# Patient Record
Sex: FEMALE | Race: WHITE | Employment: OTHER | URBAN - METROPOLITAN AREA
[De-identification: names, ages, dates, MRNs, and addresses within clinical notes are randomized per-mention and may not be internally consistent; named-entity substitution may affect disease eponyms.]

---

## 2017-06-05 ENCOUNTER — OFFICE VISIT (OUTPATIENT)
Dept: GERIATRIC MEDICINE | Age: 82
End: 2017-06-05

## 2017-06-05 VITALS
DIASTOLIC BLOOD PRESSURE: 72 MMHG | TEMPERATURE: 97.2 F | HEART RATE: 69 BPM | OXYGEN SATURATION: 96 % | HEIGHT: 61 IN | WEIGHT: 151.5 LBS | SYSTOLIC BLOOD PRESSURE: 159 MMHG | BODY MASS INDEX: 28.6 KG/M2 | RESPIRATION RATE: 18 BRPM

## 2017-06-05 DIAGNOSIS — R41.89 ALTERATION IN COGNITION: ICD-10-CM

## 2017-06-05 DIAGNOSIS — F41.9 ANXIETY: Primary | ICD-10-CM

## 2017-06-05 DIAGNOSIS — R26.89 BALANCE PROBLEM: ICD-10-CM

## 2017-06-05 DIAGNOSIS — F02.80 DEMENTIA IN ALZHEIMER'S DISEASE WITH EARLY ONSET (HCC): ICD-10-CM

## 2017-06-05 DIAGNOSIS — G30.0 DEMENTIA IN ALZHEIMER'S DISEASE WITH EARLY ONSET (HCC): ICD-10-CM

## 2017-06-05 DIAGNOSIS — R79.9 ABNORMAL FINDING OF BLOOD CHEMISTRY: ICD-10-CM

## 2017-06-05 DIAGNOSIS — I10 ESSENTIAL HYPERTENSION: ICD-10-CM

## 2017-06-05 PROBLEM — K76.0 FATTY LIVER DISEASE, NONALCOHOLIC: Chronic | Status: ACTIVE | Noted: 2017-06-05

## 2017-06-05 PROBLEM — N28.9 KIDNEY INSUFFICIENCY: Chronic | Status: ACTIVE | Noted: 2017-06-05

## 2017-06-05 RX ORDER — DONEPEZIL HYDROCHLORIDE 5 MG/1
5 TABLET, FILM COATED ORAL
Qty: 30 TAB | Refills: 1 | Status: SHIPPED | OUTPATIENT
Start: 2017-06-05 | End: 2018-01-18 | Stop reason: SINTOL

## 2017-06-05 RX ORDER — LORAZEPAM 0.5 MG/1
0.5 TABLET ORAL
Qty: 30 TAB | Refills: 2 | Status: SHIPPED | OUTPATIENT
Start: 2017-06-05 | End: 2018-01-18

## 2017-06-05 RX ORDER — FAMOTIDINE 20 MG/1
20 TABLET, FILM COATED ORAL DAILY
COMMUNITY
End: 2018-02-22 | Stop reason: ALTCHOICE

## 2017-06-05 NOTE — PATIENT INSTRUCTIONS

## 2017-06-05 NOTE — PROGRESS NOTES
Visit Vitals    /72 (BP 1 Location: Right arm, BP Patient Position: Sitting)    Pulse 69    Temp 97.2 °F (36.2 °C) (Oral)    Resp 18    Ht 5' 1\" (1.549 m)    Wt 151 lb 8 oz (68.7 kg)    LMP 01/01/2000    SpO2 96%    BMI 28.63 kg/m2     Chief Complaint   Patient presents with    Follow-up     yearly exam     Pt here in clinic for yearly exam. Pt states that she is having hot flashes lasting about half an hour or so.

## 2017-06-08 ENCOUNTER — CLINICAL SUPPORT (OUTPATIENT)
Dept: GERIATRIC MEDICINE | Age: 82
End: 2017-06-08

## 2017-06-08 DIAGNOSIS — R79.9 ABNORMAL FINDING OF BLOOD CHEMISTRY: ICD-10-CM

## 2017-06-08 DIAGNOSIS — F02.80 DEMENTIA IN ALZHEIMER'S DISEASE WITH EARLY ONSET (HCC): ICD-10-CM

## 2017-06-08 DIAGNOSIS — Z01.89 ROUTINE LAB DRAW: Primary | ICD-10-CM

## 2017-06-08 DIAGNOSIS — I10 ESSENTIAL HYPERTENSION: ICD-10-CM

## 2017-06-08 DIAGNOSIS — G30.0 DEMENTIA IN ALZHEIMER'S DISEASE WITH EARLY ONSET (HCC): ICD-10-CM

## 2017-06-08 DIAGNOSIS — R26.89 BALANCE PROBLEM: ICD-10-CM

## 2017-06-08 DIAGNOSIS — R41.89 ALTERATION IN COGNITION: ICD-10-CM

## 2017-06-08 NOTE — PROGRESS NOTES
Chief Complaint   Patient presents with    Labs Only     Pt here in clinic for lab draw only. Labs drawn CBC, CMP, TSH,Lipid Panel, HgbA1C. Pt was informed that results will be called/mailed to her.

## 2017-06-09 LAB
ALBUMIN SERPL-MCNC: 4.4 G/DL (ref 3.5–4.7)
ALBUMIN/GLOB SERPL: 1.6 {RATIO} (ref 1.2–2.2)
ALP SERPL-CCNC: 73 IU/L (ref 39–117)
ALT SERPL-CCNC: 18 IU/L (ref 0–32)
AST SERPL-CCNC: 23 IU/L (ref 0–40)
BASOPHILS # BLD AUTO: 0 X10E3/UL (ref 0–0.2)
BASOPHILS NFR BLD AUTO: 0 %
BILIRUB SERPL-MCNC: 0.4 MG/DL (ref 0–1.2)
BUN SERPL-MCNC: 19 MG/DL (ref 8–27)
BUN/CREAT SERPL: 21 (ref 12–28)
CALCIUM SERPL-MCNC: 9.5 MG/DL (ref 8.7–10.3)
CHLORIDE SERPL-SCNC: 91 MMOL/L (ref 96–106)
CHOLEST SERPL-MCNC: 177 MG/DL (ref 100–199)
CO2 SERPL-SCNC: 24 MMOL/L (ref 18–29)
CREAT SERPL-MCNC: 0.89 MG/DL (ref 0.57–1)
EOSINOPHIL # BLD AUTO: 0.2 X10E3/UL (ref 0–0.4)
EOSINOPHIL NFR BLD AUTO: 3 %
ERYTHROCYTE [DISTWIDTH] IN BLOOD BY AUTOMATED COUNT: 13.7 % (ref 12.3–15.4)
EST. AVERAGE GLUCOSE BLD GHB EST-MCNC: 128 MG/DL
GLOBULIN SER CALC-MCNC: 2.7 G/DL (ref 1.5–4.5)
GLUCOSE SERPL-MCNC: 99 MG/DL (ref 65–99)
HBA1C MFR BLD: 6.1 % (ref 4.8–5.6)
HCT VFR BLD AUTO: 40.9 % (ref 34–46.6)
HDLC SERPL-MCNC: 51 MG/DL
HGB BLD-MCNC: 13.8 G/DL (ref 11.1–15.9)
IMM GRANULOCYTES # BLD: 0 X10E3/UL (ref 0–0.1)
IMM GRANULOCYTES NFR BLD: 0 %
LDLC SERPL CALC-MCNC: 86 MG/DL (ref 0–99)
LYMPHOCYTES # BLD AUTO: 1.7 X10E3/UL (ref 0.7–3.1)
LYMPHOCYTES NFR BLD AUTO: 27 %
MCH RBC QN AUTO: 30.5 PG (ref 26.6–33)
MCHC RBC AUTO-ENTMCNC: 33.7 G/DL (ref 31.5–35.7)
MCV RBC AUTO: 91 FL (ref 79–97)
MONOCYTES # BLD AUTO: 0.9 X10E3/UL (ref 0.1–0.9)
MONOCYTES NFR BLD AUTO: 15 %
NEUTROPHILS # BLD AUTO: 3.5 X10E3/UL (ref 1.4–7)
NEUTROPHILS NFR BLD AUTO: 55 %
PLATELET # BLD AUTO: 267 X10E3/UL (ref 150–379)
POTASSIUM SERPL-SCNC: 4.1 MMOL/L (ref 3.5–5.2)
PROT SERPL-MCNC: 7.1 G/DL (ref 6–8.5)
RBC # BLD AUTO: 4.52 X10E6/UL (ref 3.77–5.28)
SODIUM SERPL-SCNC: 132 MMOL/L (ref 134–144)
TRIGL SERPL-MCNC: 201 MG/DL (ref 0–149)
TSH SERPL DL<=0.005 MIU/L-ACNC: 4.28 UIU/ML (ref 0.45–4.5)
VLDLC SERPL CALC-MCNC: 40 MG/DL (ref 5–40)
WBC # BLD AUTO: 6.4 X10E3/UL (ref 3.4–10.8)

## 2017-06-12 NOTE — PROGRESS NOTES
Even though these results have a few items that are out of the normal they are really good values. I would not change anything at this time and continue with the current regimen of treatment for the dizziness episodes.

## 2018-01-17 ENCOUNTER — TELEPHONE (OUTPATIENT)
Dept: GERIATRIC MEDICINE | Age: 83
End: 2018-01-17

## 2018-01-17 NOTE — TELEPHONE ENCOUNTER
I called the patient to remind her of her appointment on January 18, 2018. Pt stated that they will be on time and will not miss their appointment for the world.

## 2018-01-18 ENCOUNTER — OFFICE VISIT (OUTPATIENT)
Dept: GERIATRIC MEDICINE | Age: 83
End: 2018-01-18

## 2018-01-18 VITALS
RESPIRATION RATE: 20 BRPM | HEART RATE: 63 BPM | SYSTOLIC BLOOD PRESSURE: 185 MMHG | BODY MASS INDEX: 27.87 KG/M2 | WEIGHT: 147.6 LBS | DIASTOLIC BLOOD PRESSURE: 79 MMHG | OXYGEN SATURATION: 96 % | HEIGHT: 61 IN

## 2018-01-18 DIAGNOSIS — K59.01 SLOW TRANSIT CONSTIPATION: ICD-10-CM

## 2018-01-18 DIAGNOSIS — Z78.9 SELF-CARE DEFICIT IN PATIENT LIVING ALONE: ICD-10-CM

## 2018-01-18 DIAGNOSIS — Z00.00 MEDICARE ANNUAL WELLNESS VISIT, SUBSEQUENT: Primary | ICD-10-CM

## 2018-01-18 DIAGNOSIS — G47.62 NOCTURNAL LEG CRAMPS: ICD-10-CM

## 2018-01-18 DIAGNOSIS — F51.01 PRIMARY INSOMNIA: ICD-10-CM

## 2018-01-18 DIAGNOSIS — N28.9 KIDNEY INSUFFICIENCY: Chronic | ICD-10-CM

## 2018-01-18 DIAGNOSIS — E78.2 MIXED HYPERLIPIDEMIA: ICD-10-CM

## 2018-01-18 DIAGNOSIS — R26.0 ATAXIC GAIT: ICD-10-CM

## 2018-01-18 DIAGNOSIS — Z71.89 ADVANCE CARE PLANNING: ICD-10-CM

## 2018-01-18 DIAGNOSIS — F41.8 ANTICIPATORY ANXIETY: ICD-10-CM

## 2018-01-18 DIAGNOSIS — R26.89 BALANCE PROBLEM: ICD-10-CM

## 2018-01-18 DIAGNOSIS — I10 ESSENTIAL HYPERTENSION: ICD-10-CM

## 2018-01-18 NOTE — PROGRESS NOTES
Visit Vitals    /79 (BP 1 Location: Right arm, BP Patient Position: Sitting)    Pulse 63    Resp 20    Ht 5' 1\" (1.549 m)    Wt 147 lb 9.6 oz (67 kg)    LMP 01/01/2000    SpO2 96%    BMI 27.89 kg/m2      Chief Complaint   Patient presents with    Complete Physical     Patient here for yearly physical.    1. Have you been to the ER, urgent care clinic since your last visit? Hospitalized since your last visit? NO     2. Have you seen or consulted any other health care providers outside of the [address] [place] [address] since your last visit? Include any pap smears or colon screening.  NO

## 2018-01-18 NOTE — PATIENT INSTRUCTIONS
Constipation: Care Instructions  Your Care Instructions    Constipation means that you have a hard time passing stools (bowel movements). People pass stools from 3 times a day to once every 3 days. What is normal for you may be different. Constipation may occur with pain in the rectum and cramping. The pain may get worse when you try to pass stools. Sometimes there are small amounts of bright red blood on toilet paper or the surface of stools. This is because of enlarged veins near the rectum (hemorrhoids). A few changes in your diet and lifestyle may help you avoid ongoing constipation. Your doctor may also prescribe medicine to help loosen your stool. Some medicines can cause constipation. These include pain medicines and antidepressants. Tell your doctor about all the medicines you take. Your doctor may want to make a medicine change to ease your symptoms. Follow-up care is a key part of your treatment and safety. Be sure to make and go to all appointments, and call your doctor if you are having problems. It's also a good idea to know your test results and keep a list of the medicines you take. How can you care for yourself at home? · Drink plenty of fluids, enough so that your urine is light yellow or clear like water. If you have kidney, heart, or liver disease and have to limit fluids, talk with your doctor before you increase the amount of fluids you drink. · Include high-fiber foods in your diet each day. These include fruits, vegetables, beans, and whole grains. · Get at least 30 minutes of exercise on most days of the week. Walking is a good choice. You also may want to do other activities, such as running, swimming, cycling, or playing tennis or team sports. · Take a fiber supplement, such as Citrucel or Metamucil, every day. Read and follow all instructions on the label. · Schedule time each day for a bowel movement. A daily routine may help.  Take your time having your bowel movement. · Support your feet with a small step stool when you sit on the toilet. This helps flex your hips and places your pelvis in a squatting position. · Your doctor may recommend an over-the-counter laxative to relieve your constipation. Examples are Milk of Magnesia and MiraLax. Read and follow all instructions on the label. Do not use laxatives on a long-term basis. When should you call for help? Call your doctor now or seek immediate medical care if:  ? · You have new or worse belly pain. ? · You have new or worse nausea or vomiting. ? · You have blood in your stools. ? Watch closely for changes in your health, and be sure to contact your doctor if:  ? · Your constipation is getting worse. ? · You do not get better as expected. Where can you learn more? Go to http://pia-colby.info/. Enter [id number] in the search box to learn more about \"Constipation: Care Instructions. \"  Current as of: March 20, 2017  Content Version: 11.4  © 8845-9451 [employer]. Care instructions adapted under license by [employer] (which disclaims liability or warranty for this information). If you have questions about a medical condition or this instruction, always ask your healthcare professional. [address] any warranty or liability for your use of this information. Insomnia: Care Instructions  Your Care Instructions    Insomnia is the inability to sleep well. It is a common problem for most people at some time. Insomnia may make it hard for you to get to sleep, stay asleep, or sleep as long as you need to. This can make you tired and grouchy during the day. It can also make you forgetful, less effective at work, and unhappy. Insomnia can be caused by conditions such as depression or anxiety. Pain can also affect your ability to sleep. When these problems are solved, the insomnia usually clears up.  But sometimes bad sleep habits can cause insomnia. If insomnia is affecting your work or your enjoyment of life, you can take steps to improve your sleep. Follow-up care is a key part of your treatment and safety. Be sure to make and go to all appointments, and call your doctor if you are having problems. It's also a good idea to know your test results and keep a list of the medicines you take. How can you care for yourself at home? What to avoid  · Do not have drinks with caffeine, such as coffee or black tea, for 8 hours before bed. · Do not smoke or use other types of tobacco near bedtime. Nicotine is a stimulant and can keep you awake. · Avoid drinking alcohol late in the evening, because it can cause you to wake in the middle of the night. · Do not eat a big meal close to bedtime. If you are hungry, eat a light snack. · Do not drink a lot of water close to bedtime, because the need to urinate may wake you up during the night. · Do not read or watch TV in bed. Use the bed only for sleeping and sexual activity. What to try  · Go to bed at the same time every night, and wake up at the same time every morning. Do not take naps during the day. · Keep your bedroom quiet, dark, and cool. · Sleep on a comfortable pillow and mattress. · If watching the clock makes you anxious, turn it facing away from you so you cannot see the time. · If you worry when you lie down, start a worry book. Well before bedtime, write down your worries, and then set the book and your concerns aside. · Try meditation or other relaxation techniques before you go to bed. · If you cannot fall asleep, get up and go to another room until you feel sleepy. Do something relaxing. Repeat your bedtime routine before you go to bed again. · Make your house quiet and calm about an hour before bedtime. Turn down the lights, turn off the TV, log off the computer, and turn down the volume on music. This can help you relax after a busy day. When should you call for help?   Watch closely for changes in your health, and be sure to contact your doctor if:  ? · Your efforts to improve your sleep do not work. ? · Your insomnia gets worse. ? · You have been feeling down, depressed, or hopeless or have lost interest in things that you usually enjoy. Where can you learn more? Go to http://pia-colby.info/. Enter P513 in the search box to learn more about \"Insomnia: Care Instructions. \"  Current as of: July 26, 2016  Content Version: 11.4  © 2177-1817 [employer]. Care instructions adapted under license by [employer] (which disclaims liability or warranty for this information). If you have questions about a medical condition or this instruction, always ask your healthcare professional. [address] any warranty or liability for your use of this information.

## 2018-01-18 NOTE — PROGRESS NOTES
Martha Padilla is a 80 y.o. female and presents for annual Medicare Wellness Visit. Problem List: Reviewed with patient and discussed risk factors. 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  Holter    HX APPENDECTOMY      HX DILATION AND CURETTAGE      x5    HX GYN      D&C (x5), ALLEY/BSO (-Juliann/Zedler), Provera intolerance (bleeding), endometrial Bx annually    HX ALLEY AND BSO      Juliann/Zedler        SH: Reviewed with patient  Social History   Substance Use Topics    Smoking status: Former Smoker     Packs/day: 4.00     Types: Cigarettes     Quit date: 1955    Smokeless tobacco: Never Used    Alcohol use 0.0 - 0.5 oz/week     0 - 1 Standard drinks or equivalent per week      Comment: rare       FH: Reviewed with patient  Family History   Problem Relation Age of Onset    Diabetes Mother     Hypertension Mother     Heart Failure Mother      CHF ( @ 80)   [facility] Alzheimer Mother     Cancer Father      lung ( @ 77)   [facility] Alzheimer Father     Ovarian Cancer Sister      hysterectomy    Breast Cancer Sister      mastectomy    Cancer Sister      breast and ovarian    Alzheimer Sister     Breast Problems Sister breast cyst    Cataract Sister     Hypertension Sister     Diabetes Brother        Medications/Allergies: Reviewed with patient. Current Outpatient Prescriptions on File Prior to Visit   Medication Sig Dispense Refill    famotidine (PEPCID) 20 mg tablet Take 20 mg by mouth daily.  cholecalciferol, vitamin D3, 2,000 unit tab Take  by mouth.  diltiazem CD (CARTIA XT) 180 mg ER capsule Take  by mouth daily.  simvastatin (ZOCOR) 10 mg tablet Take 10 mg by mouth daily.  aspirin 81 mg tablet Take 81 mg by mouth.  MULTIVITAMIN PO Take  by mouth.  CALCIUM CARBONATE/VITAMIN D3 (CALTRATE 600 + D PO) Take  by mouth. No current facility-administered medications on file prior to visit. Allergies   Allergen Reactions    Celecoxib Other (comments)    Sulfa (Sulfonamide Antibiotics) Unknown (comments) and Hives    Darvocet A500 [Propoxyphene N-Acetaminophen] Unknown (comments)    Iodine Unknown (comments)    Statins-Hmg-Coa Reductase Inhibitors Other (comments)     (intolerance)-myalgias    Sulfa (Sulfonamide Antibiotics) Unknown (comments)    Aricept [Donepezil] Myalgia     Headaches - noted as intolerance      Medications Discontinued During This Encounter   Medication Reason    donepezil (ARICEPT) 5 mg tablet Side Effects    LORazepam (ATIVAN) 0.5 mg tablet Not A Current Medication    fexofenadine (ALLEGRA) 180 mg tablet Not A Current Medication       Objective:  Visit Vitals    /79 (BP 1 Location: Right arm, BP Patient Position: Sitting)    Pulse 63    Resp 20    Ht 5' 1\" (1.549 m)    Wt 147 lb 9.6 oz (67 kg)    LMP 01/01/2000    SpO2 96%    BMI 27.89 kg/m2    Body mass index is 27.89 kg/(m^2).     Depression Screen:   PHQ over the last two weeks 1/18/2018   Little interest or pleasure in doing things Not at all   Feeling down, depressed or hopeless Not at all   Total Score PHQ 2 -   Trouble falling or staying asleep, or sleeping too much Not at all Feeling tired or having little energy Not at all   Poor appetite or overeating Not at all   Feeling bad about yourself - or that you are a failure or have let yourself or your family down Not at all   Moving or speaking so slowly that other people could have noticed; or the opposite being so fidgety that others notice Not at all   Thoughts of being better off dead, or hurting yourself in some way Not at all   How difficult have these problems made it for you to do your work, take care of your home and get along with others Not difficult at all      [address] Exam 1/18/2018   What is the Year 1   What is the Season 1   What is the Date 1   What is the Day 1   What is the Month 1   Where are we State 1   Where are we Country 1   Where are we [place] [facility] 1   [place] are we Floor 1   Name three objects, then ask the patient to say them 3   Serial sevens Subtract 7 from 100 in increments 3   Ask for the three objects repeated above 3   Name a pencil 1   Name a watch 1   Have the patient repeat this phrase \"No ifs, ands, or buts\" 1   Three stage command: Take the paper in your right hand 1   Fold the paper in half 1   Put the paper on the floor 1   Read and obey the following: CLOSE YOUR EYES 1   Have the patient write a sentence 1   Have the patient copy a figure 1   Mini Mental Score 28       Fall Risk:   Fall Risk Assessment, last 12 mths 1/18/2018   Able to walk? Yes   Fall in past 12 months? Yes   Fall with injury? No   Number of falls in past 12 months 1   Fall Risk Score 1        Abuse Screen:   Abuse Screening Questionnaire 1/18/2018   Do you ever feel afraid of your partner? N   Are you in a relationship with someone who physically or mentally threatens you? N   Is it safe for you to go home?  N      Cognition Screen:  appropriate for age attention/concentration, appropriate safety awareness, decreased attention/concentration, following commands  follows multi-step simple commands/direction, impulsive and memory loss    Hearing Loss Screen:   The patient wears hearing aids. Activities of Daily Living:   Self-care. Requires assistance with: does not require help with any ADLs     Adult Nutrition Screen:   No risk factors noted. Immunization status:   up to date and documented. Functional Ability:   What are the patient's living arrangements - the patient lives alone. Does the patient use any ambulatory aids: no   If so, what type: none    Does the patient exhibit a steady gait?  no      Is the patient self reliant?  (ie can do own laundry, meals, household chores)  yes      Does the patient handle his/her own medications? yes     Does the patient handle his/her own money? yes     Is the patients home safe (ie good lighting, handrails on stairs and bath, etc.)? yes     Did you notice or did patient express any hearing difficulties? yes     Did you notice or did patient express any vision difficulties?   no     Were distance and reading eye charts used? yes       Advance Care Planning (ACP) Provider Conversation Snapshot    Date of ACP Conversation: 01/18/18  Persons included in Conversation:  patient  Length of ACP Conversation in minutes:  16 minutes    Authorized Decision Maker (if patient is incapable of making informed decisions): This person is: Healthcare Agent/Medical Power of  under Advance Directive        Is the patient competent to make his/her health decisions: yes    For Patients with Decision Making Capacity: \"In these circumstances, what matters most to you? \"  Care focused more on comfort or quality of life. Conversation Outcomes / Follow-Up Plan: Reviewed existing Advance Directive     No physical exam was performed today per Medicare Wellness Guidelines. Was further evaluation necessary? yes  Was it ordered? YES   I have ordered the following therapies for evaluation and treatment of notable deficits.  These referrals have been made to [address] [place] [address] department [facility] in writing. I will discuss these orders with patient on Monday 1/22/18 when we discuss labs. I have not sent these over yet! Physical Therapy: yes  Evaluate and Treat orders has been sent to [address] [facility] with the following concerns to be addressed balance issues, ambulation, safety and strength/ROM lower body. Occupational Therapy: yes  Evaluate and Treat orders has been sent to [address] [facility] with the following concerns to be addressed safety, cognition and mobility with aids. Speech Therapy: yes  Evaluate and Treat orders has been sent to [address] [facility] with the following concerns to be addressed comprehension, cognition and memory deficits. Greater than 30 minutes was spent with patient discussing advance directives, wellness initiatives, and preventative measures of their health. Orders Placed This Encounter    MICROALBUMIN, UR, RAND W/ MICROALBUMIN/CREA RATIO    HEMOGLOBIN A1C WITH EAG    LIPID PANEL    CBC WITH AUTOMATED DIFF    VITAMIN D, 25 HYDROXY    VITAMIN C10    METABOLIC PANEL, COMPREHENSIVE    TSH AND FREE T4       Diagnoses and all orders for this visit:    1. Medicare annual wellness visit, subsequent    2. Advance care planning    3. Essential hypertension  -     MICROALBUMIN, UR, RAND W/ MICROALBUMIN/CREA RATIO  -     HEMOGLOBIN A1C WITH EAG  -     LIPID PANEL  -     CBC WITH AUTOMATED DIFF  -     VITAMIN D, 25 HYDROXY  -     VITAMIN E14  -     METABOLIC PANEL, COMPREHENSIVE  -     TSH AND FREE T4    4. Mixed hyperlipidemia  -     MICROALBUMIN, UR, RAND W/ MICROALBUMIN/CREA RATIO  -     HEMOGLOBIN A1C WITH EAG  -     LIPID PANEL  -     CBC WITH AUTOMATED DIFF  -     VITAMIN D, 25 HYDROXY  -     VITAMIN D05  -     METABOLIC PANEL, COMPREHENSIVE  -     TSH AND FREE T4    5.  Kidney insufficiency  -     MICROALBUMIN, UR, RAND W/ MICROALBUMIN/CREA RATIO  -     HEMOGLOBIN A1C WITH EAG  -     LIPID PANEL  -     CBC WITH AUTOMATED DIFF  -     VITAMIN D, 25 HYDROXY  -     VITAMIN D73  -     METABOLIC PANEL, COMPREHENSIVE  -     TSH AND FREE T4    6. Balance problem  -     MICROALBUMIN, UR, RAND W/ MICROALBUMIN/CREA RATIO  -     HEMOGLOBIN A1C WITH EAG  -     LIPID PANEL  -     CBC WITH AUTOMATED DIFF  -     VITAMIN D, 25 HYDROXY  -     VITAMIN J76  -     METABOLIC PANEL, COMPREHENSIVE  -     TSH AND FREE T4    7. Anticipatory anxiety  -     MICROALBUMIN, UR, RAND W/ MICROALBUMIN/CREA RATIO  -     HEMOGLOBIN A1C WITH EAG  -     LIPID PANEL  -     CBC WITH AUTOMATED DIFF  -     VITAMIN D, 25 HYDROXY  -     VITAMIN O65  -     METABOLIC PANEL, COMPREHENSIVE  -     TSH AND FREE T4    8. Ataxic gait  -     MICROALBUMIN, UR, RAND W/ MICROALBUMIN/CREA RATIO  -     HEMOGLOBIN A1C WITH EAG  -     LIPID PANEL  -     CBC WITH AUTOMATED DIFF  -     VITAMIN D, 25 HYDROXY  -     VITAMIN O82  -     METABOLIC PANEL, COMPREHENSIVE  -     TSH AND FREE T4    9. Primary insomnia  -     MICROALBUMIN, UR, RAND W/ MICROALBUMIN/CREA RATIO  -     HEMOGLOBIN A1C WITH EAG  -     LIPID PANEL  -     CBC WITH AUTOMATED DIFF  -     VITAMIN D, 25 HYDROXY  -     VITAMIN G61  -     METABOLIC PANEL, COMPREHENSIVE  -     TSH AND FREE T4    10. Self-care deficit in patient living alone  -     MICROALBUMIN, UR, RAND W/ MICROALBUMIN/CREA RATIO  -     HEMOGLOBIN A1C WITH EAG  -     LIPID PANEL  -     CBC WITH AUTOMATED DIFF  -     VITAMIN D, 25 HYDROXY  -     VITAMIN R27  -     METABOLIC PANEL, COMPREHENSIVE  -     TSH AND FREE T4    11. Slow transit constipation    12. Nocturnal leg cramps      This is an [facility] Exam (AWV). Patient verbalized understanding and agreement with the plan. A copy of the After Visit Summary with personalized health plan was given to the patient today. I have reviewed the patient's medical history in detail and updated the computerized patient record.      Follow-up Disposition:  Return in about 4 days (around 1/22/2018) for follow up to care provided today. Bharti Russell

## 2018-01-18 NOTE — PROGRESS NOTES
Reason for Visit:      Chief Complaint   Patient presents with    Complete Physical     Patient here for yearly physical.     History of Present Illness:     Bentley Vo is a 80 y.o. female geriatric patient who presents today with concerns     Diagnosis/Plan of Care:   During today's visit the following medications or treatments were requested to be discontinued by the provider :   Medications Discontinued During This Encounter   Medication Reason    donepezil (ARICEPT) 5 mg tablet Side Effects    LORazepam (ATIVAN) 0.5 mg tablet Not A Current Medication    fexofenadine (ALLEGRA) 180 mg tablet Not A Current Medication     Diagnoses and all orders for this visit:    1. Medicare annual wellness visit, subsequent    2. Advance care planning    3. Essential hypertension  -     MICROALBUMIN, UR, RAND W/ MICROALBUMIN/CREA RATIO  -     HEMOGLOBIN A1C WITH EAG  -     LIPID PANEL  -     CBC WITH AUTOMATED DIFF  -     VITAMIN D, 25 HYDROXY  -     VITAMIN K01  -     METABOLIC PANEL, COMPREHENSIVE  -     TSH AND FREE T4    4. Mixed hyperlipidemia  -     MICROALBUMIN, UR, RAND W/ MICROALBUMIN/CREA RATIO  -     HEMOGLOBIN A1C WITH EAG  -     LIPID PANEL  -     CBC WITH AUTOMATED DIFF  -     VITAMIN D, 25 HYDROXY  -     VITAMIN F61  -     METABOLIC PANEL, COMPREHENSIVE  -     TSH AND FREE T4    5. Kidney insufficiency  -     MICROALBUMIN, UR, RAND W/ MICROALBUMIN/CREA RATIO  -     HEMOGLOBIN A1C WITH EAG  -     LIPID PANEL  -     CBC WITH AUTOMATED DIFF  -     VITAMIN D, 25 HYDROXY  -     VITAMIN G59  -     METABOLIC PANEL, COMPREHENSIVE  -     TSH AND FREE T4    6. Balance problem  -     MICROALBUMIN, UR, RAND W/ MICROALBUMIN/CREA RATIO  -     HEMOGLOBIN A1C WITH EAG  -     LIPID PANEL  -     CBC WITH AUTOMATED DIFF  -     VITAMIN D, 25 HYDROXY  -     VITAMIN H46  -     METABOLIC PANEL, COMPREHENSIVE  -     TSH AND FREE T4    7.  Anticipatory anxiety  -     MICROALBUMIN, UR, RAND W/ MICROALBUMIN/CREA RATIO  - HEMOGLOBIN A1C WITH EAG  -     LIPID PANEL  -     CBC WITH AUTOMATED DIFF  -     VITAMIN D, 25 HYDROXY  -     VITAMIN I46  -     METABOLIC PANEL, COMPREHENSIVE  -     TSH AND FREE T4    8. Ataxic gait  -     MICROALBUMIN, UR, RAND W/ MICROALBUMIN/CREA RATIO  -     HEMOGLOBIN A1C WITH EAG  -     LIPID PANEL  -     CBC WITH AUTOMATED DIFF  -     VITAMIN D, 25 HYDROXY  -     VITAMIN R90  -     METABOLIC PANEL, COMPREHENSIVE  -     TSH AND FREE T4    9. Primary insomnia  -     MICROALBUMIN, UR, RAND W/ MICROALBUMIN/CREA RATIO  -     HEMOGLOBIN A1C WITH EAG  -     LIPID PANEL  -     CBC WITH AUTOMATED DIFF  -     VITAMIN D, 25 HYDROXY  -     VITAMIN B56  -     METABOLIC PANEL, COMPREHENSIVE  -     TSH AND FREE T4    10. Self-care deficit in patient living alone  -     MICROALBUMIN, UR, RAND W/ MICROALBUMIN/CREA RATIO  -     HEMOGLOBIN A1C WITH EAG  -     LIPID PANEL  -     CBC WITH AUTOMATED DIFF  -     VITAMIN D, 25 HYDROXY  -     VITAMIN W73  -     METABOLIC PANEL, COMPREHENSIVE  -     TSH AND FREE T4    11. Slow transit constipation    12. Nocturnal leg cramps      Follow-up Disposition:  Return in about 4 days (around 1/22/2018) for follow up to care provided today. .    Subjective: Allergies   Allergen Reactions    Celecoxib Other (comments)    Sulfa (Sulfonamide Antibiotics) Unknown (comments) and Hives    Darvocet A500 [Propoxyphene N-Acetaminophen] Unknown (comments)    Iodine Unknown (comments)    Statins-Hmg-Coa Reductase Inhibitors Other (comments)     (intolerance)-myalgias    Sulfa (Sulfonamide Antibiotics) Unknown (comments)    Aricept [Donepezil] Myalgia     Headaches - noted as intolerance      Current Outpatient Prescriptions on File Prior to Visit   Medication Sig Dispense Refill    famotidine (PEPCID) 20 mg tablet Take 20 mg by mouth daily.  cholecalciferol, vitamin D3, 2,000 unit tab Take  by mouth.  diltiazem CD (CARTIA XT) 180 mg ER capsule Take  by mouth daily.         simvastatin (ZOCOR) 10 mg tablet Take 10 mg by mouth daily.  aspirin 81 mg tablet Take 81 mg by mouth.  MULTIVITAMIN PO Take  by mouth.  CALCIUM CARBONATE/VITAMIN D3 (CALTRATE 600 + D PO) Take  by mouth. No current facility-administered medications on file prior to visit. Past Medical History:   Diagnosis Date    Allergic rhinitis     Edema     Environmental allergies     dizziness-(chronic)    Fatty liver disease, nonalcoholic     GERD (gastroesophageal reflux disease)     Hypercholesterolemia     Hypertension     Liver disease     fatty liver    Peripheral neuropathy     Vertigo     Vitamin D deficiency       Past Surgical History:   Procedure Laterality Date    BREAST SURGERY PROCEDURE UNLISTED      breast cyst Brian Siemens)    CARDIAC SURG PROCEDURE UNLIST  1999    + stress thalassemia; 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  Holter    HX APPENDECTOMY      HX DILATION AND CURETTAGE      x5    HX GYN      D&C (x5), ALLEY/BSO (-Juliann/Alonzo), Provera intolerance (bleeding), endometrial Bx annually    HX ALLEY AND BSO      Juliann/Alonzor      Social History   Substance Use Topics    Smoking status: Former Smoker     Packs/day: 4.00     Types: Cigarettes     Quit date: 1955    Smokeless tobacco: Never Used    Alcohol use 0.0 - 0.5 oz/week     0 - 1 Standard drinks or equivalent per week      Comment: rare      Family History   Problem Relation Age of Onset    Diabetes Mother     Hypertension Mother     Heart Failure Mother      CHF ( @ 80)   Laura Martinez Alzheimer Mother     Cancer Father      lung ( @ 77)   Laura Martinez Alzheimer Father     Ovarian Cancer Sister      hysterectomy    Breast Cancer Sister      mastectomy    Cancer Sister      breast and ovarian    Alzheimer Sister     Breast Problems Sister      breast cyst    Cataract Sister     Hypertension Sister     Diabetes Brother       Latest Laboratory Results:     Lab Results   Component Value Date/Time    WBC 6.4 2017 12:00 AM    HGB 13.8 06/08/2017 12:00 AM    HCT 40.9 06/08/2017 12:00 AM    PLATELET 018 02/71/0755 12:00 AM     Lab Results   Component Value Date/Time    ALT (SGPT) 18 06/08/2017 12:00 AM    AST (SGOT) 23 06/08/2017 12:00 AM    Creatinine 0.89 06/08/2017 12:00 AM    BUN 19 06/08/2017 12:00 AM    Sodium 132 06/08/2017 12:00 AM    Potassium 4.1 06/08/2017 12:00 AM     Lab Results   Component Value Date/Time    Cholesterol, total 177 06/08/2017 12:00 AM    HDL Cholesterol 51 06/08/2017 12:00 AM    LDL, calculated 86 06/08/2017 12:00 AM    Triglyceride 201 06/08/2017 12:00 AM    TSH 4.280 06/08/2017 12:00 AM    Hemoglobin A1c 6.1 06/08/2017 12:00 AM     Objective:     Vitals:    01/18/18 0834 01/18/18 0840   BP: (!) 210/93 185/79   Pulse: 66 63   Resp: 20    SpO2: 96%    Weight: 147 lb 9.6 oz (67 kg)    Height: 5' 1\" (1.549 m)      Review of Systems   Constitutional: Negative for activity change, appetite change, fatigue and fever. HENT: Negative for congestion, dental problem, hearing loss, postnasal drip, sinus pressure, sneezing, sore throat and trouble swallowing. Eyes: Negative for discharge, redness and visual disturbance. Respiratory: Negative for apnea, cough, chest tightness, shortness of breath and wheezing. Cardiovascular: Negative for chest pain, palpitations and leg swelling. Gastrointestinal: Positive for constipation. Negative for abdominal distention, abdominal pain, blood in stool, diarrhea, nausea and vomiting. Endocrine: Negative for polydipsia, polyphagia and polyuria. Genitourinary: Negative for flank pain, frequency and urgency. Musculoskeletal: Negative for arthralgias, gait problem, joint swelling and neck pain. Skin: Negative for color change, pallor, rash and wound. Allergic/Immunologic: Negative for environmental allergies and food allergies. Neurological: Positive for dizziness, weakness and light-headedness. Negative for tremors, numbness and headaches. Hematological: Negative for adenopathy. Psychiatric/Behavioral: Positive for confusion and dysphoric mood. Negative for agitation and sleep disturbance. The patient is nervous/anxious. Physical Exam   Constitutional: She is oriented to person, place, and time and well-developed, well-nourished, and in no distress. Vital signs are normal. She appears to not be writhing in pain, not malnourished and not dehydrated. She appears healthy. She does not have a sickly appearance. No distress. Obese,  female, who's appearance is younger then her stated age. She appears to be in her 66's. She is not in any acute distress. HENT:   Head: Normocephalic and atraumatic. Right Ear: Hearing, tympanic membrane, external ear and ear canal normal.   Left Ear: Hearing, tympanic membrane, external ear and ear canal normal.   Nose: Nose normal. No mucosal edema or rhinorrhea. Mouth/Throat: Uvula is midline, oropharynx is clear and moist and mucous membranes are normal. Mucous membranes are not pale, not dry and not cyanotic. No oral lesions. No uvula swelling. No posterior oropharyngeal edema or posterior oropharyngeal erythema. Eyes: Conjunctivae, EOM and lids are normal. Pupils are equal, round, and reactive to light. Lids are everted and swept, no foreign bodies found. Neck: Trachea normal, normal range of motion and full passive range of motion without pain. Neck supple. No hepatojugular reflux and no JVD present. Carotid bruit is not present. No thyromegaly present. Cardiovascular: Normal rate, regular rhythm, S1 normal, S2 normal, normal heart sounds, intact distal pulses and normal pulses. Occasional extrasystoles are present. Exam reveals no gallop and no friction rub. No murmur heard. No extremity edema noted on visit. Pulmonary/Chest: Effort normal and breath sounds normal.   Abdominal: Soft.  Normal appearance and bowel sounds are normal. She exhibits no distension, no fluid wave and no mass. There is no hepatosplenomegaly. There is no tenderness. There is no rebound and no CVA tenderness. Musculoskeletal:   Generalized weakness with balance issues and intermittent dizziness    Lymphadenopathy:        Head (right side): No submandibular adenopathy present. Head (left side): No submandibular and no tonsillar adenopathy present. She has no cervical adenopathy. She has no axillary adenopathy. Neurological: She is alert and oriented to person, place, and time. She has normal reflexes and intact cranial nerves. She displays weakness and atrophy. A sensory deficit is present. She exhibits normal muscle tone. Coordination and gait abnormal.   Skin: Skin is warm, dry and intact. No bruising, no ecchymosis and no rash noted. She is not diaphoretic. No cyanosis. No pallor. Nails show no clubbing. Psychiatric: Affect normal. Her mood appears anxious. She exhibits disordered thought content and abnormal new learning ability. Baseline mood and affect unchanged from normal.         Disclaimer:   Advised Dougie Leon to call back or return to office if symptoms worsen/change/persist.  Discussed expected course/resolution/complications of diagnosis in detail with patient. Medication risks/benefits/costs/interactions/alternatives discussed with patient and she was given an after visit summary which includes diagnoses, current medications, & vitals. Dougie Leon expressed understanding with the diagnosis and plan.

## 2018-01-19 LAB
25(OH)D3+25(OH)D2 SERPL-MCNC: 42.9 NG/ML (ref 30–100)
ALBUMIN SERPL-MCNC: 4.5 G/DL (ref 3.2–4.6)
ALBUMIN/CREAT UR: 318.5 MG/G CREAT (ref 0–30)
ALBUMIN/GLOB SERPL: 1.6 {RATIO} (ref 1.2–2.2)
ALP SERPL-CCNC: 79 IU/L (ref 39–117)
ALT SERPL-CCNC: 17 IU/L (ref 0–32)
AST SERPL-CCNC: 21 IU/L (ref 0–40)
BASOPHILS # BLD AUTO: 0 X10E3/UL (ref 0–0.2)
BASOPHILS NFR BLD AUTO: 0 %
BILIRUB SERPL-MCNC: 0.3 MG/DL (ref 0–1.2)
BUN SERPL-MCNC: 16 MG/DL (ref 10–36)
BUN/CREAT SERPL: 16 (ref 12–28)
CALCIUM SERPL-MCNC: 10.3 MG/DL (ref 8.7–10.3)
CHLORIDE SERPL-SCNC: 98 MMOL/L (ref 96–106)
CHOLEST SERPL-MCNC: 194 MG/DL (ref 100–199)
CO2 SERPL-SCNC: 25 MMOL/L (ref 18–29)
CREAT SERPL-MCNC: 0.97 MG/DL (ref 0.57–1)
CREAT UR-MCNC: 21.1 MG/DL
EOSINOPHIL # BLD AUTO: 0.2 X10E3/UL (ref 0–0.4)
EOSINOPHIL NFR BLD AUTO: 3 %
ERYTHROCYTE [DISTWIDTH] IN BLOOD BY AUTOMATED COUNT: 13.7 % (ref 12.3–15.4)
EST. AVERAGE GLUCOSE BLD GHB EST-MCNC: 131 MG/DL
GLOBULIN SER CALC-MCNC: 2.9 G/DL (ref 1.5–4.5)
GLUCOSE SERPL-MCNC: 104 MG/DL (ref 65–99)
HBA1C MFR BLD: 6.2 % (ref 4.8–5.6)
HCT VFR BLD AUTO: 43.7 % (ref 34–46.6)
HDLC SERPL-MCNC: 53 MG/DL
HGB BLD-MCNC: 14.8 G/DL (ref 11.1–15.9)
IMM GRANULOCYTES # BLD: 0 X10E3/UL (ref 0–0.1)
IMM GRANULOCYTES NFR BLD: 0 %
LDLC SERPL CALC-MCNC: 109 MG/DL (ref 0–99)
LYMPHOCYTES # BLD AUTO: 1.6 X10E3/UL (ref 0.7–3.1)
LYMPHOCYTES NFR BLD AUTO: 26 %
MCH RBC QN AUTO: 30.3 PG (ref 26.6–33)
MCHC RBC AUTO-ENTMCNC: 33.9 G/DL (ref 31.5–35.7)
MCV RBC AUTO: 90 FL (ref 79–97)
MICROALBUMIN UR-MCNC: 67.2 UG/ML
MONOCYTES # BLD AUTO: 0.5 X10E3/UL (ref 0.1–0.9)
MONOCYTES NFR BLD AUTO: 8 %
NEUTROPHILS # BLD AUTO: 4 X10E3/UL (ref 1.4–7)
NEUTROPHILS NFR BLD AUTO: 63 %
PLATELET # BLD AUTO: 273 X10E3/UL (ref 150–379)
POTASSIUM SERPL-SCNC: 4.4 MMOL/L (ref 3.5–5.2)
PROT SERPL-MCNC: 7.4 G/DL (ref 6–8.5)
RBC # BLD AUTO: 4.88 X10E6/UL (ref 3.77–5.28)
SODIUM SERPL-SCNC: 141 MMOL/L (ref 134–144)
SPECIMEN STATUS REPORT, ROLRST: NORMAL
T4 FREE SERPL-MCNC: 1.1 NG/DL (ref 0.82–1.77)
TRIGL SERPL-MCNC: 162 MG/DL (ref 0–149)
TSH SERPL DL<=0.005 MIU/L-ACNC: 4 UIU/ML (ref 0.45–4.5)
VIT B12 SERPL-MCNC: 896 PG/ML (ref 232–1245)
VLDLC SERPL CALC-MCNC: 32 MG/DL (ref 5–40)
WBC # BLD AUTO: 6.2 X10E3/UL (ref 3.4–10.8)

## 2018-01-22 ENCOUNTER — OFFICE VISIT (OUTPATIENT)
Dept: GERIATRIC MEDICINE | Age: 83
End: 2018-01-22

## 2018-01-25 ENCOUNTER — OFFICE VISIT (OUTPATIENT)
Dept: GERIATRIC MEDICINE | Age: 83
End: 2018-01-25

## 2018-01-25 VITALS
TEMPERATURE: 98.7 F | BODY MASS INDEX: 27.75 KG/M2 | OXYGEN SATURATION: 98 % | DIASTOLIC BLOOD PRESSURE: 58 MMHG | HEIGHT: 61 IN | SYSTOLIC BLOOD PRESSURE: 144 MMHG | HEART RATE: 62 BPM | RESPIRATION RATE: 20 BRPM | WEIGHT: 147 LBS

## 2018-01-25 DIAGNOSIS — E78.2 HYPERLIPEMIA, MIXED: Primary | ICD-10-CM

## 2018-01-25 DIAGNOSIS — F41.8 ANTICIPATORY ANXIETY: ICD-10-CM

## 2018-01-25 DIAGNOSIS — F51.01 PRIMARY INSOMNIA: ICD-10-CM

## 2018-01-25 DIAGNOSIS — Z71.2 ENCOUNTER TO DISCUSS TEST RESULTS: ICD-10-CM

## 2018-01-25 DIAGNOSIS — K59.01 SLOW TRANSIT CONSTIPATION: ICD-10-CM

## 2018-01-25 DIAGNOSIS — R73.03 PREDIABETES: ICD-10-CM

## 2018-01-25 DIAGNOSIS — R41.3 EPISODIC MEMORY LOSS: ICD-10-CM

## 2018-01-25 DIAGNOSIS — I48.0 PAROXYSMAL ATRIAL FIBRILLATION (HCC): ICD-10-CM

## 2018-01-25 DIAGNOSIS — I10 ESSENTIAL HYPERTENSION: ICD-10-CM

## 2018-01-25 DIAGNOSIS — Z78.9 PATIENT IS FULL CODE: ICD-10-CM

## 2018-01-25 RX ORDER — FENOFIBRATE 48 MG/1
48 TABLET, COATED ORAL DAILY
Qty: 30 TAB | Refills: 2 | Status: SHIPPED | OUTPATIENT
Start: 2018-01-25 | End: 2018-11-28 | Stop reason: ALTCHOICE

## 2018-01-25 NOTE — PATIENT INSTRUCTIONS
Anxiety Disorder: Care Instructions  Your Care Instructions    Anxiety is a normal reaction to stress. Difficult situations can cause you to have symptoms such as sweaty palms and a nervous feeling. In an anxiety disorder, the symptoms are far more severe. Constant worry, muscle tension, trouble sleeping, nausea and diarrhea, and other symptoms can make normal daily activities difficult or impossible. These symptoms may occur for no reason, and they can affect your work, school, or social life. Medicines, counseling, and self-care can all help. Follow-up care is a key part of your treatment and safety. Be sure to make and go to all appointments, and call your doctor if you are having problems. It's also a good idea to know your test results and keep a list of the medicines you take. How can you care for yourself at home? · Take medicines exactly as directed. Call your doctor if you think you are having a problem with your medicine. · Go to your counseling sessions and follow-up appointments. · Recognize and accept your anxiety. Then, when you are in a situation that makes you anxious, say to yourself, \"This is not an emergency. I feel uncomfortable, but I am not in danger. I can keep going even if I feel anxious. \"  · Be kind to your body:  ¨ Relieve tension with exercise or a massage. ¨ Get enough rest.  ¨ Avoid alcohol, caffeine, nicotine, and illegal drugs. They can increase your anxiety level and cause sleep problems. ¨ Learn and do relaxation techniques. See below for more about these techniques. · Engage your mind. Get out and do something you enjoy. Go to a funny movie, or take a walk or hike. Plan your day. Having too much or too little to do can make you anxious. · Keep a record of your symptoms. Discuss your fears with a good friend or family member, or join a support group for people with similar problems. Talking to others sometimes relieves stress.   · Get involved in social groups, or volunteer to help others. Being alone sometimes makes things seem worse than they are. · Get at least 30 minutes of exercise on most days of the week to relieve stress. Walking is a good choice. You also may want to do other activities, such as running, swimming, cycling, or playing tennis or team sports. Relaxation techniques  Do relaxation exercises 10 to 20 minutes a day. You can play soothing, relaxing music while you do them, if you wish. · Tell others in your house that you are going to do your relaxation exercises. Ask them not to disturb you. · Find a comfortable place, away from all distractions and noise. · Lie down on your back, or sit with your back straight. · Focus on your breathing. Make it slow and steady. · Breathe in through your nose. Breathe out through either your nose or mouth. · Breathe deeply, filling up the area between your navel and your rib cage. Breathe so that your belly goes up and down. · Do not hold your breath. · Breathe like this for 5 to 10 minutes. Notice the feeling of calmness throughout your whole body. As you continue to breathe slowly and deeply, relax by doing the following for another 5 to 10 minutes:  · Tighten and relax each muscle group in your body. You can begin at your toes and work your way up to your head. · Imagine your muscle groups relaxing and becoming heavy. · Empty your mind of all thoughts. · Let yourself relax more and more deeply. · Become aware of the state of calmness that surrounds you. · When your relaxation time is over, you can bring yourself back to alertness by moving your fingers and toes and then your hands and feet and then stretching and moving your entire body. Sometimes people fall asleep during relaxation, but they usually wake up shortly afterward. · Always give yourself time to return to full alertness before you drive a car or do anything that might cause an accident if you are not fully alert.  Never play a relaxation tape while you drive a car. When should you call for help? Call 911 anytime you think you may need emergency care. For example, call if:  ? · You feel you cannot stop from hurting yourself or someone else. ? Keep the numbers for these national suicide hotlines: 9-798-545-TALK (3-674.602.4529) and 2-318-NUJOFCE (4-368.803.2818). If you or someone you know talks about suicide or feeling hopeless, get help right away. ? Watch closely for changes in your health, and be sure to contact your doctor if:  ? · You have anxiety or fear that affects your life. ? · You have symptoms of anxiety that are new or different from those you had before. Where can you learn more? Go to http://pia-colby.info/. Enter P754 in the search box to learn more about \"Anxiety Disorder: Care Instructions. \"  Current as of: May 12, 2017  Content Version: 11.4  © 2016-3019 [facility]. Care instructions adapted under license by [employer] (which disclaims liability or warranty for this information). If you have questions about a medical condition or this instruction, always ask your healthcare professional. [address] any warranty or liability for your use of this information. Atrial Fibrillation: Care Instructions  Your Care Instructions    Atrial fibrillation is an irregular and often fast heartbeat. Treating this condition is important for several reasons. It can cause blood clots, which can travel from your heart to your brain and cause a stroke. If you have a fast heartbeat, you may feel lightheaded, dizzy, and weak. An irregular heartbeat can also increase your risk for heart failure. Atrial fibrillation is often the result of another heart condition, such as high blood pressure or coronary artery disease. Making changes to improve your heart condition will help you stay healthy and active. Follow-up care is a key part of your treatment and safety.  Be sure to make and go to all appointments, and call your doctor if you are having problems. It's also a good idea to know your test results and keep a list of the medicines you take. How can you care for yourself at home? Medicines  ? · Take your medicines exactly as prescribed. Call your doctor if you think you are having a problem with your medicine. You will get more details on the specific medicines your doctor prescribes. ? · If your doctor has given you a blood thinner to prevent a stroke, be sure you get instructions about how to take your medicine safely. Blood thinners can cause serious bleeding problems. ? · Do not take any vitamins, over-the-counter drugs, or herbal products without talking to your doctor first.   ? Lifestyle changes  ? · Do not smoke. Smoking can increase your chance of a stroke and heart attack. If you need help quitting, talk to your doctor about stop-smoking programs and medicines. These can increase your chances of quitting for good. ? · Eat a heart-healthy diet. ? · Stay at a healthy weight. Lose weight if you need to.   ? · Limit alcohol to 2 drinks a day for men and 1 drink a day for women. Too much alcohol can cause health problems. ? · Avoid colds and flu. Get a pneumococcal vaccine shot. If you have had one before, ask your doctor whether you need another dose. Get a flu shot every year. If you must be around people with colds or flu, wash your hands often. Activity  ? · If your doctor recommends it, get more exercise. Walking is a good choice. Bit by bit, increase the amount you walk every day. Try for at least 30 minutes on most days of the week. You also may want to swim, bike, or do other activities. Your doctor may suggest that you join a cardiac rehabilitation program so that you can have help increasing your physical activity safely. ? · Start light exercise if your doctor says it is okay.  Even a small amount will help you get stronger, have more energy, and manage stress. Walking is an easy way to get exercise. Start out by walking a little more than you did in the hospital. Gradually increase the amount you walk. ? · When you exercise, watch for signs that your heart is working too hard. You are pushing too hard if you cannot talk while you are exercising. If you become short of breath or dizzy or have chest pain, sit down and rest immediately. ? · Check your pulse regularly. Place two fingers on the artery at the palm side of your wrist, in line with your thumb. If your heartbeat seems uneven or fast, talk to your doctor. When should you call for help? Call 911 anytime you think you may need emergency care. For example, call if:  ? · You have symptoms of a heart attack. These may include:  ¨ Chest pain or pressure, or a strange feeling in the chest.  ¨ Sweating. ¨ Shortness of breath. ¨ Nausea or vomiting. ¨ Pain, pressure, or a strange feeling in the back, neck, jaw, or upper belly or in one or both shoulders or arms. ¨ Lightheadedness or sudden weakness. ¨ A fast or irregular heartbeat. After you call 911, the  may tell you to chew 1 adult-strength or 2 to 4 low-dose aspirin. Wait for an ambulance. Do not try to drive yourself. ? · You have symptoms of a stroke. These may include:  ¨ Sudden numbness, tingling, weakness, or loss of movement in your face, arm, or leg, especially on only one side of your body. ¨ Sudden vision changes. ¨ Sudden trouble speaking. ¨ Sudden confusion or trouble understanding simple statements. ¨ Sudden problems with walking or balance. ¨ A sudden, severe headache that is different from past headaches. ? · You passed out (lost consciousness). ?Call your doctor now or seek immediate medical care if:  ? · You have new or increased shortness of breath. ? · You feel dizzy or lightheaded, or you feel like you may faint. ? · Your heart rate becomes irregular.    ? · You can feel your heart flutter in your chest or skip heartbeats. Tell your doctor if these symptoms are new or worse. ? Watch closely for changes in your health, and be sure to contact your doctor if you have any problems. Where can you learn more? Go to http://pia-colby.info/. Enter U020 in the search box to learn more about \"Atrial Fibrillation: Care Instructions. \"  Current as of: September 21, 2016  Content Version: 11.4  © 6033-0454 [employer]. Care instructions adapted under license by [employer] (which disclaims liability or warranty for this information). If you have questions about a medical condition or this instruction, always ask your healthcare professional. [place] [address] any warranty or liability for your use of this information. Constipation: Care Instructions  Your Care Instructions    Constipation means that you have a hard time passing stools (bowel movements). People pass stools from 3 times a day to once every 3 days. What is normal for you may be different. Constipation may occur with pain in the rectum and cramping. The pain may get worse when you try to pass stools. Sometimes there are small amounts of bright red blood on toilet paper or the surface of stools. This is because of enlarged veins near the rectum (hemorrhoids). A few changes in your diet and lifestyle may help you avoid ongoing constipation. Your doctor may also prescribe medicine to help loosen your stool. Some medicines can cause constipation. These include pain medicines and antidepressants. Tell your doctor about all the medicines you take. Your doctor may want to make a medicine change to ease your symptoms. Follow-up care is a key part of your treatment and safety. Be sure to make and go to all appointments, and call your doctor if you are having problems. It's also a good idea to know your test results and keep a list of the medicines you take.   How can you care for yourself at home?  · Drink plenty of fluids, enough so that your urine is light yellow or clear like water. If you have kidney, heart, or liver disease and have to limit fluids, talk with your doctor before you increase the amount of fluids you drink. · Include high-fiber foods in your diet each day. These include fruits, vegetables, beans, and whole grains. · Get at least 30 minutes of exercise on most days of the week. Walking is a good choice. You also may want to do other activities, such as running, swimming, cycling, or playing tennis or team sports. · Take a fiber supplement, such as Citrucel or Metamucil, every day. Read and follow all instructions on the label. · Schedule time each day for a bowel movement. A daily routine may help. Take your time having your bowel movement. · Support your feet with a small step stool when you sit on the toilet. This helps flex your hips and places your pelvis in a squatting position. · Your doctor may recommend an over-the-counter laxative to relieve your constipation. Examples are Milk of Magnesia and MiraLax. Read and follow all instructions on the label. Do not use laxatives on a long-term basis. When should you call for help? Call your doctor now or seek immediate medical care if:  ? · You have new or worse belly pain. ? · You have new or worse nausea or vomiting. ? · You have blood in your stools. ? Watch closely for changes in your health, and be sure to contact your doctor if:  ? · Your constipation is getting worse. ? · You do not get better as expected. Where can you learn more? Go to http://pia-colby.info/. Enter [phone] in the search box to learn more about \"Constipation: Care Instructions. \"  Current as of: March 20, 2017  Content Version: 11.4  © 7165-8851 [employer]. Care instructions adapted under license by [employer] (which disclaims liability or warranty for this information).  If you have questions about a medical condition or this instruction, always ask your healthcare professional. [place] [address] any warranty or liability for your use of this information.

## 2018-01-25 NOTE — PROGRESS NOTES
Health Maintenance Due   Topic Date Due    GLAUCOMA SCREENING Q2Y  11/13/1992    Influenza Age 5 to Adult  08/01/2017       Chief Complaint   Patient presents with    Results     Patient here to discuss lab results with NP.        1. Have you been to the ER, urgent care clinic since your last visit? NO Hospitalized since your last visit? No                    2. Have you seen or consulted any other health care providers outside of the [address] [place] [address] since your last visit? NO  Include any pap smears or colon screening. NO     3) Do you have an Advance Directive on file? NO     4) Are you interested in receiving information on Advance Directives? Yes       Patient is accompanied by self  I have received verbal consent from Prentis Najjar to discuss any/all medical information while they are present in the room.

## 2018-01-25 NOTE — PROGRESS NOTES
Reason for Visit:      Chief Complaint   Patient presents with    Results     Patient here to discuss lab results with NP. History of Present Illness:     Bakari Rose is a 80 y.o. female geriatric patient who presents today to follow up on her visit from last week, review labs, and obtain answers to some of her questions she has been worried with. Diagnosis/Plan of Care:   During today's visit the following medications or treatments were requested to be discontinued by the provider :   Medications Discontinued During This Encounter   Medication Reason    simvastatin (ZOCOR) 10 mg tablet Alternate Therapy     Diagnoses and all orders for this visit:    1. Hyperlipemia, mixed  -     REFERRAL TO [facility] PROGRAMS  -     fenofibrate nanocrystallized (TRICOR) 48 mg tablet; Take 1 Tab by mouth daily. Indications: mixed hyperlipidemia  -     REFERRAL TO DIETITIAN  -     LIPID PANEL; Future    2. Encounter to discuss test results  -     REFERRAL TO [facility] PROGRAMS    3. Patient is full code  -     REFERRAL TO [facility] PROGRAMS  -     FULL CODE    4. Essential hypertension  -     REFERRAL TO [facility] PROGRAMS    5. Anticipatory anxiety  -     REFERRAL TO [facility] PROGRAMS  -     REFERRAL TO SPEECH THERAPY    6. Primary insomnia  -     REFERRAL TO [facility] PROGRAMS    7. Slow transit constipation  -     REFERRAL TO [facility] PROGRAMS  -     REFERRAL TO DIETITIAN    8. Episodic memory loss  -     REFERRAL TO [facility] PROGRAMS  -     REFERRAL TO SPEECH THERAPY    9. Prediabetes  -     REFERRAL TO [facility] PROGRAMS  -     REFERRAL TO DIETITIAN  -     HEMOGLOBIN A1C W/O EAG; Future  -     MICROALBUMIN, UR, RAND W/ MICROALBUMIN/CREA RATIO; Future    10. Paroxysmal atrial fibrillation (HCC)  -     REFERRAL TO CARDIOLOGY    Records have been requested from the following providers: Labs/Test results requested from other physicians, Cardiology Dr. Mere Camara.  The records are necessary for a full holistic approach of Bakari Rose medical treatment plan, if changes need to be made the provider will contact the patient individually. Follow-up Disposition: Return in about 2 weeks (around 2018) for follow up to care provided today. At that time we will discuss the followin. Constipation  2. Dietician Consult   3. Cardiology Consult  4. Trying an antidepressant, such as Lexapro. 5. Trying an anxiety medication, such as Xanax. 6. Trying Myrbetric for the chronic nocturia. Subjective: Allergies   Allergen Reactions    Celecoxib Other (comments)    Sulfa (Sulfonamide Antibiotics) Unknown (comments) and Hives    Darvocet A500 [Propoxyphene N-Acetaminophen] Unknown (comments)    Iodine Unknown (comments)    Statins-Hmg-Coa Reductase Inhibitors Other (comments)     (intolerance)-myalgias    Sulfa (Sulfonamide Antibiotics) Unknown (comments)    Aricept [Donepezil] Myalgia     Headaches - noted as intolerance      Current Outpatient Prescriptions on File Prior to Visit   Medication Sig Dispense Refill    famotidine (PEPCID) 20 mg tablet Take 20 mg by mouth daily.  cholecalciferol, vitamin D3, 2,000 unit tab Take  by mouth.  diltiazem CD (CARTIA XT) 180 mg ER capsule Take  by mouth daily.  aspirin 81 mg tablet Take 81 mg by mouth.  MULTIVITAMIN PO Take  by mouth.  CALCIUM CARBONATE/VITAMIN D3 (CALTRATE 600 + D PO) Take  by mouth. No current facility-administered medications on file prior to visit.        Past Medical History:   Diagnosis Date    Allergic rhinitis     Edema     Environmental allergies     dizziness-(chronic)    Fatty liver disease, nonalcoholic     GERD (gastroesophageal reflux disease)     Hypercholesterolemia     Hypertension     Liver disease     fatty liver    Peripheral neuropathy     Vertigo     Vitamin D deficiency       Past Surgical History:   Procedure Laterality Date    BREAST SURGERY PROCEDURE UNLISTED      breast cyst Alice Lamb)    CARDIAC SURG PROCEDURE UNLIST 1999    + stress thalassemia; 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  Holter    HX APPENDECTOMY      HX DILATION AND CURETTAGE      x5    HX GYN      D&C (x5), ALLEY/BSO (-), Provera intolerance (bleeding), endometrial Bx annually    HX ALLEY AND BSO      Juliann/Zedler      Social History   Substance Use Topics    Smoking status: Former Smoker     Packs/day: 4.00     Types: Cigarettes     Quit date: 1955    Smokeless tobacco: Never Used    Alcohol use 0.0 - 0.5 oz/week     0 - 1 Standard drinks or equivalent per week      Comment: rare      Family History   Problem Relation Age of Onset    Diabetes Mother     Hypertension Mother     Heart Failure Mother      CHF ( @ 80)   [facility] Alzheimer Mother     Cancer Father      lung ( @ 77)   [facility] Alzheimer Father     No Known Problems Son     No Known Problems Son     Ovarian Cancer Sister      hysterectomy    Breast Cancer Sister      mastectomy    Cancer Sister      breast and ovarian    Alzheimer Sister     Breast Problems Sister      breast cyst    Cataract Sister     Hypertension Sister     Diabetes Brother       Latest Laboratory Results:     Lab Results   Component Value Date/Time    WBC 6.2 2018 10:42 AM    HGB 14.8 2018 10:42 AM    HCT 43.7 2018 10:42 AM    PLATELET 597  10:42 AM     Lab Results   Component Value Date/Time    ALT (SGPT) 17 2018 10:42 AM    AST (SGOT) 21 2018 10:42 AM    Creatinine 0.97 2018 10:42 AM    BUN 16 2018 10:42 AM    Sodium 141 2018 10:42 AM    Potassium 4.4 2018 10:42 AM     Lab Results   Component Value Date/Time    Cholesterol, total 194 2018 10:42 AM    HDL Cholesterol 53 2018 10:42 AM    LDL, calculated 109 2018 10:42 AM    Triglyceride 162 2018 10:42 AM    TSH 4.000 2018 10:42 AM    Hemoglobin A1c 6.2 2018 10:42 AM     Objective:     Vitals:    18 1022   BP: 144/58   Pulse: 62   Resp: 20   Temp: 98.7 °F (37.1 °C)   TempSrc: Oral   SpO2: 98%   Weight: 147 lb (66.7 kg)   Height: 5' 1\" (1.549 m)     Review of Systems   Constitutional: Positive for fatigue. Negative for activity change, appetite change and fever. HENT: Negative for congestion, dental problem, hearing loss, postnasal drip, sinus pressure, sneezing, sore throat and trouble swallowing. Eyes: Negative for discharge, redness and visual disturbance. Respiratory: Negative for apnea, cough, chest tightness, shortness of breath and wheezing. Cardiovascular: Negative for chest pain, palpitations and leg swelling. Gastrointestinal: Positive for constipation. Negative for abdominal distention, abdominal pain, blood in stool, diarrhea, nausea and vomiting. Endocrine: Negative for polydipsia, polyphagia and polyuria. Genitourinary: Negative for flank pain, frequency and urgency. Musculoskeletal: Negative for arthralgias, gait problem, joint swelling and neck pain. Skin: Negative for color change, pallor, rash and wound. Allergic/Immunologic: Negative for environmental allergies and food allergies. Neurological: Positive for dizziness, weakness and light-headedness. Negative for tremors, numbness and headaches. Hematological: Negative for adenopathy. Psychiatric/Behavioral: Positive for confusion and dysphoric mood. Negative for agitation and sleep disturbance. The patient is nervous/anxious. Physical Exam   Constitutional: She is oriented to person, place, and time and well-developed, well-nourished, and in no distress. Vital signs are normal. She appears to not be writhing in pain, not malnourished and not dehydrated. She appears healthy. She does not have a sickly appearance. No distress. Obese,  female, who's appearance is younger then her stated age. She appears to be in her 66's. She is not in any acute distress. HENT:   Head: Normocephalic and atraumatic.    Right Ear: Hearing, tympanic membrane, external ear and ear canal normal.   Left Ear: Hearing, tympanic membrane, external ear and ear canal normal.   Nose: Nose normal. No mucosal edema or rhinorrhea. Mouth/Throat: Uvula is midline, oropharynx is clear and moist and mucous membranes are normal. Mucous membranes are not pale, not dry and not cyanotic. No oral lesions. No uvula swelling. No posterior oropharyngeal edema or posterior oropharyngeal erythema. Eyes: Conjunctivae, EOM and lids are normal. Pupils are equal, round, and reactive to light. Lids are everted and swept, no foreign bodies found. Neck: Trachea normal, normal range of motion and full passive range of motion without pain. Neck supple. No hepatojugular reflux and no JVD present. Carotid bruit is not present. No thyromegaly present. Cardiovascular: Normal rate, S1 normal, S2 normal, intact distal pulses and normal pulses. A regularly irregular rhythm present. Occasional extrasystoles are present. Exam reveals distant heart sounds. Exam reveals no gallop and no friction rub. No murmur heard. No extremity edema noted on visit. Pulmonary/Chest: Effort normal and breath sounds normal.   Abdominal: Soft. Normal appearance and bowel sounds are normal. She exhibits no distension, no fluid wave and no mass. There is no hepatosplenomegaly. There is no tenderness. There is no rebound and no CVA tenderness. Musculoskeletal:   Generalized weakness with balance issues and intermittent dizziness    Lymphadenopathy:        Head (right side): No submandibular adenopathy present. Head (left side): No submandibular and no tonsillar adenopathy present. She has no cervical adenopathy. She has no axillary adenopathy. Neurological: She is alert and oriented to person, place, and time. She has normal reflexes and intact cranial nerves. She displays weakness and atrophy. A sensory deficit is present. She exhibits normal muscle tone. Coordination and gait abnormal. GCS score is 15.    Skin: Skin is warm, dry and intact. No bruising, no ecchymosis and no rash noted. She is not diaphoretic. No cyanosis. No pallor. Nails show no clubbing. Psychiatric: Affect normal. Her mood appears anxious. She exhibits disordered thought content and abnormal new learning ability. Baseline mood and affect unchanged from normal.         Disclaimer:   Advised Maggi Katz to call back or return to office if symptoms worsen/change/persist.  Discussed expected course/resolution/complications of diagnosis in detail with patient. Medication risks/benefits/costs/interactions/alternatives discussed with patient and she was given an after visit summary which includes diagnoses, current medications, & vitals. Maggi Katz expressed understanding with the diagnosis and plan.

## 2018-02-08 ENCOUNTER — OFFICE VISIT (OUTPATIENT)
Dept: GERIATRIC MEDICINE | Age: 83
End: 2018-02-08

## 2018-02-08 VITALS
WEIGHT: 147 LBS | HEIGHT: 61 IN | DIASTOLIC BLOOD PRESSURE: 77 MMHG | OXYGEN SATURATION: 98 % | HEART RATE: 70 BPM | BODY MASS INDEX: 27.75 KG/M2 | RESPIRATION RATE: 20 BRPM | TEMPERATURE: 97.8 F | SYSTOLIC BLOOD PRESSURE: 170 MMHG

## 2018-02-08 DIAGNOSIS — H65.90 FLUID COLLECTION OF MIDDLE EAR: ICD-10-CM

## 2018-02-08 DIAGNOSIS — F41.8 ANTICIPATORY ANXIETY: ICD-10-CM

## 2018-02-08 DIAGNOSIS — K59.01 SLOW TRANSIT CONSTIPATION: ICD-10-CM

## 2018-02-08 DIAGNOSIS — R42 VERTIGO: Primary | ICD-10-CM

## 2018-02-08 DIAGNOSIS — H66.90 ACUTE OTITIS MEDIA, UNSPECIFIED OTITIS MEDIA TYPE: ICD-10-CM

## 2018-02-08 DIAGNOSIS — F51.01 PRIMARY INSOMNIA: ICD-10-CM

## 2018-02-08 DIAGNOSIS — Z66 DNR NO CODE (DO NOT RESUSCITATE): ICD-10-CM

## 2018-02-08 DIAGNOSIS — I48.0 PAROXYSMAL ATRIAL FIBRILLATION (HCC): ICD-10-CM

## 2018-02-08 RX ORDER — ALPRAZOLAM 0.25 MG/1
0.25 TABLET ORAL
Qty: 30 TAB | Refills: 0 | Status: SHIPPED | OUTPATIENT
Start: 2018-02-08 | End: 2018-11-28 | Stop reason: ALTCHOICE

## 2018-02-08 RX ORDER — CIPROFLOXACIN AND DEXAMETHASONE 3; 1 MG/ML; MG/ML
2 SUSPENSION/ DROPS AURICULAR (OTIC) 2 TIMES DAILY
Qty: 7.5 ML | Refills: 0 | Status: SHIPPED | OUTPATIENT
Start: 2018-02-08 | End: 2018-02-13

## 2018-02-08 RX ORDER — AMOXICILLIN AND CLAVULANATE POTASSIUM 875; 125 MG/1; MG/1
1 TABLET, FILM COATED ORAL EVERY 12 HOURS
Qty: 14 TAB | Refills: 0 | Status: SHIPPED | OUTPATIENT
Start: 2018-02-08 | End: 2018-02-15

## 2018-02-08 RX ORDER — MONTELUKAST SODIUM 10 MG/1
10 TABLET ORAL DAILY
Qty: 30 TAB | Refills: 0 | Status: SHIPPED | OUTPATIENT
Start: 2018-02-08 | End: 2018-03-27

## 2018-02-08 RX ORDER — MECLIZINE HCL 12.5 MG 12.5 MG/1
12.5 TABLET ORAL
COMMUNITY
End: 2018-11-28 | Stop reason: ALTCHOICE

## 2018-02-08 RX ORDER — FLUCONAZOLE 100 MG/1
100 TABLET ORAL DAILY
Qty: 1 TAB | Refills: 0 | Status: SHIPPED | OUTPATIENT
Start: 2018-02-08 | End: 2018-02-09

## 2018-02-08 NOTE — PROGRESS NOTES
There are no preventive care reminders to display for this patient. Chief Complaint   Patient presents with    Constipation     Patient here for follow up.  Follow-up       1. Have you been to the ER, urgent care clinic since your last visit? Hospitalized since your last visit? No    2. Have you seen or consulted any other health care providers outside of the [address] [place] [address] since your last visit? Include any pap smears or colon screening. No    3) Do you have an Advance Directive on file? Yes    4) Are you interested in receiving information on Advance Directives? NO      Patient is accompanied by self I have received verbal consent from Estela Chow to discuss any/all medical information while they are present in the room. Corrinne Dresser

## 2018-02-08 NOTE — PATIENT INSTRUCTIONS
Anxiety Disorder: Care Instructions  Your Care Instructions    Anxiety is a normal reaction to stress. Difficult situations can cause you to have symptoms such as sweaty palms and a nervous feeling. In an anxiety disorder, the symptoms are far more severe. Constant worry, muscle tension, trouble sleeping, nausea and diarrhea, and other symptoms can make normal daily activities difficult or impossible. These symptoms may occur for no reason, and they can affect your work, school, or social life. Medicines, counseling, and self-care can all help. Follow-up care is a key part of your treatment and safety. Be sure to make and go to all appointments, and call your doctor if you are having problems. It's also a good idea to know your test results and keep a list of the medicines you take. How can you care for yourself at home? · Take medicines exactly as directed. Call your doctor if you think you are having a problem with your medicine. · Go to your counseling sessions and follow-up appointments. · Recognize and accept your anxiety. Then, when you are in a situation that makes you anxious, say to yourself, \"This is not an emergency. I feel uncomfortable, but I am not in danger. I can keep going even if I feel anxious. \"  · Be kind to your body:  ¨ Relieve tension with exercise or a massage. ¨ Get enough rest.  ¨ Avoid alcohol, caffeine, nicotine, and illegal drugs. They can increase your anxiety level and cause sleep problems. ¨ Learn and do relaxation techniques. See below for more about these techniques. · Engage your mind. Get out and do something you enjoy. Go to a funny movie, or take a walk or hike. Plan your day. Having too much or too little to do can make you anxious. · Keep a record of your symptoms. Discuss your fears with a good friend or family member, or join a support group for people with similar problems. Talking to others sometimes relieves stress.   · Get involved in social groups, or volunteer to help others. Being alone sometimes makes things seem worse than they are. · Get at least 30 minutes of exercise on most days of the week to relieve stress. Walking is a good choice. You also may want to do other activities, such as running, swimming, cycling, or playing tennis or team sports. Relaxation techniques  Do relaxation exercises 10 to 20 minutes a day. You can play soothing, relaxing music while you do them, if you wish. · Tell others in your house that you are going to do your relaxation exercises. Ask them not to disturb you. · Find a comfortable place, away from all distractions and noise. · Lie down on your back, or sit with your back straight. · Focus on your breathing. Make it slow and steady. · Breathe in through your nose. Breathe out through either your nose or mouth. · Breathe deeply, filling up the area between your navel and your rib cage. Breathe so that your belly goes up and down. · Do not hold your breath. · Breathe like this for 5 to 10 minutes. Notice the feeling of calmness throughout your whole body. As you continue to breathe slowly and deeply, relax by doing the following for another 5 to 10 minutes:  · Tighten and relax each muscle group in your body. You can begin at your toes and work your way up to your head. · Imagine your muscle groups relaxing and becoming heavy. · Empty your mind of all thoughts. · Let yourself relax more and more deeply. · Become aware of the state of calmness that surrounds you. · When your relaxation time is over, you can bring yourself back to alertness by moving your fingers and toes and then your hands and feet and then stretching and moving your entire body. Sometimes people fall asleep during relaxation, but they usually wake up shortly afterward. · Always give yourself time to return to full alertness before you drive a car or do anything that might cause an accident if you are not fully alert.  Never play a relaxation tape while you drive a car. When should you call for help? Call 911 anytime you think you may need emergency care. For example, call if:  ? · You feel you cannot stop from hurting yourself or someone else. ? Keep the numbers for these national suicide hotlines: 7-067-090-TALK (6-974.263.5662) and 3-144-MUTCUCC (7-293.248.4105). If you or someone you know talks about suicide or feeling hopeless, get help right away. ? Watch closely for changes in your health, and be sure to contact your doctor if:  ? · You have anxiety or fear that affects your life. ? · You have symptoms of anxiety that are new or different from those you had before. Where can you learn more? Go to http://pia-colby.info/. Enter P754 in the search box to learn more about \"Anxiety Disorder: Care Instructions. \"  Current as of: May 12, 2017  Content Version: 11.4  © 6563-0883 [employer]. Care instructions adapted under license by [employer] (which disclaims liability or warranty for this information). If you have questions about a medical condition or this instruction, always ask your healthcare professional. [place] [address] any warranty or liability for your use of this information. Ear Infection (Otitis Media): Care Instructions  Your Care Instructions    An ear infection may start with a cold and affect the middle ear (otitis media). It can hurt a lot. Most ear infections clear up on their own in a couple of days. Most often you will not need antibiotics. This is because many ear infections are caused by a virus. Antibiotics don't work against a virus. Regular doses of pain medicines are the best way to reduce your fever and help you feel better. Follow-up care is a key part of your treatment and safety. Be sure to make and go to all appointments, and call your doctor if you are having problems.  It's also a good idea to know your test results and keep a list of the medicines you take. How can you care for yourself at home? · Take pain medicines exactly as directed. ¨ If the doctor gave you a prescription medicine for pain, take it as prescribed. ¨ If you are not taking a prescription pain medicine, take an over-the-counter medicine, such as acetaminophen (Tylenol), ibuprofen (Advil, Motrin), or naproxen (Aleve). Read and follow all instructions on the label. ¨ Do not take two or more pain medicines at the same time unless the doctor told you to. Many pain medicines have acetaminophen, which is Tylenol. Too much acetaminophen (Tylenol) can be harmful. · Plan to take a full dose of pain reliever before bedtime. Getting enough sleep will help you get better. · Try a warm, moist washcloth on the ear. It may help relieve pain. · If your doctor prescribed antibiotics, take them as directed. Do not stop taking them just because you feel better. You need to take the full course of antibiotics. When should you call for help? Call your doctor now or seek immediate medical care if:  ? · You have new or increasing ear pain. ? · You have new or increasing pus or blood draining from your ear. ? · You have a fever with a stiff neck or a severe headache. ? Watch closely for changes in your health, and be sure to contact your doctor if:  ? · You have new or worse symptoms. ? · You are not getting better after taking an antibiotic for 2 days. Where can you learn more? Go to http://pia-colby.info/. Enter U215 in the search box to learn more about \"Ear Infection (Otitis Media): Care Instructions. \"  Current as of: May 12, 2017  Content Version: 11.4  © 8540-6371 [employer]. Care instructions adapted under license by [employer] (which disclaims liability or warranty for this information).  If you have questions about a medical condition or this instruction, always ask your healthcare professional. [address] any warranty or liability for your use of this information. Learning About Sleeping Well  What does sleeping well mean? Sleeping well means getting enough sleep. How much sleep is enough varies among people. The number of hours you sleep is not as important as how you feel when you wake up. If you do not feel refreshed, you probably need more sleep. Another sign of not getting enough sleep is feeling tired during the day. The average total nightly sleep time is 7½ to 8 hours. Healthy adults may need a little more or a little less than this. Why is getting enough sleep important? Getting enough quality sleep is a basic part of good health. When your sleep suffers, your mood and your thoughts can suffer too. You may find yourself feeling more grumpy or stressed. Not getting enough sleep also can lead to serious problems, including injury, accidents, anxiety, and depression. What might cause poor sleeping? Many things can cause sleep problems, including:  · Stress. Stress can be caused by fear about a single event, such as giving a speech. Or you may have ongoing stress, such as worry about work or school. · Depression, anxiety, and other mental or emotional conditions. · Changes in your sleep habits or surroundings. This includes changes that happen where you sleep, such as noise, light, or sleeping in a different bed. It also includes changes in your sleep pattern, such as having jet lag or working a late shift. · Health problems, such as pain, breathing problems, and restless legs syndrome. · Lack of regular exercise. How can you help yourself? Here are some tips that may help you sleep more soundly and wake up feeling more refreshed. Your sleeping area  · Use your bedroom only for sleeping and sex. A bit of light reading may help you fall asleep. But if it doesn't, do your reading elsewhere in the house. Don't watch TV in bed.   · Be sure your bed is big enough to stretch out comfortably, especially if you have a sleep partner. · Keep your bedroom quiet, dark, and cool. Use curtains, blinds, or a sleep mask to block out light. To block out noise, use earplugs, soothing music, or a \"white noise\" machine. Your evening and bedtime routine  · Create a relaxing bedtime routine. You might want to take a warm shower or bath, listen to soothing music, or drink a cup of noncaffeinated tea. · Go to bed at the same time every night. And get up at the same time every morning, even if you feel tired. What to avoid  · Limit caffeine (coffee, tea, caffeinated sodas) during the day, and don't have any for at least 4 to 6 hours before bedtime. · Don't drink alcohol before bedtime. Alcohol can cause you to wake up more often during the night. · Don't smoke or use tobacco, especially in the evening. Nicotine can keep you awake. · Don't take naps during the day, especially close to bedtime. · Don't lie in bed awake for too long. If you can't fall asleep, or if you wake up in the middle of the night and can't get back to sleep within 15 minutes or so, get out of bed and go to another room until you feel sleepy. · Don't take medicine right before bed that may keep you awake or make you feel hyper or energized. Your doctor can tell you if your medicine may do this and if you can take it earlier in the day. If you can't sleep  · Imagine yourself in a peaceful, pleasant scene. Focus on the details and feelings of being in a place that is relaxing. · Get up and do a quiet or boring activity until you feel sleepy. · Don't drink any liquids after 6 p.m. if you wake up often because you have to go to the bathroom. Where can you learn more? Go to http://pia-colby.info/. Enter P722 in the search box to learn more about \"Learning About Sleeping Well. \"  Current as of: May 12, 2017  Content Version: 11.4  © 4396-4362 [employer].  Care instructions adapted under license by [employer] (which disclaims liability or warranty for this information). If you have questions about a medical condition or this instruction, always ask your healthcare professional. [address] any warranty or liability for your use of this information.

## 2018-02-08 NOTE — LETTER
2/8/2018 9:59 AM 
 
Ms. Dottie Hatchet [address] [facility] [address] [id number] Your instruction for care after your visit today are as follows: Any questions do not hesitate to call Domi Kumar [facility] Nurse at [phone]. Please STOP taking the following medications: There are no discontinued medications. Please START taking the following medication: 
 
Orders Placed This Encounter  DO NOT RESUSCITATE  meclizine (ANTIVERT) 12.5 mg tablet Sig: Take 12.5 mg by mouth every eight (8) hours as needed.  amoxicillin-clavulanate (AUGMENTIN) 875-125 mg per tablet Sig: Take 1 Tab by mouth every twelve (12) hours for 7 days. Indications: Acute Otitis Media Dispense:  14 Tab Refill:  0  
 montelukast (SINGULAIR) 10 mg tablet Sig: Take 1 Tab by mouth daily. Indications: Allergic Rhinitis Dispense:  30 Tab Refill:  0  
 linaclotide (LINZESS) 72 mcg cap Sig: Take 1 Caplet by mouth daily (with breakfast). Indications: chronic idiopathic constipation Dispense:  30 Cap Refill:  1  ciprofloxacin-dexamethasone (CIPRODEX) 0.3-0.1 % otic suspension Sig: Administer 2 Drops into each ear two (2) times a day for 5 days. Dispense:  7.5 mL Refill:  0  
 ALPRAZolam (XANAX) 0.25 mg tablet Sig: Take 1 Tab by mouth three (3) times daily as needed for Anxiety. Max Daily Amount: 0.75 mg. Indications: Generalized Anxiety Disorder Dispense:  30 Tab Refill:  0  
 fluconazole (DIFLUCAN) 100 mg tablet Sig: Take 1 Tab by mouth daily for 1 day. Indications: PREVENTION OF VULVOVAGINAL CANDIDIASIS Dispense:  1 Tab Refill:  0 Diagnoses and all orders for this visit: 1. Vertigo - Continue to use the meclazine as needed but only if the treatments below are not working.  
-     amoxicillin-clavulanate (AUGMENTIN) 875-125 mg per tablet; Take 1 Tab by mouth every twelve (12) hours for 7 days.    Indications: Acute Otitis Media 
-     montelukast (SINGULAIR) 10 mg tablet; Take 1 Tab by mouth daily. Indications: Fluid Collection in Ears 
-     ciprofloxacin-dexamethasone (CIPRODEX) 0.3-0.1 % otic suspension; Administer 2 Drops into each ear two (2) times a   day for 5 days. Indications: Inner Ear Infection, Acute -     ALPRAZolam (XANAX) 0.25 mg tablet; Take 1 Tab by mouth three (3) times daily as needed for Anxiety. Max Daily Amount: 0.75 mg. Indications: Anxiety, Dizziness, Promote Sleep 2. Fluid collection of middle ear 3. Acute otitis media, unspecified otitis media type 
-     amoxicillin-clavulanate (AUGMENTIN) 875-125 mg per tablet; Take 1 Tab by mouth every twelve (12) hours for 7 days. Indications: Acute Otitis Media 
-     fluconazole (DIFLUCAN) 100 mg tablet; Take 1 Tab by mouth daily for 1 day. Indications: PREVENTION OF Yeast Infection from Antibiotics 4. Slow transit constipation 
-     linaclotide (LINZESS) 72 mcg cap; Take 1 Caplet by mouth daily (with breakfast). Indications: chronic idiopathic constipation 5. Anticipatory anxiety -     ALPRAZolam (XANAX) 0.25 mg tablet; Take 1 Tab by mouth three (3) times daily as needed for Anxiety. Max Daily Amount: 0.75 mg. Indications: Anxiety, Dizziness, Promote Sleep 6. Primary insomnia -     ALPRAZolam (XANAX) 0.25 mg tablet; Take 1 Tab by mouth three (3) times daily as needed for Anxiety. Max Daily Amount: 0.75 mg. Indications: Anxiety, Dizziness, Promote Sleep 7. Paroxysmal atrial fibrillation (HCC) 8. DNR no code (do not resuscitate) -     DO NOT RESUSCITATE Please return on Monday 2/12/18 for a follow up to your ear infection. Thank you for allowing us to participate in your health care.   
 
Philip Cantu NP

## 2018-02-08 NOTE — PROGRESS NOTES
Reason for Visit:      Chief Complaint   Patient presents with    Constipation     Patient here for follow up.  Follow-up     History of Present Illness:     Balwinder Kimball is a 80 y.o. female geriatric patient who presents today for a 2 week follow up to review the healthcare suggestions and concerns I have for her. She has complaints of dizziness, confusion, constipation, uncontrolled a fib, insomnia, balance concerns, and severe anxiety. Today we are discussin. Constipation   2. Dietician Consult   3. Cardiology Consult  4. Trying an antidepressant, such as Lexapro. 5. Trying an anxiety medication, such as Xanax. 6. Trying Myrbetric for the chronic nocturia. Diagnosis/Plan of Care:     Constipation- discussed with patient, gave her a Linzess handout and the medications have been ordered. Dietician Consult - completed 18 with Tenisha Adamson. Cardiology Consult- Ms. Blayne Cavazos is ok with requesting records from dr. Tesha Alejandro and then seeing another Cardiologist for the Afib treatment. As she continues to have runs of flutter and increased heart rate. Antidepressant/Anxiety Treatment- Ms. Blayne Cavazos does not want to treat these as she believe she can resolve them on her own and \"does not want to be addicted to a pill\". I have attempted to educate her and give her all the benefits and concerns with treatment. She has significant obsessive compulsive behaviors, insomnia, and anxiety. Discussed the use of Lexapro to help even out her emotions, she currently declines this. She is ok with using the Xanax as she was using Ativan in the past. She has failed treatment with Remeron in the past. Unknown as she does not remember using it. She has an old script of Ativan but it is out dated. Nocturia- She used Detrol XL in the past and it made her stop urinating. She became paranoid that it was keeping her from peeing and so she discontinued it and returned to urinating sometimes 9 x a night. At this time she does not want to treat these symptoms. During today's visit the following medications or treatments were requested to be discontinued by the provider :   There are no discontinued medications. Diagnoses and all orders for this visit:    1. Vertigo  -     amoxicillin-clavulanate (AUGMENTIN) 875-125 mg per tablet; Take 1 Tab by mouth every twelve (12) hours for 7 days. Indications: Acute Otitis Media  -     montelukast (SINGULAIR) 10 mg tablet; Take 1 Tab by mouth daily. Indications: Allergic Rhinitis  -     ciprofloxacin-dexamethasone (CIPRODEX) 0.3-0.1 % otic suspension; Administer 2 Drops into each ear two (2) times a day for 5 days.  -     ALPRAZolam (XANAX) 0.25 mg tablet; Take 1 Tab by mouth three (3) times daily as needed for Anxiety. Max Daily Amount: 0.75 mg. Indications: Generalized Anxiety Disorder    2. Fluid collection of middle ear  -     amoxicillin-clavulanate (AUGMENTIN) 875-125 mg per tablet; Take 1 Tab by mouth every twelve (12) hours for 7 days. Indications: Acute Otitis Media  -     montelukast (SINGULAIR) 10 mg tablet; Take 1 Tab by mouth daily. Indications: Allergic Rhinitis  -     ciprofloxacin-dexamethasone (CIPRODEX) 0.3-0.1 % otic suspension; Administer 2 Drops into each ear two (2) times a day for 5 days. 3. Acute otitis media, unspecified otitis media type  -     amoxicillin-clavulanate (AUGMENTIN) 875-125 mg per tablet; Take 1 Tab by mouth every twelve (12) hours for 7 days. Indications: Acute Otitis Media  -     ciprofloxacin-dexamethasone (CIPRODEX) 0.3-0.1 % otic suspension; Administer 2 Drops into each ear two (2) times a day for 5 days. -     fluconazole (DIFLUCAN) 100 mg tablet; Take 1 Tab by mouth daily for 1 day. Indications: PREVENTION OF VULVOVAGINAL CANDIDIASIS    4. Slow transit constipation  -     linaclotide (LINZESS) 72 mcg cap; Take 1 Caplet by mouth daily (with breakfast).  Indications: chronic idiopathic constipation  -     ALPRAZolam Ernpiter Lloyd) 0.25 mg tablet; Take 1 Tab by mouth three (3) times daily as needed for Anxiety. Max Daily Amount: 0.75 mg. Indications: Generalized Anxiety Disorder    5. Anticipatory anxiety  -     ALPRAZolam (XANAX) 0.25 mg tablet; Take 1 Tab by mouth three (3) times daily as needed for Anxiety. Max Daily Amount: 0.75 mg. Indications: Generalized Anxiety Disorder    6. Primary insomnia  -     ALPRAZolam (XANAX) 0.25 mg tablet; Take 1 Tab by mouth three (3) times daily as needed for Anxiety. Max Daily Amount: 0.75 mg. Indications: Generalized Anxiety Disorder    7. Paroxysmal atrial fibrillation (HCC)  -     ALPRAZolam (XANAX) 0.25 mg tablet; Take 1 Tab by mouth three (3) times daily as needed for Anxiety. Max Daily Amount: 0.75 mg. Indications: Generalized Anxiety Disorder    8. DNR no code (do not resuscitate)  -     DO NOT RESUSCITATE    Records have been requested from the following providers: Labs/Test results requested from other physicians from Dr. Justin Jackson and Dr. Kya Lange. The records are necessary for a [facility], if changes need to be made the provider will contact the patient individually. Follow-up Disposition:  Return in about 4 days (around 2/12/2018) for follow up to care provided today. .    Subjective: Allergies   Allergen Reactions    Celecoxib Other (comments)    Sulfa (Sulfonamide Antibiotics) Unknown (comments) and Hives    Darvocet A500 [Propoxyphene N-Acetaminophen] Unknown (comments)    Iodine Unknown (comments)    Statins-Hmg-Coa Reductase Inhibitors Other (comments)     (intolerance)-myalgias    Sulfa (Sulfonamide Antibiotics) Unknown (comments)    Aricept [Donepezil] Myalgia     Headaches - noted as intolerance      Current Outpatient Prescriptions on File Prior to Visit   Medication Sig Dispense Refill    fenofibrate nanocrystallized (TRICOR) 48 mg tablet Take 1 Tab by mouth daily.  Indications: mixed hyperlipidemia 30 Tab 2    famotidine (PEPCID) 20 mg tablet Take 20 mg by mouth daily.  cholecalciferol, vitamin D3, 2,000 unit tab Take  by mouth.  diltiazem CD (CARTIA XT) 180 mg ER capsule Take  by mouth daily.  aspirin 81 mg tablet Take 81 mg by mouth.  MULTIVITAMIN PO Take  by mouth.  CALCIUM CARBONATE/VITAMIN D3 (CALTRATE 600 + D PO) Take  by mouth. No current facility-administered medications on file prior to visit. Past Medical History:   Diagnosis Date    Allergic rhinitis     Edema     Environmental allergies     dizziness-(chronic)    Fatty liver disease, nonalcoholic     GERD (gastroesophageal reflux disease)     Hypercholesterolemia     Hypertension     Liver disease     fatty liver    Peripheral neuropathy     Vertigo     Vitamin D deficiency       Past Surgical History:   Procedure Laterality Date    BREAST SURGERY PROCEDURE UNLISTED      breast cyst Philip Robledo)    CARDIAC SURG PROCEDURE UNLIST  1999    + stress thalassemia; 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  Holter    HX APPENDECTOMY      HX DILATION AND CURETTAGE      x5    HX GYN      D&C (x5), ALLEY/BSO (-Juliann/Jerome), Provera intolerance (bleeding), endometrial Bx annually    HX ALLEY AND BSO      Juliann/Zedler      Social History   Substance Use Topics    Smoking status: Former Smoker     Packs/day: 4.00     Types: Cigarettes     Quit date: 1955    Smokeless tobacco: Never Used    Alcohol use 0.0 - 0.5 oz/week     0 - 1 Standard drinks or equivalent per week      Comment: rare      Family History   Problem Relation Age of Onset    Diabetes Mother     Hypertension Mother     Heart Failure Mother      CHF ( @ 80)   [facility] Alzheimer Mother     Cancer Father      lung ( @ 77)   [facility] Alzheimer Father     No Known Problems Son     No Known Problems Son     Ovarian Cancer Sister      hysterectomy    Breast Cancer Sister      mastectomy    Cancer Sister      breast and ovarian    Alzheimer Sister     Breast Problems Sister      breast cyst    Cataract Sister     Hypertension Sister     Diabetes Brother       Latest Laboratory Results:     Lab Results   Component Value Date/Time    WBC 6.2 01/18/2018 10:42 AM    HGB 14.8 01/18/2018 10:42 AM    HCT 43.7 01/18/2018 10:42 AM    PLATELET 435 67/18/9918 10:42 AM     Lab Results   Component Value Date/Time    ALT (SGPT) 17 01/18/2018 10:42 AM    AST (SGOT) 21 01/18/2018 10:42 AM    Creatinine 0.97 01/18/2018 10:42 AM    BUN 16 01/18/2018 10:42 AM    Sodium 141 01/18/2018 10:42 AM    Potassium 4.4 01/18/2018 10:42 AM     Lab Results   Component Value Date/Time    Cholesterol, total 194 01/18/2018 10:42 AM    HDL Cholesterol 53 01/18/2018 10:42 AM    LDL, calculated 109 (H) 01/18/2018 10:42 AM    Triglyceride 162 (H) 01/18/2018 10:42 AM    TSH 4.000 01/18/2018 10:42 AM    Hemoglobin A1c 6.2 (H) 01/18/2018 10:42 AM     Objective:     Vitals:    02/08/18 0855 02/08/18 0856   BP: 178/76 170/77   Pulse: 73 70   Resp: 20    Temp: 97.8 °F (36.6 °C)    TempSrc: Oral    SpO2: 98%    Weight: 147 lb (66.7 kg)    Height: 5' 1\" (1.549 m)      Extended / Orthostatic Vitals:    Vital Signs  Temp: 97.8 °F (36.6 °C) (02/08/18 0855)  Pulse (Heart Rate): 70 (02/08/18 0856)  Resp Rate: 20 (02/08/18 0855)  BP: 170/77 (02/08/18 0856)  BP 1 Location: Right arm (02/08/18 0856)  BP Patient Position: Sitting (02/08/18 0856)    Additional Blood Pressure/Pulse Data  Pulse 2: 66 (02/08/18 0000)  BP 2: 178/62 (02/08/18 0000)  BP 2 Location: Right arm (02/08/18 0000)  BP Method 2: Manual (02/08/18 0000)  Patient Position 2: Supine (02/08/18 0000)  Pulse 3: 66 (02/08/18 0000)  BP 3: 184/78 (02/08/18 0000)  BP 3 Location: Right arm (02/08/18 0000)  BP Method 3: Manual (02/08/18 0000)  Patient Position 3: Sitting (02/08/18 0000)  Pulse 4: 68 (02/08/18 0000)  BP 4: 182/78 (02/08/18 0000)  BP 4 Location: Right arm (02/08/18 0000)  BP Method 4: Manual (02/08/18 0000)  Patient Position 4: Standing (02/08/18 0000)    Oxygen Therapy  O2 Sat (%): 98 % (02/08/18 0828)    Review of Systems   Constitutional: Negative for activity change, appetite change, fatigue and fever. HENT: Negative for congestion, dental problem, hearing loss, postnasal drip, sinus pressure, sneezing, sore throat and trouble swallowing. Eyes: Negative for discharge, redness and visual disturbance. Respiratory: Negative for apnea, cough, chest tightness, shortness of breath and wheezing. Cardiovascular: Negative for chest pain, palpitations and leg swelling. Gastrointestinal: Positive for abdominal distention and constipation. Negative for abdominal pain, blood in stool, diarrhea, nausea and vomiting. Endocrine: Negative for polydipsia, polyphagia and polyuria. Genitourinary: Negative for flank pain, frequency and urgency. Musculoskeletal: Negative for arthralgias, gait problem, joint swelling and neck pain. Skin: Negative for color change, pallor, rash and wound. Allergic/Immunologic: Negative for environmental allergies and food allergies. Neurological: Positive for dizziness, weakness and light-headedness. Negative for tremors, numbness and headaches. Hematological: Negative for adenopathy. Psychiatric/Behavioral: Positive for confusion, dysphoric mood and sleep disturbance. Negative for agitation. The patient is nervous/anxious. Physical Exam   Constitutional: She is well-developed, well-nourished, and in no distress. Vital signs are normal. She appears to not be writhing in pain, not malnourished and not dehydrated. She appears healthy. She does not have a sickly appearance. No distress. Obese,  female, who's appearance is younger then her stated age. She appears to be in her 66's. She is not in any acute distress. HENT:   Head: Normocephalic and atraumatic. Right Ear: Ear canal normal. There is tenderness. Tympanic membrane is bulging. A middle ear effusion is present. Decreased hearing is noted. Left Ear: Ear canal normal. There is tenderness. Tympanic membrane is bulging. A middle ear effusion is present. Decreased hearing is noted. Nose: Nose normal. No mucosal edema or rhinorrhea. Mouth/Throat: Uvula is midline, oropharynx is clear and moist and mucous membranes are normal. Mucous membranes are not pale, not dry and not cyanotic. No oral lesions. No uvula swelling. No posterior oropharyngeal edema or posterior oropharyngeal erythema. Has been having dizzy spells since Sunday last week. She has been taking Meclizine for the spells but it is not getting better. As of today she has a large amount of fluid behind the left TM and it is bulging. The right is mild to moderately fluid filled. She also states the left ear gets really hot feeling and hurts her. Eyes: Conjunctivae, EOM and lids are normal. Pupils are equal, round, and reactive to light. Lids are everted and swept, no foreign bodies found. Neck: Trachea normal, normal range of motion and full passive range of motion without pain. Neck supple. No hepatojugular reflux and no JVD present. Carotid bruit is not present. No thyromegaly present. Cardiovascular: Normal rate, S1 normal, S2 normal, intact distal pulses and normal pulses. A regularly irregular rhythm present. Occasional extrasystoles are present. Exam reveals distant heart sounds. Exam reveals no gallop and no friction rub. No murmur heard. No extremity edema noted on visit. Pulmonary/Chest: Effort normal and breath sounds normal.   Abdominal: Soft. Normal appearance and bowel sounds are normal. She exhibits no distension, no fluid wave and no mass. There is no hepatosplenomegaly. There is no tenderness. There is no rebound and no CVA tenderness. Musculoskeletal:   Generalized weakness with balance issues and intermittent dizziness    Lymphadenopathy:        Head (right side): No submandibular adenopathy present.         Head (left side): No submandibular and no tonsillar adenopathy present. She has no cervical adenopathy. She has no axillary adenopathy. Neurological: She is alert. She has normal reflexes and intact cranial nerves. She is disoriented. She displays weakness and atrophy. A sensory deficit is present. She exhibits normal muscle tone. Coordination and gait abnormal. GCS score is 15. Has been having dizzy spells since Sunday last week. She has been taking Meclizine for the spells but it is not getting better. As of today she has a large amount of fluid behind the left TM and it is bulging. The right is mild to moderately fluid filled. Skin: Skin is warm, dry and intact. No bruising, no ecchymosis and no rash noted. She is not diaphoretic. No cyanosis. No pallor. Nails show no clubbing. Psychiatric: Affect normal. Her mood appears anxious. She expresses impulsivity. She exhibits disordered thought content and abnormal new learning ability. Baseline mood and affect unchanged from normal.         Disclaimer:   Advised Hossein Mekhilion to call back or return to office if symptoms worsen/change/persist.  Discussed expected course/resolution/complications of diagnosis in detail with patient. Medication risks/benefits/costs/interactions/alternatives discussed with patient and she was given an after visit summary which includes diagnoses, current medications, & vitals. Hossein Pontiff expressed understanding with the diagnosis and plan.

## 2018-02-09 ENCOUNTER — CLINICAL SUPPORT (OUTPATIENT)
Dept: GERIATRIC MEDICINE | Age: 83
End: 2018-02-09

## 2018-02-09 VITALS — HEART RATE: 60 BPM | HEIGHT: 61 IN | SYSTOLIC BLOOD PRESSURE: 150 MMHG | DIASTOLIC BLOOD PRESSURE: 69 MMHG

## 2018-02-09 DIAGNOSIS — Z01.30 BLOOD PRESSURE CHECK: Primary | ICD-10-CM

## 2018-02-09 RX ORDER — MINERAL OIL
ENEMA (ML) RECTAL
COMMUNITY
End: 2018-11-28 | Stop reason: ALTCHOICE

## 2018-02-09 NOTE — PROGRESS NOTES
There are no preventive care reminders to display for this patient. Chief Complaint   Patient presents with    Blood Pressure Check     Patient here for blood pressure check.  Medication Management     Patient here for medication refill for weekly pill box. 1. Have you been to the ER, urgent care clinic since your last visit? Hospitalized since your last visit? No    2. Have you seen or consulted any other health care providers outside of the [address] [place] [address] since your last visit? Include any pap smears or colon screening. No    3) Do you have an Advance Directive on file? yes    4) Are you interested in receiving information on Advance Directives? NO      Patient is accompanied by self I have received verbal consent from Balwinder Kimball to discuss any/all medical information while they are present in the room.

## 2018-02-21 ENCOUNTER — TELEPHONE (OUTPATIENT)
Dept: GERIATRIC MEDICINE | Age: 83
End: 2018-02-21

## 2018-02-21 NOTE — TELEPHONE ENCOUNTER
Pt wants to begin taking Prilosec again for her indigestion. She states she has been taking Pepcid but it does not seem to work well, she sometimes has to take two Pepcid to help her. She was once told to stop taking Prilosec as it was not a good medication to be on long-term. Please contact pt to approve/deny the use of Prilosec or to recommend an alternative medication.

## 2018-02-22 DIAGNOSIS — K21.9 GASTROESOPHAGEAL REFLUX DISEASE WITHOUT ESOPHAGITIS: Primary | ICD-10-CM

## 2018-02-22 RX ORDER — PHENOL/SODIUM PHENOLATE
40 AEROSOL, SPRAY (ML) MUCOUS MEMBRANE DAILY
Qty: 90 TAB | Refills: 1 | Status: SHIPPED | OUTPATIENT
Start: 2018-02-22 | End: 2018-03-27

## 2018-03-11 DIAGNOSIS — E78.2 HYPERLIPEMIA, MIXED: ICD-10-CM

## 2018-03-11 DIAGNOSIS — R73.03 PREDIABETES: ICD-10-CM

## 2018-03-27 ENCOUNTER — OFFICE VISIT (OUTPATIENT)
Dept: CARDIOLOGY CLINIC | Age: 83
End: 2018-03-27

## 2018-03-27 VITALS
HEIGHT: 61 IN | HEART RATE: 78 BPM | WEIGHT: 147.4 LBS | SYSTOLIC BLOOD PRESSURE: 144 MMHG | RESPIRATION RATE: 16 BRPM | BODY MASS INDEX: 27.83 KG/M2 | DIASTOLIC BLOOD PRESSURE: 80 MMHG

## 2018-03-27 DIAGNOSIS — R01.1 SYSTOLIC EJECTION MURMUR: Primary | ICD-10-CM

## 2018-03-27 DIAGNOSIS — E78.2 MIXED HYPERLIPIDEMIA: ICD-10-CM

## 2018-03-27 DIAGNOSIS — I48.0 PAROXYSMAL ATRIAL FIBRILLATION (HCC): ICD-10-CM

## 2018-03-27 DIAGNOSIS — I25.10 CORONARY ARTERY DISEASE INVOLVING NATIVE CORONARY ARTERY OF NATIVE HEART WITHOUT ANGINA PECTORIS: ICD-10-CM

## 2018-03-27 RX ORDER — OMEPRAZOLE 40 MG/1
40 CAPSULE, DELAYED RELEASE ORAL DAILY
COMMUNITY
End: 2018-12-03 | Stop reason: ALTCHOICE

## 2018-03-27 NOTE — MR AVS SNAPSHOT
[address] [facility] [address] Rosalio Lamb 13 
[phone] Patient: Balwinder Kimball MRN: [id number] EJB:[id number] Visit Information Date & Time Provider Department Dept. Phone Encounter #  
 3/27/2018 10:40 AM Amandeep Howe MD [facility] Madelyn Brand [phone] [id number] Your Appointments 4/3/2018  9:00 AM  
ECHO CARDIOGRAMS 2D with [facility] (TING SCHEDULING) Appt Note: echo for murmur per Dr. Claudia Ochoa [address] [facility] [address]  
[address] [address]  
  
    
 10/2/2018  9:20 AM  
ESTABLISHED PATIENT with Amandeep Howe MD  
[facility] (Kaushik Decker) Appt Note: 6 mo f/u per Dr. Claudia Ochoa [address] [facility] Rupesh Lamb 13  
[address] [address] Upcoming Health Maintenance Date Due  
 MEDICARE YEARLY EXAM 1/19/2019 GLAUCOMA SCREENING Q2Y 12/4/2019 DTaP/Tdap/Td series (2 - Td) 10/13/2024 Allergies as of 3/27/2018  Review Complete On: 3/27/2018 By: Melissa Guzman Severity Noted Reaction Type Reactions Celecoxib Medium   Other (comments) Sulfa (Sulfonamide Antibiotics) Medium 11/07/2011    Unknown (comments), Hives Atorvastatin  02/13/2018    Unknown (comments) Codeine  02/13/2018    Unknown (comments) Darvocet A500 [Propoxyphene N-acetaminophen]  11/08/2011    Unknown (comments) Iodine  11/08/2011    Unknown (comments) Statins-hmg-coa Reductase Inhibitors  11/07/2011    Other (comments)  
 (intolerance)-myalgias Sulfa (Sulfonamide Antibiotics)  11/08/2011    Unknown (comments) Aricept [Donepezil] Low 01/18/2018   Intolerance Myalgia Headaches - noted as intolerance Current Immunizations  Reviewed on 10/14/2014 Name Date Influenza High Dose Vaccine PF 10/7/2013  1:57 PM  
 Influenza Vaccine 10/14/2014 Influenza Vaccine Whole 10/13/2011, 11/1/2008 Pneumococcal Conjugate (PCV-13) 4/14/2015 Tdap 10/13/2014 ZZZ-RETIRED (DO NOT USE) Pneumococcal Vaccine (Unspecified Type) 8/1/1998 Not reviewed this visit You Were Diagnosed With   
  
 Codes Comments Systolic ejection murmur    -  Primary ICD-10-CM: R01.1 ICD-9-CM: 785.2 Mixed hyperlipidemia     ICD-10-CM: E78.2 ICD-9-CM: 272.2 Paroxysmal atrial fibrillation (HCC)     ICD-10-CM: I48.0 ICD-9-CM: 427.31 Coronary artery disease involving native coronary artery of native heart without angina pectoris     ICD-10-CM: I25.10 ICD-9-CM: 414.01 Vitals BP Pulse Resp Height(growth percentile) Weight(growth percentile) LMP  
 144/80 (BP 1 Location: Right arm, BP Patient Position: Standing) 78 16 5' 1\" (1.549 m) 147 lb 6.4 oz (66.9 kg) 01/01/2000 BMI OB Status Smoking Status 27.85 kg/m2 Hysterectomy Former Smoker Vitals History BMI and BSA Data Body Mass Index Body Surface Area  
 27.85 kg/m 2 1.7 m 2 Preferred Pharmacy Pharmacy Name Phone [facility] [address] [place] [address], [address] [place] [address] [phone] Your Updated Medication List  
  
   
This list is accurate as of 3/27/18 11:41 AM.  Always use your most recent med list.  
  
  
  
  
 ALPRAZolam 0.25 mg tablet Commonly known as:  Kelly Jn Take 1 Tab by mouth three (3) times daily as needed for Anxiety. Max Daily Amount: 0.75 mg. Indications: Generalized Anxiety Disorder  
  
 aspirin 81 mg tablet Take 81 mg by mouth nightly. CALTRATE 600 + D PO Take  by mouth. CARTIA  mg ER capsule Generic drug:  dilTIAZem CD Take 180 mg by mouth nightly. cholecalciferol (vitamin D3) 2,000 unit Tab Take 1,000 Units by mouth. fenofibrate nanocrystallized 48 mg tablet Commonly known as:  [employer] Take 1 Tab by mouth daily. Indications: mixed hyperlipidemia  
  
 linaclotide 72 mcg Cap Commonly known as:  Apple Gambles Take 1 Caplet by mouth daily (with breakfast). Indications: chronic idiopathic constipation  
  
 meclizine 12.5 mg tablet Commonly known as:  ANTIVERT Take 12.5 mg by mouth every eight (8) hours as needed. MULTIVITAMIN PO Take  by mouth. omeprazole 40 mg capsule Commonly known as:  PRILOSEC Take 40 mg by mouth daily. WAL-FEX ALLERGY 180 mg tablet Generic drug:  fexofenadine Take  by mouth. We Performed the Following AMB POC EKG ROUTINE W/ 12 LEADS, INTER & REP [53436 CPT(R)] To-Do List   
 03/27/2018 ECHO:  2D ECHO COMPLETE ADULT (TTE) W OR WO CONTR Patient Instructions Please increase your aerobic/cardiovascular exercise to 30 minutes daily 5 days/week Introducing [facility]! Chris Campuzano introduces Sampa patient portal. Now you can access parts of your medical record, email your doctor's office, and request medication refills online. 1. In your internet browser, go to [email or web address] 2. Click on the First Time User? Click Here link in the Sign In box. You will see the New Member Sign Up page. 3. Enter your Sampa Access Code exactly as it appears below. You will not need to use this code after youve completed the sign-up process. If you do not sign up before the expiration date, you must request a new code. · [employer]ampa Access Code: [email or web address] Expires: 4/17/2018  5:47 PM 
 
4. Enter the last four digits of your Social Security Number (xxxx) and Date of Birth (mm/dd/yyyy) as indicated and click Submit. You will be taken to the next sign-up page. 5. Create a Sampa ID. This will be your Sampa login ID and cannot be changed, so think of one that is secure and easy to remember. 6. Create a [employer] password. You can change your password at any time. 7. Enter your Password Reset Question and Answer. This can be used at a later time if you forget your password. 8. Enter your e-mail address. You will receive e-mail notification when new information is available in [address] 9. Click Sign Up. You can now view and download portions of your medical record. 10. Click the Download Summary menu link to download a portable copy of your medical information. If you have questions, please visit the Frequently Asked Questions section of the [employer] website. Remember, [employer] is NOT to be used for urgent needs. For medical emergencies, dial 911. Now available from your iPhone and Android! Please provide this summary of care documentation to your next provider. Your primary care clinician is listed as Rhona Voss. If you have any questions after today's visit, please call [phone].

## 2018-03-27 NOTE — PROGRESS NOTES
[facility]  [phone]    Reason for consult:  Atrial Fib  Requesting physician: Jacqueline Zhou NP  Previously followed by Dr. Liliam Ramirez. Fercho Londono at [facility]    HPI: Balwinder Kimball, a 80y.o. year-old who presents for evaluation of atrial fibrillation. She denies any palpitations recently, says she has never been to hospital for her Atrial Fib  She has never had a cardioversion or ablation  Had taken alprazolam in the past during episodes and it would stop her palpitations  She denies any hx of TIA or CVA, no new neurologic symptoms   She denies chest pain or dyspnea with exertion  No PND or orthopnea  She denies any LE edema but says her legs feel tight at night  Hx of vertigo, worse when turning over in bed, followed by Dr. 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 Atrial Fib - diagnosed over 10 years ago, [id number] (age, sex, vascular disease), discussed her risk of stroke versus the risk of bleeding on [address] [place] [address] and at this time she prefers to take ASA 81mg daily, not symptomatic currently on diltiazem CD 180mg daily   -will order TTE to check chamber sizes, LVEF and valves   -follow up in 6 months    2. Dyslipidemia - , had statin intolerance in the past due to myalgias, recently started on tricor 48mg daily and is tolerating it well   3. CAD - had abnormal stress test which lead to a cardiac cath in 1999, had non-obstructive CAD, on ASA, cannot take statins as above, no exertional symptoms currently   4. Fatty liver - hx of   5. GERD - on omeprazole   6. Vertigo - hx of, takes meclizine PRN  7. Vitamin D deficiency - takes 1000 units daily   8. Impaired glucose tolerance - A1C 6.2%, encouraged her to exercise daily  9. Systolic ejection murmur - will assess with TTE    10.   Breast pain - bilaterally, she is asking to have mammogram ordered, will forward this request to Janie Parham, DYLAN     [address] [facility] - presumed non-obstructive CAD, no report available for review   Nuc Stress 1999 - no ischemia, EF 69%    Fam Hx: brother  from heart valve regurgitation at age 80, father passed from lung cancer, mother passed from heart trouble at age 80   Soc Hx: quit smoking in 1955, smoked 1 pack over 3 weeks, very rare etoh use, lives at [facility]     She  has a past medical history of Allergic rhinitis; Atherosclerotic cardiovascular disease (); Edema; Environmental allergies; Fatty liver disease, nonalcoholic; GERD (gastroesophageal reflux disease); Heart palpitations (); Hypercholesterolemia (); Hypertension (); Liver disease; Peripheral neuropathy; Vertigo; and Vitamin D deficiency. Cardiovascular ROS: no chest pain or dyspnea on exertion  Respiratory ROS: no cough, shortness of breath, or wheezing  Neurological ROS: no TIA or stroke symptoms  All other systems negative except as above. PE  Vitals:    18 1033 18 1041 18 1042   BP: 140/80 150/70 144/80   Pulse: 70 72 78   Resp: 16     Weight: 147 lb 6.4 oz (66.9 kg)     Height: 5' 1\" (1.549 m)      Body mass index is 27.85 kg/(m^2).    General appearance - alert, well appearing, and in no distress  Mental status - affect appropriate to mood  Eyes - sclera anicteric, moist mucous membranes  Neck - supple  Lymphatics - not assessed  Chest - clear to auscultation, no wheezes, rales or rhonchi  Heart - normal rate, regular rhythm, normal S1, S2, 2/6 VANGIE   Abdomen - soft, nontender, nondistended, no masses or organomegaly  Back exam - full range of motion, no tenderness  Neurological - cranial nerves II through XII grossly intact, no focal deficit  Musculoskeletal - no muscular tenderness noted, normal strength  Extremities - peripheral pulses normal, no pedal edema  Skin - normal coloration  no rashes    12 lead ECG: NSR    Recent Labs:  Lab Results Component Value Date/Time    Cholesterol, total 194 01/18/2018 10:42 AM    HDL Cholesterol 53 01/18/2018 10:42 AM    LDL, calculated 109 (H) 01/18/2018 10:42 AM    Triglyceride 162 (H) 01/18/2018 10:42 AM    CHOL/HDL Ratio 3.8 12/17/2009 08:35 AM     Lab Results   Component Value Date/Time    Creatinine 0.97 01/18/2018 10:42 AM     Lab Results   Component Value Date/Time    BUN 16 01/18/2018 10:42 AM     Lab Results   Component Value Date/Time    Potassium 4.4 01/18/2018 10:42 AM     Lab Results   Component Value Date/Time    Hemoglobin A1c 6.2 (H) 01/18/2018 10:42 AM     Lab Results   Component Value Date/Time    HGB 14.8 01/18/2018 10:42 AM     Lab Results   Component Value Date/Time    PLATELET 945 81/60/3430 10:42 AM       Reviewed:  Past Medical History:   Diagnosis Date    Allergic rhinitis     Atherosclerotic cardiovascular disease 1999    Edema     Environmental allergies     dizziness-(chronic)    Fatty liver disease, nonalcoholic     GERD (gastroesophageal reflux disease)     Heart palpitations 2018    infrequent     Hypercholesterolemia 1999    Hypertension 2010    Liver disease     fatty liver    Peripheral neuropathy     Vertigo     Vitamin D deficiency      History   Smoking Status    Former Smoker    Packs/day: 4.00    Types: Cigarettes    Quit date: 1/1/1955   Smokeless Tobacco    Never Used     History   Alcohol Use    0.0 - 0.5 oz/week    0 - 1 Standard drinks or equivalent per week     Comment: rare     Allergies   Allergen Reactions    Celecoxib Other (comments)    Sulfa (Sulfonamide Antibiotics) Unknown (comments) and Hives    Atorvastatin Unknown (comments)    Codeine Unknown (comments)    Darvocet A500 [Propoxyphene N-Acetaminophen] Unknown (comments)    Iodine Unknown (comments)    Statins-Hmg-Coa Reductase Inhibitors Other (comments)     (intolerance)-myalgias    Sulfa (Sulfonamide Antibiotics) Unknown (comments)    Aricept [Donepezil] Myalgia     Headaches - noted as intolerance        Current Outpatient Prescriptions   Medication Sig    omeprazole (PRILOSEC) 40 mg capsule Take 40 mg by mouth daily.  fexofenadine (WAL-FEX ALLERGY) 180 mg tablet Take  by mouth.  meclizine (ANTIVERT) 12.5 mg tablet Take 12.5 mg by mouth every eight (8) hours as needed.  linaclotide (LINZESS) 72 mcg cap Take 1 Caplet by mouth daily (with breakfast). Indications: chronic idiopathic constipation    ALPRAZolam (XANAX) 0.25 mg tablet Take 1 Tab by mouth three (3) times daily as needed for Anxiety. Max Daily Amount: 0.75 mg. Indications: Generalized Anxiety Disorder    fenofibrate nanocrystallized (TRICOR) 48 mg tablet Take 1 Tab by mouth daily. Indications: mixed hyperlipidemia    cholecalciferol, vitamin D3, 2,000 unit tab Take 1,000 Units by mouth.  diltiazem CD (CARTIA XT) 180 mg ER capsule Take 180 mg by mouth nightly.  aspirin 81 mg tablet Take 81 mg by mouth nightly.  MULTIVITAMIN PO Take  by mouth.  CALCIUM CARBONATE/VITAMIN D3 (CALTRATE 600 + D PO) Take  by mouth. No current facility-administered medications for this visit.         Adam Morales MD  [facility] [address] [facility] [address] [facility] [address]  [facility]  [phone]

## 2018-03-29 DIAGNOSIS — Z12.31 SCREENING MAMMOGRAM, ENCOUNTER FOR: Primary | ICD-10-CM

## 2018-03-29 NOTE — PROGRESS NOTES
Patient called for order to have referral for mammogram. Referral completed and mailed out to patient.

## 2018-04-03 ENCOUNTER — HOSPITAL ENCOUNTER (OUTPATIENT)
Dept: MAMMOGRAPHY | Age: 83
Discharge: HOME | End: 2018-04-03
Attending: NURSE PRACTITIONER
Payer: MEDICARE

## 2018-04-03 ENCOUNTER — CLINICAL SUPPORT (OUTPATIENT)
Dept: CARDIOLOGY CLINIC | Age: 83
End: 2018-04-03

## 2018-04-03 DIAGNOSIS — I34.81 MITRAL ANNULAR CALCIFICATION: ICD-10-CM

## 2018-04-03 DIAGNOSIS — Z12.31 SCREENING MAMMOGRAM, ENCOUNTER FOR: ICD-10-CM

## 2018-04-03 DIAGNOSIS — R01.1 SYSTOLIC EJECTION MURMUR: ICD-10-CM

## 2018-04-03 DIAGNOSIS — I51.7 LAE (LEFT ATRIAL ENLARGEMENT): Primary | ICD-10-CM

## 2018-04-03 DIAGNOSIS — I35.8 AORTIC VALVE SCLEROSIS: ICD-10-CM

## 2018-04-03 DIAGNOSIS — I35.1 NONRHEUMATIC AORTIC (VALVE) INSUFFICIENCY: ICD-10-CM

## 2018-04-03 DIAGNOSIS — I37.1 NONRHEUMATIC PULMONARY VALVE INSUFFICIENCY: ICD-10-CM

## 2018-04-03 PROCEDURE — 77067 SCR MAMMO BI INCL CAD: CPT

## 2018-04-03 NOTE — PROGRESS NOTES
IMPRESSION:  BI-RADS 1: Negative. No mammographic evidence of malignancy. RECOMMENDATIONS:  Next screening mammogram is recommended in one year.

## 2018-04-03 NOTE — LETTER
4/3/2018 1:42 PM 
 
Ms. Lili Samaniego [address] [facility] [address] Dear Lili Miranda: 
 
Please find your most recent results below. Resulted Orders BRIANA MAMMO BI SCREENING INCL CAD Narrative STUDY: Bilateral digital screening mammogram 
 
INDICATION:  Screening. COMPARISON:  [id number] BREAST COMPOSITION:  There are scattered areas of fibroglandular density. FINDINGS: Bilateral digital screening mammography was performed and is 
interpreted in conjunction with a computer assisted detection (CAD) system. No 
suspicious masses or calcifications are identified. There has been no 
significant change. Impression IMPRESSION: 
BI-RADS 1: Negative. No mammographic evidence of malignancy. RECOMMENDATIONS: 
Next screening mammogram is recommended in one year. The patient will be notified of these results. RECOMMENDATIONS: 
 
IMPRESSION:  
BI-RADS 1: Negative. No mammographic evidence of malignancy. RECOMMENDATIONS:  
Next screening mammogram is recommended in one year. Please call me if you have any questions: [phone] Sincerely, 
 
 
Carmen Carroll NP

## 2018-04-09 DIAGNOSIS — K59.01 SLOW TRANSIT CONSTIPATION: ICD-10-CM

## 2018-04-10 ENCOUNTER — TELEPHONE (OUTPATIENT)
Dept: CARDIOLOGY CLINIC | Age: 83
End: 2018-04-10

## 2018-04-10 NOTE — TELEPHONE ENCOUNTER
Echo 4/3/18 - EF 50% mod AI, A/V sclerosis - Repeat in 1 year or PRN symptoms      Called patient. Verified identity. Informed her of the above echo results per Dr. Bea Sparrow. Confirmed next office visit with Dr. Bea Sparrow for 10/2/18, patient states she will make her 1 yr echo follow up then.

## 2018-06-15 ENCOUNTER — TELEPHONE (OUTPATIENT)
Dept: GERIATRIC MEDICINE | Age: 83
End: 2018-06-15

## 2018-06-15 NOTE — TELEPHONE ENCOUNTER
Mrs. 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 I have given her a copy of our current records of what I know her to be on and there is not a blood pressure pill on her list. I have given her a copy to take to her ENT drGuanakito As well. I also reminded her that she needs to get follow up labs that she was overdue for in March. She has made an appt to get them completed. She needs a CMP, Urine Micro albumin, HGA1C, Lipid Panel when she returns.

## 2018-06-19 ENCOUNTER — CLINICAL SUPPORT (OUTPATIENT)
Dept: GERIATRIC MEDICINE | Age: 83
End: 2018-06-19

## 2018-06-19 DIAGNOSIS — I10 ESSENTIAL HYPERTENSION: ICD-10-CM

## 2018-06-19 DIAGNOSIS — E78.2 MIXED HYPERLIPIDEMIA: ICD-10-CM

## 2018-06-19 DIAGNOSIS — R73.03 PREDIABETES: ICD-10-CM

## 2018-06-19 DIAGNOSIS — K76.0 FATTY LIVER DISEASE, NONALCOHOLIC: Primary | Chronic | ICD-10-CM

## 2018-06-19 NOTE — LETTER
6/22/2018 2:31 PM 
 
Ms. Gordon Samaniego [address] [place] [address] Dear Gordon Sanders: 
 
Please find your most recent results below. Resulted Orders METABOLIC PANEL, COMPREHENSIVE Result Value Ref Range Glucose 90 65 - 99 mg/dL BUN 20 10 - 36 mg/dL Creatinine 1.03 (H) 0.57 - 1.00 mg/dL GFR est non-AA 48 (L) >59 mL/min/1.73 GFR est AA 55 (L) >59 mL/min/1.73  
 BUN/Creatinine ratio 19 12 - 28 Sodium 136 134 - 144 mmol/L Potassium 4.3 3.5 - 5.2 mmol/L Chloride 97 96 - 106 mmol/L  
 CO2 24 20 - 29 mmol/L Comment: **Please note reference interval change** Calcium 9.4 8.7 - 10.3 mg/dL Protein, total 6.8 6.0 - 8.5 g/dL Albumin 4.2 3.2 - 4.6 g/dL GLOBULIN, TOTAL 2.6 1.5 - 4.5 g/dL A-G Ratio 1.6 1.2 - 2.2 Bilirubin, total 0.3 0.0 - 1.2 mg/dL Alk. phosphatase 70 39 - 117 IU/L  
 AST (SGOT) 22 0 - 40 IU/L  
 ALT (SGPT) 18 0 - 32 IU/L Narrative Performed at:  [address]  [id number] : Felicity Maurer MD, Phone:  [phone] HEMOGLOBIN A1C WITH EAG Result Value Ref Range Hemoglobin A1c 6.1 (H) 4.8 - 5.6 % Comment:  
            Pre-diabetes: 5.7 - 6.4 Diabetes: >6.4 Glycemic control for adults with diabetes: <7.0 Estimated average glucose 128 mg/dL Narrative Performed at:  [address]  [id number] : Felicity Maurer MD, Phone:  [phone] LIPID PANEL Result Value Ref Range Cholesterol, total 230 (H) 100 - 199 mg/dL Triglyceride 136 0 - 149 mg/dL HDL Cholesterol 55 >39 mg/dL VLDL, calculated 27 5 - 40 mg/dL LDL, calculated 148 (H) 0 - 99 mg/dL Narrative Performed at:  [address] [place], [place]  [id number] : Felicity Maurer MD, Phone:  [phone] MICROALBUMIN, UR, RAND Result Value Ref Range Creatinine, urine 35.8 Not Estab. mg/dL Microalbumin, urine 57.6 Not Estab. ug/mL Microalb/Creat ratio (ug/mg creat.) 160.9 (H) 0.0 - 30.0 mg/g creat Narrative Performed at:  [address]  [id number] : Hetal Hamilton MD, Phone:  [phone] RECOMMENDATIONS: 
Your kidney function is stable. 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 Still elevated but cut in half with insufficiency. HGA1C is stable. We do not need recheck this for 6 months. Lipid panel is now showing elevated cholesterol but better Triglyceride treatment. I would like to discuss options for treatment with your current medications. Please call our clinic to make an appointment. Please call me if you have any questions: [phone] Sincerely, 
 
 
Tasha Mcclelland NP

## 2018-06-20 LAB
ALBUMIN SERPL-MCNC: 4.2 G/DL (ref 3.2–4.6)
ALBUMIN/CREAT UR: 160.9 MG/G CREAT (ref 0–30)
ALBUMIN/GLOB SERPL: 1.6 {RATIO} (ref 1.2–2.2)
ALP SERPL-CCNC: 70 IU/L (ref 39–117)
ALT SERPL-CCNC: 18 IU/L (ref 0–32)
AST SERPL-CCNC: 22 IU/L (ref 0–40)
BILIRUB SERPL-MCNC: 0.3 MG/DL (ref 0–1.2)
BUN SERPL-MCNC: 20 MG/DL (ref 10–36)
BUN/CREAT SERPL: 19 (ref 12–28)
CALCIUM SERPL-MCNC: 9.4 MG/DL (ref 8.7–10.3)
CHLORIDE SERPL-SCNC: 97 MMOL/L (ref 96–106)
CHOLEST SERPL-MCNC: 230 MG/DL (ref 100–199)
CO2 SERPL-SCNC: 24 MMOL/L (ref 20–29)
CREAT SERPL-MCNC: 1.03 MG/DL (ref 0.57–1)
CREAT UR-MCNC: 35.8 MG/DL
EST. AVERAGE GLUCOSE BLD GHB EST-MCNC: 128 MG/DL
GFR SERPLBLD CREATININE-BSD FMLA CKD-EPI: 48 ML/MIN/1.73
GFR SERPLBLD CREATININE-BSD FMLA CKD-EPI: 55 ML/MIN/1.73
GLOBULIN SER CALC-MCNC: 2.6 G/DL (ref 1.5–4.5)
GLUCOSE SERPL-MCNC: 90 MG/DL (ref 65–99)
HBA1C MFR BLD: 6.1 % (ref 4.8–5.6)
HDLC SERPL-MCNC: 55 MG/DL
LDLC SERPL CALC-MCNC: 148 MG/DL (ref 0–99)
MICROALBUMIN UR-MCNC: 57.6 UG/ML
POTASSIUM SERPL-SCNC: 4.3 MMOL/L (ref 3.5–5.2)
PROT SERPL-MCNC: 6.8 G/DL (ref 6–8.5)
SODIUM SERPL-SCNC: 136 MMOL/L (ref 134–144)
TRIGL SERPL-MCNC: 136 MG/DL (ref 0–149)
VLDLC SERPL CALC-MCNC: 27 MG/DL (ref 5–40)

## 2018-06-22 NOTE — PROGRESS NOTES
Stable kidney function. 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 Still elevated but cut in half with insufficiency. HGA1C is stable. We do not need recheck this for 6 months. Lipid panel is now showing elevated cholesterol but better Triglyceride treatment. I would like to discuss options for treatment with her current medications.

## 2018-08-01 ENCOUNTER — OFFICE VISIT (OUTPATIENT)
Dept: GERIATRIC MEDICINE | Age: 83
End: 2018-08-01

## 2018-08-01 VITALS
WEIGHT: 145 LBS | TEMPERATURE: 97.8 F | HEIGHT: 61 IN | RESPIRATION RATE: 18 BRPM | DIASTOLIC BLOOD PRESSURE: 78 MMHG | SYSTOLIC BLOOD PRESSURE: 158 MMHG | OXYGEN SATURATION: 98 % | BODY MASS INDEX: 27.38 KG/M2 | HEART RATE: 88 BPM

## 2018-08-01 DIAGNOSIS — I10 ESSENTIAL HYPERTENSION: Primary | ICD-10-CM

## 2018-08-01 DIAGNOSIS — Z79.899 MEDICATION MANAGEMENT: ICD-10-CM

## 2018-08-01 DIAGNOSIS — Z71.2 ENCOUNTER TO DISCUSS TEST RESULTS: ICD-10-CM

## 2018-08-01 DIAGNOSIS — R35.1 NOCTURIA MORE THAN TWICE PER NIGHT: ICD-10-CM

## 2018-08-01 DIAGNOSIS — F41.8 ANTICIPATORY ANXIETY: ICD-10-CM

## 2018-08-01 DIAGNOSIS — F51.01 PRIMARY INSOMNIA: ICD-10-CM

## 2018-08-01 DIAGNOSIS — Z78.9 POOR HISTORIAN: ICD-10-CM

## 2018-08-01 DIAGNOSIS — E78.2 MIXED HYPERLIPIDEMIA: ICD-10-CM

## 2018-08-01 DIAGNOSIS — I48.0 PAROXYSMAL ATRIAL FIBRILLATION (HCC): ICD-10-CM

## 2018-08-01 DIAGNOSIS — K21.9 GASTROESOPHAGEAL REFLUX DISEASE WITHOUT ESOPHAGITIS: ICD-10-CM

## 2018-08-01 PROBLEM — H04.123 DRY EYES: Status: ACTIVE | Noted: 2017-06-19

## 2018-08-01 PROBLEM — H25.13 AGE-RELATED NUCLEAR CATARACT, BILATERAL: Status: ACTIVE | Noted: 2017-10-25

## 2018-08-01 NOTE — PROGRESS NOTES
ADVISED PATIENT OF THE FOLLOWING HEALTH MAINTAINCE DUE Health Maintenance Due Topic Date Due  Influenza Age 5 to Adult  08/01/2018 Chief Complaint Patient presents with  Results Patient here to discuss lab results and medication changes. 1. Have you been to the ER, urgent care clinic since your last visit? Hospitalized since your last visit? No 
 
2. Have you seen or consulted any other health care providers outside of the [address] [place] [address] since your last visit? Include any DEXA scan, mammography  or colon screening. No 
 
3. Do you have an Advance Directive on file? yes 4. Do you have a DNR on file? DNR Patient is accompanied by self I have received verbal consent from Liang Morfin to discuss any/all medical information while they are present in the room. [facility] [address] [facility], [address] [place] [address] Phone: [phone] Fax: [phone] Advance Care Planning 8/1/2018 Primary Decision Maker Name Frances Kwok Primary Decision Maker Phone Number [phone] Primary Decision Maker Relationship to Patient Adult child Confirm Advance Directive Yes, on file Does the patient have other document types Do Not Resuscitate; Power of [employer]; Other (comment)

## 2018-08-01 NOTE — Clinical Note
I am sending her to [facility] Means. She has failed oral treatment methods and possibly she can help her with other options.

## 2018-08-01 NOTE — ACP (ADVANCE CARE PLANNING)
Advance Care Planning 8/1/2018   Primary Decision Maker Name Tamica Saez   Primary Decision Maker Phone Number [phone]   Primary Decision Maker Relationship to Patient Adult child   Confirm Advance Directive Yes, on file   Does the patient have other document types Do Not Resuscitate; Power of [employer]; Other (comment)

## 2018-08-01 NOTE — PROGRESS NOTES
Reason for Visit/HPI:   
Shahrzad Roberson is a 80 y.o. female patient who presents today for Chief Complaint Patient presents with  Results Patient here to discuss lab results and medication changes. Diagnosis/Treatment Plan:   
Diagnoses and all orders for this visit: 1. Essential hypertension ; Per Mrs. Leyla Lan she has been having some episodes of having a \"fuzzy\" head. She then took her blood pressure with her own cuff and it noted 194/102. I have educated her on use of her machine but to please come in and let us know when it is that elevated and we can check to see if it really is that out of the normal. She was provided with education and monitoring sheet to keep record and let us monitor it. I am not interested in putting her back on BP meds as she has changed the direction of her kidney function. She agrees. 2. Nocturia more than twice per night ; Mrs. Leyla Lan states she is urinating every 15 minutes and this makes it hard to be out enjoying life. She has failed treatment options in the past with pharmacologic interventions. I am sending her to Dr. Rae Bah for consultation as possibly she can help her with other options as her nocturia is severe, she is up 8-10 times per night and is not sleeping.  
-     REFERRAL TO FEMALE PELV[facility] MEDICINE AND RECONSTRUCTIVE SURGERY 3. Primary insomnia ; advised her to see Dr. Anni Schofield in hopes of having some options for treatment of her nocturia. I have also recommended she take 1-2 of her Xanax 0.25 mg prior to bedtime to help with more quality filled sleep.  
-     REFERRAL TO FEMALE PELVIC MEDICINE AND RECONSTRUCTIVE SURGERY 4. Anticipatory anxiety 5. Mixed hyperlipidemia ; Mrs. Hardik Zavala has had a consult from the Dietician here at [facility] previously. She states she can not remember her recommendations but she does not want to follow any diet changes anyway as she loves food and wants to enjoy it.   
 
6. Gastroesophageal reflux disease without esophagitis ; stable on Omeprazole without any breakthrough symptoms. 7. Paroxysmal atrial fibrillation (HCC) ; stable on Cardizem 8. Poor historian ; continues to have intermittent memory loss and has been treated in the past with Dementia medications such as Aricept and Namenda. She discontinued them as she did not want to take more medications. I have discussed with her the safety implications for her medicating herself and driving. She states she understands and is very cautious when out. 9. Encounter to discuss test results 10. Medication management ; Constipation is ongoing. She was taking the Linzess 72 mcg daily but was having to go multiple times per day and it was urgent. She adjusted the medications herself and started taking it 2-3 times per week. She is not having a bowel movement but every 3-4 days with this tx. I have advised her to take the medication every other day before bedtime to allow for a good stool hopefully the next AM before she is up and out of her apartment. Discussed the straining and pain she has with the BM's. She states she was doing well on the medication daily but the urgency is not good for her social life. Discontinued Care: The following treatment modalities have been discontinued by the provider today:  
There are no discontinued medications. Follow Up: Follow-up Disposition: 
Return in about 1 month (around 9/1/2018) for with the NP for follow up to your treatment plan. Subjective: Allergies Allergen Reactions  Celecoxib Other (comments)  Sulfa (Sulfonamide Antibiotics) Unknown (comments) and Hives  Atorvastatin Unknown (comments)  Codeine Unknown (comments)  Darvocet A500 [Propoxyphene N-Acetaminophen] Unknown (comments)  Iodine Unknown (comments)  Statins-Hmg-Coa Reductase Inhibitors Other (comments)  
  (intolerance)-myalgias  Sulfa (Sulfonamide Antibiotics) Unknown (comments)  Aricept [Donepezil] Myalgia Headaches - noted as intolerance Prior to Admission medications Medication Sig Start Date End Date Taking? Authorizing Provider  
linaclotide (LINZESS) 72 mcg cap Take 1 Caplet by mouth daily (with breakfast). Indications: chronic idiopathic constipation 4/9/18  Yes Shahnaz Loya NP  
omeprazole (PRILOSEC) 40 mg capsule Take 40 mg by mouth daily. Yes Historical Provider  
fexofenadine (WAL-FEX ALLERGY) 180 mg tablet Take  by mouth. Yes Historical Provider  
meclizine (ANTIVERT) 12.5 mg tablet Take 12.5 mg by mouth every eight (8) hours as needed. Yes Historical Provider  
fenofibrate nanocrystallized (TRICOR) 48 mg tablet Take 1 Tab by mouth daily. Indications: mixed hyperlipidemia 1/25/18  Yes Shahnaz Loya NP  
cholecalciferol, vitamin D3, 2,000 unit tab Take 1,000 Units by mouth. Yes Historical Provider  
diltiazem CD (CARTIA XT) 180 mg ER capsule Take 180 mg by mouth nightly. Yes Historical Provider  
aspirin 81 mg tablet Take 81 mg by mouth nightly. Yes Historical Provider MULTIVITAMIN PO Take  by mouth. Yes Historical Provider CALCIUM CARBONATE/VITAMIN D3 (CALTRATE 600 + D PO) Take  by mouth. Yes Historical Provider ALPRAZolam (XANAX) 0.25 mg tablet Take 1 Tab by mouth three (3) times daily as needed for Anxiety. Max Daily Amount: 0.75 mg. Indications: Generalized Anxiety Disorder 2/8/18   Shahnaz Loya NP Past Medical History:  
Diagnosis Date  Allergic rhinitis  Atherosclerotic cardiovascular disease 1999  Edema  Environmental allergies   
 dizziness-(chronic)  Fatty liver disease, nonalcoholic  GERD (gastroesophageal reflux disease)  Heart palpitations 2018 infrequent  Hypercholesterolemia 1999  Hypertension 2010  Liver disease   
 fatty liver  Peripheral neuropathy  Vertigo  Vitamin D deficiency Past Surgical History:  
Procedure Laterality Date  BREAST SURGERY PROCEDURE UNLISTED    
 breast cyst Jesse Kohli)  CARDIAC SURG PROCEDURE UNLIST  1999  
 + stress thalassemia; 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 Holter  HX APPENDECTOMY  HX BREAST BIOPSY Left   
 neg  HX CYST INCISION AND DRAINAGE Right years ago  
 neg  HX DILATION AND CURETTAGE    
 x5  
 HX GYN    
 D&C (x5), ALLEY/BSO (-Juliann/Zedler), Provera intolerance (bleeding), endometrial Bx annually  HX ALLEY AND BSO   Juliann/Zedler Social History Substance Use Topics  Smoking status: Former Smoker Packs/day: 4.00 Types: Cigarettes Quit date: 1955  Smokeless tobacco: Never Used  Alcohol use 0.0 - 0.5 oz/week 0 - 1 Standard drinks or equivalent per week Comment: rare Family History Problem Relation Age of Onset  Diabetes Mother  Hypertension Mother  Heart Failure Mother CHF ( @ 80)  Alzheimer Mother  Cancer Father   
  lung ( @ 77)  Alzheimer Father  No Known Problems Son  No Known Problems Son   
 Ovarian Cancer Sister   
  hysterectomy  Breast Cancer Sister 28  
  mastectomy  Cancer Sister   
  breast and ovarian  Alzheimer Sister  Breast Problems Sister   
  breast cyst  
 Cataract Sister  Hypertension Sister  Diabetes Brother Advance Care Planning 2018 Primary Decision Maker Name Hiro Washington Primary Decision Maker Phone Number [phone] Primary Decision Maker Relationship to Patient Adult child Confirm Advance Directive Yes, on file Does the patient have other document types Do Not Resuscitate; Power of [employer]; Other (comment) Latest Laboratory Results:  
 
Lab Results Component Value Date/Time WBC 6.2 2018 10:42 AM  
 HGB 14.8 2018 10:42 AM  
 HCT 43.7 2018 10:42 AM  
 PLATELET 008  10:42 AM  
 MCV 90 2018 10:42 AM  
 
Lab Results Component Value Date/Time  Sodium 136 2018 08:52 AM  
 Potassium 4.3 2018 08:52 AM  
 Chloride 97 06/19/2018 08:52 AM  
 CO2 24 06/19/2018 08:52 AM  
 Anion gap 11 11/02/2010 05:01 PM  
 Glucose 90 06/19/2018 08:52 AM  
 BUN 20 06/19/2018 08:52 AM  
 Creatinine 1.03 (H) 06/19/2018 08:52 AM  
 BUN/Creatinine ratio 19 06/19/2018 08:52 AM  
 GFR est AA 55 (L) 06/19/2018 08:52 AM  
 GFR est non-AA 48 (L) 06/19/2018 08:52 AM  
 Calcium 9.4 06/19/2018 08:52 AM  
 Bilirubin, total 0.3 06/19/2018 08:52 AM  
 AST (SGOT) 22 06/19/2018 08:52 AM  
 Alk. phosphatase 70 06/19/2018 08:52 AM  
 Protein, total 6.8 06/19/2018 08:52 AM  
 Albumin 4.2 06/19/2018 08:52 AM  
 Globulin 3.3 11/02/2010 05:01 PM  
 A-G Ratio 1.6 06/19/2018 08:52 AM  
 ALT (SGPT) 18 06/19/2018 08:52 AM  
 
Objective:  
 
Vitals:  
 08/01/18 1027 BP: 158/78 Pulse: 88 Resp: 18 Temp: 97.8 °F (36.6 °C) TempSrc: Oral  
SpO2: 98% Weight: 145 lb (65.8 kg) Height: 5' 1\" (1.549 m) Wt Readings from Last 3 Encounters:  
08/01/18 145 lb (65.8 kg) 03/27/18 147 lb 6.4 oz (66.9 kg) 02/08/18 147 lb (66.7 kg) BP Readings from Last 3 Encounters:  
08/01/18 158/78  
03/27/18 144/80  
02/09/18 150/69 Review of Systems Constitutional: Negative for activity change, appetite change, fatigue and fever. HENT: Negative for congestion, dental problem, hearing loss, postnasal drip, sinus pressure, sneezing, sore throat and trouble swallowing. Eyes: Negative for discharge, redness and visual disturbance. Respiratory: Negative for apnea, cough, chest tightness, shortness of breath and wheezing. Cardiovascular: Negative for chest pain, palpitations and leg swelling. Gastrointestinal: Positive for constipation. Negative for abdominal distention, abdominal pain, blood in stool, diarrhea, nausea and vomiting. Endocrine: Positive for polyuria. Negative for polydipsia and polyphagia. Genitourinary: Positive for urgency. Negative for flank pain and frequency.   
Musculoskeletal: Negative for arthralgias, gait problem, joint swelling and neck pain.  
Skin: Negative for color change, pallor, rash and wound. Allergic/Immunologic: Negative for environmental allergies and food allergies. Neurological: Negative for dizziness, tremors, weakness, light-headedness, numbness and headaches. Hematological: Negative for adenopathy. Psychiatric/Behavioral: Positive for confusion. Negative for agitation and sleep disturbance. The patient is not nervous/anxious. Physical Exam  
Constitutional: She is well-developed, well-nourished, and in no distress. Vital signs are normal. She appears to not be writhing in pain, not malnourished and not dehydrated. She appears healthy. She does not have a sickly appearance. No distress. Obese,  female, who's appearance is younger then her stated age. She appears to be in her 66's. She is not in any acute distress. HENT:  
Head: Normocephalic and atraumatic. Right Ear: Tympanic membrane, external ear and ear canal normal. Decreased hearing is noted. Left Ear: Tympanic membrane, external ear and ear canal normal. Decreased hearing is noted. Nose: Nose normal. No mucosal edema or rhinorrhea. Mouth/Throat: Uvula is midline, oropharynx is clear and moist and mucous membranes are normal. Mucous membranes are not pale, not dry and not cyanotic. No oral lesions. No uvula swelling. No posterior oropharyngeal edema or posterior oropharyngeal erythema. Eyes: Conjunctivae, EOM and lids are normal. Pupils are equal, round, and reactive to light. Lids are everted and swept, no foreign bodies found. Neck: Trachea normal, normal range of motion and full passive range of motion without pain. Neck supple. No hepatojugular reflux and no JVD present. Carotid bruit is not present. No thyromegaly present. Cardiovascular: Normal rate, S1 normal, S2 normal, intact distal pulses and normal pulses. A regularly irregular rhythm present. Occasional extrasystoles are present. Exam reveals distant heart sounds.  Exam reveals no gallop and no friction rub. No murmur heard. No extremity edema noted on visit. Pulmonary/Chest: Effort normal and breath sounds normal.  
Abdominal: Soft. Normal appearance and bowel sounds are normal. She exhibits no distension, no fluid wave and no mass. There is no hepatosplenomegaly. There is no tenderness. There is no rebound and no CVA tenderness. Continues to struggle with constipation. The [place] Sushma is working but she can not take it every day as the stools get better but the urgency is too much. Musculoskeletal:  
Generalized weakness with balance issues and intermittent dizziness Lymphadenopathy:  
     Head (right side): No submandibular adenopathy present. Head (left side): No submandibular and no tonsillar adenopathy present. She has no cervical adenopathy. She has no axillary adenopathy. Neurological: She is alert. She has normal reflexes and intact cranial nerves. She is disoriented. She is not agitated. She displays weakness and atrophy. No sensory deficit. She exhibits normal muscle tone. Coordination and gait abnormal. GCS score is 15. Skin: Skin is warm, dry and intact. No bruising, no ecchymosis and no rash noted. She is not diaphoretic. No cyanosis. No pallor. Nails show no clubbing. Psychiatric: Affect normal. Her mood appears anxious. She expresses impulsivity. She exhibits disordered thought content and abnormal new learning ability. Baseline mood and affect unchanged from normal.   
  
Disclaimer: Ms. Gumaro Navarro has been advised to call or return to our office if symptoms worsen/change/persist. We as a care team including the patient; discussed expected course/resolution/complications of diagnosis in detail today. Medication risks/benefits/costs/interactions/alternatives discussed Donna Arias was given an after visit summary which includes diagnoses, current medications, & vitals.  Gumaro Navarro expressed understanding with the diagnosis and plan.

## 2018-08-01 NOTE — LETTER
8/1/2018 12:24 PM 
 
Patient:  Alba Spurling YOB: 1927 Date of Visit: 8/1/2018 Dear No Recipients: Thank you for referring Ms. Raquel Juarez to me for evaluation/treatment. Below are the relevant portions of my assessment and plan of care. If you have questions, please do not hesitate to call me. I look forward to following Ms. Verlin Koyanagi along with you.  
 
 
 
Sincerely, 
 
 
Pema Lara NP

## 2018-11-28 ENCOUNTER — OFFICE VISIT (OUTPATIENT)
Dept: GERIATRIC MEDICINE | Age: 83
End: 2018-11-28

## 2018-11-28 VITALS
WEIGHT: 126 LBS | DIASTOLIC BLOOD PRESSURE: 66 MMHG | TEMPERATURE: 97.8 F | BODY MASS INDEX: 23.79 KG/M2 | HEIGHT: 61 IN | RESPIRATION RATE: 18 BRPM | OXYGEN SATURATION: 99 % | HEART RATE: 72 BPM | SYSTOLIC BLOOD PRESSURE: 138 MMHG

## 2018-11-28 DIAGNOSIS — K90.9 DIARRHEA DUE TO MALABSORPTION: Chronic | ICD-10-CM

## 2018-11-28 DIAGNOSIS — R41.0 CONFUSION AND DISORIENTATION: ICD-10-CM

## 2018-11-28 DIAGNOSIS — R19.7 DIARRHEA DUE TO MALABSORPTION: Chronic | ICD-10-CM

## 2018-11-28 DIAGNOSIS — I69.814 FRONTAL LOBE AND EXECUTIVE FUNCTION DEFICIT FOLLOWING OTHER CEREBROVASCULAR DISEASE: ICD-10-CM

## 2018-11-28 DIAGNOSIS — I48.0 PAROXYSMAL ATRIAL FIBRILLATION (HCC): Chronic | ICD-10-CM

## 2018-11-28 DIAGNOSIS — R00.0 IRREGULAR TACHYCARDIA: ICD-10-CM

## 2018-11-28 DIAGNOSIS — I50.42 CHRONIC COMBINED SYSTOLIC AND DIASTOLIC CONGESTIVE HEART FAILURE (HCC): Chronic | ICD-10-CM

## 2018-11-28 DIAGNOSIS — E86.0 DEHYDRATION, MODERATE: ICD-10-CM

## 2018-11-28 DIAGNOSIS — R47.89 WORD FINDING DIFFICULTY: ICD-10-CM

## 2018-11-28 DIAGNOSIS — E78.2 MIXED HYPERLIPIDEMIA: ICD-10-CM

## 2018-11-28 DIAGNOSIS — E87.6 DIURETIC-INDUCED HYPOKALEMIA: ICD-10-CM

## 2018-11-28 DIAGNOSIS — R06.02 SHORTNESS OF BREATH: Chronic | ICD-10-CM

## 2018-11-28 DIAGNOSIS — M62.81 DECREASED MUSCLE STRENGTH: ICD-10-CM

## 2018-11-28 DIAGNOSIS — N28.9 KIDNEY INSUFFICIENCY: Chronic | ICD-10-CM

## 2018-11-28 DIAGNOSIS — I21.02 ST ELEVATION MYOCARDIAL INFARCTION INVOLVING LEFT ANTERIOR DESCENDING (LAD) CORONARY ARTERY (HCC): Primary | ICD-10-CM

## 2018-11-28 DIAGNOSIS — R53.1 WEAKNESS GENERALIZED: ICD-10-CM

## 2018-11-28 DIAGNOSIS — E44.0 MODERATE PROTEIN-CALORIE MALNUTRITION (HCC): ICD-10-CM

## 2018-11-28 DIAGNOSIS — R26.0 ATAXIC GAIT: ICD-10-CM

## 2018-11-28 DIAGNOSIS — T17.320A CHOKING DUE TO FOOD IN LARYNX, INITIAL ENCOUNTER: ICD-10-CM

## 2018-11-28 DIAGNOSIS — T50.2X5A DIURETIC-INDUCED HYPOKALEMIA: ICD-10-CM

## 2018-11-28 DIAGNOSIS — Z74.3 REQUIRES CONTINUOUS SUPERVISION FOR ACTIVITIES OF DAILY LIVING (ADL): ICD-10-CM

## 2018-11-28 DIAGNOSIS — I27.9 PULMONARY HEART DISEASE (HCC): ICD-10-CM

## 2018-11-28 DIAGNOSIS — J45.30 REACTIVE AIRWAY DISEASE, MILD PERSISTENT, UNCOMPLICATED: ICD-10-CM

## 2018-11-28 DIAGNOSIS — Z09 HOSPITAL DISCHARGE FOLLOW-UP: ICD-10-CM

## 2018-11-28 DIAGNOSIS — K14.5: ICD-10-CM

## 2018-11-28 DIAGNOSIS — Z79.899 MEDICATION MANAGEMENT: ICD-10-CM

## 2018-11-28 DIAGNOSIS — R13.19 ESOPHAGEAL DYSPHAGIA: ICD-10-CM

## 2018-11-28 DIAGNOSIS — Z91.14 INNEFFECTIVE MEDICATION ADMINISTRATION ROUTINE AT HOME: ICD-10-CM

## 2018-11-28 DIAGNOSIS — F09 IMPAIRED COMMUNICATION WITH IMPAIRED COGNITION: ICD-10-CM

## 2018-11-28 DIAGNOSIS — F80.9 IMPAIRED COMMUNICATION WITH IMPAIRED COGNITION: ICD-10-CM

## 2018-11-28 DIAGNOSIS — R11.2 NON-INTRACTABLE VOMITING WITH NAUSEA, UNSPECIFIED VOMITING TYPE: ICD-10-CM

## 2018-11-28 PROBLEM — H04.123 DRY EYES: Status: RESOLVED | Noted: 2017-06-19 | Resolved: 2018-11-28

## 2018-11-28 PROBLEM — H25.13 AGE-RELATED NUCLEAR CATARACT, BILATERAL: Status: RESOLVED | Noted: 2017-10-25 | Resolved: 2018-11-28

## 2018-11-28 RX ORDER — MIRTAZAPINE 15 MG/1
7.5 TABLET, FILM COATED ORAL
COMMUNITY
Start: 2018-11-26 | End: 2018-11-28

## 2018-11-28 RX ORDER — TICAGRELOR 90 MG/1
90 TABLET ORAL 2 TIMES DAILY
COMMUNITY
Start: 2018-11-26 | End: 2018-11-30 | Stop reason: SINTOL

## 2018-11-28 RX ORDER — SIMVASTATIN 40 MG/1
40 TABLET, FILM COATED ORAL
COMMUNITY
Start: 2018-11-26

## 2018-11-28 RX ORDER — IPRATROPIUM BROMIDE AND ALBUTEROL SULFATE 2.5; .5 MG/3ML; MG/3ML
3 SOLUTION RESPIRATORY (INHALATION) 3 TIMES DAILY
Qty: 90 NEBULE | Refills: 1 | Status: SHIPPED | OUTPATIENT
Start: 2018-11-28 | End: 2018-12-06

## 2018-11-28 RX ORDER — METOPROLOL TARTRATE 25 MG/1
12.5 TABLET, FILM COATED ORAL
Qty: 30 TAB | Refills: 0 | Status: SHIPPED | OUTPATIENT
Start: 2018-11-28 | End: 2018-12-06

## 2018-11-28 RX ORDER — BUMETANIDE 2 MG/1
2 TABLET ORAL DAILY
COMMUNITY
Start: 2018-11-26 | End: 2018-12-05 | Stop reason: ALTCHOICE

## 2018-11-28 RX ORDER — ONDANSETRON 4 MG/1
TABLET, FILM COATED ORAL
Qty: 90 TAB | Refills: 1 | Status: SHIPPED | OUTPATIENT
Start: 2018-11-28 | End: 2019-02-11 | Stop reason: ALTCHOICE

## 2018-11-28 RX ORDER — POTASSIUM CHLORIDE 750 MG/1
10 TABLET, EXTENDED RELEASE ORAL DAILY
Qty: 30 TAB | Refills: 1 | Status: SHIPPED | OUTPATIENT
Start: 2018-11-28 | End: 2019-03-27 | Stop reason: DRUGHIGH

## 2018-11-28 RX ORDER — CARVEDILOL 6.25 MG/1
6.25 TABLET ORAL 2 TIMES DAILY
COMMUNITY
Start: 2018-11-26 | End: 2018-12-20 | Stop reason: SDUPTHER

## 2018-11-28 RX ORDER — NEBULIZER AND COMPRESSOR
1 EACH MISCELLANEOUS 4 TIMES DAILY
Qty: 1 EACH | Refills: 0 | Status: SHIPPED | OUTPATIENT
Start: 2018-11-28 | End: 2019-02-11 | Stop reason: ALTCHOICE

## 2018-11-28 RX ORDER — ALBUTEROL SULFATE 90 UG/1
2 AEROSOL, METERED RESPIRATORY (INHALATION)
COMMUNITY
Start: 2018-11-26 | End: 2019-08-20 | Stop reason: ALTCHOICE

## 2018-11-28 RX ORDER — PAROXETINE HYDROCHLORIDE 20 MG/1
20 TABLET, FILM COATED ORAL DAILY
COMMUNITY
Start: 2018-11-26 | End: 2019-02-28

## 2018-11-28 RX ORDER — BUDESONIDE 0.25 MG/2ML
250 INHALANT ORAL 2 TIMES DAILY
Qty: 60 EACH | Refills: 2 | Status: SHIPPED | OUTPATIENT
Start: 2018-11-28 | End: 2019-02-11 | Stop reason: ALTCHOICE

## 2018-11-28 RX ORDER — AMOXICILLIN 250 MG
1 CAPSULE ORAL 2 TIMES DAILY
COMMUNITY
End: 2018-11-28

## 2018-11-28 RX ORDER — NITROGLYCERIN 0.4 MG/1
0.4 TABLET SUBLINGUAL AS NEEDED
COMMUNITY
Start: 2018-11-26

## 2018-11-28 RX ORDER — LUBIPROSTONE 8 UG/1
8 CAPSULE, GELATIN COATED ORAL 2 TIMES DAILY
COMMUNITY
Start: 2018-11-26 | End: 2018-11-28

## 2018-11-28 NOTE — PROGRESS NOTES
ADVISED PATIENT OF THE FOLLOWING HEALTH MAINTAINCE DUE Health Maintenance Due Topic Date Due  Shingrix Vaccine Age 50> (1 of 2) 11/13/1977  Influenza Age 5 to Adult  08/01/2018 Chief Complaint Patient presents with  Transitions Of Care Patient being seen for transition of care, she was discharged from  [facility] on 11/26/18.  Diarrhea She states she has had loose stools with bouts of constipation for the past few months. 1. Have you been to the ER, urgent care clinic since your last visit? Hospitalized since your last visit? Patient was seen and [facility] to [facility] in September 2018 for MI 
 
2. Have you seen or consulted any other health care providers outside of the [address] [place] [address] since your last visit? Include any DEXA scan, mammography  or colon screening. No 
 
3. Do you have an Advance Directive on file? yes 4. Do you have a DNR on file? DNR Patient is accompanied by self I have received verbal consent from Rishabh Merritt to discuss any/all medical information while they are present in the room. Advance Care Planning 8/1/2018 Primary Decision Maker Name Devyn Gotti Primary Decision Maker Phone Number [phone] Primary Decision Maker Relationship to Patient Adult child Confirm Advance Directive Yes, on file Does the patient have other document types Do Not Resuscitate; Power of [employer]; Other (comment) [facility] [address], [address] [place] [address] Phone: [phone] Fax: [phone] Rishabh Merritt presents for lab draw ordered by Steffi Jain RN MSN [id number]-NP The following labs were drawn and sent to lab by Kevin Hoff LPN: 
 
CBC, CMP, PT/INR, ESR (sed rate), BNP, HgA1C and C reactive protein, Vitamin D, Vitamin B12, Folate, Prealbumin, Phosphorus, Lipid panel, Magnesium, CK, Lactic Acid, Ammonia, The following tubes were sent: 
 
Sol [facility]cer  ( 4), Bob  ( 1) and [facility] Patient tolerated procedure well, blood obtained via venipuncture to left antecubital  
 
Urine collected for UA,

## 2018-11-28 NOTE — LETTER
Admission Medical Information 11/28/2018 6:13 PM 
 
Ms. Sarah Estes [address] [facility] [address] 
11/13/1927 [address] to 24 hour in home nursing care until patient can be admitted to [facility] To Whom It May Concern: The following information is specific to Ms. Salgado's admission. Admission Type:  Urgent - 24 hour Advance Directive: YES 
CODE STATUS: DDNR Diet- Modified Mechanical with Thickened Liquids due to High Aspiration Risk until swallow study can be completed Extended Emergency Contact Information Primary Emergency Contact: Javi Peterson Cell Phone: [phone] Relation: Son  
 
Primary Care Provider: Jailene Moreau NP with [facility] Primary Contact: [phone] Emergency Contact or After Hours: Cell [phone] 1. Daily weights - Notify provider for weight increase of 3# in a day. 2. Vital signs tid. Keep a log for provider to review. 3. Breathing treatments - Pulmicort bid, Duo nebs tid. Make sure she rinses her mouth after to prevent thrush. 4. Administer medications as prescribed. 5. Daily skin assessments for breakdown, pressure injuries, skin changes due to diarrhea. 6. Bath prn as patient wishes. 7. Manage patients meals & snacks for appropriate nutrition. Use Zofran PRN for nausea. 8. Manage bowel movements, keep log to manage chronic diarrhea. 9. Patient should be allowed to socialize without nursing having to chaperone as long as she is in the care of a friend that is able to manage her safety. 10. Multiple diagnostics are going to start to be scheduled, please keep calendar and get patient to tests or evaluations as needed. 11. Any questions call Mr. Madelyn Garrett or Ceci Gardner NP for help. 12. If patient can attend social events on the campus please get her up and out to the events.  Please have her ambulate around her apartment or in the hallway at least hourly or ever 2 hours if resting to help with strengthening her leg muscles & balance. 13. Manage medications & refills as needed. [facility] delivers medications daily after 4 pm to [facility], [employer] then delivers them to the appropriate patient/residents apartment. 1. ST elevation myocardial infarction involving left anterior descending (LAD) coronary artery (HCC) 
 
- EKG, 12 LEAD, INITIAL; Future - XR CHEST SNGL V; Future 
- REFERRAL TO OCCUPATIONAL THERAPY 
- REFERRAL TO PHYSICAL THERAPY 
- REFERRAL TO SPEECH THERAPY 2. Paroxysmal atrial fibrillation ([address] - AMMONIA 
- CBC WITH AUTOMATED DIFF 
- COLLECTION VENOUS BLOOD,VENIPUNCTURE 
- CREATINE KINASE (CK), MB/TOTAL 
- HEMOGLOBIN A1C WITH EAG 
- LACTIC ACID 
- MAGNESIUM 
- LIPID PANEL 
- METABOLIC PANEL, COMPREHENSIVE 
- NT-PRO BNP 
- PHOSPHORUS 
- PREALBUMIN 
- PROTHROMBIN TIME + INR 
- TSH REFLEX TO T4 
- UA/M W/RFLX CULTURE, ROUTINE 
- VITAMIN B12 & FOLATE 
- VITAMIN D, 25 HYDROXY 
- SED RATE (ESR) - C REACTIVE PROTEIN, QT 
- INSERT PERIPHERAL IV; Future - IV INFUSION, HYDRATION, 1ST HOUR 
- WA PLACE CATH IN VEIN,SVC,IVC 
- OXYGEN SATURATION RESULTS DOCUMENTED/REVIEWED 
- WA NONINVASV OXYGEN SATUR;SINGLE 
- metoprolol tartrate (LOPRESSOR) 25 mg tablet; Take 0.5 Tabs by mouth every morning. Dispense: 30 Tab; Refill: 0 
- EKG, 12 LEAD, INITIAL; Future 3. Irregular tachycardia 
- EKG, 12 LEAD, INITIAL; Future - XR CHEST SNGL V; Future 4. Hospital discharge follow-up - AMMONIA 
- CBC WITH AUTOMATED DIFF 
- COLLECTION VENOUS BLOOD,VENIPUNCTURE 
- CREATINE KINASE (CK), MB/TOTAL 
- HEMOGLOBIN A1C WITH EAG 
- LACTIC ACID 
- MAGNESIUM 
- LIPID PANEL 
- METABOLIC PANEL, COMPREHENSIVE 
- NT-PRO BNP 
- PHOSPHORUS 
- PREALBUMIN 
- PROTHROMBIN TIME + INR 
- TSH REFLEX TO T4 
- UA/M W/RFLX CULTURE, ROUTINE 
- VITAMIN B12 & FOLATE 
- VITAMIN D, 25 HYDROXY 
- SED RATE (ESR) - C REACTIVE PROTEIN, QT 
 - INSERT PERIPHERAL IV; Future - IV INFUSION, HYDRATION, 1ST HOUR 
- SD PLACE CATH IN VEIN,SVC,IVC 
- OXYGEN SATURATION RESULTS DOCUMENTED/REVIEWED 
- SD NONINVASV OXYGEN SATUR;SINGLE 5. Pulmonary heart disease ([address] - SD NONINVASV OXYGEN SATUR;SINGLE 
- Nebulizer & Compressor machine; 1 Each by Nebulization route four (4) times daily. Dispense: 1 Each; Refill: 0 
 
6. Dehydration, moderate 
- sodium chloride 0.9 %; 500 mL by IntraVENous route once for 1 dose. Dispense: 500 mL; Refill: 0 
- potassium chloride (KLOR-CON) 10 mEq tablet; Take 1 Tab by mouth daily. Dispense: 30 Tab; Refill: 1 
- ondansetron hcl (ZOFRAN) 4 mg tablet; Take 1 tablet 30 mins prior to breakfast, then q 8 hours prn for nausea/vomiting. Dispense: 90 Tab; Refill: 1 7. Diarrhea due to malabsorption 8. Furrowed tongue 
- sodium chloride 0.9 %; 500 mL by IntraVENous route once for 1 dose. Dispense: 500 mL; Refill: 0 
- potassium chloride (KLOR-CON) 10 mEq tablet; Take 1 Tab by mouth daily. Dispense: 30 Tab; Refill: 1 
- ondansetron hcl (ZOFRAN) 4 mg tablet; Take 1 tablet 30 mins prior to breakfast, then q 8 hours prn for nausea/vomiting. Dispense: 90 Tab; Refill: 1 9. Diuretic-induced hypokalemia 
- sodium chloride 0.9 %; 500 mL by IntraVENous route once for 1 dose. Dispense: 500 mL; Refill: 0 
- potassium chloride (KLOR-CON) 10 mEq tablet; Take 1 Tab by mouth daily. Dispense: 30 Tab; Refill: 1 10. Moderate protein-calorie malnutrition (HCC)  
- VITAMIN B12 & FOLATE 11. Non-intractable vomiting with nausea, unspecified vomiting type - XR BA SWALLOW ESOPHOGRAM; Future 
- REFERRAL TO SPEECH THERAPY 12. Esophageal dysphagia - XR BA SWALLOW ESOPHOGRAM; Future 
- REFERRAL TO OCCUPATIONAL THERAPY 
- REFERRAL TO SPEECH THERAPY 
- ondansetron hcl (ZOFRAN) 4 mg tablet; Take 1 tablet 30 mins prior to breakfast, then q 8 hours prn for nausea/vomiting. Dispense: 90 Tab; Refill: 1 13. Choking due to food in larynx, initial encounter - XR BA SWALLOW ESOPHOGRAM; Future 
- REFERRAL TO SPEECH THERAPY 
- ondansetron hcl (ZOFRAN) 4 mg tablet; Take 1 tablet 30 mins prior to breakfast, then q 8 hours prn for nausea/vomiting. Dispense: 90 Tab; Refill: 1 14. Kidney insufficiency - AMMONIA 
- CBC WITH AUTOMATED DIFF 
- COLLECTION VENOUS BLOOD,VENIPUNCTURE 
- CREATINE KINASE (CK), MB/TOTAL 
- HEMOGLOBIN A1C WITH EAG 
- LACTIC ACID 
- MAGNESIUM 
- LIPID PANEL 
- METABOLIC PANEL, COMPREHENSIVE 
- NT-PRO BNP 
- PHOSPHORUS 
- PREALBUMIN 
- PROTHROMBIN TIME + INR 
- TSH REFLEX TO T4 
- UA/M W/RFLX CULTURE, ROUTINE 
- VITAMIN B12 & FOLATE 
- VITAMIN D, 25 HYDROXY 
- SED RATE (ESR) - C REACTIVE PROTEIN, QT 
- INSERT PERIPHERAL IV; Future - IV INFUSION, HYDRATION, 1ST HOUR 
- [place] PLACE CATH IN VEIN,SVC,IVC 
- OXYGEN SATURATION RESULTS DOCUMENTED/REVIEWED 
- [place] NONINVASV OXYGEN SATUR;SINGLE 15. Mixed hyperlipidemia 
- CREATINE KINASE (CK), MB/TOTAL 
- LIPID PANEL 
- METABOLIC PANEL, COMPREHENSIVE 
- OXYGEN SATURATION RESULTS DOCUMENTED/REVIEWED 
- [place] NONINVASV OXYGEN SATUR;SINGLE 16. Word finding difficulty - AMMONIA 
- CBC WITH AUTOMATED DIFF 
- COLLECTION VENOUS BLOOD,VENIPUNCTURE 
- CREATINE KINASE (CK), MB/TOTAL 
- HEMOGLOBIN A1C WITH EAG 
- LACTIC ACID 
- MAGNESIUM 
- LIPID PANEL 
- METABOLIC PANEL, COMPREHENSIVE 
- NT-PRO BNP 
- PHOSPHORUS 
- PREALBUMIN 
- PROTHROMBIN TIME + INR 
- TSH REFLEX TO T4 
- UA/M W/RFLX CULTURE, ROUTINE 
- VITAMIN B12 & FOLATE 
- VITAMIN D, 25 HYDROXY 
- SED RATE (ESR) - C REACTIVE PROTEIN, QT 
- INSERT PERIPHERAL IV; Future - IV INFUSION, HYDRATION, 1ST HOUR 
- [place] PLACE CATH IN VEIN,SVC,IVC 
- OXYGEN SATURATION RESULTS DOCUMENTED/REVIEWED 
- [place] NONINVASV OXYGEN SATUR;SINGLE 
- MRI BRAIN WO CONT; Future 
- REFERRAL TO OCCUPATIONAL THERAPY 
- REFERRAL TO PHYSICAL THERAPY 
- REFERRAL TO SPEECH THERAPY 17. Confusion and disorientation - AMMONIA 
 - CBC WITH AUTOMATED DIFF 
- COLLECTION VENOUS BLOOD,VENIPUNCTURE 
- CREATINE KINASE (CK), MB/TOTAL 
- HEMOGLOBIN A1C WITH EAG 
- LACTIC ACID 
- MAGNESIUM 
- LIPID PANEL 
- METABOLIC PANEL, COMPREHENSIVE 
- NT-PRO BNP 
- PHOSPHORUS 
- PREALBUMIN 
- PROTHROMBIN TIME + INR 
- TSH REFLEX TO T4 
- UA/M W/RFLX CULTURE, ROUTINE 
- VITAMIN B12 & FOLATE 
- VITAMIN D, 25 HYDROXY 
- SED RATE (ESR) - C REACTIVE PROTEIN, QT 
- INSERT PERIPHERAL IV; Future - IV INFUSION, HYDRATION, 1ST HOUR 
- MI PLACE CATH IN VEIN,SVC,IVC 
- OXYGEN SATURATION RESULTS DOCUMENTED/REVIEWED 
- [place] NONINVASV OXYGEN SATUR;SINGLE 
- MRI BRAIN WO CONT; Future 
- REFERRAL TO OCCUPATIONAL THERAPY 
- REFERRAL TO PHYSICAL THERAPY 
- REFERRAL TO SPEECH THERAPY 18. Shortness of breath - budesonide (PULMICORT) 0.25 mg/2 mL nbsp; 2 mL by Nebulization route two (2) times a day. Dispense: 60 Each; Refill: 2 
- albuterol-ipratropium (DUO-NEB) 2.5 mg-0.5 mg/3 ml nebu; 3 mL by Nebulization route three (3) times daily. Dispense: 90 Nebule; Refill: 1 
- Nebulizer & Compressor machine; 1 Each by Nebulization route four (4) times daily. Dispense: 1 Each; Refill: 0 
- Nebulizer Accessories kit; Dispense Nebulizer Accessories - face mask. Daily use due to COPD/Pulmonary Reactive Airway Disease/Hypoxemia  Dispense: 1 Kit; Refill: 0 
- EKG, 12 LEAD, INITIAL; Future - XR CHEST SNGL V; Future 19. Chronic combined systolic and diastolic congestive heart failure ([facility] [address] - budesonide (PULMICORT) 0.25 mg/2 mL nbsp; 2 mL by Nebulization route two (2) times a day. Dispense: 60 Each; Refill: 2 
- albuterol-ipratropium (DUO-NEB) 2.5 mg-0.5 mg/3 ml nebu; 3 mL by Nebulization route three (3) times daily. Dispense: 90 Nebule; Refill: 1 
- Nebulizer & Compressor machine; 1 Each by Nebulization route four (4) times daily. Dispense: 1 Each; Refill: 0 
- Nebulizer Accessories kit; Dispense Nebulizer Accessories - face mask. Daily use due to COPD/Pulmonary Reactive Airway Disease/Hypoxemia  Dispense: 1 Kit; Refill: 0 
- EKG, 12 LEAD, INITIAL; Future - XR CHEST SNGL V; Future 20. Reactive airway disease, mild persistent, uncomplicated - budesonide (PULMICORT) 0.25 mg/2 mL nbsp; 2 mL by Nebulization route two (2) times a day. Dispense: 60 Each; Refill: 2 
- albuterol-ipratropium (DUO-NEB) 2.5 mg-0.5 mg/3 ml nebu; 3 mL by Nebulization route three (3) times daily. Dispense: 90 Nebule; Refill: 1 
- Nebulizer & Compressor machine; 1 Each by Nebulization route four (4) times daily. Dispense: 1 Each; Refill: 0 
- Nebulizer Accessories kit; Dispense Nebulizer Accessories - face mask. Daily use due to COPD/Pulmonary Reactive Airway Disease/Hypoxemia  Dispense: 1 Kit; Refill: 0 
 
21. Inneffective medication administration routine at home - MRI BRAIN WO CONT; Future 
- REFERRAL TO OCCUPATIONAL THERAPY 
- REFERRAL TO PHYSICAL THERAPY 
- REFERRAL TO SPEECH THERAPY 22. Ataxic gait - MRI BRAIN WO CONT; Future 
- REFERRAL TO OCCUPATIONAL THERAPY 
- REFERRAL TO PHYSICAL THERAPY 
- REFERRAL TO SPEECH THERAPY 23. Medication management - MRI BRAIN WO CONT; Future 
- REFERRAL TO OCCUPATIONAL THERAPY 
- REFERRAL TO PHYSICAL THERAPY 
- REFERRAL TO SPEECH THERAPY 24. Weakness generalized - MRI BRAIN WO CONT; Future 
- REFERRAL TO OCCUPATIONAL THERAPY 
- REFERRAL TO PHYSICAL THERAPY 
- REFERRAL TO SPEECH THERAPY 25. Decreased muscle strength - MRI BRAIN WO CONT; Future 
- REFERRAL TO OCCUPATIONAL THERAPY 
- REFERRAL TO PHYSICAL THERAPY 
- REFERRAL TO SPEECH THERAPY 26. 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 
- REFERRAL TO OCCUPATIONAL THERAPY 
- REFERRAL TO PHYSICAL THERAPY 
- REFERRAL TO SPEECH THERAPY 27. Requires continuous supervision for activities of daily living (ADL) 
- MRI BRAIN WO CONT; Future 
- REFERRAL TO OCCUPATIONAL THERAPY - REFERRAL TO PHYSICAL THERAPY 
- REFERRAL TO SPEECH THERAPY 28. Impaired communication with impaired cognition - MRI BRAIN WO CONT; Future 
- REFERRAL TO OCCUPATIONAL THERAPY 
- REFERRAL TO PHYSICAL THERAPY 
- REFERRAL TO SPEECH THERAPY Problem List:    
Patient Active Problem List  
 Diagnosis Date Noted  Paroxysmal atrial fibrillation ([address] 11/28/2018  Pulmonary heart disease ([address]  11/28/2018  Diuretic-induced hypokalemia 11/28/2018  Moderate protein-calorie malnutrition ([address]  11/28/2018  Atherosclerotic cardiovascular disease  Fatty liver disease, nonalcoholic 03/73/1524  Kidney insufficiency 06/05/2017  Osteoporosis 11/16/2016  Nuclear cataract 10/06/2016  Nuclear sclerosis 07/25/2016  Ocular hypertension 02/01/2016  
 HTN (hypertension) 11/08/2011  Hyperlipidemia 11/08/2011  GERD (gastroesophageal reflux disease) 11/08/2011  Anxiety 11/08/2011  Vertigo 11/08/2011 Medical History:    
Past Medical History:  
Diagnosis Date  Allergic rhinitis  Atherosclerotic cardiovascular disease 1999  
 CHF (congestive heart failure) ([address] 09/11/2018  Cognitive communication deficit  Edema  Environmental allergies   
 dizziness-(chronic)  Fatty liver disease, nonalcoholic  GERD (gastroesophageal reflux disease)  H/O heart artery stent 09/2018  Heart attack ([address] 09/2018  Heart palpitations 2018 infrequent  History of PTCA  Hypercholesterolemia 1999  Hypertension 2010  Liver disease   
 fatty liver  MI (myocardial infarction) ([address] 09/2018  Overactive bladder 08/09/2018  Peripheral neuropathy  Permanent atrial fibrillation ([address] 11/05/2018  Senile dementia, without behavioral disturbance 11/05/2018  Stress incontinence, female 08/09/2018  Vertigo  Vitamin D deficiency Allergies: Allergies Allergen Reactions  Celecoxib Other (comments)  Sulfa (Sulfonamide Antibiotics) Unknown (comments) and Hives  Atorvastatin Unknown (comments)  Codeine Unknown (comments)  Darvocet A500 [Propoxyphene N-Acetaminophen] Unknown (comments)  Iodine Unknown (comments)  Statins-Hmg-Coa Reductase Inhibitors Other (comments)  
  (intolerance)-myalgias  Sulfa (Sulfonamide Antibiotics) Unknown (comments)  Aricept [Donepezil] Myalgia Headaches - noted as intolerance Medications:  * [facility] will lend their Nebulizer until patient's comes in from supplier* Current Outpatient Medications Medication Sig  BRILINTA 90 mg tablet Take 90 mg by mouth two (2) times a day.  simvastatin (ZOCOR) 40 mg tablet Take 40 mg by mouth nightly.  PARoxetine (PAXIL) 20 mg tablet Take 20 mg by mouth daily.  nitroglycerin (NITROSTAT) 0.4 mg SL tablet Take 0.4 mg by mouth as needed for chest pain.  carvedilol (COREG) 6.25 mg tablet Take 6.25 mg by mouth two (2) times a day.  bumetanide (BUMEX) 2 mg tablet Take 2 mg by mouth daily.  PROAIR HFA 90 mcg/actuation inhaler Take 2 Inhalation by inhalation four (4) times daily as needed for SOB or Wheezing when out of apartment. RESCUE INHALER.  metoprolol tartrate (LOPRESSOR) 25 mg tablet Take 0.5 Tabs by mouth every morning.  budesonide (PULMICORT) 0.25 mg/2 mL nbsp 2 mL by Nebulization route two (2) times a day.  albuterol-ipratropium (DUO-NEB) 2.5 mg-0.5 mg/3 ml nebu 3 mL by Nebulization route three (3) times daily.  Nebulizer & Compressor machine 1 Each by Nebulization route four (4) times daily. ([facility] has ordered her own nebulizer & Accessories for long term use)  Nebulizer Accessories kit Dispense Nebulizer Accessories - face mask. Daily use due to COPD/Pulmonary Reactive Airway Disease/Hypoxemia([facility] has ordered her own nebulizer & Accessories for long term use)  potassium chloride (KLOR-CON) 10 mEq tablet Take 1 Tab by mouth daily.  ondansetron hcl (ZOFRAN) 4 mg tablet Take 1 tablet 30 mins prior to breakfast, then q 8 hours prn for nausea/vomiting.  omeprazole (PRILOSEC) 40 mg capsule Take 40 mg by mouth 30 mins prior to dinner.  diltiazem CD (CARTIA XT) 180 mg ER capsule Take 180 mg by mouth AM. Sincerely, Cj De La Rosa NP

## 2018-11-28 NOTE — LETTER
11/28/2018 3:12 PM 
 
RE:    [address] [facility] [address] [address] CURRENT MEDICATIONS AS OF 11/28/18 Current Outpatient Medications Medication Sig  BRILINTA 90 mg tablet Take 1 tab. by mouth two (2) times a day. (BLOOD THINNER)  simvastatin (ZOCOR) 40 mg tablet Take 1 tab. by mouth nightly. (CHOLESTEROL PILL)  PARoxetine (PAXIL) 20 mg tablet Take 1 tab. by mouth every morning. (MOOD STABILIZER)  carvedilol (COREG) 6.25 mg tablet Take 1 tab. by mouth two (2) times a day. (HEART PILL)  bumetanide (BUMEX) 2 mg tablet Take 1 tab. by mouth every morning. (FLUID PILL)  metoprolol tartrate (LOPRESSOR) 25 mg tablet Take 0.5 Tabs by mouth every morning. (AFIB PILL)  omeprazole (PRILOSEC) 40 mg capsule Take 40 mg by mouth daily. (STOMACH PILL)  diltiazem CD (CARTIA XT) 180 mg ER capsule Take 180 mg by mouth nightly. (HEART PILL) AS NEEDED MEDICATIONS ONLY FOR CHEST PAIN 
 nitroglycerin (NITROSTAT) 0.4 mg SL tablet Take 0.4 mg by mouth as needed. AS NEEDED FOR SHORTNESS OF BREATH 
 PROAIR HFA 90 mcg/actuation inhaler Take 2 puffs by inhalation four (4) times daily as needed. Any questions please call [phone] and ask for Dov Felix. If it is after hours you may [facility] [phone].

## 2018-11-29 LAB
ALBUMIN SERPL-MCNC: 3.7 G/DL (ref 3.2–4.6)
ALBUMIN/GLOB SERPL: 1.2 {RATIO} (ref 1.2–2.2)
ALP SERPL-CCNC: 71 IU/L (ref 39–117)
ALT SERPL-CCNC: 12 IU/L (ref 0–32)
AMMONIA PLAS-MCNC: 36 UG/DL (ref 19–87)
APPEARANCE UR: CLEAR
AST SERPL-CCNC: 24 IU/L (ref 0–40)
BACTERIA #/AREA URNS HPF: NORMAL /[HPF]
BASOPHILS # BLD AUTO: 0 X10E3/UL (ref 0–0.2)
BASOPHILS NFR BLD AUTO: 0 %
BILIRUB SERPL-MCNC: 0.5 MG/DL (ref 0–1.2)
BILIRUB UR QL STRIP: NEGATIVE
BUN SERPL-MCNC: 28 MG/DL (ref 10–36)
BUN/CREAT SERPL: 22 (ref 12–28)
CALCIUM SERPL-MCNC: 9.4 MG/DL (ref 8.7–10.3)
CASTS URNS QL MICRO: NORMAL /LPF
CHLORIDE SERPL-SCNC: 98 MMOL/L (ref 96–106)
CO2 SERPL-SCNC: 25 MMOL/L (ref 20–29)
COLOR UR: YELLOW
CREAT SERPL-MCNC: 1.29 MG/DL (ref 0.57–1)
EOSINOPHIL # BLD AUTO: 0.3 X10E3/UL (ref 0–0.4)
EOSINOPHIL NFR BLD AUTO: 4 %
EPI CELLS #/AREA URNS HPF: NORMAL /HPF
ERYTHROCYTE [DISTWIDTH] IN BLOOD BY AUTOMATED COUNT: 15.1 % (ref 12.3–15.4)
GLOBULIN SER CALC-MCNC: 3.2 G/DL (ref 1.5–4.5)
GLUCOSE SERPL-MCNC: 105 MG/DL (ref 65–99)
GLUCOSE UR QL: NEGATIVE
HCT VFR BLD AUTO: 35.7 % (ref 34–46.6)
HGB BLD-MCNC: 12.2 G/DL (ref 11.1–15.9)
HGB UR QL STRIP: NEGATIVE
KETONES UR QL STRIP: NEGATIVE
LACTATE SERPL-MCNC: 10.3 MG/DL (ref 4.8–25.7)
LEUKOCYTE ESTERASE UR QL STRIP: NEGATIVE
LYMPHOCYTES # BLD AUTO: 1.5 X10E3/UL (ref 0.7–3.1)
LYMPHOCYTES NFR BLD AUTO: 23 %
MCH RBC QN AUTO: 29 PG (ref 26.6–33)
MCHC RBC AUTO-ENTMCNC: 34.2 G/DL (ref 31.5–35.7)
MCV RBC AUTO: 85 FL (ref 79–97)
MICRO URNS: NORMAL
MICRO URNS: NORMAL
MONOCYTES # BLD AUTO: 0.7 X10E3/UL (ref 0.1–0.9)
MONOCYTES NFR BLD AUTO: 11 %
MUCOUS THREADS URNS QL MICRO: PRESENT
NEUTROPHILS # BLD AUTO: 3.9 X10E3/UL (ref 1.4–7)
NEUTROPHILS NFR BLD AUTO: 62 %
NITRITE UR QL STRIP: NEGATIVE
NT-PROBNP SERPL-MCNC: 6632 PG/ML (ref 0–738)
PH UR STRIP: 6.5 [PH] (ref 5–7.5)
PLATELET # BLD AUTO: 316 X10E3/UL (ref 150–379)
POTASSIUM SERPL-SCNC: 3.1 MMOL/L (ref 3.5–5.2)
PROT SERPL-MCNC: 6.9 G/DL (ref 6–8.5)
PROT UR QL STRIP: NEGATIVE
RBC # BLD AUTO: 4.2 X10E6/UL (ref 3.77–5.28)
RBC #/AREA URNS HPF: NORMAL /HPF
SODIUM SERPL-SCNC: 140 MMOL/L (ref 134–144)
SP GR UR: 1.01 (ref 1–1.03)
URINALYSIS REFLEX, 377202: NORMAL
UROBILINOGEN UR STRIP-MCNC: 0.2 MG/DL (ref 0.2–1)
WBC # BLD AUTO: 6.4 X10E3/UL (ref 3.4–10.8)
WBC #/AREA URNS HPF: NORMAL /HPF

## 2018-11-29 NOTE — PATIENT INSTRUCTIONS
Atrial Fibrillation: Care Instructions Your Care Instructions Atrial fibrillation is an irregular and often fast heartbeat. Treating this condition is important for several reasons. It can cause blood clots, which can travel from your heart to your brain and cause a stroke. If you have a fast heartbeat, you may feel lightheaded, dizzy, and weak. An irregular heartbeat can also increase your risk for heart failure. Atrial fibrillation is often the result of another heart condition, such as high blood pressure or coronary artery disease. Making changes to improve your heart condition will help you stay healthy and active. Follow-up care is a key part of your treatment and safety. Be sure to make and go to all appointments, and call your doctor if you are having problems. It's also a good idea to know your test results and keep a list of the medicines you take. How can you care for yourself at home? Medicines 
  · Take your medicines exactly as prescribed. Call your doctor if you think you are having a problem with your medicine. You will get more details on the specific medicines your doctor prescribes.  
  · If your doctor has given you a blood thinner to prevent a stroke, be sure you get instructions about how to take your medicine safely. Blood thinners can cause serious bleeding problems.  
  · Do not take any vitamins, over-the-counter drugs, or herbal products without talking to your doctor first.  
 Lifestyle changes 
  · Do not smoke. Smoking can increase your chance of a stroke and heart attack. If you need help quitting, talk to your doctor about stop-smoking programs and medicines. These can increase your chances of quitting for good.  
  · Eat a heart-healthy diet.  
  · Stay at a healthy weight. Lose weight if you need to.  
  · Limit alcohol to 2 drinks a day for men and 1 drink a day for women. Too much alcohol can cause health problems.   · Avoid colds and flu. Get a pneumococcal vaccine shot. If you have had one before, ask your doctor whether you need another dose. Get a flu shot every year. If you must be around people with colds or flu, wash your hands often. Activity 
  · If your doctor recommends it, get more exercise. Walking is a good choice. Bit by bit, increase the amount you walk every day. Try for at least 30 minutes on most days of the week. You also may want to swim, bike, or do other activities. Your doctor may suggest that you join a cardiac rehabilitation program so that you can have help increasing your physical activity safely.  
  · Start light exercise if your doctor says it is okay. Even a small amount will help you get stronger, have more energy, and manage stress. Walking is an easy way to get exercise. Start out by walking a little more than you did in the hospital. Gradually increase the amount you walk.  
  · When you exercise, watch for signs that your heart is working too hard. You are pushing too hard if you cannot talk while you are exercising. If you become short of breath or dizzy or have chest pain, sit down and rest immediately.  
  · Check your pulse regularly. Place two fingers on the artery at the palm side of your wrist, in line with your thumb. If your heartbeat seems uneven or fast, talk to your doctor. When should you call for help? Call 911 anytime you think you may need emergency care. For example, call if: 
  · You have symptoms of a heart attack. These may include: 
? Chest pain or pressure, or a strange feeling in the chest. 
? Sweating. ? Shortness of breath. ? Nausea or vomiting. ? Pain, pressure, or a strange feeling in the back, neck, jaw, or upper belly or in one or both shoulders or arms. ? Lightheadedness or sudden weakness. ? A fast or irregular heartbeat.  
After you call 911, the  may tell you to chew 1 adult-strength or 2 to 4 low-dose aspirin. Wait for an ambulance. Do not try to drive yourself.  
  · You have symptoms of a stroke. These may include: 
? Sudden numbness, tingling, weakness, or loss of movement in your face, arm, or leg, especially on only one side of your body. ? Sudden vision changes. ? Sudden trouble speaking. ? Sudden confusion or trouble understanding simple statements. ? Sudden problems with walking or balance. ? A sudden, severe headache that is different from past headaches.  
  · You passed out (lost consciousness).  
 Call your doctor now or seek immediate medical care if: 
  · You have new or increased shortness of breath.  
  · You feel dizzy or lightheaded, or you feel like you may faint.  
  · Your heart rate becomes irregular.  
  · You can feel your heart flutter in your chest or skip heartbeats. Tell your doctor if these symptoms are new or worse.  
 Watch closely for changes in your health, and be sure to contact your doctor if you have any problems. Where can you learn more? Go to http://pia-colby.info/. Enter U020 in the search box to learn more about \"Atrial Fibrillation: Care Instructions. \" Current as of: December 6, 2017 Content Version: 11.8 © 8741-7385 [employer]. Care instructions adapted under license by [employer] (which disclaims liability or warranty for this information). If you have questions about a medical condition or this instruction, always ask your healthcare professional. [place] [address] any warranty or liability for your use of this information. Dehydration: Care Instructions Your Care Instructions Dehydration happens when your body loses too much fluid. This might happen when you do not drink enough water or you lose large amounts of fluids from your body because of diarrhea, vomiting, or sweating. Severe dehydration can be life-threatening. Water and minerals called electrolytes help put your body fluids back in balance. Learn the early signs of fluid loss, and drink more fluids to prevent dehydration. Follow-up care is a key part of your treatment and safety. Be sure to make and go to all appointments, and call your doctor if you are having problems. It's also a good idea to know your test results and keep a list of the medicines you take. How can you care for yourself at home? · To prevent dehydration, drink plenty of fluids, enough so that your urine is light yellow or clear like water. Choose water and other caffeine-free clear liquids until you feel better. If you have kidney, heart, or liver disease and have to limit fluids, talk with your doctor before you increase the amount of fluids you drink. · If you do not feel like eating or drinking, try taking small sips of water, sports drinks, or other rehydration drinks. · Get plenty of rest. 
To prevent dehydration · Add more fluids to your diet and daily routine, unless your doctor has told you not to. · During hot weather, drink more fluids. Drink even more fluids if you exercise a lot. Stay away from drinks with alcohol or caffeine. · Watch for the symptoms of dehydration. These include: ? A dry, sticky mouth. ? Dark yellow urine, and not much of it. ? Dry and sunken eyes. ? Feeling very tired. · Learn what problems can lead to dehydration. These include: ? Diarrhea, fever, and vomiting. ? Any illness with a fever, such as pneumonia or the flu. ? Activities that cause heavy sweating, such as endurance races and heavy outdoor work in hot or humid weather. ? Alcohol or drug abuse or withdrawal. 
? Certain medicines, such as cold and allergy pills (antihistamines), diet pills (diuretics), and laxatives. ? Certain diseases, such as diabetes, cancer, and heart or kidney disease. When should you call for help? Call 911 anytime you think you may need emergency care.  For example, call if: 
  · You passed out (lost consciousness).  
 Call your doctor now or seek immediate medical care if: 
  · You are confused and cannot think clearly.  
  · You are dizzy or lightheaded, or you feel like you may faint.  
  · You have signs of needing more fluids. You have sunken eyes and a dry mouth, and you pass only a little dark urine.  
  · You cannot keep fluids down.  
 Watch closely for changes in your health, and be sure to contact your doctor if: 
  · You are not making tears.  
  · Your skin is very dry and sags slowly back into place after you pinch it.  
  · Your mouth and eyes are very dry. Where can you learn more? Go to http://pia-colby.info/. Enter Q581 in the search box to learn more about \"Dehydration: Care Instructions. \" Current as of: November 20, 2017 Content Version: 11.8 © 4134-3314 [employer]. Care instructions adapted under license by [employer] (which disclaims liability or warranty for this information). If you have questions about a medical condition or this instruction, always ask your healthcare professional. [address] any warranty or liability for your use of this information.

## 2018-11-29 NOTE — PROGRESS NOTES
WBC- Normal  
CMP - Kidney insufficiency could be due to dehydration. BNP- PRO - Active CHF / Fluid over load. Urine - WNL no active infection present.

## 2018-11-29 NOTE — PROGRESS NOTES
Reason for Visit/HPI:   
Kimberly Madrigal is a 80 y.o. female patient who presents today for Chief Complaint Patient presents with  Transitions Of Care Patient being seen for transition of care, she was discharged from  [facility] on 11/26/18.  Diarrhea She states she has had loose stools with bouts of constipation for the past few months. Prolonged face to face time documentation:  
 
Start - 9:45 am  
Stop - 1345 pm  
Total Face to Face time = 240 mins Prolonged face to face time necessary for complex assessment of current acute complaints due to geriatric medicine, patient's fragility, frailness, distress, necessary education for new/advanced disease process. Face to Face time with patient reviewing consult notes and plan of care. Review of treatment plan related to systemic illness. Multiple conversations with patient, son, staff of [facility] and [place] [id number] care providers to figure out the best option for Mrs. Salgado care moving forward. I have voiced my concern over her being left alone. Mike Ramey  & DON are also uncomfortable with the patient only receiving 4 hours daily of direct care from the CNA that has been hired. Ms. Elisa Goodwin is unable to identify safety concerns or manage her own care. To date [facility] has turned off Mrs. Salgado stove as she was noted to be cooking and walking away and leaving the stove on. Discussed with son & NN at [facility] that patient requires 24 hour care until more assessments could be made. My overall recommendation from myself is that she be moved to AL at [facility] to allow for a higher level of care to be provided with the hopes that she can rehabilitate towards more independence with a better quality of life. Mr. Elisa Goodwin has agreed and will move forward with getting her to move to AL for a higher level of care.   
 
I have spoke with the  of [place] [id number], they are going to provide 24 hour care until patient moves to AL. I will meet with the DON on Tuesday AM and offer a plan of care related to patient's health at home. She will require some urgent testing for swallowing, medication management & administration, managing her diet as she has been vomiting after eating she states, as well as daily weights & vital sign care. Diagnosis/Treatment Plan:   
Diagnoses and all orders for this visit: 1. ST elevation myocardial infarction involving left anterior descending (LAD) coronary artery ([facility] Comments: 
Pt had a Massive MI in Aug 2018. Now has 2 stents in place, just discharged from skilled nursing facility to [place] at [facility]. Orders: 
-     EKG, 12 LEAD, INITIAL; Future -     XR CHEST SNGL V; Future 
-     REFERRAL TO OCCUPATIONAL THERAPY 
-     REFERRAL TO PHYSICAL THERAPY 
-     REFERRAL TO SPEECH THERAPY 2. Paroxysmal atrial fibrillation ([facility] Comments: 
Chronic AFIB, currently not controlled rate wise. Rate running between 80 to 110 bpm during visit. Orders: -     AMMONIA 
-     CBC WITH AUTOMATED DIFF 
-     COLLECTION VENOUS BLOOD,VENIPUNCTURE 
-     CREATINE KINASE (CK), MB/TOTAL 
-     HEMOGLOBIN A1C WITH EAG 
-     LACTIC ACID 
-     MAGNESIUM 
-     LIPID PANEL 
-     METABOLIC PANEL, COMPREHENSIVE 
-     NT-PRO BNP 
-     PHOSPHORUS 
-     PREALBUMIN 
-     PROTHROMBIN TIME + INR 
-     TSH REFLEX TO T4 
-     UA/M W/RFLX CULTURE, ROUTINE 
-     VITAMIN B12 & FOLATE 
-     VITAMIN D, 25 HYDROXY 
-     SED RATE (ESR) -     C REACTIVE PROTEIN, QT 
-     INSERT PERIPHERAL IV; Future -     IV INFUSION, HYDRATION, 1ST HOUR 
-     RI PLACE CATH IN VEIN,SVC,IVC 
-     OXYGEN SATURATION RESULTS DOCUMENTED/REVIEWED 
-     RI NONINVASV OXYGEN SATUR;SINGLE 
-     metoprolol tartrate (LOPRESSOR) 25 mg tablet; Take 0.5 Tabs by mouth every morning. 
-     EKG, 12 LEAD, INITIAL; Future 3. Irregular tachycardia Comments: Currently could be related to dehydration but will need to assess further. Orders: 
-     EKG, 12 LEAD, INITIAL; Future -     XR CHEST SNGL V; Future 4. Hospital discharge follow-up Comments: 
Discharged from Skilled Nursing stay at [facility] from an acute MI in Aug 2018. Orders: -     AMMONIA 
-     CBC WITH AUTOMATED DIFF 
-     COLLECTION VENOUS BLOOD,VENIPUNCTURE 
-     CREATINE KINASE (CK), MB/TOTAL 
-     HEMOGLOBIN A1C WITH EAG 
-     LACTIC ACID 
-     MAGNESIUM 
-     LIPID PANEL 
-     METABOLIC PANEL, COMPREHENSIVE 
-     NT-PRO BNP 
-     PHOSPHORUS 
-     PREALBUMIN 
-     PROTHROMBIN TIME + INR 
-     TSH REFLEX TO T4 
-     UA/M W/RFLX CULTURE, ROUTINE 
-     VITAMIN B12 & FOLATE 
-     VITAMIN D, 25 HYDROXY 
-     SED RATE (ESR) -     C REACTIVE PROTEIN, QT 
-     INSERT PERIPHERAL IV; Future -     IV INFUSION, HYDRATION, 1ST HOUR 
-     ND PLACE CATH IN VEIN,SVC,IVC 
-     OXYGEN SATURATION RESULTS DOCUMENTED/REVIEWED 
-     ND NONINVASV OXYGEN SATUR;SINGLE 5. Pulmonary heart disease ([address] -     ND NONINVASV OXYGEN SATUR;SINGLE 
-     Nebulizer & Compressor machine; 1 Each by Nebulization route four (4) times daily. 6. Dehydration, moderate Comments: 
Acute, multiple episodes of diarrhea. Not drinking, not capable of hydrating herself due to cognitive issues. Orders: 
-     sodium chloride 0.9 %; 500 mL by IntraVENous route once for 1 dose. -     potassium chloride (KLOR-CON) 10 mEq tablet; Take 1 Tab by mouth daily. -     ondansetron hcl (ZOFRAN) 4 mg tablet; Take 1 tablet 30 mins prior to breakfast, then q 8 hours prn for nausea/vomiting. 7. Diarrhea due to malabsorption 8. Furrowed tongue 
-     sodium chloride 0.9 %; 500 mL by IntraVENous route once for 1 dose. -     potassium chloride (KLOR-CON) 10 mEq tablet; Take 1 Tab by mouth daily. -     ondansetron hcl (ZOFRAN) 4 mg tablet; Take 1 tablet 30 mins prior to breakfast, then q 8 hours prn for nausea/vomiting. 9. Diuretic-induced hypokalemia 
-     sodium chloride 0.9 %; 500 mL by IntraVENous route once for 1 dose. -     potassium chloride (KLOR-CON) 10 mEq tablet; Take 1 Tab by mouth daily. 10. Moderate protein-calorie malnutrition (HCC)  
-     VITAMIN B12 & FOLATE 11. Non-intractable vomiting with nausea, unspecified vomiting type Comments: 
Patient states she is always nauseated and vomits after she eats any meals. Orders: 
-     XR BA SWALLOW ESOPHOGRAM; Future 
-     REFERRAL TO SPEECH THERAPY 12. Esophageal dysphagia Comments: 
Patient is choking on water. She attempted to eat crackers with me today and choked on the crackers. No dysphagia history but could be related MI Orders: 
-     XR BA SWALLOW ESOPHOGRAM; Future 
-     REFERRAL TO OCCUPATIONAL THERAPY 
-     REFERRAL TO SPEECH THERAPY 
-     ondansetron hcl (ZOFRAN) 4 mg tablet; Take 1 tablet 30 mins prior to breakfast, then q 8 hours prn for nausea/vomiting. 13. Choking due to food in larynx, initial encounter -     XR BA SWALLOW ESOPHOGRAM; Future 
-     REFERRAL TO SPEECH THERAPY 
-     ondansetron hcl (ZOFRAN) 4 mg tablet; Take 1 tablet 30 mins prior to breakfast, then q 8 hours prn for nausea/vomiting. 14. Kidney insufficiency -     AMMONIA 
-     CBC WITH AUTOMATED DIFF 
-     COLLECTION VENOUS BLOOD,VENIPUNCTURE 
-     CREATINE KINASE (CK), MB/TOTAL 
-     HEMOGLOBIN A1C WITH EAG 
-     LACTIC ACID 
-     MAGNESIUM 
-     LIPID PANEL 
-     METABOLIC PANEL, COMPREHENSIVE 
-     NT-PRO BNP 
-     PHOSPHORUS 
-     PREALBUMIN 
-     PROTHROMBIN TIME + INR 
-     TSH REFLEX TO T4 
-     UA/M W/RFLX CULTURE, ROUTINE 
-     VITAMIN B12 & FOLATE 
-     VITAMIN D, 25 HYDROXY 
-     SED RATE (ESR) -     C REACTIVE PROTEIN, QT 
-     INSERT PERIPHERAL IV; Future -     IV INFUSION, HYDRATION, 1ST HOUR 
-     CA PLACE CATH IN VEIN,SVC,IVC 
-     OXYGEN SATURATION RESULTS DOCUMENTED/REVIEWED 
-     CA NONINVASV OXYGEN SATUR;SINGLE 
 
 15. Mixed hyperlipidemia 
-     CREATINE KINASE (CK), MB/TOTAL 
-     LIPID PANEL 
-     METABOLIC PANEL, COMPREHENSIVE 
-     OXYGEN SATURATION RESULTS DOCUMENTED/REVIEWED 
-     ND NONINVASV OXYGEN SATUR;SINGLE 16. Word finding difficulty -     AMMONIA 
-     CBC WITH AUTOMATED DIFF 
-     COLLECTION VENOUS BLOOD,VENIPUNCTURE 
-     CREATINE KINASE (CK), MB/TOTAL 
-     HEMOGLOBIN A1C WITH EAG 
-     LACTIC ACID 
-     MAGNESIUM 
-     LIPID PANEL 
-     METABOLIC PANEL, COMPREHENSIVE 
-     NT-PRO BNP 
-     PHOSPHORUS 
-     PREALBUMIN 
-     PROTHROMBIN TIME + INR 
-     TSH REFLEX TO T4 
-     UA/M W/RFLX CULTURE, ROUTINE 
-     VITAMIN B12 & FOLATE 
-     VITAMIN D, 25 HYDROXY 
-     SED RATE (ESR) -     C REACTIVE PROTEIN, QT 
-     INSERT PERIPHERAL IV; Future -     IV INFUSION, HYDRATION, 1ST HOUR 
-     ND PLACE CATH IN VEIN,SVC,IVC 
-     OXYGEN SATURATION RESULTS DOCUMENTED/REVIEWED 
-     ND NONINVASV OXYGEN SATUR;SINGLE 
-     MRI BRAIN WO CONT; Future 
-     REFERRAL TO OCCUPATIONAL THERAPY 
-     REFERRAL TO PHYSICAL THERAPY 
-     REFERRAL TO SPEECH THERAPY 17. Confusion and disorientation -     AMMONIA 
-     CBC WITH AUTOMATED DIFF 
-     COLLECTION VENOUS BLOOD,VENIPUNCTURE 
-     CREATINE KINASE (CK), MB/TOTAL 
-     HEMOGLOBIN A1C WITH EAG 
-     LACTIC ACID 
-     MAGNESIUM 
-     LIPID PANEL 
-     METABOLIC PANEL, COMPREHENSIVE 
-     NT-PRO BNP 
-     PHOSPHORUS 
-     PREALBUMIN 
-     PROTHROMBIN TIME + INR 
-     TSH REFLEX TO T4 
-     UA/M W/RFLX CULTURE, ROUTINE 
-     VITAMIN B12 & FOLATE 
-     VITAMIN D, 25 HYDROXY 
-     SED RATE (ESR) -     C REACTIVE PROTEIN, QT 
-     INSERT PERIPHERAL IV; Future -     IV INFUSION, HYDRATION, 1ST HOUR 
-     ND PLACE CATH IN VEIN,SVC,IVC 
-     OXYGEN SATURATION RESULTS DOCUMENTED/REVIEWED 
-     ND NONINVASV OXYGEN SATUR;SINGLE 
-     MRI BRAIN WO CONT; Future 
-     REFERRAL TO OCCUPATIONAL THERAPY -     REFERRAL TO PHYSICAL THERAPY 
-     REFERRAL TO SPEECH THERAPY 18. Shortness of breath Comments: 
Pt was being treated in SNF for RAD, COPD. Has been off breathing treatments for 3 days due to discharge. Orders: 
-     budesonide (PULMICORT) 0.25 mg/2 mL nbsp; 2 mL by Nebulization route two (2) times a day. -     albuterol-ipratropium (DUO-NEB) 2.5 mg-0.5 mg/3 ml nebu; 3 mL by Nebulization route three (3) times daily. 
-     Nebulizer & Compressor machine; 1 Each by Nebulization route four (4) times daily. 
-     Nebulizer Accessories kit; Dispense Nebulizer Accessories - face mask. Daily use due to COPD/Pulmonary Reactive Airway Disease/Hypoxemia 
-     EKG, 12 LEAD, INITIAL; Future -     XR CHEST SNGL V; Future 19. Chronic combined systolic and diastolic congestive heart failure ([facility] Comments: 
Chronic since the MI due to heart injury. Currently on [employer]um[employer] Orders: 
-     budesonide (PULMICORT) 0.25 mg/2 mL nbsp; 2 mL by Nebulization route two (2) times a day. -     albuterol-ipratropium (DUO-NEB) 2.5 mg-0.5 mg/3 ml nebu; 3 mL by Nebulization route three (3) times daily. 
-     Nebulizer & Compressor machine; 1 Each by Nebulization route four (4) times daily. 
-     Nebulizer Accessories kit; Dispense Nebulizer Accessories - face mask. Daily use due to COPD/Pulmonary Reactive Airway Disease/Hypoxemia 
-     EKG, 12 LEAD, INITIAL; Future -     XR CHEST SNGL V; Future 20. Reactive airway disease, mild persistent, uncomplicated -     budesonide (PULMICORT) 0.25 mg/2 mL nbsp; 2 mL by Nebulization route two (2) times a day. -     albuterol-ipratropium (DUO-NEB) 2.5 mg-0.5 mg/3 ml nebu; 3 mL by Nebulization route three (3) times daily. 
-     Nebulizer & Compressor machine; 1 Each by Nebulization route four (4) times daily. 
-     Nebulizer Accessories kit; Dispense Nebulizer Accessories - face mask. Daily use due to COPD/Pulmonary Reactive Airway Disease/Hypoxemia 21. Inneffective medication administration routine at home -     MRI BRAIN WO CONT; Future 
-     REFERRAL TO OCCUPATIONAL THERAPY 
-     REFERRAL TO PHYSICAL THERAPY 
-     REFERRAL TO SPEECH THERAPY 22. Ataxic gait -     MRI BRAIN WO CONT; Future 
-     REFERRAL TO OCCUPATIONAL THERAPY 
-     REFERRAL TO PHYSICAL THERAPY 
-     REFERRAL TO SPEECH THERAPY 23. Medication management -     MRI BRAIN WO CONT; Future 
-     REFERRAL TO OCCUPATIONAL THERAPY 
-     REFERRAL TO PHYSICAL THERAPY 
-     REFERRAL TO SPEECH THERAPY 24. Weakness generalized -     MRI BRAIN WO CONT; Future 
-     REFERRAL TO OCCUPATIONAL THERAPY 
-     REFERRAL TO PHYSICAL THERAPY 
-     REFERRAL TO SPEECH THERAPY 25. Decreased muscle strength -     MRI BRAIN WO CONT; Future 
-     REFERRAL TO OCCUPATIONAL THERAPY 
-     REFERRAL TO PHYSICAL THERAPY 
-     REFERRAL TO SPEECH THERAPY 26. 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 
-     REFERRAL TO OCCUPATIONAL THERAPY 
-     REFERRAL TO PHYSICAL THERAPY 
-     REFERRAL TO SPEECH THERAPY 27. Requires continuous supervision for activities of daily living (ADL) 
-     MRI BRAIN WO CONT; Future 
-     REFERRAL TO OCCUPATIONAL THERAPY 
-     REFERRAL TO PHYSICAL THERAPY 
-     REFERRAL TO SPEECH THERAPY 28. Impaired communication with impaired cognition -     MRI BRAIN WO CONT; Future 
-     REFERRAL TO OCCUPATIONAL THERAPY 
-     REFERRAL TO PHYSICAL THERAPY 
-     REFERRAL TO SPEECH THERAPY Discontinued Care: The following treatment modalities have been discontinued by the provider today:  
Medications Discontinued During This Encounter Medication Reason  linaclotide (LINZESS) 72 mcg cap Alternate Therapy  fexofenadine (WAL-FEX ALLERGY) 180 mg tablet Alternate Therapy  meclizine (ANTIVERT) 12.5 mg tablet Alternate Therapy  ALPRAZolam (XANAX) 0.25 mg tablet Alternate Therapy  fenofibrate nanocrystallized (TRICOR) 48 mg tablet Alternate Therapy  cholecalciferol, vitamin D3, 2,000 unit tab Alternate Therapy  aspirin 81 mg tablet Alternate Therapy  MULTIVITAMIN PO Alternate Therapy  CALCIUM CARBONATE/VITAMIN D3 (CALTRATE 600 + D PO) Alternate Therapy  AMITIZA 8 mcg capsule Paradoxical Response  mirtazapine (REMERON) 15 mg tablet Paradoxical Response  senna-docusate (PERICOLACE) 8.6-50 mg per tablet Paradoxical Response Follow Up: Follow-up Disposition: 
Return in about 1 day (around 11/29/2018) for with the NP for follow up to your treatment plan. Subjective: Allergies Allergen Reactions  Celecoxib Other (comments)  Sulfa (Sulfonamide Antibiotics) Unknown (comments) and Hives  Atorvastatin Unknown (comments)  Codeine Unknown (comments)  Darvocet A500 [Propoxyphene N-Acetaminophen] Unknown (comments)  Iodine Unknown (comments)  Statins-Hmg-Coa Reductase Inhibitors Other (comments)  
  (intolerance)-myalgias  Sulfa (Sulfonamide Antibiotics) Unknown (comments)  Aricept [Donepezil] Myalgia Headaches - noted as intolerance Prior to Admission medications Medication Sig Start Date End Date Taking? Authorizing Provider BRILINTA 90 mg tablet Take 90 mg by mouth two (2) times a day. 11/26/18  Yes Provider, Historical  
simvastatin (ZOCOR) 40 mg tablet Take 40 mg by mouth nightly. 11/26/18  Yes Provider, Historical  
PARoxetine (PAXIL) 20 mg tablet Take 20 mg by mouth daily. 11/26/18  Yes Provider, Historical  
nitroglycerin (NITROSTAT) 0.4 mg SL tablet Take 0.4 mg by mouth as needed. 11/26/18  Yes Provider, Historical  
carvedilol (COREG) 6.25 mg tablet Take 6.25 mg by mouth two (2) times a day. 11/26/18  Yes Provider, Historical  
bumetanide (BUMEX) 2 mg tablet Take 2 mg by mouth daily.  11/26/18  Yes Provider, Historical  
PROAIR HFA 90 mcg/actuation inhaler Take 2 Inhalation by inhalation four (4) times daily as needed. 11/26/18  Yes Provider, Historical  
metoprolol tartrate (LOPRESSOR) 25 mg tablet Take 0.5 Tabs by mouth every morning. 11/28/18  Yes Josh De La Cruz NP  
budesonide (PULMICORT) 0.25 mg/2 mL nbsp 2 mL by Nebulization route two (2) times a day. 11/28/18  Yes Josh De La Cruz NP  
albuterol-ipratropium (DUO-NEB) 2.5 mg-0.5 mg/3 ml nebu 3 mL by Nebulization route three (3) times daily. 11/28/18  Yes Josh De La Cruz NP Nebulizer & Compressor machine 1 Each by Nebulization route four (4) times daily. 11/28/18  Yes Josh De La Cruz NP Nebulizer Accessories kit Dispense Nebulizer Accessories - face mask. Daily use due to COPD/Pulmonary Reactive Airway Disease/Hypoxemia 11/28/18  Yes Madan DAVE NP  
sodium chloride 0.9 % 500 mL by IntraVENous route once for 1 dose. 11/28/18 11/28/18 Yes Josh De La Cruz NP  
potassium chloride (KLOR-CON) 10 mEq tablet Take 1 Tab by mouth daily. 11/28/18  Yes Josh De La Cruz NP  
ondansetron hcl (ZOFRAN) 4 mg tablet Take 1 tablet 30 mins prior to breakfast, then q 8 hours prn for nausea/vomiting. 11/28/18  Yes Josh De La Cruz NP  
omeprazole (PRILOSEC) 40 mg capsule Take 40 mg by mouth daily. Yes Provider, Historical  
diltiazem CD (CARTIA XT) 180 mg ER capsule Take 180 mg by mouth nightly. Yes Provider, Historical  
 
Past Medical History:  
Diagnosis Date  Allergic rhinitis  Atherosclerotic cardiovascular disease 1999  
 CHF (congestive heart failure) ([facility] 09/11/2018  Cognitive communication deficit  Edema  Environmental allergies   
 dizziness-(chronic)  Fatty liver disease, nonalcoholic  GERD (gastroesophageal reflux disease)  H/O heart artery stent 09/2018  Heart attack ([facility] 09/2018  Heart palpitations 2018 infrequent  History of PTCA  Hypercholesterolemia 1999  Hypertension 2010  Liver disease   
 fatty liver  MI (myocardial infarction) ([facility] 09/2018  Overactive bladder 2018  Peripheral neuropathy  Permanent atrial fibrillation ([facility] [facility] 2018  Senile dementia, without behavioral disturbance 2018  Stress incontinence, female 2018  Vertigo  Vitamin D deficiency Past Surgical History:  
Procedure Laterality Date  BREAST SURGERY PROCEDURE UNLISTED    
 breast cyst Landy Oliver)  CARDIAC SURG PROCEDURE UNLIST  1999  
 + stress thalassemia; 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 Holter  HX APPENDECTOMY  HX BREAST BIOPSY Left   
 neg  HX CYST INCISION AND DRAINAGE Right years ago  
 neg  HX DILATION AND CURETTAGE    
 x5  
 HX GYN    
 D&C (x5), ALLEY/BSO (-Juliann/Zedler), Provera intolerance (bleeding), endometrial Bx annually  HX ALLEY AND BSO   Juliann/Zedler Social History Tobacco Use  Smoking status: Former Smoker Packs/day: 4.00 Types: Cigarettes Last attempt to quit: 1955 Years since quittin.9  Smokeless tobacco: Never Used Substance Use Topics  Alcohol use: Yes Alcohol/week: 0.0 - 0.5 oz  
  Comment: rare  Drug use: No  
  
Family History Problem Relation Age of Onset  Diabetes Mother  Hypertension Mother  Heart Failure Mother CHF ( @ 80)  Alzheimer Mother  Cancer Father   
     lung ( @ 77)  Alzheimer Father  No Known Problems Son  No Known Problems Son   
 Ovarian Cancer Sister   
     hysterectomy  Breast Cancer Sister 28  
     mastectomy  Cancer Sister   
     breast and ovarian  Alzheimer Sister  Breast Problems Sister   
     breast cyst  
 Cataract Sister  Hypertension Sister  Diabetes Brother Advance Care Planning 2018 Primary Decision Maker Name Melissa Chong Primary Decision Maker Phone Number [phone] Primary Decision Maker Relationship to Patient Adult child Confirm Advance Directive Yes, on file Does the patient have other document types Do Not Resuscitate; Power of [employer]; Other (comment) Test Results:  
 
Lab Results Component Value Date/Time WBC 6.4 11/28/2018 01:38 PM  
 HGB 12.2 11/28/2018 01:38 PM  
 HCT 35.7 11/28/2018 01:38 PM  
 PLATELET 390 70/34/1498 01:38 PM  
 MCV 85 11/28/2018 01:38 PM  
 
Lab Results Component Value Date/Time Sodium 140 11/28/2018 01:38 PM  
 Potassium 3.1 (L) 11/28/2018 01:38 PM  
 Chloride 98 11/28/2018 01:38 PM  
 CO2 25 11/28/2018 01:38 PM  
 Anion gap 11 11/02/2010 05:01 PM  
 Glucose 105 (H) 11/28/2018 01:38 PM  
 BUN 28 11/28/2018 01:38 PM  
 Creatinine 1.29 (H) 11/28/2018 01:38 PM  
 BUN/Creatinine ratio 22 11/28/2018 01:38 PM  
 GFR est AA 42 (L) 11/28/2018 01:38 PM  
 GFR est non-AA 36 (L) 11/28/2018 01:38 PM  
 Calcium 9.4 11/28/2018 01:38 PM  
 Bilirubin, total 0.5 11/28/2018 01:38 PM  
 AST (SGOT) 24 11/28/2018 01:38 PM  
 Alk. phosphatase 71 11/28/2018 01:38 PM  
 Protein, total 6.9 11/28/2018 01:38 PM  
 Albumin 3.7 11/28/2018 01:38 PM  
 Globulin 3.3 11/02/2010 05:01 PM  
 A-G Ratio 1.2 11/28/2018 01:38 PM  
 ALT (SGPT) 12 11/28/2018 01:38 PM  
 
 
Objective:  
 
Vitals:  
 11/28/18 0910 11/28/18 1248 BP: 119/77 138/66 Pulse: 90 72 Resp: 18 Temp: 97.8 °F (36.6 °C) TempSrc: Oral   
SpO2: 99% Weight: 126 lb (57.2 kg) Height: 5' 1\" (1.549 m) Wt Readings from Last 3 Encounters:  
11/28/18 126 lb (57.2 kg) 08/01/18 145 lb (65.8 kg) 03/27/18 147 lb 6.4 oz (66.9 kg) BP Readings from Last 3 Encounters:  
11/28/18 138/66  
08/01/18 158/78  
03/27/18 144/80 Review of Systems Unable to perform ROS: Mental status change Physical Exam  
Constitutional: Vital signs are normal. She appears lethargic, malnourished and dehydrated. She appears to not be writhing in pain. She appears unhealthy. She appears cachectic. She appears toxic. She does not have a sickly appearance. She appears distressed. Frail, fragile, alert but not oriented, elderly  female. 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 HENT:  
Head: Normocephalic and atraumatic. Right Ear: External ear and ear canal normal. A middle ear effusion is present. Decreased hearing is noted. Left Ear: External ear and ear canal normal. A middle ear effusion is present. Decreased hearing is noted. Nose: Nose normal. No mucosal edema or rhinorrhea. Mouth/Throat: Uvula is midline and oropharynx is clear and moist. Mucous membranes are pale, dry and not cyanotic. No oral lesions. No uvula swelling. No posterior oropharyngeal edema or posterior oropharyngeal erythema. Eyes: Conjunctivae, EOM and lids are normal. Lids are everted and swept, no foreign bodies found. Right pupil is round and reactive. Left pupil is not reactive. Left pupil required 20 to 30 secs extra to respond. But it finally did respond. Neck: Trachea normal, normal range of motion and full passive range of motion without pain. Neck supple. JVD present. No hepatojugular reflux present. Carotid bruit is not present. No thyromegaly present. Cardiovascular: S1 normal, S2 normal, intact distal pulses and normal pulses. An irregularly irregular rhythm present. Frequent extrasystoles are present. Tachycardia present. Exam reveals gallop and distant heart sounds. Exam reveals no friction rub. No murmur heard. No extremity edema noted on visit. Pulmonary/Chest: Effort normal. Tachypnea noted. She has decreased breath sounds. She has wheezes. Abdominal: Soft. Normal appearance. She exhibits no distension, no fluid wave and no mass. Bowel sounds are hyperactive. There is no hepatosplenomegaly. There is no tenderness. There is no rebound and no CVA tenderness. Continues to struggle with constipation. The [employer] is working but she can not take it every day as the stools get better but the urgency is too much. Musculoskeletal: Generalized weakness with balance issues and intermittent dizziness Lymphadenopathy:  
     Head (right side): No submandibular adenopathy present. Head (left side): No submandibular and no tonsillar adenopathy present. She has no cervical adenopathy. She has no axillary adenopathy. Neurological: She has normal reflexes. She appears lethargic. She is disoriented. She is not agitated. She displays weakness, atrophy, facial asymmetry, abnormal stance and abnormal speech. A cranial nerve deficit and sensory deficit is present. She exhibits abnormal muscle tone. Coordination and gait abnormal. GCS score is 15. Skin: Skin is warm, dry and intact. No bruising, no ecchymosis and no rash noted. She is not diaphoretic. No cyanosis. There is pallor. Nails show no clubbing. Psychiatric: Her mood appears anxious. Her affect is labile. She expresses impulsivity. She exhibits a depressed mood. She is apathetic. She exhibits disordered thought content, abnormal new learning ability and abnormal remote memory. She has a flat affect. Baseline mood and affect unchanged from normal.   
Nursing note and vitals reviewed. Disclaimer: Ms. Juve Pool has been advised to call or return to our office if symptoms worsen/change/persist. We as a care team including the patient; discussed expected course/resolution/complications of diagnosis in detail today. Medication risks/benefits/costs/interactions/alternatives discussed Donna Zarate was given an after visit summary which includes diagnoses, current medications, & vitals. Juve Pool expressed understanding with the diagnosis and plan.

## 2018-11-30 ENCOUNTER — TELEPHONE (OUTPATIENT)
Dept: GERIATRIC MEDICINE | Age: 83
End: 2018-11-30

## 2018-11-30 DIAGNOSIS — I21.02 ST ELEVATION MYOCARDIAL INFARCTION INVOLVING LEFT ANTERIOR DESCENDING (LAD) CORONARY ARTERY (HCC): ICD-10-CM

## 2018-11-30 DIAGNOSIS — I48.0 PAROXYSMAL ATRIAL FIBRILLATION (HCC): Primary | ICD-10-CM

## 2018-11-30 DIAGNOSIS — I27.9 PULMONARY HEART DISEASE (HCC): ICD-10-CM

## 2018-11-30 RX ORDER — CLOPIDOGREL BISULFATE 75 MG/1
75 TABLET ORAL DAILY
Qty: 30 TAB | Refills: 0 | Status: SHIPPED | OUTPATIENT
Start: 2018-11-30 | End: 2019-03-27

## 2018-12-01 NOTE — TELEPHONE ENCOUNTER
Speaking with Jody Hunt NP with Dr. [facility] Nurse Practitioner. Brilinta is the antiplatelet Mrs. Liyah Bowman has been on since her heart attack in Sept. Rajani Rice states the #1 side effect with Brilinta is SOB, Dyspnea. She recommends that I change this to Plavix as she should be on a blood thinner like Eliquis. I have to make these changes and get the information to her aids that are staying with her 24 hours per day until she moves to Assisted Living.

## 2018-12-03 ENCOUNTER — OFFICE VISIT (OUTPATIENT)
Dept: GERIATRIC MEDICINE | Age: 83
End: 2018-12-03

## 2018-12-03 ENCOUNTER — HOSPITAL ENCOUNTER (OUTPATIENT)
Dept: NON INVASIVE DIAGNOSTICS | Age: 83
Discharge: HOME | DRG: 291 | End: 2018-12-03
Attending: NURSE PRACTITIONER
Payer: MEDICARE

## 2018-12-03 ENCOUNTER — HOSPITAL ENCOUNTER (OUTPATIENT)
Dept: GENERAL RADIOLOGY | Age: 83
Discharge: HOME | DRG: 291 | End: 2018-12-03
Attending: NURSE PRACTITIONER
Payer: MEDICARE

## 2018-12-03 DIAGNOSIS — R00.0 IRREGULAR TACHYCARDIA: ICD-10-CM

## 2018-12-03 DIAGNOSIS — I48.0 PAROXYSMAL ATRIAL FIBRILLATION (HCC): Primary | ICD-10-CM

## 2018-12-03 DIAGNOSIS — R06.02 SHORTNESS OF BREATH: Chronic | ICD-10-CM

## 2018-12-03 DIAGNOSIS — F41.9 ANXIETY: ICD-10-CM

## 2018-12-03 DIAGNOSIS — E87.6 DIURETIC-INDUCED HYPOKALEMIA: ICD-10-CM

## 2018-12-03 DIAGNOSIS — R06.02 SHORTNESS OF BREATH: ICD-10-CM

## 2018-12-03 DIAGNOSIS — I27.9 PULMONARY HEART DISEASE (HCC): ICD-10-CM

## 2018-12-03 DIAGNOSIS — R11.2 NON-INTRACTABLE VOMITING WITH NAUSEA, UNSPECIFIED VOMITING TYPE: ICD-10-CM

## 2018-12-03 DIAGNOSIS — R13.19 ESOPHAGEAL DYSPHAGIA: ICD-10-CM

## 2018-12-03 DIAGNOSIS — F41.9 ANXIETY AND DEPRESSION: ICD-10-CM

## 2018-12-03 DIAGNOSIS — T50.2X5A DIURETIC-INDUCED HYPOKALEMIA: ICD-10-CM

## 2018-12-03 DIAGNOSIS — F33.1 MODERATE EPISODE OF RECURRENT MAJOR DEPRESSIVE DISORDER (HCC): ICD-10-CM

## 2018-12-03 DIAGNOSIS — K20.90 ESOPHAGITIS WITH GASTRITIS: ICD-10-CM

## 2018-12-03 DIAGNOSIS — K29.70 ESOPHAGITIS WITH GASTRITIS: ICD-10-CM

## 2018-12-03 DIAGNOSIS — I21.02 ST ELEVATION MYOCARDIAL INFARCTION INVOLVING LEFT ANTERIOR DESCENDING (LAD) CORONARY ARTERY (HCC): ICD-10-CM

## 2018-12-03 DIAGNOSIS — I50.42 CHRONIC COMBINED SYSTOLIC AND DIASTOLIC CONGESTIVE HEART FAILURE (HCC): ICD-10-CM

## 2018-12-03 DIAGNOSIS — I48.91 UNCONTROLLED ATRIAL FIBRILLATION (HCC): ICD-10-CM

## 2018-12-03 DIAGNOSIS — R09.02 HYPOXEMIA REQUIRING SUPPLEMENTAL OXYGEN: ICD-10-CM

## 2018-12-03 DIAGNOSIS — F32.A ANXIETY AND DEPRESSION: ICD-10-CM

## 2018-12-03 DIAGNOSIS — E63.9 NUTRITIONAL DEFICIENCY: ICD-10-CM

## 2018-12-03 DIAGNOSIS — I50.42 CHRONIC COMBINED SYSTOLIC AND DIASTOLIC CONGESTIVE HEART FAILURE (HCC): Chronic | ICD-10-CM

## 2018-12-03 DIAGNOSIS — Z99.81 HYPOXEMIA REQUIRING SUPPLEMENTAL OXYGEN: ICD-10-CM

## 2018-12-03 DIAGNOSIS — J90 PLEURAL EFFUSION, LEFT: ICD-10-CM

## 2018-12-03 DIAGNOSIS — J98.11 MILD BIBASILAR ATELECTASIS: ICD-10-CM

## 2018-12-03 DIAGNOSIS — F32.9 REACTIVE DEPRESSION: ICD-10-CM

## 2018-12-03 DIAGNOSIS — R47.89 WORD FINDING DIFFICULTY: ICD-10-CM

## 2018-12-03 DIAGNOSIS — E86.0 DEHYDRATION, MODERATE: ICD-10-CM

## 2018-12-03 DIAGNOSIS — F32.9 REACTIVE DEPRESSION (SITUATIONAL): ICD-10-CM

## 2018-12-03 DIAGNOSIS — I48.0 PAROXYSMAL ATRIAL FIBRILLATION (HCC): Chronic | ICD-10-CM

## 2018-12-03 DIAGNOSIS — T17.320A CHOKING DUE TO FOOD IN LARYNX, INITIAL ENCOUNTER: ICD-10-CM

## 2018-12-03 LAB
ATRIAL RATE: 101 BPM
CALCULATED R AXIS, ECG10: 71 DEGREES
CALCULATED T AXIS, ECG11: -49 DEGREES
DIAGNOSIS, 93000: NORMAL
Q-T INTERVAL, ECG07: 390 MS
QRS DURATION, ECG06: 88 MS
QTC CALCULATION (BEZET), ECG08: 474 MS
VENTRICULAR RATE, ECG03: 89 BPM

## 2018-12-03 PROCEDURE — 93005 ELECTROCARDIOGRAM TRACING: CPT

## 2018-12-03 PROCEDURE — 74220 X-RAY XM ESOPHAGUS 1CNTRST: CPT

## 2018-12-03 RX ORDER — SUCRALFATE 1 G/10ML
1 SUSPENSION ORAL 4 TIMES DAILY
Qty: 414 ML | Refills: 2 | Status: SHIPPED | OUTPATIENT
Start: 2018-12-03 | End: 2019-02-11 | Stop reason: ALTCHOICE

## 2018-12-03 RX ORDER — BUMETANIDE 1 MG/1
TABLET ORAL
Qty: 2 TAB | Refills: 0 | OUTPATIENT
Start: 2018-12-03 | End: 2018-12-05 | Stop reason: ALTCHOICE

## 2018-12-03 RX ORDER — PANTOPRAZOLE SODIUM 40 MG/1
40 TABLET, DELAYED RELEASE ORAL DAILY
Qty: 30 TAB | Refills: 0 | Status: SHIPPED | OUTPATIENT
Start: 2018-12-03 | End: 2019-04-22

## 2018-12-03 RX ORDER — PANTOPRAZOLE SODIUM 40 MG/1
TABLET, DELAYED RELEASE ORAL
Qty: 90 TAB | Refills: 0 | OUTPATIENT
Start: 2018-12-03

## 2018-12-03 NOTE — TELEPHONE ENCOUNTER
Raheel Duncan please call [facility] and let them know I do not want a 90 day supply as she is going into assisted living and will have to use another pharmacy in a few days to weeks

## 2018-12-04 LAB
25(OH)D3+25(OH)D2 SERPL-MCNC: 44.3 NG/ML (ref 30–100)
CHOLEST SERPL-MCNC: 118 MG/DL (ref 100–199)
CK MB SERPL-MCNC: <1 NG/ML (ref 0–5.3)
CK SERPL-CCNC: 59 U/L (ref 24–173)
CRP SERPL-MCNC: 5.4 MG/L (ref 0–4.9)
ERYTHROCYTE [SEDIMENTATION RATE] IN BLOOD BY WESTERGREN METHOD: 45 MM/HR (ref 0–40)
EST. AVERAGE GLUCOSE BLD GHB EST-MCNC: 137 MG/DL
FOLATE SERPL-MCNC: 11.7 NG/ML
HBA1C MFR BLD: 6.4 % (ref 4.8–5.6)
HDLC SERPL-MCNC: 42 MG/DL
INR PPP: 1.5 (ref 0.8–1.2)
LDLC SERPL CALC-MCNC: 56 MG/DL (ref 0–99)
MAGNESIUM SERPL-MCNC: 2 MG/DL (ref 1.6–2.3)
PHOSPHATE SERPL-MCNC: 3.5 MG/DL (ref 2.5–4.5)
PREALB SERPL-MCNC: 16 MG/DL (ref 9–32)
PROTHROMBIN TIME: 14.9 SEC (ref 9.1–12)
T4 SERPL-MCNC: 10.3 UG/DL (ref 4.5–12)
TRIGL SERPL-MCNC: 98 MG/DL (ref 0–149)
TSH SERPL DL<=0.005 MIU/L-ACNC: 9.22 UIU/ML (ref 0.45–4.5)
VIT B12 SERPL-MCNC: 927 PG/ML (ref 232–1245)
VLDLC SERPL CALC-MCNC: 20 MG/DL (ref 5–40)

## 2018-12-04 NOTE — PROGRESS NOTES
IMPRESSION:   1. An ingested barium tablet demonstrates holdup at the distal esophagus raising the possibility of mild esophagitis. There is no esophageal mass, stricture, or mucosal abnormality. 2. The frontal chest radiograph demonstrates a small left pleural effusion. There are bibasilar opacities, left greater than right, that may represent atelectasis, infection, edema, or aspiration.

## 2018-12-05 ENCOUNTER — TELEPHONE (OUTPATIENT)
Dept: GERIATRIC MEDICINE | Age: 83
End: 2018-12-05

## 2018-12-05 ENCOUNTER — CLINICAL SUPPORT (OUTPATIENT)
Dept: GERIATRIC MEDICINE | Age: 83
End: 2018-12-05

## 2018-12-05 VITALS — WEIGHT: 128.7 LBS | BODY MASS INDEX: 24.32 KG/M2

## 2018-12-05 VITALS
OXYGEN SATURATION: 91 % | HEIGHT: 61 IN | HEART RATE: 98 BPM | WEIGHT: 128 LBS | BODY MASS INDEX: 24.17 KG/M2 | RESPIRATION RATE: 22 BRPM | DIASTOLIC BLOOD PRESSURE: 71 MMHG | TEMPERATURE: 98.1 F | SYSTOLIC BLOOD PRESSURE: 108 MMHG

## 2018-12-05 DIAGNOSIS — J98.11 MILD BIBASILAR ATELECTASIS: Primary | ICD-10-CM

## 2018-12-05 DIAGNOSIS — E78.2 MIXED HYPERLIPIDEMIA: ICD-10-CM

## 2018-12-05 DIAGNOSIS — N28.9 KIDNEY INSUFFICIENCY: Chronic | ICD-10-CM

## 2018-12-05 DIAGNOSIS — I10 ESSENTIAL HYPERTENSION: Primary | ICD-10-CM

## 2018-12-05 DIAGNOSIS — I67.81 ACUTE CEREBROVASCULAR INSUFFICIENCY: ICD-10-CM

## 2018-12-05 DIAGNOSIS — J69.0 ASPIRATION PNEUMONIA OF BOTH LOWER LOBES DUE TO GASTRIC SECRETIONS (HCC): ICD-10-CM

## 2018-12-05 DIAGNOSIS — E87.6 DIURETIC-INDUCED HYPOKALEMIA: ICD-10-CM

## 2018-12-05 DIAGNOSIS — R13.14 PHARYNGOESOPHAGEAL DYSPHAGIA: ICD-10-CM

## 2018-12-05 DIAGNOSIS — T50.2X5A DIURETIC-INDUCED HYPOKALEMIA: ICD-10-CM

## 2018-12-05 DIAGNOSIS — J90 BILATERAL PLEURAL EFFUSION: ICD-10-CM

## 2018-12-05 DIAGNOSIS — I50.43 ACUTE ON CHRONIC COMBINED SYSTOLIC AND DIASTOLIC CONGESTIVE HEART FAILURE (HCC): ICD-10-CM

## 2018-12-05 RX ORDER — GUAIFENESIN 600 MG/1
600 TABLET, EXTENDED RELEASE ORAL 2 TIMES DAILY
Qty: 10 TAB | Refills: 0 | Status: SHIPPED | OUTPATIENT
Start: 2018-12-05 | End: 2018-12-06

## 2018-12-05 RX ORDER — BUMETANIDE 2 MG/1
2 TABLET ORAL
Qty: 30 TAB | Refills: 0 | Status: SHIPPED | OUTPATIENT
Start: 2018-12-05 | End: 2018-12-21

## 2018-12-05 RX ORDER — BUMETANIDE 1 MG/1
1 TABLET ORAL
Qty: 30 TAB | Refills: 0 | Status: SHIPPED | OUTPATIENT
Start: 2018-12-05 | End: 2018-12-24

## 2018-12-05 RX ORDER — LEVOTHYROXINE SODIUM 25 UG/1
25 TABLET ORAL
Qty: 1 TAB | Refills: 0 | Status: SHIPPED | OUTPATIENT
Start: 2018-12-05 | End: 2019-03-27 | Stop reason: ALTCHOICE

## 2018-12-05 RX ORDER — MOXIFLOXACIN HYDROCHLORIDE 400 MG/1
400 TABLET ORAL DAILY
Qty: 10 TAB | Refills: 0 | Status: SHIPPED | OUTPATIENT
Start: 2018-12-05 | End: 2018-12-06

## 2018-12-05 NOTE — PATIENT INSTRUCTIONS
Atrial Fibrillation: Care Instructions Your Care Instructions Atrial fibrillation is an irregular and often fast heartbeat. Treating this condition is important for several reasons. It can cause blood clots, which can travel from your heart to your brain and cause a stroke. If you have a fast heartbeat, you may feel lightheaded, dizzy, and weak. An irregular heartbeat can also increase your risk for heart failure. Atrial fibrillation is often the result of another heart condition, such as high blood pressure or coronary artery disease. Making changes to improve your heart condition will help you stay healthy and active. Follow-up care is a key part of your treatment and safety. Be sure to make and go to all appointments, and call your doctor if you are having problems. It's also a good idea to know your test results and keep a list of the medicines you take. How can you care for yourself at home? Medicines 
  · Take your medicines exactly as prescribed. Call your doctor if you think you are having a problem with your medicine. You will get more details on the specific medicines your doctor prescribes.  
  · If your doctor has given you a blood thinner to prevent a stroke, be sure you get instructions about how to take your medicine safely. Blood thinners can cause serious bleeding problems.  
  · Do not take any vitamins, over-the-counter drugs, or herbal products without talking to your doctor first.  
 Lifestyle changes 
  · Do not smoke. Smoking can increase your chance of a stroke and heart attack. If you need help quitting, talk to your doctor about stop-smoking programs and medicines. These can increase your chances of quitting for good.  
  · Eat a heart-healthy diet.  
  · Stay at a healthy weight. Lose weight if you need to.  
  · Limit alcohol to 2 drinks a day for men and 1 drink a day for women. Too much alcohol can cause health problems.   · Avoid colds and flu. Get a pneumococcal vaccine shot. If you have had one before, ask your doctor whether you need another dose. Get a flu shot every year. If you must be around people with colds or flu, wash your hands often. Activity 
  · If your doctor recommends it, get more exercise. Walking is a good choice. Bit by bit, increase the amount you walk every day. Try for at least 30 minutes on most days of the week. You also may want to swim, bike, or do other activities. Your doctor may suggest that you join a cardiac rehabilitation program so that you can have help increasing your physical activity safely.  
  · Start light exercise if your doctor says it is okay. Even a small amount will help you get stronger, have more energy, and manage stress. Walking is an easy way to get exercise. Start out by walking a little more than you did in the hospital. Gradually increase the amount you walk.  
  · When you exercise, watch for signs that your heart is working too hard. You are pushing too hard if you cannot talk while you are exercising. If you become short of breath or dizzy or have chest pain, sit down and rest immediately.  
  · Check your pulse regularly. Place two fingers on the artery at the palm side of your wrist, in line with your thumb. If your heartbeat seems uneven or fast, talk to your doctor. When should you call for help? Call 911 anytime you think you may need emergency care. For example, call if: 
  · You have symptoms of a heart attack. These may include: 
? Chest pain or pressure, or a strange feeling in the chest. 
? Sweating. ? Shortness of breath. ? Nausea or vomiting. ? Pain, pressure, or a strange feeling in the back, neck, jaw, or upper belly or in one or both shoulders or arms. ? Lightheadedness or sudden weakness. ? A fast or irregular heartbeat.  
After you call 911, the  may tell you to chew 1 adult-strength or 2 to 4 low-dose aspirin. Wait for an ambulance. Do not try to drive yourself.  
  · You have symptoms of a stroke. These may include: 
? Sudden numbness, tingling, weakness, or loss of movement in your face, arm, or leg, especially on only one side of your body. ? Sudden vision changes. ? Sudden trouble speaking. ? Sudden confusion or trouble understanding simple statements. ? Sudden problems with walking or balance. ? A sudden, severe headache that is different from past headaches.  
  · You passed out (lost consciousness).  
 Call your doctor now or seek immediate medical care if: 
  · You have new or increased shortness of breath.  
  · You feel dizzy or lightheaded, or you feel like you may faint.  
  · Your heart rate becomes irregular.  
  · You can feel your heart flutter in your chest or skip heartbeats. Tell your doctor if these symptoms are new or worse.  
 Watch closely for changes in your health, and be sure to contact your doctor if you have any problems. Where can you learn more? Go to http://pia-colby.info/. Enter U020 in the search box to learn more about \"Atrial Fibrillation: Care Instructions. \" Current as of: December 6, 2017 Content Version: 11.8 © 5432-6422 [employer]. Care instructions adapted under license by [employer] (which disclaims liability or warranty for this information). If you have questions about a medical condition or this instruction, always ask your healthcare professional. [place] [address] any warranty or liability for your use of this information. Dehydration: Care Instructions Your Care Instructions Dehydration happens when your body loses too much fluid. This might happen when you do not drink enough water or you lose large amounts of fluids from your body because of diarrhea, vomiting, or sweating. Severe dehydration can be life-threatening. Water and minerals called electrolytes help put your body fluids back in balance. Learn the early signs of fluid loss, and drink more fluids to prevent dehydration. Follow-up care is a key part of your treatment and safety. Be sure to make and go to all appointments, and call your doctor if you are having problems. It's also a good idea to know your test results and keep a list of the medicines you take. How can you care for yourself at home? · To prevent dehydration, drink plenty of fluids, enough so that your urine is light yellow or clear like water. Choose water and other caffeine-free clear liquids until you feel better. If you have kidney, heart, or liver disease and have to limit fluids, talk with your doctor before you increase the amount of fluids you drink. · If you do not feel like eating or drinking, try taking small sips of water, sports drinks, or other rehydration drinks. · Get plenty of rest. 
To prevent dehydration · Add more fluids to your diet and daily routine, unless your doctor has told you not to. · During hot weather, drink more fluids. Drink even more fluids if you exercise a lot. Stay away from drinks with alcohol or caffeine. · Watch for the symptoms of dehydration. These include: ? A dry, sticky mouth. ? Dark yellow urine, and not much of it. ? Dry and sunken eyes. ? Feeling very tired. · Learn what problems can lead to dehydration. These include: ? Diarrhea, fever, and vomiting. ? Any illness with a fever, such as pneumonia or the flu. ? Activities that cause heavy sweating, such as endurance races and heavy outdoor work in hot or humid weather. ? Alcohol or drug abuse or withdrawal. 
? Certain medicines, such as cold and allergy pills (antihistamines), diet pills (diuretics), and laxatives. ? Certain diseases, such as diabetes, cancer, and heart or kidney disease. When should you call for help? Call 911 anytime you think you may need emergency care.  For example, call if: 
  · You passed out (lost consciousness).  
 Call your doctor now or seek immediate medical care if: 
  · You are confused and cannot think clearly.  
  · You are dizzy or lightheaded, or you feel like you may faint.  
  · You have signs of needing more fluids. You have sunken eyes and a dry mouth, and you pass only a little dark urine.  
  · You cannot keep fluids down.  
 Watch closely for changes in your health, and be sure to contact your doctor if: 
  · You are not making tears.  
  · Your skin is very dry and sags slowly back into place after you pinch it.  
  · Your mouth and eyes are very dry. Where can you learn more? Go to http://pia-colby.info/. Enter R526 in the search box to learn more about \"Dehydration: Care Instructions. \" Current as of: November 20, 2017 Content Version: 11.8 © 0073-6952 [employer]. Care instructions adapted under license by [employer] (which disclaims liability or warranty for this information). If you have questions about a medical condition or this instruction, always ask your healthcare professional. [address] any warranty or liability for your use of this information. Oxygen Therapy for Heart Failure: Care Instructions Your Care Instructions When you have heart failure, your heart does not pump as well as it should. So it does not send enough oxygen-rich blood to the rest of your body. Oxygen therapy increases the amount of oxygen sent to your body's tissues. This helps reduce your heart's workload. It can help you breathe easier and let you do more. Follow-up care is a key part of your treatment and safety. Be sure to make and go to all appointments, and call your doctor if you are having problems. It's also a good idea to know your test results and keep a list of the medicines you take. How can you care for yourself at home? To help yourself · Using oxygen may dry out your nose or lips. Use water-based lubricants on your lips or nostrils. Do not use an oil-based product like petroleum jelly. · If you use a nasal cannula, the tubing may rub under your nostrils and around your ears. To keep your skin from getting sore, tuck some gauze under the tubing. Use a water-based lotion on rubbed areas. · Do not use alcohol or take drugs that relax you, because they will slow your breathing rate. · Keep track of how much oxygen is in the tank, and reorder before it runs out. If a holiday is coming up or you expect bad weather, order in advance or make your regular order larger. · You may need extra oxygen when you travel to high altitudes or travel by plane. Ask your doctor about this. · If you are getting oxygen directly to your windpipe through an opening in your neck, your doctor will teach you how to care for the equipment. Check your airflow if you think you are not getting oxygen · Check the flow by holding your mask or cannula up to your ear and listening for the \"hiss\" of airflow. · If you have a nasal cannula, dip the prongs in a glass of water. If you see bubbles, oxygen is coming through. · Check your pressure gauge or contents indicator. · If you use an oxygen concentrator, make sure it is turned on and plugged in. If you use a cylinder, make sure the valve is open. · Look for kinks, blockages, or water in the tubing. Be sure the tubing is connected to the oxygen source. · Do not change your oxygen flow rate. Your doctor sets this at the correct level. Higher flow rates usually do not help and can increase the risk of harmful carbon dioxide buildup in the blood. To be safe · Do not leave cords or tubing running across an area where you or someone else may trip on it. · Do not let oxygen containers get hot. Store them in a cool place where there is airflow. Do not leave them in a car trunk or a hot vehicle. · Keep oxygen containers upright. Make sure they do not fall over and get damaged. · Watch for signs of oxygen leaks. If you hear a loud hissing from your container or if it empties too fast, stay away from the container. Open windows. Call the company that brought the oxygen system to your home right away. · Do not use oxygen around anything that could spark or easily cause a fire. ? Do not smoke or let others smoke while you are using oxygen. Put up \"No smoking\" signs in your home. ? Do not use oxygen near open flames, such as candles, fireplaces, gas stoves, or hot water heaters. Do not use it near electric razors, hair dryers, heating pads, or anything that may spark. ? Keep a working fire extinguisher in your home that is easy to get to. 
? If a fire starts, turn off the oxygen right away and leave the house. ? If you have an oxygen concentrator, do not use it if the cord looks damaged. Do not use an extension cord to plug it in. Do not plug it into an outlet that has other appliances plugged into it. To care for the equipment · Follow the directions that come with the equipment for using and caring for it. · Wash your cannula or mask with a liquid soap and warm water 1 or 2 times a week. Replace them every 2 to 4 weeks. · If you have a cold, change the nasal prongs when your cold symptoms are done. · If you have an oxygen concentrator, unplug the unit and wipe down the cabinet with a damp cloth daily. Clean the air filter at least 2 times a week. Where can you learn more? Go to http://pia-colby.info/. Enter N901 in the search box to learn more about \"Oxygen Therapy for Heart Failure: Care Instructions. \" Current as of: December 6, 2017 Content Version: 11.8 © 0542-8984 [employer]. Care instructions adapted under license by [employer] (which disclaims liability or warranty for this information).  If you have questions about a medical condition or this instruction, always ask your healthcare professional. [place] [address] any warranty or liability for your use of this information.

## 2018-12-05 NOTE — PROGRESS NOTES
Reason for Visit/HPI:   
Kimberly Madrigal is a 80 y.o. female patient who presents today for Chief Complaint Patient presents with  Breathing Problem Ms. Elisa Goodwin went to have some testing completed this morning and the aide that is with her states she was short of breath & increasingly fatigued. Per aide she was unable to stand for her bathing this morning without \"huffing & puffing\".  Dehydration Ms. Elisa Goodwin is also here for follow up from last week's visit when she was clinically dehydrated, confused, and weak.  Follow Up Chronic Condition Prolonged face to face time documentation:  
 
Start - 1400 Stop - 1600 Total Face to Face time = 120 minutes Prolonged face to face time necessary for complex assessment of current acute complaints due to geriatric medicine, patient's fragility, frailness, distress, necessary education for new/advanced disease process. Face to Face time with patient reviewing consult notes and plan of care. Review of treatment plan related to systemic illness. Diagnosis/Treatment Plan:   
Diagnoses and all orders for this visit: 1. Paroxysmal atrial fibrillation (HCC)  
- Beto Serrano - Dr. Patel Fraction at [facility] - Shortness of breath - Hypoxemia - Post inferior STEMI 9/11/2018 - 2 stents *Never had an echo during this medical issue* Was on Coumadin & Brilinta post MI. Noted to have severe dyspnea on exam in my office. Noted in the records of   the SNF that this was one of her biggest complaints. Changed her to Eliquis & Plavix on Nov. 28.  
 - Pulmonary Edema present on a barium swallow due to me possibly due to dysphagia or aspiration. Possibly had a noxious brain injury with the MI as she was noted to be unresponsive. She is not oriented   except to self and that waxes & wains - Active AFIB- slightly better since adding metoprolol 12.5 mg daily. -     XR CHEST SNGL V; Ordered. -     HOME OXYGEN - Patient failed 6 minute walk test in office today. Patient requires continuous oxygen at 2 L NC/min.  
  - OXYGEN SATURATION RESULTS DOCUMENTED/REVIEWED- Tool Used: 6-MINUTE WALK TEST RESULTS RESTING VITALS - ROOM AIR 
/71 (BP 1 Location: Right arm, BP Patient Position: Sitting) Pulse 98 Temp 98.1 °F (36.7 °C) (Oral) Resp 22 Ht 5' 1\" (1.549 m) Wt 128 lb (58.1 kg) SpO2 91% BMI 24.19 kg/m²  
 
6 MIN WALK TEST - ROOM AIR : Patient was unable to ambulate more than 2 foot steps without SOB, Hypoxemia, and increased Fatigue. Pulse 112 Resp 28 SpO2 78 % RESTING VITALS - WITH CONTINUOUS OXYGEN SUPPLEMENTED: Oxygen via Nasal Cannula at 2 L/min /61 (BP 1 Location: Right arm, BP Patient Position: Sitting) Pulse 108 Resp 26 SpO2 95% 2L NC  
 
6 MIN WALK RE-TEST WITH CONTINUOUS OXYGEN SUPPLEMENTED: Oxygen via Nasal Cannula at 2 L/min Pulse 118 Resp 26 SpO2 93% 2L NC During the re-evaluation of patient with nasal cannula in place supplementing with 2 L/min Mrs. Genevieve Guerra remained fatigued, took 5 steps, then needed to sit. Pulse ox dropped to 80%, increased oxygen level to 3 L/min, pt was able to sit down with pulse ox increasing to 98% on 3 L. She was subsequently able to remain between 94 and 96% on 2 L at discharge. - [place] NONINVASV OXYGEN SATUR;SINGLE 
   LONG TERM OXYGEN THERAPY PRESCRIBED due to congestive heart failure; hypoxemia, reactive airway disease. Patient has failed therapy with Duo Nebs q 4 hours and Pulmicort BID. She has had   increased doses of Bumex to help with fluid buildup, she has failed this as well. She is unable to self regulate or cognitively able to breathe. RX: Dispense - Portable Oxygen & Supplies due to congestive heart failure, hypoxemia, confusion due to hypoxemia.  Long term treatment is required as patient failed in office walk test. Oxygen   saturations noted to be at start 89% RA then dropped to 78% with activity. RX: Dispense Oxygen Concentrator with humidified air and supplies. Dx: I50.42 CHF; Dx: I48.91 Afib uncontrolled; Dx: R06.02 SOB; Dx: R09.02 Hypoxemia. Patient failed walk test in office. Pulse ox at   78% on RA. 2. Chronic combined systolic and diastolic congestive heart failure (HCC) 
-     REFERRAL TO CARDIOLOGY 
-     REFERRAL TO PULMONARY DISEASE - [facility] 
  -Significant shortness of breath following a massive MI in Sept. She was started on Brilinta post MI. I have since discontinued it per Cardiology's recommendation as a significant side effect is   dyspnea. She has been on Pulmicort & Duo nebs while in [address] [place] [address] at [facility] from Sept to Nov. She is still really short of breath with any activity. She does have fluid overload but   we are trying to manage that. I do not know what her EF is at this time. Six minute walk test was extremely difficult. She de sated to low 70's with 3 steps & was not able to walk any further.  
-     XR CHEST SNGL V; Future 
-     HOME OXYGEN 
-     OTHER; Dispense - Portable Oxygen & Supplies due to congestive heart failure, hypoxemia, confusion due to hypoxemia. Long term treatment is required as patient failed in office walk test. Oxygen saturations noted to be at start 89% RA then dropped to 78% with activity. 
-     LONG TERM OXYGEN THERAPY PRESCRIBED 
-     OTHER; Dispense Oxygen Concentrator with humidified air and supplies. Dx: I50.42 CHF; Dx: I48.91 Afib uncontrolled; Dx: R06.02 SOB; Dx: R09.02 Hypoxemia. Patient failed walk test in office. Pulse ox at 78% on RA. 
-     OXYGEN SATURATION RESULTS DOCUMENTED/REVIEWED 
-     [place] NONINVASV OXYGEN SATUR;SINGLE 3. Shortness of breath 
-     REFERRAL TO CARDIOLOGY 
-     REFERRAL TO PULMONARY DISEASE -     XR CHEST SNGL V; Future 
-     HOME OXYGEN 
 -     OTHER; Dispense - Portable Oxygen & Supplies due to congestive heart failure, hypoxemia, confusion due to hypoxemia. Long term treatment is required as patient failed in office walk test. Oxygen saturations noted to be at start 89% RA then dropped to 78% with activity. 
-     LONG TERM OXYGEN THERAPY PRESCRIBED 
-     OTHER; Dispense Oxygen Concentrator with humidified air and supplies. Dx: I50.42 CHF; Dx: I48.91 Afib uncontrolled; Dx: R06.02 SOB; Dx: R09.02 Hypoxemia. Patient failed walk test in office. Pulse ox at 78% on RA. 
-     OXYGEN SATURATION RESULTS DOCUMENTED/REVIEWED 
-     [place] NONINVASV OXYGEN SATUR;SINGLE 
 
4. Uncontrolled atrial fibrillation (HCC) 
-     REFERRAL TO CARDIOLOGY 
-     REFERRAL TO PULMONARY DISEASE 
-     HOME OXYGEN 
-     OTHER; Dispense - Portable Oxygen & Supplies due to congestive heart failure, hypoxemia, confusion due to hypoxemia. Long term treatment is required as patient failed in office walk test. Oxygen saturations noted to be at start 89% RA then dropped to 78% with activity. 
-     LONG TERM OXYGEN THERAPY PRESCRIBED 
-     OTHER; Dispense Oxygen Concentrator with humidified air and supplies. Dx: I50.42 CHF; Dx: I48.91 Afib uncontrolled; Dx: R06.02 SOB; Dx: R09.02 Hypoxemia. Patient failed walk test in office. Pulse ox at 78% on RA. 
-     OXYGEN SATURATION RESULTS DOCUMENTED/REVIEWED 
-     [place] NONINVASV OXYGEN SATUR;SINGLE 5. Hypoxemia requiring supplemental oxygen 
-     HOME OXYGEN 
-     OTHER; Dispense - Portable Oxygen & Supplies due to congestive heart failure, hypoxemia, confusion due to hypoxemia. Long term treatment is required as patient failed in office walk test. Oxygen saturations noted to be at start 89% RA then dropped to 78% with activity. 
-     LONG TERM OXYGEN THERAPY PRESCRIBED 
-     OTHER; Dispense Oxygen Concentrator with humidified air and supplies. Dx: I50.42 CHF; Dx: I48.91 Afib uncontrolled;  Dx: R06.02 SOB; Dx: R09.02 Hypoxemia. Patient failed walk test in office. Pulse ox at 78% on RA. 
-     OXYGEN SATURATION RESULTS DOCUMENTED/REVIEWED 
-     [place] NONINVASV OXYGEN SATUR;SINGLE 6. Pulmonary heart disease ([address]  
-     REFERRAL TO CARDIOLOGY 
-     REFERRAL TO PULMONARY DISEASE -     XR CHEST SNGL V; Future 
-     HOME OXYGEN 
-     OTHER; Dispense - Portable Oxygen & Supplies due to congestive heart failure, hypoxemia, confusion due to hypoxemia. Long term treatment is required as patient failed in office walk test. Oxygen saturations noted to be at start 89% RA then dropped to 78% with activity. 
-     LONG TERM OXYGEN THERAPY PRESCRIBED 
-     OTHER; Dispense Oxygen Concentrator with humidified air and supplies. Dx: I50.42 CHF; Dx: I48.91 Afib uncontrolled; Dx: R06.02 SOB; Dx: R09.02 Hypoxemia. Patient failed walk test in office. Pulse ox at 78% on RA. 
-     OXYGEN SATURATION RESULTS DOCUMENTED/REVIEWED 
-     [place] NONINVASV OXYGEN SATUR;SINGLE 
 
7. Irregular tachycardia 
-     REFERRAL TO CARDIOLOGY 8. Pleural effusion, left 
-     REFERRAL TO CARDIOLOGY 
-     REFERRAL TO PULMONARY DISEASE -     XR CHEST SNGL V; Future 9. Mild bibasilar atelectasis -     REFERRAL TO PULMONARY DISEASE -     XR CHEST SNGL V; Future 10. Esophageal dysphagia -     pantoprazole (PROTONIX) 40 mg tablet; Take 1 Tab by mouth daily. -     sucralfate (CARAFATE) 100 mg/mL suspension; Take 10 mL by mouth four (4) times daily. 11. Esophagitis with gastritis -     pantoprazole (PROTONIX) 40 mg tablet; Take 1 Tab by mouth daily. -     sucralfate (CARAFATE) 100 mg/mL suspension; Take 10 mL by mouth four (4) times daily. 12. Dehydration, moderate 13. Nutritional deficiency 
-     REFERRAL TO DIETITIAN - BRANDON SPEARS AT [address]  
  -Referral to Ashlee Tirado, [address]: Please assess & educate Emelina Rizzo for the following nutritional concerns: 1. Weight Loss - Aug 2018 - 145# to Presently 128#.  
   2. Nutritional deficiencies as evidenced by continued dehydration, confusion, ect. .. 3. Diet recommendations as she has been aspirating at home during her stay there. She needs a Cardiac Diet but I did mechanical soft to help with swallowing. She had a barium swallow and    passed it with just results of esophagitis. 14. Diuretic-induced hypokalemia 15. Anxiety 
 -     REFERRAL TO PSYCHIATRY - [facility] - Evaluation & Treat for the followin. Active treatment of depression- taking Paxil 20 mg daily. Not working. Currently confused, disoriented at time. 2. Anxiety - chronic in natures but no treatment. 3. [place] to [place] then to [address] [place] [address] in 2 weeks. Please discuss treatment with patient's PCP - Benedict Hawkins [phone] to allow for continuity of care. -     REFERRAL TO PSYCHOLOGY - Evaluate & Treat - chronic, ongoing, depression with anxiety. Has been on Paxil 20 mg since MI in 2018. She is also suffering from some sort of brain processing issue. I have ordered an MRI of the brain. Not sure if she had a noxious brain injury or small TIA following the MI in Sept??? She has lost her ability to emotionally function daily as an independent person. She is tearful, confused with some moments of clarity but previously diagnosed with a ? ? Dementia? ? . I am not buying that (FYI). She has been in healthcare since [facility] MI for skilled nursing rehab. She went home to her apartment on the ; got 24 hour care in apartment on the  and now moved to Assisted Living due to requiring a higher level of care. Please communicate with Benedict Hawkins NP as her primary provider. [phone] 16. Reactive depression 
-     REFERRAL TO PSYCHIATRY 
-     REFERRAL TO PSYCHOLOGY 17. Word finding difficulty 
-     REFERRAL TO PSYCHIATRY -     REFERRAL TO PSYCHOLOGY 18. Anxiety and depression 
-     REFERRAL TO PSYCHIATRY 
-     REFERRAL TO PSYCHOLOGY 19. Moderate episode of recurrent major depressive disorder (HCC) 
-     REFERRAL TO PSYCHIATRY 
-     REFERRAL TO PSYCHOLOGY 20. Reactive depression (situational) 
-     REFERRAL TO PSYCHIATRY 
-     REFERRAL TO PSYCHOLOGY Other orders -     levothyroxine (SYNTHROID) 25 mcg tablet; Take 1 Tab by mouth Daily (before breakfast). Discontinued Care: The following treatment modalities have been discontinued by the provider today:  
Medications Discontinued During This Encounter Medication Reason  omeprazole (PRILOSEC) 40 mg capsule Alternate Therapy Follow Up: Follow-up Disposition: 
Return in about 2 days (around 12/5/2018) for with the NP for follow up to your treatment plan. Subjective: Allergies Allergen Reactions  Celecoxib Other (comments)  Sulfa (Sulfonamide Antibiotics) Unknown (comments) and Hives  Atorvastatin Unknown (comments)  Codeine Unknown (comments)  Darvocet A500 [Propoxyphene N-Acetaminophen] Unknown (comments)  Iodine Unknown (comments)  Statins-Hmg-Coa Reductase Inhibitors Other (comments)  
  (intolerance)-myalgias  Aricept [Donepezil] Myalgia Headaches - noted as intolerance Prior to Admission medications Medication Sig Start Date End Date Taking? Authorizing Provider  
levothyroxine (SYNTHROID) 25 mcg tablet Take 1 Tab by mouth Daily (before breakfast). 12/5/18  Yes Keaton Angel, NP  
OTHER Dispense - Portable Oxygen & Supplies due to congestive heart failure, hypoxemia, confusion due to hypoxemia. Long term treatment is required as patient failed in office walk test. Oxygen saturations noted to be at start 89% RA then dropped to 78% with activity. 12/5/18  Yes Keaton Angel, NP  
OTHER Dispense Oxygen Concentrator with humidified air and supplies.  Dx: I50.42 CHF; Dx: I48.91 Afib uncontrolled; Dx: R06.02 SOB; Dx: R09.02 Hypoxemia. Patient failed walk test in office. Pulse ox at 78% on RA. 12/5/18  Yes Carlos Muro NP  
pantoprazole (PROTONIX) 40 mg tablet Take 1 Tab by mouth daily. 12/3/18  Yes Carlos Muro NP  
sucralfate (CARAFATE) 100 mg/mL suspension Take 10 mL by mouth four (4) times daily. 12/3/18  Yes Carlos Muro NP  
clopidogrel (PLAVIX) 75 mg tab Take 1 Tab by mouth daily. 11/30/18  Yes Carlos Muro NP  
apixaban (ELIQUIS) 2.5 mg tablet Take 1 Tab by mouth two (2) times a day. 11/30/18  Yes Carlos Muro NP  
simvastatin (ZOCOR) 40 mg tablet Take 40 mg by mouth nightly. 11/26/18  Yes Provider, Historical  
PARoxetine (PAXIL) 20 mg tablet Take 20 mg by mouth daily. 11/26/18  Yes Provider, Historical  
nitroglycerin (NITROSTAT) 0.4 mg SL tablet Take 0.4 mg by mouth as needed. 11/26/18  Yes Provider, Historical  
carvedilol (COREG) 6.25 mg tablet Take 6.25 mg by mouth two (2) times a day. 11/26/18  Yes Provider, Historical  
PROAIR HFA 90 mcg/actuation inhaler Take 2 Inhalation by inhalation four (4) times daily as needed. 11/26/18  Yes Provider, Historical  
metoprolol tartrate (LOPRESSOR) 25 mg tablet Take 0.5 Tabs by mouth every morning. 11/28/18  Yes Carlos Muro NP  
budesonide (PULMICORT) 0.25 mg/2 mL nbsp 2 mL by Nebulization route two (2) times a day. 11/28/18  Yes Carlos Muro NP  
albuterol-ipratropium (DUO-NEB) 2.5 mg-0.5 mg/3 ml nebu 3 mL by Nebulization route three (3) times daily. 11/28/18  Yes Carlos Muro NP Nebulizer & Compressor machine 1 Each by Nebulization route four (4) times daily. 11/28/18  Yes Carlos Muro NP Nebulizer Accessories kit Dispense Nebulizer Accessories - face mask. Daily use due to COPD/Pulmonary Reactive Airway Disease/Hypoxemia 11/28/18  Yes Carlos Muro NP  
potassium chloride (KLOR-CON) 10 mEq tablet Take 1 Tab by mouth daily. 18  Yes Samuel Redman NP  
ondansetron hcl (ZOFRAN) 4 mg tablet Take 1 tablet 30 mins prior to breakfast, then q 8 hours prn for nausea/vomiting. 18  Yes Samuel Redman NP  
diltiazem CD (CARTIA XT) 180 mg ER capsule Take 180 mg by mouth nightly. Yes Provider, Historical  
 
Past Medical History:  
Diagnosis Date  Allergic rhinitis  Atherosclerotic cardiovascular disease   
 CHF (congestive heart failure) ([facility] 2018  Cognitive communication deficit  Edema  Environmental allergies   
 dizziness-(chronic)  Fatty liver disease, nonalcoholic  GERD (gastroesophageal reflux disease)  H/O heart artery stent 2018  Heart attack ([facility] 2018  Heart palpitations  infrequent  History of PTCA  Hypercholesterolemia   Hypertension   Liver disease   
 fatty liver  MI (myocardial infarction) ([facility] 2018  Overactive bladder 2018  Peripheral neuropathy  Permanent [facility] fibrillation ([facility] 2018  Senile dementia, without behavioral disturbance 2018  Stress incontinence, female 2018  Vertigo  Vitamin D deficiency Past Surgical History:  
Procedure Laterality Date  BREAST SURGERY PROCEDURE UNLISTED    
 breast cyst Seth Brine)  CARDIAC SURG PROCEDURE UNLIST  1999  
 + stress thalassemia; 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 Holter  HX APPENDECTOMY  HX BREAST BIOPSY Left   
 neg  HX CYST INCISION AND DRAINAGE Right years ago  
 neg  HX DILATION AND CURETTAGE    
 x5  
 HX GYN    
 D&C (x5), ALLEY/BSO (-Juliann/Zeumerr), Provera intolerance (bleeding), endometrial Bx annually  HX ALLEY AND BSO   Juliann/Zeumerr Social History Tobacco Use  Smoking status: Former Smoker Packs/day: 4.00 Types: Cigarettes Last attempt to quit: 1955 Years since quittin.9  Smokeless tobacco: Never Used Substance Use Topics  Alcohol use:  Yes  
 Alcohol/week: 0.0 - 0.5 oz  
  Comment: rare  Drug use: No  
  
Family History Problem Relation Age of Onset  Diabetes Mother  Hypertension Mother  Heart Failure Mother CHF ( @ 80)  Alzheimer Mother  Cancer Father   
     lung ( @ 77)  Alzheimer Father  No Known Problems Son  No Known Problems Son   
 Ovarian Cancer Sister   
     hysterectomy  Breast Cancer Sister 28  
     mastectomy  Cancer Sister   
     breast and ovarian  Alzheimer Sister  Breast Problems Sister   
     breast cyst  
 Cataract Sister  Hypertension Sister  Diabetes Brother Advance Care Planning 2018 Primary Decision Maker Name Ashely Caal Primary Decision Maker Phone Number [phone] Primary Decision Maker Relationship to Patient Adult child Confirm Advance Directive Yes, on file Does the patient have other document types Do Not Resuscitate; Power of [employer]; Other (comment) Test Results:  
 
Lab Results Component Value Date/Time WBC 6.4 2018 01:38 PM  
 HGB 12.2 2018 01:38 PM  
 HCT 35.7 2018 01:38 PM  
 PLATELET 071  01:38 PM  
 MCV 85 2018 01:38 PM  
 
Lab Results Component Value Date/Time Sodium 140 2018 01:38 PM  
 Potassium 3.1 (L) 2018 01:38 PM  
 Chloride 98 2018 01:38 PM  
 CO2 25 2018 01:38 PM  
 Anion gap 11 2010 05:01 PM  
 Glucose 105 (H) 2018 01:38 PM  
 BUN 28 2018 01:38 PM  
 Creatinine 1.29 (H) 2018 01:38 PM  
 BUN/Creatinine ratio 22 2018 01:38 PM  
 GFR est AA 42 (L) 2018 01:38 PM  
 GFR est non-AA 36 (L) 2018 01:38 PM  
 Calcium 9.4 2018 01:38 PM  
 Bilirubin, total 0.5 2018 01:38 PM  
 AST (SGOT) 24 2018 01:38 PM  
 Alk.  phosphatase 71 2018 01:38 PM  
 Protein, total 6.9 2018 01:38 PM  
 Albumin 3.7 2018 01:38 PM  
 Globulin 3.3 2010 05:01 PM  
 A-G Ratio 1.2 11/28/2018 01:38 PM  
 ALT (SGPT) 12 11/28/2018 01:38 PM  
 
 
Objective:  
 
Vitals:  
 12/03/18 1500 BP: 108/71 Pulse: 98 Resp: 22 Temp: 98.1 °F (36.7 °C) TempSrc: Oral  
SpO2: 91% Weight: 128 lb (58.1 kg) Height: 5' 1\" (1.549 m) Wt Readings from Last 3 Encounters:  
12/05/18 128 lb 11.2 oz (58.4 kg) 12/03/18 128 lb (58.1 kg) 11/28/18 126 lb (57.2 kg) BP Readings from Last 3 Encounters:  
12/03/18 108/71  
11/28/18 138/66  
08/01/18 158/78 Review of Systems Unable to perform ROS: Mental status change Physical Exam  
Constitutional: Vital signs are normal. She appears lethargic, malnourished and dehydrated. She appears to not be writhing in pain. She appears unhealthy. She appears cachectic. She appears toxic. She does not have a sickly appearance. She appears distressed. Frail, fragile, alert but not oriented, elderly  female. 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 HENT:  
Head: Normocephalic and atraumatic. Right Ear: External ear and ear canal normal. A middle ear effusion is present. Decreased hearing is noted. Left Ear: External ear and ear canal normal. A middle ear effusion is present. Decreased hearing is noted. Nose: Nose normal. No mucosal edema or rhinorrhea. Mouth/Throat: Uvula is midline and oropharynx is clear and moist. Mucous membranes are pale, dry and not cyanotic. No oral lesions. No uvula swelling. No posterior oropharyngeal edema or posterior oropharyngeal erythema. Eyes: Conjunctivae, EOM and lids are normal. Lids are everted and swept, no foreign bodies found. Right pupil is round and reactive. Left pupil is not reactive. Left pupil required 20 to 30 secs extra to respond. But it finally did respond. Neck: Trachea normal, normal range of motion and full passive range of motion without pain. Neck supple. JVD present.  No hepatojugular reflux present. Carotid bruit is not present. No thyromegaly present. JVD present bilaterally. SOB, hypoxic. Cardiovascular: S1 normal, S2 normal, intact distal pulses and normal pulses. An irregularly irregular rhythm present. Frequent extrasystoles are present. Tachycardia present. Exam reveals gallop and distant heart sounds. Exam reveals no friction rub. No murmur heard. No extremity edema noted on visit. Pulmonary/Chest: Effort normal. Tachypnea noted. She has decreased breath sounds in the right upper field, the right lower field, the left upper field, the left middle field and the left lower field. She has wheezes. Bilateral wheezing, dyspnea even with patient trying to take deep breaths. Shallow breathing when not trying to deep breathe. Hypoxic during 6 min walk. Abdominal: Soft. Normal appearance. She exhibits no distension, no fluid wave and no mass. Bowel sounds are hyperactive. There is no hepatosplenomegaly. There is no tenderness. There is no rebound and no CVA tenderness. Continues to struggle with constipation. The [place] Course is working but she can not take it every day as the stools get better but the urgency is too much. Musculoskeletal:  
Generalized weakness with balance issues and intermittent dizziness. Atrophy in extremities. Loss of tone in extremities. Lymphadenopathy:  
     Head (right side): No submandibular adenopathy present. Head (left side): No submandibular and no tonsillar adenopathy present. She has no cervical adenopathy. She has no axillary adenopathy. Neurological: She has normal reflexes. She appears lethargic. She is disoriented. She is not agitated. She displays weakness, atrophy, facial asymmetry, abnormal stance and abnormal speech. A cranial nerve deficit and sensory deficit is present. She exhibits abnormal muscle tone. Coordination and gait abnormal. GCS score is 15. Skin: Skin is warm, dry and intact. No bruising, no ecchymosis and no rash noted. She is not diaphoretic. There is cyanosis. There is pallor. Nails show no clubbing. Psychiatric: Her mood appears anxious. Her affect is labile. She expresses impulsivity. She exhibits a depressed mood. She is apathetic. She exhibits disordered thought content, abnormal new learning ability and abnormal remote memory. She has a flat affect. Patient is debilitated. Nursing note and vitals reviewed. Disclaimer: Ms. Albert Ham has been advised to call or return to our office if symptoms worsen/change/persist. We as a care team including the patient; discussed expected course/resolution/complications of diagnosis in detail today. Medication risks/benefits/costs/interactions/alternatives discussed Donna Nelson was given an after visit summary which includes diagnoses, current medications, & vitals. Albert Ham expressed understanding with the diagnosis and plan.

## 2018-12-05 NOTE — PROGRESS NOTES
Chief Complaint   Patient presents with   [facility]     Patient here for lab draw.  Weight Management     Patient here for weight check.        Geroldine Merlin presents for lab draw ordered by Angelo Jason RN MSN [id number]-NP    The following labs were drawn and sent to lab by Tawanna Kayser, LPN:    CBC, CMP, PT/INR, BNP and Thyroid panel    The following tubes were sent:    Lavender  ( 2) and Tiger (2) Blue (1)     Patient tolerated procedure well, blood obtained via venipuncture to left antecubital

## 2018-12-06 ENCOUNTER — OFFICE VISIT (OUTPATIENT)
Dept: CARDIOLOGY CLINIC | Age: 83
End: 2018-12-06

## 2018-12-06 ENCOUNTER — CLINICAL SUPPORT (OUTPATIENT)
Dept: CARDIOLOGY CLINIC | Age: 83
End: 2018-12-06

## 2018-12-06 ENCOUNTER — APPOINTMENT (OUTPATIENT)
Dept: GENERAL RADIOLOGY | Age: 83
DRG: 291 | End: 2018-12-06
Attending: EMERGENCY MEDICINE
Payer: MEDICARE

## 2018-12-06 ENCOUNTER — HOSPITAL ENCOUNTER (INPATIENT)
Age: 83
LOS: 4 days | Discharge: NURSING, REHAB OR LONG-TERM CARE | DRG: 291 | End: 2018-12-10
Attending: EMERGENCY MEDICINE | Admitting: HOSPITALIST
Payer: MEDICARE

## 2018-12-06 VITALS
SYSTOLIC BLOOD PRESSURE: 100 MMHG | HEART RATE: 90 BPM | WEIGHT: 129 LBS | DIASTOLIC BLOOD PRESSURE: 60 MMHG | BODY MASS INDEX: 24.37 KG/M2

## 2018-12-06 DIAGNOSIS — I50.32 CHRONIC DIASTOLIC CONGESTIVE HEART FAILURE (HCC): ICD-10-CM

## 2018-12-06 DIAGNOSIS — I48.0 PAROXYSMAL ATRIAL FIBRILLATION (HCC): ICD-10-CM

## 2018-12-06 DIAGNOSIS — I25.10 CORONARY ARTERY DISEASE INVOLVING NATIVE CORONARY ARTERY OF NATIVE HEART WITHOUT ANGINA PECTORIS: ICD-10-CM

## 2018-12-06 DIAGNOSIS — R06.02 SHORTNESS OF BREATH: ICD-10-CM

## 2018-12-06 DIAGNOSIS — R94.31 ABNORMAL ECG: ICD-10-CM

## 2018-12-06 DIAGNOSIS — J90 PLEURAL EFFUSION, LEFT: Primary | ICD-10-CM

## 2018-12-06 DIAGNOSIS — J90 PLEURAL EFFUSION: ICD-10-CM

## 2018-12-06 DIAGNOSIS — I35.1 AORTIC VALVE INSUFFICIENCY, ETIOLOGY OF CARDIAC VALVE DISEASE UNSPECIFIED: ICD-10-CM

## 2018-12-06 DIAGNOSIS — I35.1 NONRHEUMATIC AORTIC VALVE INSUFFICIENCY: ICD-10-CM

## 2018-12-06 DIAGNOSIS — I34.0 NON-RHEUMATIC MITRAL REGURGITATION: ICD-10-CM

## 2018-12-06 DIAGNOSIS — I25.10 CORONARY ARTERY DISEASE INVOLVING NATIVE CORONARY ARTERY OF NATIVE HEART WITHOUT ANGINA PECTORIS: Primary | ICD-10-CM

## 2018-12-06 DIAGNOSIS — R06.02 SOB (SHORTNESS OF BREATH): Primary | ICD-10-CM

## 2018-12-06 DIAGNOSIS — J96.01 ACUTE RESPIRATORY FAILURE WITH HYPOXEMIA (HCC): ICD-10-CM

## 2018-12-06 DIAGNOSIS — J90 PLEURAL EFFUSION, RIGHT: ICD-10-CM

## 2018-12-06 DIAGNOSIS — I34.0 MITRAL VALVE INSUFFICIENCY, UNSPECIFIED ETIOLOGY: ICD-10-CM

## 2018-12-06 LAB
ABO + RH BLD: NORMAL
ALBUMIN SERPL-MCNC: 3.8 G/DL (ref 3.2–4.6)
ALBUMIN/GLOB SERPL: 1.3 {RATIO} (ref 1.2–2.2)
ALP SERPL-CCNC: 64 IU/L (ref 39–117)
ALT SERPL-CCNC: 14 IU/L (ref 0–32)
ANION GAP SERPL CALC-SCNC: 8 MMOL/L (ref 5–15)
APPEARANCE UR: CLEAR
ARTERIAL PATENCY WRIST A: YES
AST SERPL-CCNC: 26 IU/L (ref 0–40)
BACTERIA URNS QL MICRO: NEGATIVE /HPF
BASE EXCESS BLD CALC-SCNC: 0 MMOL/L
BASOPHILS # BLD AUTO: 0 X10E3/UL (ref 0–0.2)
BASOPHILS # BLD: 0 K/UL (ref 0–0.1)
BASOPHILS NFR BLD AUTO: 0 %
BASOPHILS NFR BLD: 1 % (ref 0–1)
BDY SITE: ABNORMAL
BILIRUB SERPL-MCNC: 0.6 MG/DL (ref 0–1.2)
BILIRUB UR QL: NEGATIVE
BLOOD GROUP ANTIBODIES SERPL: NORMAL
BNP SERPL-MCNC: 7752 PG/ML (ref 0–450)
BUN SERPL-MCNC: 22 MG/DL (ref 10–36)
BUN SERPL-MCNC: 28 MG/DL (ref 6–20)
BUN/CREAT SERPL: 20 (ref 12–28)
BUN/CREAT SERPL: 25 (ref 12–20)
CALCIUM SERPL-MCNC: 9.1 MG/DL (ref 8.7–10.3)
CALCIUM SERPL-MCNC: 9.2 MG/DL (ref 8.5–10.1)
CHLORIDE SERPL-SCNC: 104 MMOL/L (ref 96–106)
CHLORIDE SERPL-SCNC: 106 MMOL/L (ref 97–108)
CO2 SERPL-SCNC: 24 MMOL/L (ref 20–29)
CO2 SERPL-SCNC: 26 MMOL/L (ref 21–32)
COLOR UR: NORMAL
COMMENT, HOLDF: NORMAL
CREAT SERPL-MCNC: 1.1 MG/DL (ref 0.57–1)
CREAT SERPL-MCNC: 1.12 MG/DL (ref 0.55–1.02)
DIFFERENTIAL METHOD BLD: ABNORMAL
EOSINOPHIL # BLD AUTO: 0.2 X10E3/UL (ref 0–0.4)
EOSINOPHIL # BLD: 0.2 K/UL (ref 0–0.4)
EOSINOPHIL NFR BLD AUTO: 2 %
EOSINOPHIL NFR BLD: 2 % (ref 0–7)
EPITH CASTS URNS QL MICRO: NORMAL /LPF
ERYTHROCYTE [DISTWIDTH] IN BLOOD BY AUTOMATED COUNT: 15.8 % (ref 12.3–15.4)
ERYTHROCYTE [DISTWIDTH] IN BLOOD BY AUTOMATED COUNT: 16.3 % (ref 11.5–14.5)
EST. AVERAGE GLUCOSE BLD GHB EST-MCNC: 137 MG/DL
FT4I SERPL CALC-MCNC: 2.6 (ref 1.2–4.9)
GAS FLOW.O2 O2 DELIVERY SYS: ABNORMAL L/MIN
GLOBULIN SER CALC-MCNC: 3 G/DL (ref 1.5–4.5)
GLUCOSE SERPL-MCNC: 119 MG/DL (ref 65–100)
GLUCOSE SERPL-MCNC: 154 MG/DL (ref 65–99)
GLUCOSE UR STRIP.AUTO-MCNC: NEGATIVE MG/DL
HBA1C MFR BLD: 6.4 % (ref 4.2–6.3)
HCO3 BLD-SCNC: 24.8 MMOL/L (ref 22–26)
HCT VFR BLD AUTO: 39.1 % (ref 34–46.6)
HCT VFR BLD AUTO: 43.3 % (ref 35–47)
HGB BLD-MCNC: 12.4 G/DL (ref 11.1–15.9)
HGB BLD-MCNC: 13.1 G/DL (ref 11.5–16)
HGB UR QL STRIP: NEGATIVE
HYALINE CASTS URNS QL MICRO: NORMAL /LPF (ref 0–5)
IMM GRANULOCYTES # BLD: 0 K/UL (ref 0–0.04)
IMM GRANULOCYTES # BLD: 0 X10E3/UL (ref 0–0.1)
IMM GRANULOCYTES NFR BLD AUTO: 0 % (ref 0–0.5)
IMM GRANULOCYTES NFR BLD: 0 %
INR PPP: 1.3 (ref 0.8–1.2)
INR PPP: 1.3 (ref 0.9–1.1)
KETONES UR QL STRIP.AUTO: NEGATIVE MG/DL
LEUKOCYTE ESTERASE UR QL STRIP.AUTO: NEGATIVE
LYMPHOCYTES # BLD AUTO: 1.2 X10E3/UL (ref 0.7–3.1)
LYMPHOCYTES # BLD: 1.6 K/UL (ref 0.8–3.5)
LYMPHOCYTES NFR BLD AUTO: 15 %
LYMPHOCYTES NFR BLD: 19 % (ref 12–49)
MCH RBC QN AUTO: 28.1 PG (ref 26.6–33)
MCH RBC QN AUTO: 28.3 PG (ref 26–34)
MCHC RBC AUTO-ENTMCNC: 30.3 G/DL (ref 30–36.5)
MCHC RBC AUTO-ENTMCNC: 31.7 G/DL (ref 31.5–35.7)
MCV RBC AUTO: 89 FL (ref 79–97)
MCV RBC AUTO: 93.5 FL (ref 80–99)
MONOCYTES # BLD AUTO: 0.7 X10E3/UL (ref 0.1–0.9)
MONOCYTES # BLD: 1 K/UL (ref 0–1)
MONOCYTES NFR BLD AUTO: 9 %
MONOCYTES NFR BLD: 12 % (ref 5–13)
NEUTROPHILS # BLD AUTO: 5.7 X10E3/UL (ref 1.4–7)
NEUTROPHILS NFR BLD AUTO: 74 %
NEUTS SEG # BLD: 5.6 K/UL (ref 1.8–8)
NEUTS SEG NFR BLD: 67 % (ref 32–75)
NITRITE UR QL STRIP.AUTO: NEGATIVE
NRBC # BLD: 0 K/UL (ref 0–0.01)
NRBC BLD-RTO: 0 PER 100 WBC
NT-PROBNP SERPL-MCNC: 8153 PG/ML (ref 0–738)
PCO2 BLD: 38.1 MMHG (ref 35–45)
PH BLD: 7.42 [PH] (ref 7.35–7.45)
PH UR STRIP: 5.5 [PH] (ref 5–8)
PLATELET # BLD AUTO: 303 K/UL (ref 150–400)
PLATELET # BLD AUTO: 322 X10E3/UL (ref 150–379)
PMV BLD AUTO: 10.1 FL (ref 8.9–12.9)
PO2 BLD: 50 MMHG (ref 80–100)
POTASSIUM SERPL-SCNC: 3.6 MMOL/L (ref 3.5–5.1)
POTASSIUM SERPL-SCNC: 3.7 MMOL/L (ref 3.5–5.2)
PROT SERPL-MCNC: 6.8 G/DL (ref 6–8.5)
PROT UR STRIP-MCNC: NEGATIVE MG/DL
PROTHROMBIN TIME: 12.7 SEC (ref 9–11.1)
PROTHROMBIN TIME: 13.4 SEC (ref 9.1–12)
RBC # BLD AUTO: 4.41 X10E6/UL (ref 3.77–5.28)
RBC # BLD AUTO: 4.63 M/UL (ref 3.8–5.2)
RBC #/AREA URNS HPF: NORMAL /HPF (ref 0–5)
SAMPLES BEING HELD,HOLD: NORMAL
SAO2 % BLD: 86 % (ref 92–97)
SODIUM SERPL-SCNC: 140 MMOL/L (ref 136–145)
SODIUM SERPL-SCNC: 144 MMOL/L (ref 134–144)
SP GR UR REFRACTOMETRY: 1.01 (ref 1–1.03)
SPECIMEN EXP DATE BLD: NORMAL
SPECIMEN TYPE: ABNORMAL
T3RU NFR SERPL: 33 % (ref 24–39)
T4 SERPL-MCNC: 8 UG/DL (ref 4.5–12)
TOTAL RESP. RATE, ITRR: 18
TSH SERPL DL<=0.005 MIU/L-ACNC: 8.39 UIU/ML (ref 0.45–4.5)
TSH SERPL DL<=0.05 MIU/L-ACNC: 10.1 UIU/ML (ref 0.36–3.74)
UR CULT HOLD, URHOLD: NORMAL
UROBILINOGEN UR QL STRIP.AUTO: 0.2 EU/DL (ref 0.2–1)
WBC # BLD AUTO: 7.7 X10E3/UL (ref 3.4–10.8)
WBC # BLD AUTO: 8.3 K/UL (ref 3.6–11)
WBC URNS QL MICRO: NORMAL /HPF (ref 0–4)

## 2018-12-06 PROCEDURE — 65660000000 HC RM CCU STEPDOWN

## 2018-12-06 PROCEDURE — 85025 COMPLETE CBC W/AUTO DIFF WBC: CPT

## 2018-12-06 PROCEDURE — 71046 X-RAY EXAM CHEST 2 VIEWS: CPT

## 2018-12-06 PROCEDURE — 36415 COLL VENOUS BLD VENIPUNCTURE: CPT

## 2018-12-06 PROCEDURE — 81001 URINALYSIS AUTO W/SCOPE: CPT

## 2018-12-06 PROCEDURE — 83036 HEMOGLOBIN GLYCOSYLATED A1C: CPT

## 2018-12-06 PROCEDURE — 85610 PROTHROMBIN TIME: CPT

## 2018-12-06 PROCEDURE — 86901 BLOOD TYPING SEROLOGIC RH(D): CPT

## 2018-12-06 PROCEDURE — 36600 WITHDRAWAL OF ARTERIAL BLOOD: CPT

## 2018-12-06 PROCEDURE — 83880 ASSAY OF NATRIURETIC PEPTIDE: CPT

## 2018-12-06 PROCEDURE — 84443 ASSAY THYROID STIM HORMONE: CPT

## 2018-12-06 PROCEDURE — 99285 EMERGENCY DEPT VISIT HI MDM: CPT

## 2018-12-06 PROCEDURE — 74011250637 HC RX REV CODE- 250/637: Performed by: HOSPITALIST

## 2018-12-06 PROCEDURE — 82803 BLOOD GASES ANY COMBINATION: CPT

## 2018-12-06 PROCEDURE — 80048 BASIC METABOLIC PNL TOTAL CA: CPT

## 2018-12-06 PROCEDURE — 93005 ELECTROCARDIOGRAM TRACING: CPT

## 2018-12-06 RX ORDER — SIMVASTATIN 40 MG/1
40 TABLET, FILM COATED ORAL
Status: DISCONTINUED | OUTPATIENT
Start: 2018-12-06 | End: 2018-12-10 | Stop reason: HOSPADM

## 2018-12-06 RX ORDER — PAROXETINE HYDROCHLORIDE 20 MG/1
20 TABLET, FILM COATED ORAL DAILY
Status: DISCONTINUED | OUTPATIENT
Start: 2018-12-07 | End: 2018-12-10 | Stop reason: HOSPADM

## 2018-12-06 RX ORDER — DILTIAZEM HYDROCHLORIDE 180 MG/1
180 CAPSULE, COATED, EXTENDED RELEASE ORAL
Status: DISCONTINUED | OUTPATIENT
Start: 2018-12-06 | End: 2018-12-10 | Stop reason: HOSPADM

## 2018-12-06 RX ORDER — CLOPIDOGREL BISULFATE 75 MG/1
75 TABLET ORAL DAILY
Status: DISCONTINUED | OUTPATIENT
Start: 2018-12-07 | End: 2018-12-10 | Stop reason: HOSPADM

## 2018-12-06 RX ORDER — NITROGLYCERIN 0.4 MG/1
0.4 TABLET SUBLINGUAL AS NEEDED
Status: DISCONTINUED | OUTPATIENT
Start: 2018-12-06 | End: 2018-12-10 | Stop reason: HOSPADM

## 2018-12-06 RX ORDER — FERROUS SULFATE, DRIED 160(50) MG
1 TABLET, EXTENDED RELEASE ORAL 2 TIMES DAILY WITH MEALS
COMMUNITY
End: 2019-03-27 | Stop reason: ALTCHOICE

## 2018-12-06 RX ORDER — BUMETANIDE 1 MG/1
2 TABLET ORAL
Status: DISCONTINUED | OUTPATIENT
Start: 2018-12-07 | End: 2018-12-07

## 2018-12-06 RX ORDER — SODIUM CHLORIDE 0.9 % (FLUSH) 0.9 %
5-10 SYRINGE (ML) INJECTION AS NEEDED
Status: DISCONTINUED | OUTPATIENT
Start: 2018-12-06 | End: 2018-12-10 | Stop reason: HOSPADM

## 2018-12-06 RX ORDER — CARVEDILOL 6.25 MG/1
6.25 TABLET ORAL 2 TIMES DAILY
Status: DISCONTINUED | OUTPATIENT
Start: 2018-12-07 | End: 2018-12-10 | Stop reason: HOSPADM

## 2018-12-06 RX ORDER — CEFAZOLIN SODIUM/WATER 2 G/20 ML
2 SYRINGE (ML) INTRAVENOUS ONCE
Status: DISCONTINUED | OUTPATIENT
Start: 2018-12-06 | End: 2018-12-06

## 2018-12-06 RX ORDER — IPRATROPIUM BROMIDE AND ALBUTEROL SULFATE 2.5; .5 MG/3ML; MG/3ML
3 SOLUTION RESPIRATORY (INHALATION)
COMMUNITY
End: 2019-02-11 | Stop reason: ALTCHOICE

## 2018-12-06 RX ORDER — MOXIFLOXACIN HYDROCHLORIDE 400 MG/1
400 TABLET ORAL DAILY
COMMUNITY
End: 2018-12-10

## 2018-12-06 RX ORDER — GUAIFENESIN 600 MG/1
600 TABLET, EXTENDED RELEASE ORAL 2 TIMES DAILY
COMMUNITY
End: 2018-12-24

## 2018-12-06 RX ORDER — POLYETHYLENE GLYCOL 3350 17 G/17G
17 POWDER, FOR SOLUTION ORAL DAILY
COMMUNITY

## 2018-12-06 RX ORDER — SENNOSIDES 8.6 MG/1
1 TABLET ORAL
COMMUNITY
End: 2019-02-28 | Stop reason: ALTCHOICE

## 2018-12-06 RX ORDER — METOPROLOL TARTRATE 25 MG/1
12.5 TABLET, FILM COATED ORAL 2 TIMES DAILY
COMMUNITY
End: 2018-12-10

## 2018-12-06 RX ORDER — POTASSIUM CHLORIDE 750 MG/1
10 TABLET, FILM COATED, EXTENDED RELEASE ORAL DAILY
Status: DISCONTINUED | OUTPATIENT
Start: 2018-12-07 | End: 2018-12-10 | Stop reason: HOSPADM

## 2018-12-06 RX ORDER — SODIUM CHLORIDE 0.9 % (FLUSH) 0.9 %
5-10 SYRINGE (ML) INJECTION EVERY 8 HOURS
Status: DISCONTINUED | OUTPATIENT
Start: 2018-12-06 | End: 2018-12-10 | Stop reason: HOSPADM

## 2018-12-06 RX ORDER — BUMETANIDE 1 MG/1
1 TABLET ORAL
Status: DISCONTINUED | OUTPATIENT
Start: 2018-12-07 | End: 2018-12-07

## 2018-12-06 RX ORDER — LEVOTHYROXINE SODIUM 50 UG/1
25 TABLET ORAL
Status: DISCONTINUED | OUTPATIENT
Start: 2018-12-07 | End: 2018-12-10 | Stop reason: HOSPADM

## 2018-12-06 RX ORDER — BUDESONIDE 0.25 MG/2ML
250 INHALANT ORAL 2 TIMES DAILY
Status: DISCONTINUED | OUTPATIENT
Start: 2018-12-07 | End: 2018-12-07

## 2018-12-06 RX ADMIN — Medication 10 ML: at 21:49

## 2018-12-06 NOTE — ED TRIAGE NOTES
TRIAGE: Pt arrives from Cardiologist after an echocardiogram showed she had a pleural effusion. Pt complained of SOB prior to arrival that began yesterday. Hx of MI last year. Hx of dementia. Pt arrives alert to person but disoriented to time, place, and situation. Pt on NC 2L. VSS.

## 2018-12-06 NOTE — PROGRESS NOTES
Results discussed with patients son Luna Garibay today prior to patient being sent to the ER. Mrs. Aldo Schumacher is currently being assessed for illness at the ER.

## 2018-12-06 NOTE — PROGRESS NOTES
[facility]  [phone]    Reason for consult: CAD  Requesting physician: Cristina Ibarra NP    HPI: Governor Reilly, a 80y.o. year-old who presents for evaluation of dyspnea. She has been having more dyspnea with minimal exertion recently   In the last couple of days she has been having episodes of acute dyspnea which occur suddenly according to the nurse that is with her today  She has been on oxygen since yesterday at 2 lpm  She is somewhat of a poor historian  Can't give many details about her dyspnea  Doesn't remember what her symptoms were before her MI  Denies ever having any chest pain, before her MI or now   Reports having some occasional palpitations, episodes lasts for a few seconds  Has lightheadedness intermittently, BP low today at 100/60  She is not sure what medications she takes, has metoprolol and coreg on her medication list today  She denies any syncope  No LE edema  Doesn't know if she has PND, has been sleeping on 2 pillows recently   Says she coughs all the time but it is not productive  No fevers or chills, CXR 12/3 without evidence of consolidation  O2 Sats 96% on 2lpm here     Assessment/Plan:  1. CAD s/p Inferior STEMI 9/11/18 with PTCA/stents to distal RCA (KENNETH x 2) - will try to obtain cardiac cath report from [facility]  -continue plavix, coreg, statin  -ECG with inferior, anteroseptal TWI  -follow up with myself again in 4 months   2. Dyspnea  - will check TTE to assess cardiac structure and function  -follow up with Lisha Officer, DYLAN in 3-4 weeks to reassess  3. Hypotension - hx of HTN, advised her to stop taking metoprolol tartrate, continue other medications for now   4. Hx of HFpEF - will reassess with TTE, BP controlled, continue bumex   5. AF - rate controlled on coreg 6.25mg BID and diltiazem 180mg daily, continue Eliquis 2.5mg BID   6. Dyslipidemia - LDL 56 on simvastatin 40mg daily   7.   DNR status in place    Inferior STEMI 9/11/18 with PTCA/stents to distal RCA (KENNETH x 2)  Echo 4/18 - LVEF 55 %, no WMA,mod dilated LA, MAC with mild MR, AV sclerosis with mod AI, mild TR, PASP 30mmHg, mild PI    Soc Hx: no tobacco use x 10 years, no etoh use   Fam Hx: mother passed at age 80 from heart problems    She  has a past medical history of (HFpEF) heart failure with preserved ejection fraction ([address] Allergic rhinitis, Atherosclerotic cardiovascular disease, CHF (congestive heart failure) ([address] Cognitive communication deficit, Edema, Environmental allergies, Fatty liver disease, nonalcoholic, GERD (gastroesophageal reflux disease), H/O heart artery stent, HCVD (hypertensive cardiovascular disease), Heart attack ([address] Heart palpitations, History of PTCA, Hypercholesterolemia, Hypertension, Liver disease, MI (myocardial infarction) ([address] Overactive bladder, Peripheral neuropathy, Permanent atrial fibrillation ([address] Senile dementia, without behavioral disturbance, Stress incontinence, female, Vertigo, and Vitamin D deficiency. Cardiovascular ROS: no chest pain, positive for dyspnea   Respiratory ROS: positive for cough, shortness of breath  Neurological ROS: no TIA or stroke symptoms  All other systems negative except as above. PE  Vitals:    12/06/18 1052   BP: 100/60   Pulse: 90   Weight: 129 lb (58.5 kg)    Body mass index is 24.37 kg/m².    General appearance - alert, well appearing, and in no distress  Mental status - affect appropriate to mood  Eyes - sclera anicteric, moist mucous membranes  Neck - supple  Lymphatics - not assessed  Chest - diminished bases bilaterally   Heart - normal rate, irregular rhythm, normal S1, S2, no murmurs, rubs, clicks or gallops  Abdomen - soft, nontender, nondistended  Back exam - full range of motion, no tenderness  Neurological - cranial nerves II through XII grossly intact, no focal deficit  Musculoskeletal - no muscular tenderness noted, normal strength  Extremities - peripheral pulses normal, no pedal edema  Skin - normal coloration  no rashes    12 lead ECG 12/3: Afib with inferior and anteroseptal TWI    Recent Labs:  Lab Results   Component Value Date/Time    Cholesterol, total 118 11/28/2018 01:39 PM    HDL Cholesterol 42 11/28/2018 01:39 PM    LDL, calculated 56 11/28/2018 01:39 PM    Triglyceride 98 11/28/2018 01:39 PM    CHOL/HDL Ratio 3.8 12/17/2009 08:35 AM     Lab Results   Component Value Date/Time    Creatinine 1.04 (H) 12/10/2018 02:57 AM     Lab Results   Component Value Date/Time    BUN 37 (H) 12/10/2018 02:57 AM     Lab Results   Component Value Date/Time    Potassium 3.9 12/10/2018 02:57 AM     Lab Results   Component Value Date/Time    Hemoglobin A1c 6.4 (H) 12/06/2018 02:03 PM     Lab Results   Component Value Date/Time    HGB 11.4 (L) 12/07/2018 02:46 AM     Lab Results   Component Value Date/Time    PLATELET 807 14/99/1191 02:46 AM       Reviewed:  Past Medical History:   Diagnosis Date    (HFpEF) heart failure with preserved ejection fraction ([address] 10/17/2018    acute    Allergic rhinitis     Atherosclerotic cardiovascular disease 1999    CHF (congestive heart failure) ([address] 09/11/2018    Cognitive communication deficit     Edema     Environmental allergies     dizziness-(chronic)    Fatty liver disease, nonalcoholic     GERD (gastroesophageal reflux disease)     H/O heart artery stent 09/2018    HCVD (hypertensive cardiovascular disease) 10/17/2018    Heart attack ([address] 09/2018    Heart palpitations 2018    infrequent     History of PTCA     Hypercholesterolemia 1999    Hypertension 2010    Liver disease     fatty liver    MI (myocardial infarction) ([address] 09/2018    Overactive bladder 08/09/2018    Peripheral neuropathy     Permanent atrial fibrillation ([address] 11/05/2018    Senile dementia, without behavioral disturbance 11/05/2018    Stress incontinence, female 08/09/2018    Vertigo     Vitamin D deficiency      Social History     Tobacco Use   Smoking Status Former Smoker    Packs/day: 4.00    Types: Cigarettes    Last attempt to quit: 1955    Years since quittin.9   Smokeless Tobacco Never Used     Social History     Substance and Sexual Activity   Alcohol Use No    Alcohol/week: 0.0 - 0.5 oz     Allergies   Allergen Reactions    Celecoxib Other (comments)    Sulfa (Sulfonamide Antibiotics) Unknown (comments) and Hives    Atorvastatin Unknown (comments)    Codeine Unknown (comments)    Darvocet A500 [Propoxyphene N-Acetaminophen] Unknown (comments)    Iodine Unknown (comments)    Statins-Hmg-Coa Reductase Inhibitors Other (comments)     (intolerance)-myalgias    Aricept [Donepezil] Myalgia     Headaches - noted as intolerance        Current Outpatient Medications   Medication Sig    levothyroxine (SYNTHROID) 25 mcg tablet Take 1 Tab by mouth Daily (before breakfast).  OTHER Dispense - Portable Oxygen & Supplies due to congestive heart failure, hypoxemia, confusion due to hypoxemia. Long term treatment is required as patient failed in office walk test. Oxygen saturations noted to be at start 89% RA then dropped to 78% with activity.  OTHER Dispense Oxygen Concentrator with humidified air and supplies. Dx: I50.42 CHF; Dx: I48.91 Afib uncontrolled; Dx: R06.02 SOB; Dx: R09.02 Hypoxemia. Patient failed walk test in office. Pulse ox at 78% on RA.  bumetanide (BUMEX) 2 mg tablet Take 1 Tab by mouth every morning.  bumetanide (BUMEX) 1 mg tablet Take 1 Tab by mouth daily (after lunch).  OTHER May crush or change to liquid for any prescription prn due to dysphagia.  pantoprazole (PROTONIX) 40 mg tablet Take 1 Tab by mouth daily.  sucralfate (CARAFATE) 100 mg/mL suspension Take 10 mL by mouth four (4) times daily.  clopidogrel (PLAVIX) 75 mg tab Take 1 Tab by mouth daily.  apixaban (ELIQUIS) 2.5 mg tablet Take 1 Tab by mouth two (2) times a day.  simvastatin (ZOCOR) 40 mg tablet Take 40 mg by mouth nightly.     PARoxetine (PAXIL) 20 mg tablet Take 20 mg by mouth daily.  nitroglycerin (NITROSTAT) 0.4 mg SL tablet Take 0.4 mg by mouth as needed.  carvedilol (COREG) 6.25 mg tablet Take 6.25 mg by mouth two (2) times a day.  PROAIR HFA 90 mcg/actuation inhaler Take 2 Inhalation by inhalation four (4) times daily as needed.  budesonide (PULMICORT) 0.25 mg/2 mL nbsp 2 mL by Nebulization route two (2) times a day.  Nebulizer & Compressor machine 1 Each by Nebulization route four (4) times daily.  Nebulizer Accessories kit Dispense Nebulizer Accessories - face mask. Daily use due to COPD/Pulmonary Reactive Airway Disease/Hypoxemia    potassium chloride (KLOR-CON) 10 mEq tablet Take 1 Tab by mouth daily.  ondansetron hcl (ZOFRAN) 4 mg tablet Take 1 tablet 30 mins prior to breakfast, then q 8 hours prn for nausea/vomiting.  diltiazem CD (CARTIA XT) 180 mg ER capsule Take 180 mg by mouth nightly.  albuterol-ipratropium (DUO-NEB) 2.5 mg-0.5 mg/3 ml nebu 3 mL by Nebulization route every 4 hours daily while awake resp.  polyethylene glycol (MIRALAX) 17 gram packet Take 17 g by mouth daily.  guaiFENesin ER (MUCINEX) 600 mg ER tablet Take 600 mg by mouth two (2) times a day.  senna (SENNA) 8.6 mg tablet Take 1 Tab by mouth nightly.  calcium-vitamin D (OYSTER SHELL) 500 mg(1,250mg) -200 unit per tablet Take 1 Tab by mouth two (2) times daily (with meals). No current facility-administered medications for this visit.         Juan Carlos Beck MD  [facility] [address], [address] [facility] [address]  [place], [address]  [phone]

## 2018-12-06 NOTE — ED PROVIDER NOTES
80 y.o. female with past medical history significant for dementia, CAD, fatty liver dz, hypercholesterolemia, GERD, atherosclerosis, MI, heart failure, who presents for chief complaint of referral. Pt presents to ED after being evaluated at his cardiologist's office to follow a 2017 MI. Pt displayed pleural effusion and mitral regurgitation on her echocardiogram; referred to ED for further treatment. Accompanying sx include AMS, SOB, congestion, and light headedness. Denies any pain. Currently taking Plavix, Eliquis, and nitro as needed. Recently discontinued metoprolol. Cardiac stents in place. There are no other acute medical concerns at this time. PCP: Emerald Alexander NP Cardiologist: Xochitl Rivera MD 
 
Full history, physical exam, and ROS unable to be obtained due to:  dementia. Note written by Marcia Joyce, as dictated by Regla Pineda MD 1:46 PM 
 
 
The history is provided by a caregiver and the patient. History limited by: dementia. No  was used. Past Medical History:  
Diagnosis Date  (HFpEF) heart failure with preserved ejection fraction ([address] 10/17/2018  
 acute  Allergic rhinitis  Atherosclerotic cardiovascular disease 1999  
 CHF (congestive heart failure) ([address] 09/11/2018  Cognitive communication deficit  Edema  Environmental allergies   
 dizziness-(chronic)  Fatty liver disease, nonalcoholic  GERD (gastroesophageal reflux disease)  H/O heart artery stent 09/2018  HCVD (hypertensive cardiovascular disease) 10/17/2018  Heart attack ([address] 09/2018  Heart palpitations 2018 infrequent  History of PTCA  Hypercholesterolemia 1999  Hypertension 2010  Liver disease   
 fatty liver  MI (myocardial infarction) ([address] 09/2018  Overactive bladder 08/09/2018  Peripheral neuropathy  Permanent atrial fibrillation ([address] 11/05/2018  Senile dementia, without behavioral disturbance 2018  Stress incontinence, female 2018  Vertigo  Vitamin D deficiency Past Surgical History:  
Procedure Laterality Date  BREAST SURGERY PROCEDURE UNLISTED    
 breast cyst Shellye Abed)  CARDIAC SURG PROCEDURE UNLIST  1999  
 + stress thalassemia; 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 Holter  HX APPENDECTOMY  HX BREAST BIOPSY Left   
 neg  HX CYST INCISION AND DRAINAGE Right years ago  
 neg  HX DILATION AND CURETTAGE    
 x5  
 HX GYN    
 D&C (x5), ALLEY/BSO (-Juliann/Zedler), Provera intolerance (bleeding), endometrial Bx annually  HX PTCA  2018  
 stent distal RCA into PLwith KENNETH x 2  
 HX ALLEY AND BSO   Juliann/Zedler Family History:  
Problem Relation Age of Onset  Diabetes Mother  Hypertension Mother  Heart Failure Mother CHF ( @ 80)  Alzheimer Mother  Cancer Father   
     lung ( @ 77)  Alzheimer Father  No Known Problems Son  No Known Problems Son   
 Ovarian Cancer Sister   
     hysterectomy  Breast Cancer Sister 28  
     mastectomy  Cancer Sister   
     breast and ovarian  Alzheimer Sister  Breast Problems Sister   
     breast cyst  
 Cataract Sister  Hypertension Sister  Diabetes Brother Social History Socioeconomic History  Marital status:  Spouse name: Not on file  Number of children: Not on file  Years of education: Not on file  Highest education level: Not on file Social Needs  Financial resource strain: Not on file  Food insecurity - worry: Not on file  Food insecurity - inability: Not on file  Transportation needs - medical: Not on file  Transportation needs - non-medical: Not on file Occupational History  Not on file Tobacco Use  Smoking status: Former Smoker Packs/day: 4.00 Types: Cigarettes Last attempt to quit: 1955 Years since quittin.9  Smokeless tobacco: Never Used Substance and Sexual Activity  Alcohol use: No  
  Alcohol/week: 0.0 - 0.5 oz  Drug use: No  
 Sexual activity: No  
Other Topics Concern  Not on file Social History Narrative ** Merged History Encounter ** ALLERGIES: Celecoxib; Sulfa (sulfonamide antibiotics); Atorvastatin; Codeine; Darvocet a500 [propoxyphene n-acetaminophen]; Iodine; Statins-hmg-coa reductase inhibitors; and Aricept [donepezil] Review of Systems Unable to perform ROS: Dementia Constitutional: Negative for chills and fever. HENT: Negative for congestion. Eyes: Negative for visual disturbance. Respiratory: Positive for shortness of breath. Negative for chest tightness. Cardiovascular: Negative for chest pain. Gastrointestinal: Negative for abdominal pain, nausea and vomiting. Genitourinary: Negative for dysuria. Musculoskeletal: Negative for back pain. Skin: Negative for rash. Allergic/Immunologic: Negative for immunocompromised state. Neurological: Positive for weakness. Negative for syncope and headaches. Psychiatric/Behavioral: Positive for confusion. There were no vitals filed for this visit. Physical Exam  
Constitutional: She is oriented to person, place, and time. She appears well-developed. No distress. HENT:  
Head: Normocephalic and atraumatic. Mouth/Throat: Oropharynx is clear and moist.  
Eyes: EOM are normal. No scleral icterus. Neck: No tracheal deviation present. Cardiovascular: Normal rate, regular rhythm, normal heart sounds and intact distal pulses. Pulmonary/Chest: No respiratory distress. Mild Tachypnea, pausing between sentences to breathe; decreased BS in bilateral bases Abdominal: Soft. She exhibits no distension. There is no tenderness. Musculoskeletal: Normal range of motion. She exhibits no edema. Neurological: She is alert and oriented to person, place, and time.  No cranial nerve deficit. Skin: Skin is warm and dry. Psychiatric: She has a normal mood and affect. Nursing note and vitals reviewed. MDM Number of Diagnoses or Management Options Diagnosis management comments: 89yoF with above hx, referred to ED by her Cardiologist for evaluation of pleural effusions and worsening MR/AI on echo today. No resp distress at this time. Will obtain CXR and plan for admission for non-emergent thoracentesis by IR. EKG, basic labs, troponin, coags. CTS recommends CTA Chest to evaluate for aortic dissection given acuity of changes. Admit to Medicine with Cardiology and CTS following. Amount and/or Complexity of Data Reviewed Clinical lab tests: ordered and reviewed Tests in the radiology section of CPT®: ordered and reviewed Obtain history from someone other than the patient: yes Review and summarize past medical records: yes Discuss the patient with other providers: yes Independent visualization of images, tracings, or specimens: yes Risk of Complications, Morbidity, and/or Mortality Presenting problems: moderate Diagnostic procedures: moderate Management options: moderate Patient Progress Patient progress: stable Procedures ED EKG interpretation: 
Rhythm: atrial fib; Rate (approx.): 84; non-ischemic; normal interval; QRS 80; QT/QTc 404/439 Note written by Marcia Tang, as dictated by Taylor Brunner MD 2:01 PM 
 
CONSULT NOTE: 
3:23 PM Taylor Brunner MD spoke with Dr. Nicole Peñaloza, Consult for CT surgery. Discussed available diagnostic tests and clinical findings. Dr. William De La Rosa surgery recommends CTA of chest to evaluate for aortic dissection.  Pt will be admitted for potential thoracentesis and CTA chest with premedication protocol as she is allergic to iodinated contrast. 
  
CONSULT NOTE: 
3:47 PM Taylor Brunner MD spoke with Dr. Herman Mejia, Consult for Cardiology. Discussed available diagnostic tests and clinical findings. Dr. Alfonso Us recommends admission. I agree that the above documentation as written by ED Scribe is accurate and complete. Janeen Lutz MD  
Signed By: Janeen Lutz MD   
 December 7, 2018

## 2018-12-06 NOTE — PROGRESS NOTES
Seen in office today for dyspnea and hypoxia(new pt) Large left pleural effusion on echo,. Severe MR on office echo. DMS reporting down. Sent to ER for thoracentesis and after speaking to pt and son will ask valve team to consult regarding the benefit of possible MV clipping/repair. Valve is significantly thickened, but mobile, options may be limited. Recommend thoracentesis and diuresis and then discussion of options. Son agrees.

## 2018-12-06 NOTE — ED NOTES
Pt assisted to bedside commode in no acute distress. Returned to bed and placed on monitor x3. Will continue to monitor.

## 2018-12-06 NOTE — CONSULTS
CSS Consult    Subjective:      Kimberly Madrigal is a 80 y.o. female who was referred for cardiac evaluation by Dr. Alvaro Garcia for . The patient had increasing SOB yesterday and was placed on oxygen by the facility that she lives. She reports improvement in her SOB since then. She was seen in the cardiology clinic today and had an echo and was referred to the ER for pleural effusion. The patient currently denies SOB and CP, but is a difficult historian, most information gained from chart review. She has a past medical history of HTN, CAD/STEMI s/p stents 9/2018, GERD, renal insufficiency, HFpEF, HLD, peripheral neuropathy, afib, hypothyroid, and dementia. She is a former smoker. She denies alcohol use. She lives in an assisted living facility. She has a family history of HTN, heart failure, and DM. Cardiac Testing    Cardiac catheterization: none in [facility]    ECHO 4/5/18: SUMMARY:  Left ventricle: Systolic function was normal. Ejection fraction was  estimated to be 55 % by visual assessment. EF measured 50% by 4D volume  and Hunter's biplane techniques. There were no regional wall motion  abnormalities. Wall thickness was minimally increased. Left atrium: The atrium was moderately dilated. Atrial septum: The septum was thickened. Mitral valve: There was mild posterior annular calcification. There was  very mild regurgitation. Aortic valve: The valve was trileaflet. Leaflets exhibited sclerosis. There was moderate regurgitation. PHT measured 459 ms. Tricuspid valve: There was mild regurgitation. Pulmonary artery systolic  pressure: 30 mmHg. Pulmonic valve: There was mild regurgitation.     Past Medical History:   Diagnosis Date    (HFpEF) heart failure with [facility] fraction [facility] 10/17/2018    acute    Allergic rhinitis     Atherosclerotic cardiovascular disease 1999    CHF (congestive heart failure) ([facility] 09/11/2018    Cognitive communication deficit     Edema     Environmental allergies     dizziness-(chronic)    Fatty liver disease, nonalcoholic     GERD (gastroesophageal reflux disease)     H/O heart artery stent 2018    HCVD (hypertensive cardiovascular disease) 10/17/2018    Heart attack ([facility] 2018    Heart palpitations 2018    infrequent     History of PTCA     Hypercholesterolemia 1999    Hypertension 2010    Liver disease     fatty liver    MI (myocardial infarction) ([facility] 2018    Overactive bladder 2018    Peripheral neuropathy     Permanent atrial fibrillation ([facility] 2018    Senile dementia, without behavioral disturbance 2018    Stress incontinence, female 2018    Vertigo     Vitamin D deficiency      Past Surgical History:   Procedure Laterality Date    BREAST SURGERY PROCEDURE UNLISTED      breast cyst [place])    CARDIAC SURG PROCEDURE UNLIST  1999    + stress thalassemia; 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  Holter    HX APPENDECTOMY      HX BREAST BIOPSY Left     neg    HX CYST INCISION AND DRAINAGE Right years ago    neg    HX DILATION AND CURETTAGE      x5    HX GYN      D&C (x5), ALLEY/BSO (-Juliann/Zedler), Provera intolerance (bleeding), endometrial Bx annually    HX PTCA  2018    stent distal RCA into PLwith KENNETH x 2    HX ALLEY AND BSO      Juliann/Zedler      Social History     Tobacco Use    Smoking status: Former Smoker     Packs/day: 4.00     Types: Cigarettes     Last attempt to quit: 1955     Years since quittin.9    Smokeless tobacco: Never Used   Substance Use Topics    Alcohol use: No     Alcohol/week: 0.0 - 0.5 oz      Family History   Problem Relation Age of Onset    Diabetes Mother     Hypertension Mother     Heart Failure Mother         CHF ( @ 80)   [facility] Alzheimer Mother     Cancer Father         lung ( @ 77)   [facility] Alzheimer Father     No Known Problems Son     No Known Problems Son     Ovarian Cancer Sister         hysterectomy    Breast Cancer Sister 28 mastectomy    Cancer Sister         breast and ovarian    Alzheimer Sister     Breast Problems Sister         breast cyst    Cataract Sister     Hypertension Sister     Diabetes Brother      Prior to Admission medications    Medication Sig Start Date End Date Taking? Authorizing Provider   levothyroxine (SYNTHROID) 25 mcg tablet Take 1 Tab by mouth Daily (before breakfast). 12/5/18   Samuel Redman NP   OTHER Dispense - Portable Oxygen & Supplies due to congestive heart failure, hypoxemia, confusion due to hypoxemia. Long term treatment is required as patient failed in office walk test. Oxygen saturations noted to be at start 89% RA then dropped to 78% with activity. 12/5/18   Samuel Redman, DYLAN   OTHER Dispense Oxygen Concentrator with humidified air and supplies. Dx: I50.42 CHF; Dx: I48.91 Afib uncontrolled; Dx: R06.02 SOB; Dx: R09.02 Hypoxemia. Patient failed walk test in office. Pulse ox at 78% on RA. 12/5/18   Samuel Redman NP   bumetanide (BUMEX) 2 mg tablet Take 1 Tab by mouth every morning. 12/5/18   Samuel Redman NP   bumetanide (BUMEX) 1 mg tablet Take 1 Tab by mouth daily (after lunch). 12/5/18   Samuel Redman NP   OTHER May crush or change to liquid for any prescription prn due to dysphagia. 12/5/18   Samuel Redman NP   pantoprazole (PROTONIX) 40 mg tablet Take 1 Tab by mouth daily. 12/3/18   Samuel Redman NP   sucralfate (CARAFATE) 100 mg/mL suspension Take 10 mL by mouth four (4) times daily. 12/3/18   Samuel Redman NP   clopidogrel (PLAVIX) 75 mg tab Take 1 Tab by mouth daily. 11/30/18   Samuel Redman NP   apixaban (ELIQUIS) 2.5 mg tablet Take 1 Tab by mouth two (2) times a day. 11/30/18   Samuel Redman NP   simvastatin (ZOCOR) 40 mg tablet Take 40 mg by mouth nightly. 11/26/18   Provider, Historical   PARoxetine (PAXIL) 20 mg tablet Take 20 mg by mouth daily.  11/26/18   Provider, Historical   nitroglycerin (NITROSTAT) 0.4 mg SL tablet Take 0.4 mg by mouth as needed. 11/26/18   Provider, Historical   carvedilol (COREG) 6.25 mg tablet Take 6.25 mg by mouth two (2) times a day. 11/26/18   Provider, Historical   PROAIR HFA 90 mcg/actuation inhaler Take 2 Inhalation by inhalation four (4) times daily as needed. 11/26/18   Provider, Historical   budesonide (PULMICORT) 0.25 mg/2 mL nbsp 2 mL by Nebulization route two (2) times a day. 11/28/18   Choco Marina NP   albuterol-ipratropium (DUO-NEB) 2.5 mg-0.5 mg/3 ml nebu 3 mL by Nebulization route three (3) times daily. 11/28/18   Choco Marina NP   Nebulizer & Compressor machine 1 Each by Nebulization route four (4) times daily. 11/28/18   Choco Marina NP   Nebulizer Accessories kit Dispense Nebulizer Accessories - face mask. Daily use due to COPD/Pulmonary Reactive Airway Disease/Hypoxemia 11/28/18   Choco Marina NP   potassium chloride (KLOR-CON) 10 mEq tablet Take 1 Tab by mouth daily. 11/28/18   Choco Marina NP   ondansetron hcl (ZOFRAN) 4 mg tablet Take 1 tablet 30 mins prior to breakfast, then q 8 hours prn for nausea/vomiting. 11/28/18   Choco Marina NP   diltiazem CD (CARTIA XT) 180 mg ER capsule Take 180 mg by mouth nightly. Provider, Historical       Allergies   Allergen Reactions    Celecoxib Other (comments)    Sulfa (Sulfonamide Antibiotics) Unknown (comments) and Hives    Atorvastatin Unknown (comments)    Codeine Unknown (comments)    Darvocet A500 [Propoxyphene N-Acetaminophen] Unknown (comments)    Iodine Unknown (comments)    Statins-Hmg-Coa Reductase Inhibitors Other (comments)     (intolerance)-myalgias    Aricept [Donepezil] Myalgia     Headaches - noted as intolerance        Review of Systems: pertinent positives in HPI  Consititutional: Denies fever or chills. Eyes:  Denies use of glasses or vision problems(cataracts). ENT:  Denies hearing or swallowing difficulty. CV: Denies CP, claudication, HTN.   Resp: Denies dyspnea, productive cough. : Denies dialysis or kidney problems. GI: Denies ulcers, esophageal strictures, liver problems. M/S: Denies joint or bone problems, or implanted artificial hardware. Skin: Denies varicose veins, edema. Neuro: Denies strokes, or TIAs. Psych: Denies anxiety or depression. Endocrine: Denies thyroid problems or diabetes. Heme/Lymphatic: Denies easy bruising or lymphedema. Objective:     VS:   Visit Vitals  /58   Pulse 83   Temp 97.9 °F (36.6 °C)   Resp 24   SpO2 97%       Physical Exam:    General appearance: alert, cooperative, no distress  Head: normocephalic, without obvious abnormality; atraumatic  Eyes: conjunctivae/corneas clear; EOM's intact. Nose: nares normal; no drainage. Neck: no carotid bruit and no JVD  Lungs: clear to auscultation bilaterally  Heart: regular rate and rhythm; +2/6 murmur  Abdomen: soft, non-tender; bowel sounds normal  Extremities: moves all extremities; no weakness. Skin: Skin color normal; No varicose veins or edema. Neurologic: Grossly normal      Labs:   Recent Labs     12/06/18  1403   WBC 8.3   HGB 13.1   HCT 43.3         K 3.6   BUN 28*   CREA 1.12*   *   INR 1.3*       Diagnostics:   PA and lateral: FINDINGS: Frontal and lateral views of the chest show unchanged moderate size  left pleural effusion. There is small right pleural effusion which appears  new/increased.     Heart size is obscured. There is difficulty excluding a component of  interstitial pulmonary edema. There is no pneumothorax or midline shift.     IMPRESSION  IMPRESSION:  1. Bilateral pleural effusions as described. 2. Difficult to exclude a component of interstitial pulmonary edema.     Carotid doppler: pending    PFTS-FEV1: pending    EKG: Atrial fibrillation   ST & T wave abnormality, consider inferior ischemia   ST & T wave abnormality, consider anterior ischemia    Assessment:     Active Problems:    AI (aortic insufficiency) (12/6/2018)        Plan:      1. AI: Seen on echo at Dr. Katie Vance office today, not available in [facility] at this time. Dr. Josephine Liriano recommends CTA to r/o dissection. Pt is allergic to dye. Plan on CTA after premedication. 2. CAD s/p Inferior STEMI 9/11/18 with PTCA/stents to distal RCA (KENNETH x 2): On plavix, coreg, statin PTA  3. Dyslipidemia: On statin PTA  4. AF: Rate controlled with coreg and dig, anticoagulated with eliquis 2.5 BID PTA  5. Hx HTN: BP lower today, monitor, meds per cardiology  6. Hypothyroid: On synthroid  7. Renal insufficiency: Monitor Cr  8. Respiratory insufficiency/pleural effusions: Start on O2 yesterday, reports improved SOB since then. Unable to do thoracentesis dt anticoagulation.   9. Dispo: Plans and care per primary team.       Signed By: Kajal Hamilton NP     December 6, 2018        Pt seen and examined  Discussed w Dr Junior Patrick  Would delay any consideration of procedural intervention until medical management optimized

## 2018-12-06 NOTE — PROGRESS NOTES
Admission Medication Reconciliation: 
 
Information obtained from:  patient, chart review, medication list from PCP Comments/Recommendations: All medications have been reviewed and updated; last medication administration times reviewed and recorded. Medical records were reviewed to determined current home medications. Per patient and patient's son, she took her morning medications prior to arrival.   
 
Changes made to Prior to Admission (PTA) Medication List: ? Medications Added:  
- metoprolol tartrate, Miralax, moxifloxacin, Muccinex, senna, and calcium + vit D 
? Medications Changed:  
- DuoNeb changed from TID to Q4H while awake ? Medications Removed:  
- None Significant PMH/Disease States:  
Past Medical History:  
Diagnosis Date  (HFpEF) heart failure with preserved ejection fraction ([facility] 10/17/2018  
 acute  Allergic rhinitis  Atherosclerotic cardiovascular disease 1999  
 CHF (congestive heart failure) ([facility] 09/11/2018  Cognitive communication deficit  Edema  Environmental allergies   
 dizziness-(chronic)  Fatty liver disease, nonalcoholic  GERD (gastroesophageal reflux disease)  H/O heart artery stent 09/2018  HCVD (hypertensive cardiovascular disease) 10/17/2018  Heart attack ([address] 09/2018  Heart palpitations 2018 infrequent  History of PTCA  Hypercholesterolemia 1999  Hypertension 2010  Liver disease   
 fatty liver  MI (myocardial infarction) ([facility] 09/2018  Overactive bladder 08/09/2018  Peripheral neuropathy  Permanent atrial fibrillation ([facility] 11/05/2018  Senile dementia, without behavioral disturbance 11/05/2018  Stress incontinence, female 08/09/2018  Vertigo  Vitamin D deficiency Chief Complaint for this Admission: Chief Complaint Patient presents with  Referral / Consult Allergies:  Celecoxib; Sulfa (sulfonamide antibiotics);  Atorvastatin; Codeine; Darvocet a500 [propoxyphene n-acetaminophen]; Iodine; Statins-hmg-coa reductase inhibitors; and Aricept [donepezil] Prior to Admission Medications:  
Prior to Admission Medications Prescriptions Last Dose Informant Patient Reported? Taking? Nebulizer & Compressor machine   No No  
Si Each by Nebulization route four (4) times daily. Nebulizer Accessories kit   No No  
Sig: Dispense Nebulizer Accessories - face mask. Daily use due to COPD/Pulmonary Reactive Airway Disease/Hypoxemia OTHER   No No  
Sig: Dispense - Portable Oxygen & Supplies due to congestive heart failure, hypoxemia, confusion due to hypoxemia. Long term treatment is required as patient failed in office walk test. Oxygen saturations noted to be at start 89% RA then dropped to 78% with activity. OTHER   No No  
Sig: Dispense Oxygen Concentrator with humidified air and supplies. Dx: I50.42 CHF; Dx: I48.91 Afib uncontrolled; Dx: R06.02 SOB; Dx: R09.02 Hypoxemia. Patient failed walk test in office. Pulse ox at 78% on RA.  
OTHER   No No  
Sig: May crush or change to liquid for any prescription prn due to dysphagia. PARoxetine (PAXIL) 20 mg tablet 2018 at Unknown time  Yes Yes Sig: Take 20 mg by mouth daily. PROAIR HFA 90 mcg/actuation inhaler   Yes No  
Sig: Take 2 Inhalation by inhalation four (4) times daily as needed. albuterol-ipratropium (DUO-NEB) 2.5 mg-0.5 mg/3 ml nebu 2018 at Unknown time  Yes Yes Sig: 3 mL by Nebulization route every 4 hours daily while awake resp. apixaban (ELIQUIS) 2.5 mg tablet 2018 at Unknown time  No Yes Sig: Take 1 Tab by mouth two (2) times a day. budesonide (PULMICORT) 0.25 mg/2 mL nbsp 2018 at Unknown time  No Yes Si mL by Nebulization route two (2) times a day. bumetanide (BUMEX) 1 mg tablet 2018 at Unknown time  No Yes Sig: Take 1 Tab by mouth daily (after lunch). bumetanide (BUMEX) 2 mg tablet 2018 at Unknown time  No Yes Sig: Take 1 Tab by mouth every morning. calcium-vitamin D (OYSTER SHELL) 500 mg(1,250mg) -200 unit per tablet   Yes Yes Sig: Take 1 Tab by mouth two (2) times daily (with meals). carvedilol (COREG) 6.25 mg tablet 12/6/2018 at Unknown time  Yes Yes Sig: Take 6.25 mg by mouth two (2) times a day. clopidogrel (PLAVIX) 75 mg tab 12/6/2018 at Unknown time  No Yes Sig: Take 1 Tab by mouth daily. diltiazem CD (CARTIA XT) 180 mg ER capsule 12/6/2018 at Unknown time  Yes Yes Sig: Take 180 mg by mouth nightly. guaiFENesin ER (MUCINEX) 600 mg ER tablet 12/6/2018 at Unknown time  Yes Yes Sig: Take 600 mg by mouth two (2) times a day. levothyroxine (SYNTHROID) 25 mcg tablet 12/6/2018 at Unknown time  No Yes Sig: Take 1 Tab by mouth Daily (before breakfast). metoprolol tartrate (LOPRESSOR) 25 mg tablet 12/6/2018 at Unknown time  Yes Yes Sig: Take 12.5 mg by mouth two (2) times a day. moxifloxacin (AVELOX) 400 mg tablet 12/6/2018 at Unknown time  Yes Yes Sig: Take 400 mg by mouth daily. nitroglycerin (NITROSTAT) 0.4 mg SL tablet   Yes No  
Sig: Take 0.4 mg by mouth as needed. ondansetron hcl (ZOFRAN) 4 mg tablet   No No  
Sig: Take 1 tablet 30 mins prior to breakfast, then q 8 hours prn for nausea/vomiting. pantoprazole (PROTONIX) 40 mg tablet 12/6/2018 at Unknown time  No Yes Sig: Take 1 Tab by mouth daily. polyethylene glycol (MIRALAX) 17 gram packet 12/6/2018 at Unknown time  Yes Yes Sig: Take 17 g by mouth daily. potassium chloride (KLOR-CON) 10 mEq tablet 12/6/2018 at Unknown time  No Yes Sig: Take 1 Tab by mouth daily. senna (SENNA) 8.6 mg tablet   Yes Yes Sig: Take 1 Tab by mouth nightly. simvastatin (ZOCOR) 40 mg tablet 12/6/2018 at Unknown time  Yes Yes Sig: Take 40 mg by mouth nightly. sucralfate (CARAFATE) 100 mg/mL suspension 12/6/2018 at Unknown time  No Yes Sig: Take 10 mL by mouth four (4) times daily. Facility-Administered Medications: None Thank you for allowing pharmacy to participate in the coordination of this patient's care. If you have any other questions, please contact the medication reconciliation pharmacist at [phone]. Noe Atkinson, Pharm. D., BCPS

## 2018-12-06 NOTE — TELEPHONE ENCOUNTER
I attempted to contact . Charles Guardado (patient's POA) again today to discuss some changes in his mother's condition and treatment. I did not receive a call back. So at 1800 [facility] requested I use to advise him of changes in her status. See e-mail. Results of her Chest Xray returned at 2000. Noted to be CHF with mild to moderate effusions bilaterally. Due to this diagnosis and her barium swallow results I am going to cover the bases and treat for possible aspiration pneumonia due to her dysphagia & choking events lately. At the close of this documentation I have still be unable to reach her POA to discuss treatments & status.

## 2018-12-07 ENCOUNTER — APPOINTMENT (OUTPATIENT)
Dept: CT IMAGING | Age: 83
DRG: 291 | End: 2018-12-07
Attending: NURSE PRACTITIONER
Payer: MEDICARE

## 2018-12-07 ENCOUNTER — APPOINTMENT (OUTPATIENT)
Dept: ULTRASOUND IMAGING | Age: 83
DRG: 291 | End: 2018-12-07
Attending: HOSPITALIST
Payer: MEDICARE

## 2018-12-07 ENCOUNTER — APPOINTMENT (OUTPATIENT)
Dept: GENERAL RADIOLOGY | Age: 83
DRG: 291 | End: 2018-12-07
Attending: RADIOLOGY
Payer: MEDICARE

## 2018-12-07 LAB
ALBUMIN SERPL-MCNC: 3 G/DL (ref 3.5–5)
ALBUMIN/GLOB SERPL: 0.8 {RATIO} (ref 1.1–2.2)
ALP SERPL-CCNC: 67 U/L (ref 45–117)
ALT SERPL-CCNC: 19 U/L (ref 12–78)
ANION GAP SERPL CALC-SCNC: 9 MMOL/L (ref 5–15)
APPEARANCE FLD: ABNORMAL
APTT PPP: 29.4 SEC (ref 22.1–32)
AST SERPL-CCNC: 23 U/L (ref 15–37)
ATRIAL RATE: 107 BPM
BASOPHILS # BLD: 0.1 K/UL (ref 0–0.1)
BASOPHILS NFR BLD: 1 % (ref 0–1)
BILIRUB SERPL-MCNC: 0.4 MG/DL (ref 0.2–1)
BNP SERPL-MCNC: 6252 PG/ML (ref 0–450)
BODY FLD TYPE: NORMAL
BUN SERPL-MCNC: 30 MG/DL (ref 6–20)
BUN/CREAT SERPL: 28 (ref 12–20)
CALCIUM SERPL-MCNC: 8.7 MG/DL (ref 8.5–10.1)
CALCULATED R AXIS, ECG10: 63 DEGREES
CALCULATED T AXIS, ECG11: -32 DEGREES
CHLORIDE SERPL-SCNC: 107 MMOL/L (ref 97–108)
CO2 SERPL-SCNC: 25 MMOL/L (ref 21–32)
COLOR FLD: YELLOW
CREAT SERPL-MCNC: 1.06 MG/DL (ref 0.55–1.02)
DIAGNOSIS, 93000: NORMAL
DIFFERENTIAL METHOD BLD: ABNORMAL
EOSINOPHIL # BLD: 0.3 K/UL (ref 0–0.4)
EOSINOPHIL NFR BLD: 3 % (ref 0–7)
ERYTHROCYTE [DISTWIDTH] IN BLOOD BY AUTOMATED COUNT: 16.3 % (ref 11.5–14.5)
GLOBULIN SER CALC-MCNC: 3.9 G/DL (ref 2–4)
GLUCOSE FLD-MCNC: 126 MG/DL
GLUCOSE SERPL-MCNC: 115 MG/DL (ref 65–100)
HCT VFR BLD AUTO: 38.1 % (ref 35–47)
HGB BLD-MCNC: 11.4 G/DL (ref 11.5–16)
IMM GRANULOCYTES # BLD: 0 K/UL (ref 0–0.04)
IMM GRANULOCYTES NFR BLD AUTO: 0 % (ref 0–0.5)
INR PPP: 1.3 (ref 0.9–1.1)
LDH FLD L TO P-CCNC: 110 U/L
LYMPHOCYTES # BLD: 1.9 K/UL (ref 0.8–3.5)
LYMPHOCYTES NFR BLD: 24 % (ref 12–49)
LYMPHOCYTES NFR FLD: 56 %
MAGNESIUM SERPL-MCNC: 2 MG/DL (ref 1.6–2.4)
MCH RBC QN AUTO: 27.9 PG (ref 26–34)
MCHC RBC AUTO-ENTMCNC: 29.9 G/DL (ref 30–36.5)
MCV RBC AUTO: 93.4 FL (ref 80–99)
MESOTHL CELL NFR FLD: 1 %
MONOCYTES # BLD: 0.9 K/UL (ref 0–1)
MONOCYTES NFR BLD: 11 % (ref 5–13)
MONOS+MACROS NFR FLD: 35 %
NEUTROPHILS NFR FLD: 6 %
NEUTS SEG # BLD: 4.8 K/UL (ref 1.8–8)
NEUTS SEG NFR BLD: 61 % (ref 32–75)
NRBC # BLD: 0 K/UL (ref 0–0.01)
NRBC BLD-RTO: 0 PER 100 WBC
NUC CELL # FLD: 1430 /CU MM
OTHER CELL,FOTHC: 2 %
PH FLD: 6.8 [PH]
PLATELET # BLD AUTO: 271 K/UL (ref 150–400)
PMV BLD AUTO: 10.2 FL (ref 8.9–12.9)
POTASSIUM SERPL-SCNC: 3.6 MMOL/L (ref 3.5–5.1)
PROT FLD-MCNC: 2.1 G/DL
PROT SERPL-MCNC: 6.9 G/DL (ref 6.4–8.2)
PROTHROMBIN TIME: 12.7 SEC (ref 9–11.1)
Q-T INTERVAL, ECG07: 392 MS
QRS DURATION, ECG06: 88 MS
QTC CALCULATION (BEZET), ECG08: 463 MS
RBC # BLD AUTO: 4.08 M/UL (ref 3.8–5.2)
RBC # FLD: >100 /CU MM
SODIUM SERPL-SCNC: 141 MMOL/L (ref 136–145)
SPECIMEN SOURCE FLD: ABNORMAL
SPECIMEN SOURCE FLD: NORMAL
THERAPEUTIC RANGE,PTTT: NORMAL SECS (ref 58–77)
VENTRICULAR RATE, ECG03: 84 BPM
WBC # BLD AUTO: 7.9 K/UL (ref 3.6–11)

## 2018-12-07 PROCEDURE — G8979 MOBILITY GOAL STATUS: HCPCS

## 2018-12-07 PROCEDURE — 83735 ASSAY OF MAGNESIUM: CPT

## 2018-12-07 PROCEDURE — 85610 PROTHROMBIN TIME: CPT

## 2018-12-07 PROCEDURE — 74011250637 HC RX REV CODE- 250/637: Performed by: HOSPITALIST

## 2018-12-07 PROCEDURE — 93880 EXTRACRANIAL BILAT STUDY: CPT

## 2018-12-07 PROCEDURE — 97110 THERAPEUTIC EXERCISES: CPT

## 2018-12-07 PROCEDURE — 36415 COLL VENOUS BLD VENIPUNCTURE: CPT

## 2018-12-07 PROCEDURE — C1729 CATH, DRAINAGE: HCPCS

## 2018-12-07 PROCEDURE — 93306 TTE W/DOPPLER COMPLETE: CPT

## 2018-12-07 PROCEDURE — G8978 MOBILITY CURRENT STATUS: HCPCS

## 2018-12-07 PROCEDURE — 74011250637 HC RX REV CODE- 250/637: Performed by: NURSE PRACTITIONER

## 2018-12-07 PROCEDURE — 83986 ASSAY PH BODY FLUID NOS: CPT

## 2018-12-07 PROCEDURE — 65660000000 HC RM CCU STEPDOWN

## 2018-12-07 PROCEDURE — 88112 CYTOPATH CELL ENHANCE TECH: CPT

## 2018-12-07 PROCEDURE — 32555 ASPIRATE PLEURA W/ IMAGING: CPT

## 2018-12-07 PROCEDURE — 83880 ASSAY OF NATRIURETIC PEPTIDE: CPT

## 2018-12-07 PROCEDURE — 97161 PT EVAL LOW COMPLEX 20 MIN: CPT

## 2018-12-07 PROCEDURE — 97165 OT EVAL LOW COMPLEX 30 MIN: CPT

## 2018-12-07 PROCEDURE — 87205 SMEAR GRAM STAIN: CPT

## 2018-12-07 PROCEDURE — 74011636637 HC RX REV CODE- 636/637: Performed by: NURSE PRACTITIONER

## 2018-12-07 PROCEDURE — 82945 GLUCOSE OTHER FLUID: CPT

## 2018-12-07 PROCEDURE — 80053 COMPREHEN METABOLIC PANEL: CPT

## 2018-12-07 PROCEDURE — 97116 GAIT TRAINING THERAPY: CPT

## 2018-12-07 PROCEDURE — 83615 LACTATE (LD) (LDH) ENZYME: CPT

## 2018-12-07 PROCEDURE — 89050 BODY FLUID CELL COUNT: CPT

## 2018-12-07 PROCEDURE — 85025 COMPLETE CBC W/AUTO DIFF WBC: CPT

## 2018-12-07 PROCEDURE — 84157 ASSAY OF PROTEIN OTHER: CPT

## 2018-12-07 PROCEDURE — 74011000250 HC RX REV CODE- 250: Performed by: NURSE PRACTITIONER

## 2018-12-07 PROCEDURE — 85730 THROMBOPLASTIN TIME PARTIAL: CPT

## 2018-12-07 PROCEDURE — 97535 SELF CARE MNGMENT TRAINING: CPT

## 2018-12-07 PROCEDURE — 0W9B3ZZ DRAINAGE OF LEFT PLEURAL CAVITY, PERCUTANEOUS APPROACH: ICD-10-PCS | Performed by: RADIOLOGY

## 2018-12-07 PROCEDURE — 88305 TISSUE EXAM BY PATHOLOGIST: CPT

## 2018-12-07 PROCEDURE — 71045 X-RAY EXAM CHEST 1 VIEW: CPT

## 2018-12-07 RX ORDER — POTASSIUM CHLORIDE 750 MG/1
20 TABLET, FILM COATED, EXTENDED RELEASE ORAL
Status: COMPLETED | OUTPATIENT
Start: 2018-12-07 | End: 2018-12-07

## 2018-12-07 RX ORDER — DIPHENHYDRAMINE HCL 25 MG
50 CAPSULE ORAL ONCE
Status: DISCONTINUED | OUTPATIENT
Start: 2018-12-08 | End: 2018-12-07

## 2018-12-07 RX ORDER — BUDESONIDE 0.25 MG/2ML
250 INHALANT ORAL
Status: DISCONTINUED | OUTPATIENT
Start: 2018-12-07 | End: 2018-12-10 | Stop reason: HOSPADM

## 2018-12-07 RX ORDER — BUMETANIDE 0.25 MG/ML
1 INJECTION INTRAMUSCULAR; INTRAVENOUS 2 TIMES DAILY
Status: DISCONTINUED | OUTPATIENT
Start: 2018-12-07 | End: 2018-12-10 | Stop reason: HOSPADM

## 2018-12-07 RX ADMIN — BUMETANIDE 2 MG: 1 TABLET ORAL at 08:35

## 2018-12-07 RX ADMIN — POTASSIUM CHLORIDE 20 MEQ: 750 TABLET, EXTENDED RELEASE ORAL at 09:20

## 2018-12-07 RX ADMIN — PAROXETINE HYDROCHLORIDE 20 MG: 20 TABLET, FILM COATED ORAL at 09:20

## 2018-12-07 RX ADMIN — BUMETANIDE 1 MG: 0.25 INJECTION INTRAMUSCULAR; INTRAVENOUS at 17:03

## 2018-12-07 RX ADMIN — CARVEDILOL 6.25 MG: 6.25 TABLET, FILM COATED ORAL at 17:03

## 2018-12-07 RX ADMIN — Medication 10 ML: at 22:06

## 2018-12-07 RX ADMIN — PREDNISONE 50 MG: 20 TABLET ORAL at 13:13

## 2018-12-07 RX ADMIN — Medication 10 ML: at 08:36

## 2018-12-07 RX ADMIN — CARVEDILOL 6.25 MG: 6.25 TABLET, FILM COATED ORAL at 09:20

## 2018-12-07 RX ADMIN — Medication 10 ML: at 13:15

## 2018-12-07 RX ADMIN — POTASSIUM CHLORIDE 10 MEQ: 750 TABLET, EXTENDED RELEASE ORAL at 09:24

## 2018-12-07 RX ADMIN — LEVOTHYROXINE SODIUM 25 MCG: 50 TABLET ORAL at 08:35

## 2018-12-07 RX ADMIN — SIMVASTATIN 40 MG: 40 TABLET, FILM COATED ORAL at 22:06

## 2018-12-07 NOTE — PROGRESS NOTES
Bedside and Verbal shift change report given to Jan WILLS (oncoming nurse) by Wolfgang Rosado (offgoing nurse). Report included the following information SBAR, ED Summary, MAR, Recent Results and Cardiac Rhythm NSR w/ PAC's.

## 2018-12-07 NOTE — H&P
[facility] 
[phone] Reason for consult: CAD Requesting physician: Angelo Jason NP 
 
HPI: Yvetta Moritz, a 80y.o. year-old who presents for evaluation of dyspnea. ER in office with large left plerual effusion and new severe MR will move to hospitl, get thoracentesis and discuss vavle mgmt options She has been having more dyspnea with minimal exertion recently In the last couple of days she has been having episodes of acute dyspnea which occur suddenly according to the nurse that is with her today She has been on oxygen since yesterday at 2 lpm 
She is somewhat of a poor historian 
Can't give many details about her dyspnea Doesn't remember what her symptoms were before her MI Denies ever having any chest pain, before her MI or now Reports having some occasional palpitations, episodes lasts for a few seconds Has lightheadedness intermittently, BP low today at 100/60 She is not sure what medications she takes, has metoprolol and coreg on her medication list today She denies any syncope No LE edema Doesn't know if she has PND, has been sleeping on 2 pillows recently Says she coughs all the time but it is not productive No fevers or chills, CXR 12/3 without evidence of consolidation O2 Sats 96% on 2lpm here Assessment/Plan: 1. CAD s/p Inferior STEMI 9/11/18 with PTCA/stents to distal RCA (KENNETH x 2) - will try to obtain cardiac cath report from [facility] 
-continue plavix, coreg, statin 
-ECG with inferior, anteroseptal TWI 
-follow up with myself again in 4 months 2. Dyspnea  - will check TTE to assess cardiac structure and function 
-follow up with Harrie Litten, NP in 3-4 weeks to reassess 3. Hypotension - hx of HTN, advised her to stop taking metoprolol tartrate, continue other medications for now 4. Hx of HFpEF - will reassess with TTE, BP controlled, continue bumex 5.  AF - rate controlled on coreg 6.25mg BID and diltiazem 180mg daily, continue Eliquis 2.5mg BID 6. Dyslipidemia - LDL 56 on simvastatin 40mg daily 7. DNR status in place Inferior STEMI 9/11/18 with PTCA/stents to distal RCA (KENNETH x 2) Echo 4/18 - LVEF 55 %, no WMA,mod dilated LA, MAC with mild MR, AV sclerosis with mod AI, mild TR, PASP 30mmHg, mild PI Soc Hx: no tobacco use x 10 years, no etoh use Fam Hx: mother passed at age 80 from heart problems She  has a past medical history of (HFpEF) heart failure with preserved ejection fraction ([address] Allergic rhinitis, Atherosclerotic cardiovascular disease, CHF (congestive heart failure) ([address] Cognitive communication deficit, Edema, Environmental allergies, Fatty liver disease, nonalcoholic, GERD (gastroesophageal reflux disease), H/O heart artery stent, HCVD (hypertensive cardiovascular disease), Heart attack ([address] Heart palpitations, History of PTCA, Hypercholesterolemia, Hypertension, Liver disease, MI (myocardial infarction) ([address] Overactive bladder, Peripheral neuropathy, Permanent atrial fibrillation ([address] Senile dementia, without behavioral disturbance, Stress incontinence, female, Vertigo, and Vitamin D deficiency. Cardiovascular ROS: no chest pain, positive for dyspnea Respiratory ROS: positive for cough, shortness of breath Neurological ROS: no TIA or stroke symptoms All other systems negative except as above. PE 
Vitals:  
 12/06/18 2040 12/06/18 2148 12/06/18 2312 12/07/18 1551 BP: 91/47  102/56 102/55 Pulse: 90  80 80 Resp: 20  18 20 Temp: 97.5 °F (36.4 °C)  97.5 °F (36.4 °C) 97.8 °F (36.6 °C) SpO2: 92%  93% 94% Weight:  135 lb 2.3 oz (61.3 kg)  138 lb 12.8 oz (63 kg) Body mass index is 26.23 kg/m². General appearance - alert, well appearing, and in no distress Mental status - affect appropriate to mood Eyes - sclera anicteric, moist mucous membranes Neck - supple Lymphatics - not assessed Chest - diminished bases bilaterally Heart - normal rate, irregular rhythm, normal S1, S2, no murmurs, rubs, clicks or gallops Abdomen - soft, nontender, nondistended Back exam - full range of motion, no tenderness Neurological - cranial nerves II through XII grossly intact, no focal deficit Musculoskeletal - no muscular tenderness noted, normal strength Extremities - peripheral pulses normal, no pedal edema Skin - normal coloration  no rashes 12 lead ECG 12/3: Afib with inferior and anteroseptal TWI Recent Labs: 
Lab Results Component Value Date/Time Cholesterol, total 118 11/28/2018 01:39 PM  
 HDL Cholesterol 42 11/28/2018 01:39 PM  
 LDL, calculated 56 11/28/2018 01:39 PM  
 Triglyceride 98 11/28/2018 01:39 PM  
 CHOL/HDL Ratio 3.8 12/17/2009 08:35 AM  
 
Lab Results Component Value Date/Time Creatinine 1.06 (H) 12/07/2018 02:46 AM  
 
Lab Results Component Value Date/Time BUN 30 (H) 12/07/2018 02:46 AM  
 
Lab Results Component Value Date/Time Potassium 3.6 12/07/2018 02:46 AM  
 
Lab Results Component Value Date/Time Hemoglobin A1c 6.4 (H) 12/06/2018 02:03 PM  
 
Lab Results Component Value Date/Time HGB 11.4 (L) 12/07/2018 02:46 AM  
 
Lab Results Component Value Date/Time PLATELET 577 53/74/4906 02:46 AM  
 
 
Reviewed: 
Past Medical History:  
Diagnosis Date  (HFpEF) heart failure with preserved ejection fraction ([address] 10/17/2018  
 acute  Allergic rhinitis  Atherosclerotic cardiovascular disease 1999  
 CHF (congestive heart failure) ([address] 09/11/2018  Cognitive communication deficit  Edema  Environmental allergies   
 dizziness-(chronic)  Fatty liver disease, nonalcoholic  GERD (gastroesophageal reflux disease)  H/O heart artery stent 09/2018  HCVD (hypertensive cardiovascular disease) 10/17/2018  Heart attack ([address] 09/2018  Heart palpitations 2018 infrequent  History of PTCA  Hypercholesterolemia 1999  Hypertension 2010  Liver disease   
 fatty liver  MI [facility]myocardial infarction) [facility] 2018  Overactive bladder 2018  Peripheral neuropathy  Permanent atrial fibrillation ([facility] 2018  Senile dementia, without behavioral disturbance 2018  Stress incontinence, female 2018  Vertigo  Vitamin D deficiency Social History Tobacco Use Smoking Status Former Smoker  Packs/day: 4.00  Types: Cigarettes  Last attempt to quit: 1955  Years since quittin.9 Smokeless Tobacco Never Used Social History Substance and Sexual Activity Alcohol Use No  
 Alcohol/week: 0.0 - 0.5 oz Allergies Allergen Reactions  Celecoxib Other (comments)  Sulfa (Sulfonamide Antibiotics) Unknown (comments) and Hives  Atorvastatin Unknown (comments)  Codeine Unknown (comments)  Darvocet A500 [Propoxyphene N-Acetaminophen] Unknown (comments)  Iodine Unknown (comments)  Statins-Hmg-Coa Reductase Inhibitors Other (comments)  
  (intolerance)-myalgias  Aricept [Donepezil] Myalgia Headaches - noted as intolerance Current Facility-Administered Medications Medication Dose Route Frequency  sodium chloride (NS) flush 5-10 mL  5-10 mL IntraVENous Q8H  
 sodium chloride (NS) flush 5-10 mL  5-10 mL IntraVENous PRN  
 sodium phosphate (FLEET'S) enema 1 Enema  1 Enema Rectal PRN  
 budesonide (PULMICORT) 250 mcg/2ml nebulizer susp  250 mcg Nebulization BID  carvedilol (COREG) tablet 6.25 mg  6.25 mg Oral BID  levothyroxine (SYNTHROID) tablet 25 mcg  25 mcg Oral ACB  nitroglycerin (NITROSTAT) tablet 0.4 mg  0.4 mg SubLINGual PRN  
 PARoxetine (PAXIL) tablet 20 mg  20 mg Oral DAILY  simvastatin (ZOCOR) tablet 40 mg  40 mg Oral QHS  bumetanide (BUMEX) tablet 1 mg  1 mg Oral PCL  bumetanide (BUMEX) tablet 2 mg  2 mg Oral 7am  
 clopidogrel (PLAVIX) tablet 75 mg  75 mg Oral DAILY  dilTIAZem CD (CARDIZEM CD) capsule 180 mg  180 mg Oral QHS  potassium chloride SR (KLOR-CON 10) tablet 10 mEq  10 mEq Oral DAILY Amalia Mcnally MD 
[facility], [address] [facility] 
[phone]

## 2018-12-07 NOTE — PROGRESS NOTES
NUTRITION COMPLETE ASSESSMENT 
 
RECOMMENDATIONS:  
1. Encourage PO intake with meals and Ensure  
 - should be drinking at least 1 Ensure per day in addition to meals 
 - continue supplements at least BID at facility when discharged 2. Daily weights Interventions/Plan:  
Food/Nutrient Delivery:  (snacks) Commercial supplement(Ensure BID) Assessment:  
Reason for Assessment: [x]BPA/MST Referral  
 
Diet: Cardiac Supplements: none Nutritionally Significant Medications: [x] Reviewed & Includes: bumex, coreg, cardizem, synthroid, KCl, prednisone Meal Intake: No data found. Pre-Hospitalization: 
Usual Appetite: Fair Diet at Home: regular Vitamins/Supplements: Yes(Boost in the past (chocolate)) Current Hospitalization:  
Appetite: Fair PO Ability: Independent Average po intake: Average supplements intake:    
 Subjective: \"I ate that microwave meal, and I'll eat the rest of it if you reheat it. .. I was drinking 2 Boost before I went to the skilled nursing side. They say I need an order for it. \" 
 
Objective: 
Pt admitted for BL pleural effusions form cardiology office. PMHx: severe MR, CHF, CAD, GERD, MITCHELL, dementia. Pleural effusion noted on chest xray at cardiology - though to be related to CHF per MD notes. Had thoracentesis today. Cardio following. Pt and son visited today. Eating microwave meal since missed lunch for thoracentesis. Appetite has been down for a few months per son. Son wt loss attributed to wt loss with start of diuretics. Pt now lives in facility (not independent living) but had previously been drinking Boost shakes (chocolate) BID. Will resumed Ensure BID (700kcal, 40g protein) with snacks - recommend continuing shakes at facility. Predict current bedscale wt not accurate. Office scale indicating severe wt loss of 11% x 4 months. #. Wt Readings from Last 10 Encounters:  
12/07/18 63 kg (138 lb 12.8 oz) 12/06/18 58.5 kg (129 lb) 12/05/18 58.4 kg (128 lb 11.2 oz) 12/03/18 58.1 kg (128 lb)  
11/28/18 57.2 kg (126 lb) 08/01/18 65.8 kg (145 lb)  
03/27/18 66.9 kg (147 lb 6.4 oz) 02/08/18 66.7 kg (147 lb)  
01/25/18 66.7 kg (147 lb) 01/18/18 67 kg (147 lb 9.6 oz) Will continue to follow for PO intake, supplement consumption, and wt trends/fluid status. Estimated Nutrition Needs:  
Kcals/day: 2421 Kcals/day(1115-1208kcal) Protein: 64 g(64-75g (1.1-1.3g/kg)) Fluid: 1200 ml(1ml/kcal) Based On: [place] St Jeor(x 1.2-1.3) Weight Used: Other (comment)(office visit wt) Pt expected to meet estimated nutrient needs:  []   Yes     []  No [x] Unable to predict at this time Nutrition Diagnosis:  
1. Unintended weight loss related to poor appetite 2/2 food selection, taste changes as evidenced by severe wt loss of 11% x 4 months; pt/son reports Goals:   
 Consumption of at least 50% meals and 1-2 supplements/day in 5-7 days Monitoring & Evaluation: - Total energy intake, Liquid meal replacement - Weight/weight change Previous Nutrition Goals Met:   N/A Previous Recommendations:    N/A Education & Discharge Needs: 
 [] None Identified 
 [] Identified and addressed  
 [] Participated in care plan, discharge planning, and/or interdisciplinary rounds Cultural, Denominational and ethnic food preferences identified: None Skin Integrity: [x]Intact  []Other Edema: [x]None []Other Last BM: PTA Food Allergies: [x]None []Other Diet Restrictions: Cultural/Confucianism Preference(s): None Anthropometrics:   
Weight Loss Metrics 12/7/2018 12/6/2018 12/6/2018 12/6/2018 12/5/2018 12/3/2018 11/28/2018 Today's Wt 138 lb 12.8 oz - 129 lb - 128 lb 11.2 oz 128 lb 126 lb BMI - 26.23 kg/m2 - 24.37 kg/m2 24.32 kg/m2 24.19 kg/m2 23.81 kg/m2 Weight Source: Bed Height: 5' 1\" (154.9 cm), Body mass index is 26.23 kg/m². IBW : 47.6 kg (105 lb), % IBW (Calculated): 132.19 % Usual Body Weight: 66.7 kg (147 lb),   
 
Labs: Lab Results Component Value Date/Time Sodium 141 12/07/2018 02:46 AM  
 Potassium 3.6 12/07/2018 02:46 AM  
 Chloride 107 12/07/2018 02:46 AM  
 CO2 25 12/07/2018 02:46 AM  
 Glucose 115 (H) 12/07/2018 02:46 AM  
 BUN 30 (H) 12/07/2018 02:46 AM  
 Creatinine 1.06 (H) 12/07/2018 02:46 AM  
 Calcium 8.7 12/07/2018 02:46 AM  
 Magnesium 2.0 12/07/2018 02:46 AM  
 Phosphorus 3.5 11/28/2018 01:39 PM  
 Albumin 3.0 (L) 12/07/2018 02:46 AM  
 
Lab Results Component Value Date/Time Hemoglobin A1c 6.4 (H) 12/06/2018 02:03 PM  
 
Obed Paul RD Pager #[phone]

## 2018-12-07 NOTE — PROGRESS NOTES
Bedside and Verbal shift change report given to ELMA Jeff (oncoming nurse) by Vera Everett RN (offgoing nurse). Report included the following information SBAR, Kardex, ED Summary, Procedure Summary, Intake/Output, MAR, Recent Results and Cardiac Rhythm A fib.

## 2018-12-07 NOTE — CDMP QUERY
Please clarify if this patient is being treated/managed for: 
 
=> Acute Hypoxic Resp. Failure in the setting of Large Left Pleural Effusion, o2 supplement, Telemetry to have thorcenteisis is planned, cards and cards surgery consult 
=> Other Explanation of clinical findings 
=> Clinically Undetermined (no explanation for clinical findings) The medical record reflects the following clinical findings, treatment, and risk factors: 
 
Risk Factors: CHF, CAD, Atrial Fib Clinical Indicators: Pa o2 @ 50--sob, o2 sats @ 86% Treatment:   O2 supplement 2 liters   telemetry, to have Thoracentesis    Cards and Cards/surgery consulted. Please clarify and document your clinical opinion in the progress notes and discharge summary including the definitive and/or presumptive diagnosis, (suspected or probable), related to the above clinical findings. Please include clinical findings supporting your diagnosis. REFERENCE:  
Hypoxemic respiratory failure is characterized by a PaO2 less than 60 mmHg (or 10 mmHg below COPD patients baseline) and a normal or low arterial PaCO2. Hypercapnic respiratory failure is characterized by a PaCO2 higher than 50 mmHg (or 10 mmHG above COPD patients baseline). Acute Respiratory Failure indicators include: - Respirations <12 or >25 - [employer] - Use of accessory muscles of respiration  
- Inability to speak in full sentences - Cyanosis AND 
 
- Pulse ox <90% RA or <95% on O2  
- pH <7.35 or >7.45  
- pO2 < 60 mm Hg (or 10mm below COPD patient's baseline) - pCO2 >50mm Hg (or 10mm above COPD patient's baseline)

## 2018-12-07 NOTE — PROGRESS NOTES
Reason for Admission:  The patient presented with bilateral pleural effusion RRAT Score: 19 Do you (patient/family) have any concerns for transition/discharge? None identified at this time Plan for utilizing home health:  TBD- CM will await PT/OT evaluations Likelihood of readmission? Low Transition of Care Plan: TBD- [facility] vs. Healthcare at [facility] The CM met with patient and son Vikash Jones at bedside- of note, Mr. Liyah Bowman is MPOA. The patient lives typically at [address] with 24/7 caregivers through [facility], however, patient was scheduled to move to intermediate side of [facility] on 12/6 when she was brought into hospital (family moved belongings to [address] [facility]). The patient's son stated that on September 11 of this year, the patient had a heart attack- prior to this event, patient was driving, very independent in active, etc., however, since this event she has had memory issues and weakness, thus leading to 24/7 caregivers and transition to [facility]. The patient's son stated family has been paying privately- patient also has LTC policy for caregivers, family stated they can supplement care at intermediate with [facility] caregivers if needed. The patient's son verified PCP as DYLAN Lyons, and stated that if [facility] cannot provide transport upon discharge, son will transport. The CM called and spoke with Carli Alcantar ([phone]) JORDYN at [facility], and she confirmed patient can return to intermediate at discharge dependent upon functioning, if not, [facility] is an option. CM also spoke with Be Duncan with [facility]- stated patient has [facility] contract and can discharge to HealthCare if needed. Patient has PT/OT consultations pending.  CM will fax clinical updates to (f: [phone]) [facility] will have to determine if they can accept patient back to [facility] or if patient will need Healthcare. Patient's son will transport upon discharge vs. [facility]. CM will continue to follow. KATHY Song Care Management Interventions PCP Verified by CM: Yes(Patient's son verified PCP as NP Jesus Cartagena) Mode of Transport at Discharge: Other (see comment)(Son vs. [facility] if able to transport, vs. BLS dependent upon functioning ) Transition of Care Consult (CM Consult): Discharge Planning MyChart Signup: No 
Discharge Durable Medical Equipment: No 
Health Maintenance Reviewed: Yes Physical Therapy Consult: Yes Occupational Therapy Consult: Yes Speech Therapy Consult: No 
Current Support Network: Assisted Living, Other(Patient's family is in the process of moving patient to [facility]- anticipate patient will be able to discharge to custodial side of [facility]) Confirm Follow Up Transport: Other (see comment)(Son vs. [facility] ) Plan discussed with Pt/Family/Caregiver: Yes [employer] Information Provided?: No 
Discharge Location Discharge Placement: Assisted Living(Family states the plan will be for patient to discharge to [facility] upon discharge- stated they already started the process, CM spoke with Leena Reynolds with [facility])

## 2018-12-07 NOTE — PROGRESS NOTES
Problem: Self Care Deficits Care Plan (Adult) Goal: *Acute Goals and Plan of Care (Insert Text) Occupational Therapy Goals Initiated 12/7/2018 1. Patient will perform grooming standing at sink for 10 minutes with supervision within 7 day(s). 2.  Patient will perform upper body ADLs with supervision/set-up within 7 day(s). 3.  Patient will perform lower body ADLs with supervision/set-up within 7 day(s). 4.  Patient will perform toilet transfers with supervision/set-up within 7 day(s). 5.  Patient will perform all aspects of toileting with supervision/set-up within 7 day(s). 6.  Patient will participate in upper extremity therapeutic exercise/activities with supervision/set-up for 5 minutes within 7 day(s). 7.  Patient will utilize energy conservation techniques during functional activities with verbal cues within 7 day(s). Occupational Therapy EVALUATION Patient: Cierra Reilly (86 y.o. female) Date: 12/7/2018 Primary Diagnosis: SOB (shortness of breath) Precautions:  Fall ASSESSMENT : 
Based on the objective data described below, the patient presents with overall min A for bed mobility, CGA-min A for functional mobility, s/u for upper body ADLs and s/u-min A for lower body ADLs s/p admission for SOB. Patient on 2.5L O2 throughout session, maintained SPO2 93-95% during and after activity. Patient ADLs limited by impaired balance, decreased functional activity tolerance, generalized weakness, and impaired cognition (attention to task, level of orientation, safety awareness, insight into deficits, etc). Patient would benefit from returning to [facility] with 24/7 assist and if unable to have 24/7 assist, to healthcare at [facility].   
 
Recommend with nursing patient to complete as able in order to maintain strength, endurance and independence: ADLs with supervision/setup, OOB to chair 3x/day and mobilizing to the bathroom for toileting with 1 assist (with RW and gait belt). Thank you for your assistance. Patient will benefit from skilled intervention to address the above impairments. Patients rehabilitation potential is considered to be Fair Factors which may influence rehabilitation potential include:  
[]             None noted []             Mental ability/status []             Medical condition []             Home/family situation and support systems []             Safety awareness []             Pain tolerance/management 
[]             Other: PLAN : 
Recommendations and Planned Interventions: 
[x]               Self Care Training                  [x]        Therapeutic Activities [x]               Functional Mobility Training    []        Cognitive Retraining 
[x]               Therapeutic Exercises           [x]        Endurance Activities [x]               Balance Training                   []        Neuromuscular Re-Education []               Visual/Perceptual Training     [x]   Home Safety Training 
[x]               Patient Education                 [x]        Family Training/Education []               Other (comment): Frequency/Duration: Patient will be followed by occupational therapy 3 times a week to address goals. Discharge Recommendations: [facility] with 24/7 assist or to Healthcare at [facility] Further Equipment Recommendations for Discharge: Has all needed DME SUBJECTIVE:  
Patient stated You're hands are so warm, it's nice.  OBJECTIVE DATA SUMMARY:  
HISTORY:  
Past Medical History:  
Diagnosis Date  (HFpEF) heart failure with preserved ejection fraction ([facility] [place] [address] 10/17/2018  
 acute  Allergic rhinitis  Atherosclerotic cardiovascular disease 1999  
 CHF (congestive heart failure) ([facility] [place] [address] 09/11/2018  Cognitive communication deficit  Edema  Environmental allergies   
 dizziness-(chronic)  Fatty liver disease, nonalcoholic  GERD (gastroesophageal reflux disease)  H/O heart artery stent 09/2018  HCVD (hypertensive cardiovascular disease) 10/17/2018  Heart attack ([facility] [address] 09/2018  Heart palpitations 2018 infrequent  History of PTCA  Hypercholesterolemia 1999  Hypertension 2010  Liver disease   
 fatty liver  MI (myocardial infarction) ([facility] [address] 09/2018  Overactive bladder 08/09/2018  Peripheral neuropathy  Permanent atrial fibrillation ([facility] [address] 11/05/2018  Senile dementia, without behavioral disturbance 11/05/2018  Stress incontinence, female 08/09/2018  Vertigo  Vitamin D deficiency Past Surgical History:  
Procedure Laterality Date  BREAST SURGERY PROCEDURE UNLISTED    
 breast cyst Lynn Denise)  CARDIAC SURG PROCEDURE UNLIST  8/1999  
 + stress thalassemia; neg. cath., (4/02) neg. Holter  HX APPENDECTOMY  HX BREAST BIOPSY Left 1998  
 neg  HX CYST INCISION AND DRAINAGE Right years ago  
 neg  HX DILATION AND CURETTAGE    
 x5  
 HX GYN    
 D&C (x5), ALELY/BSO (12/01-Juliann/[facility]), Provera intolerance (bleeding), endometrial Bx annually  HX PTCA  09/11/2018  
 stent distal RCA into PLwith KENNETH x 2  
 HX ALLEY AND BSO  12/01 Juliann/[facility] Prior Level of Function/Environment/Context: Patient recently transferring to [facility] and has all needed DME. Patient mod I/supervision for ADLs PTA. Has a son who is involved in her care. Home Situation Home Environment: Assisted living Care Facility Name: 0 One/Two Story Residence: One story Living Alone: No 
Support Systems: Child(douglas), Family member(s), Assisted living Patient Expects to be Discharged to[de-identified] Assisted living Current DME Used/Available at Home: Grab bars, Shower chair, Walker, rolling Tub or Shower Type: Shower Hand dominance: RightEXAMINATION OF PERFORMANCE DEFICITS: 
Cognitive/Behavioral Status: 
Neurologic State: Alert Orientation Level: Oriented to person Cognition: Appropriate for age attention/concentration; Follows commands;Poor safety awareness Perception: Appears intact Perseveration: No perseveration noted Safety/Judgement: Awareness of environment; Fall prevention Skin: Appears grossly intact Edema: none noted in BUEs Hearing: Auditory Auditory Impairment: Hard of hearing, bilateral 
Vision/Perceptual:   
Tracking: Able to track stimulus in all quadrants w/o difficulty Acuity: Impaired near vision; Impaired far vision Corrective Lenses: Glasses Range of Motion: In [address] [place] [address] [address] AROM: Within functional limits Strength: In [address] [place] [address] Road Strength: Within functional limits Coordination: 
Coordination: Within functional limits Fine Motor Skills-Upper: Left Intact; Right Intact Gross Motor Skills-Upper: Left Intact; Right Intact Tone & Sensation: In [address] [place] [address] [address] [facility] Tone: Normal 
Sensation: Intact Balance: 
Sitting: Intact Standing: Impaired; With support Standing - Static: Good Standing - Dynamic : Fair Functional Mobility and Transfers for ADLs:Bed Mobility: 
Supine to Sit: Minimum assistance;Assist x1 Scooting: Minimum assistance Transfers: 
Sit to Stand: Contact guard assistance;Minimum assistance;Assist x1 Stand to Sit: Contact guard assistance;Minimum assistance;Assist x1 Toilet Transfer : Minimum assistance;Assist x1 ADL Assessment: 
Feeding: Setup* Oral Facial Hygiene/Grooming: Setup* Bathing: Stand-by assistance* Upper Body Dressing: Setup* Lower Body Dressing: Minimum assistance* Toileting: Minimum assistance *Infer per obs of functional mobility, functional reach, BUE ROM, strength, balance and cognition ADL Intervention and task modifications: Toileting Toileting Assistance: Minimum assistance Clothing Management: Maximum assistance(managing hospital brief) Cues: Physical assistance for pants up;Verbal cues provided Adaptive Equipment: Grab bars Cognitive Retraining Safety/Judgement: Awareness of environment; Fall prevention Functional Measure: 
Barthel Index: Bathin Bladder: 5 Bowels: 10 
Groomin Dressing: 10 Feeding: 10 Mobility: 10 Stairs: 0 Toilet Use: 10 Transfer (Bed to Chair and Back): 10 Total: 75 Barthel and G-code impairment scale: 
Percentage of impairment CH 
0% CI 
1-19% CJ 
20-39% CK 
40-59% CL 
60-79% CM 
80-99% CN 
100% Barthel Score 0-100 100 99-80 79-60 59-40 20-39 1-19 
 0 Barthel Score 0-20 20 17-19 13-16 9-12 5-8 1-4 0 The Barthel ADL Index: Guidelines 1. The index should be used as a record of what a patient does, not as a record of what a patient could do. 2. The main aim is to establish degree of independence from any help, physical or verbal, however minor and for whatever reason. 3. The need for supervision renders the patient not independent. 4. A patient's performance should be established using the best available evidence. Asking the patient, friends/relatives and nurses are the usual sources, but direct observation and common sense are also important. However direct testing is not needed. 5. Usually the patient's performance over the preceding 24-48 hours is important, but occasionally longer periods will be relevant. 6. Middle categories imply that the patient supplies over 50 per cent of the effort. 7. Use of aids to be independent is allowed. Arely Dwyer., Barthel, DGuanakitoW. ([id number]). Functional evaluation: the Barthel Index. [facility] (14)2. HAJA Saba, Nat Bartholomew.Moustapha., [facility], [address] [place] [address] (). Measuring the change indisability after inpatient rehabilitation; comparison of the responsiveness of the Barthel Index and Functional [place] Measure. Journal of Neurology, Neurosurgery, and Psychiatry, 66(4), 428-939. Benji Aggarwal, N.J.A, CORIN Ledesma, & Willie Santiago MFRANCOISE. (2004.) Assessment of post-stroke quality of life in cost-effectiveness studies: The usefulness of the Barthel Index and the EuroQoL-5D. [facility], 13, 374-46 G codes: In compliance with CMSs Claims Based Outcome Reporting, the following G-code set was chosen for this patient based on their primary functional limitation being treated: The outcome measure chosen to determine the severity of the functional limitation was the Barthel Index with a score of 75/100 which was correlated with the impairment scale. ? Self Care:  
  - CURRENT STATUS: CJ - 20%-39% impaired, limited or restricted  - GOAL STATUS: CI - 1%-19% impaired, limited or restricted  - D/C STATUS:  ---------------To be determined--------------- Occupational Therapy Evaluation Charge Determination History Examination Decision-Making LOW Complexity : Brief history review  LOW Complexity : 1-3 performance deficits relating to physical, cognitive , or psychosocial skils that result in activity limitations and / or participation restrictions  MEDIUM Complexity : Patient may present with comorbidities that affect occupational performnce. Miniml to moderate modification of tasks or assistance (eg, physical or verbal ) with assesment(s) is necessary to enable patient to complete evaluation Based on the above components, the patient evaluation is determined to be of the following complexity level: LOW Pain: 
Pain Scale 1: Numeric (0 - 10) Pain Intensity 1: 0 Activity Tolerance:  
Good, VSS on 2.5L O2 Please refer to the flowsheet for vital signs taken during this treatment. After treatment:  
[x] Patient left in no apparent distress sitting up in chair 
[] Patient left in no apparent distress in bed 
[x] Call bell left within reach [x] Nursing notified 
[] Caregiver present [x] Chair alarm activated COMMUNICATION/EDUCATION:  
The patients plan of care was discussed with: Physical Therapist, Registered Nurse and . [x] Home safety education was provided and the patient/caregiver indicated understanding. [x] Patient/family have participated as able in goal setting and plan of care. [] Patient/family agree to work toward stated goals and plan of care. [] Patient understands intent and goals of therapy, but is neutral about his/her participation. [] Patient is unable to participate in goal setting and plan of care. This patients plan of care is appropriate for delegation to JEN. Thank you for this referral. 
Ruben Lopez OT Time Calculation: 22 mins

## 2018-12-07 NOTE — NURSE NAVIGATOR
Chart reviewed by Heart Failure Nurse Navigator. Heart Failure database completed. EF:  pending ACEi/ARB/ARNi: echo pending BB: Coreg Aldosterone Antagonist: echo pending CRT not indicated. NYHA Functional Class IV Heart Failure Teach Back in Patient Education. Heart Failure Avoiding Triggers on Discharge Instructions. Cardiologist: NAYLA Post discharge follow up phone call to be made within 48-72 hours of discharge.

## 2018-12-07 NOTE — PROGRESS NOTES
[facility] Progress Note 12/7/2018 7:53 AM 
Admit Date: 12/6/2018 Admit Diagnosis: SOB (shortness of breath) Assessment/Plan 1. Severe MR - workup per valve team for possible MV clipping/repair 2. Large left pleural effusion on TTE, moderate on CXR - no thoracentesis yet due to anticoagulation, continue bumex 3. AI - workup per valve team 
4. CAD s/p Inferior STEMI 9/11/18 with PTCA/stents to distal RCA (KENNETH x 2) - will try to obtain cardiac cath report from [facility] 
-continue plavix, coreg, statin 
-ECG with inferior, anteroseptal TWI 5. Hypotension - hx of HTN, stopped metoprolol tartrate yesterday, continue other medications for now 6. Hx of HFpEF - NYHA Class IV on admission, NYHA Class III today, BP controlled, continue bumex 7. AF - rate controlled on coreg 6.25mg BID and diltiazem 180mg daily, holding Eliquis for possible thoracentesis 8. Dyslipidemia - LDL 56 on simvastatin 40mg daily 9. Hypokalemia - K 3.6, will give KCL 20meq PO once now, continue KCL 10meq daily 10. DNR status in place  
  
Echo 12/6/18 - (report not available), severe MR, LVEF 50%, AI Inferior STEMI 9/11/18 with PTCA/stents to distal RCA (KENNETH x 2) Echo 4/18 - LVEF 55 %, no WMA,mod dilated LA, MAC with mild MR, AV sclerosis with mod AI, mild TR, PASP 30mmHg, mild PI 
  
Soc Hx: no tobacco use x 10 years, no etoh use Fam Hx: mother passed at age 80 from heart problems Subjective:  
 
Kimberly Madrigal continues with dyspnea at rest.  Denies any chest pain or palpitations. No dizziness. Understands plan for diuresis and evaluation of valve disease. Objective:  
  
Physical Exam: 
Visit Vitals /51 (BP 1 Location: Right arm, BP Patient Position: At rest) Pulse 81 Temp 97.5 °F (36.4 °C) Resp 16 Wt 138 lb 12.8 oz (63 kg) LMP 01/01/2000 SpO2 94% BMI 26.23 kg/m² General Appearance:  Well developed, well nourished, alert and oriented x 3, in no acute distress. Ears/Nose/Mouth/Throat:   Hearing grossly normal. 
  
    Neck: Supple. Chest:   Bibasilar crackles Cardiovascular:  Irregular rate and rhythm, S1, S2 normal, no murmur. Abdomen:   Soft, non-tender, bowel sounds are active. Extremities: No edema bilaterally. Skin: Warm and dry. Telemetry: AFIB Data Review:  
Labs:   
Recent Results (from the past 24 hour(s)) EKG, 12 LEAD, INITIAL Collection Time: 12/06/18  1:57 PM  
Result Value Ref Range Ventricular Rate 84 BPM  
 Atrial Rate 107 BPM  
 QRS Duration 88 ms Q-T Interval 392 ms QTC Calculation (Bezet) 463 ms Calculated R Axis 63 degrees Calculated T Axis -32 degrees Diagnosis Atrial fibrillation ST & T wave abnormality, consider inferior ischemia ST & T wave abnormality, consider anterior ischemia When compared with ECG of 03-DEC-2018 10:16, No significant change was found Confirmed by Alen Rea M.D., [facility] ([id number]) on 12/7/2018 7:92:50 AM 
  
METABOLIC PANEL, BASIC Collection Time: 12/06/18  2:03 PM  
Result Value Ref Range Sodium 140 136 - 145 mmol/L Potassium 3.6 3.5 - 5.1 mmol/L Chloride 106 97 - 108 mmol/L  
 CO2 26 21 - 32 mmol/L Anion gap 8 5 - 15 mmol/L Glucose 119 (H) 65 - 100 mg/dL BUN 28 (H) 6 - 20 MG/DL Creatinine 1.12 (H) 0.55 - 1.02 MG/DL  
 BUN/Creatinine ratio 25 (H) 12 - 20 GFR est AA 55 (L) >60 ml/min/1.73m2 GFR est non-AA 46 (L) >60 ml/min/1.73m2 Calcium 9.2 8.5 - 10.1 MG/DL  
CBC WITH AUTOMATED DIFF Collection Time: 12/06/18  2:03 PM  
Result Value Ref Range WBC 8.3 3.6 - 11.0 K/uL  
 RBC 4.63 3.80 - 5.20 M/uL  
 HGB 13.1 11.5 - 16.0 g/dL HCT 43.3 35.0 - 47.0 % MCV 93.5 80.0 - 99.0 FL  
 MCH 28.3 26.0 - 34.0 PG  
 MCHC 30.3 30.0 - 36.5 g/dL  
 RDW 16.3 (H) 11.5 - 14.5 % PLATELET 716 525 - 690 K/uL MPV 10.1 8.9 - 12.9 FL  
 NRBC 0.0 0  WBC ABSOLUTE NRBC 0.00 0.00 - 0.01 K/uL NEUTROPHILS 67 32 - 75 % LYMPHOCYTES 19 12 - 49 % MONOCYTES 12 5 - 13 % EOSINOPHILS 2 0 - 7 % BASOPHILS 1 0 - 1 % IMMATURE GRANULOCYTES 0 0.0 - 0.5 % ABS. NEUTROPHILS 5.6 1.8 - 8.0 K/UL  
 ABS. LYMPHOCYTES 1.6 0.8 - 3.5 K/UL  
 ABS. MONOCYTES 1.0 0.0 - 1.0 K/UL  
 ABS. EOSINOPHILS 0.2 0.0 - 0.4 K/UL  
 ABS. BASOPHILS 0.0 0.0 - 0.1 K/UL  
 ABS. IMM. GRANS. 0.0 0.00 - 0.04 K/UL  
 DF AUTOMATED PROTHROMBIN TIME + INR Collection Time: 12/06/18  2:03 PM  
Result Value Ref Range INR 1.3 (H) 0.9 - 1.1 Prothrombin time 12.7 (H) 9.0 - 11.1 sec SAMPLES BEING HELD Collection Time: 12/06/18  2:03 PM  
Result Value Ref Range SAMPLES BEING HELD 1RED COMMENT Add-on orders for these samples will be processed based on acceptable specimen integrity and analyte stability, which may vary by analyte. NT-PRO BNP Collection Time: 12/06/18  2:03 PM  
Result Value Ref Range NT pro-BNP 7,752 (H) 0 - 450 PG/ML  
HEMOGLOBIN A1C WITH EAG Collection Time: 12/06/18  2:03 PM  
Result Value Ref Range Hemoglobin A1c 6.4 (H) 4.2 - 6.3 % Est. average glucose 137 mg/dL TSH 3RD GENERATION Collection Time: 12/06/18  2:03 PM  
Result Value Ref Range TSH 10.10 (H) 0.36 - 3.74 uIU/mL URINALYSIS W/MICROSCOPIC Collection Time: 12/06/18  2:53 PM  
Result Value Ref Range Color YELLOW/STRAW Appearance CLEAR CLEAR Specific gravity 1.011 1.003 - 1.030    
 pH (UA) 5.5 5.0 - 8.0 Protein NEGATIVE  NEG mg/dL Glucose NEGATIVE  NEG mg/dL Ketone NEGATIVE  NEG mg/dL Bilirubin NEGATIVE  NEG Blood NEGATIVE  NEG Urobilinogen 0.2 0.2 - 1.0 EU/dL Nitrites NEGATIVE  NEG Leukocyte Esterase NEGATIVE  NEG    
 WBC 0-4 0 - 4 /hpf  
 RBC 0-5 0 - 5 /hpf Epithelial cells FEW FEW /lpf Bacteria NEGATIVE  NEG /hpf Hyaline cast 2-5 0 - 5 /lpf URINE CULTURE HOLD SAMPLE Collection Time: 12/06/18  2:53 PM  
Result Value Ref Range Urine culture hold URINE ON HOLD IN MICROBIOLOGY DEPT FOR 3 DAYS. IF UNPRESERVED URINE IS SUBMITTED, IT CANNOT BE USED FOR ADDITIONAL TESTING AFTER 24 HRS, RECOLLECTION WILL BE REQUIRED. TYPE & SCREEN Collection Time: 12/06/18  4:38 PM  
Result Value Ref Range Crossmatch Expiration 12/09/2018 ABO/Rh(D) O POSITIVE Antibody screen NEG   
POC G3 - PUL Collection Time: 12/06/18  5:48 PM  
Result Value Ref Range pH (POC) 7.422 7.35 - 7.45    
 pCO2 (POC) 38.1 35.0 - 45.0 MMHG  
 pO2 (POC) 50 (L) 80 - 100 MMHG  
 HCO3 (POC) 24.8 22 - 26 MMOL/L  
 sO2 (POC) 86 (L) 92 - 97 % Base excess (POC) 0 mmol/L Site RIGHT RADIAL Device: ROOM AIR Allens test (POC) YES Specimen type (POC) ARTERIAL Total resp. rate 18 METABOLIC PANEL, COMPREHENSIVE Collection Time: 12/07/18  2:46 AM  
Result Value Ref Range Sodium 141 136 - 145 mmol/L Potassium 3.6 3.5 - 5.1 mmol/L Chloride 107 97 - 108 mmol/L  
 CO2 25 21 - 32 mmol/L Anion gap 9 5 - 15 mmol/L Glucose 115 (H) 65 - 100 mg/dL BUN 30 (H) 6 - 20 MG/DL Creatinine 1.06 (H) 0.55 - 1.02 MG/DL  
 BUN/Creatinine ratio 28 (H) 12 - 20 GFR est AA 59 (L) >60 ml/min/1.73m2 GFR est non-AA 49 (L) >60 ml/min/1.73m2 Calcium 8.7 8.5 - 10.1 MG/DL Bilirubin, total 0.4 0.2 - 1.0 MG/DL  
 ALT (SGPT) 19 12 - 78 U/L  
 AST (SGOT) 23 15 - 37 U/L Alk. phosphatase 67 45 - 117 U/L Protein, total 6.9 6.4 - 8.2 g/dL Albumin 3.0 (L) 3.5 - 5.0 g/dL Globulin 3.9 2.0 - 4.0 g/dL A-G Ratio 0.8 (L) 1.1 - 2.2    
CBC WITH AUTOMATED DIFF Collection Time: 12/07/18  2:46 AM  
Result Value Ref Range WBC 7.9 3.6 - 11.0 K/uL  
 RBC 4.08 3.80 - 5.20 M/uL  
 HGB 11.4 (L) 11.5 - 16.0 g/dL HCT 38.1 35.0 - 47.0 % MCV 93.4 80.0 - 99.0 FL  
 MCH 27.9 26.0 - 34.0 PG  
 MCHC 29.9 (L) 30.0 - 36.5 g/dL  
 RDW 16.3 (H) 11.5 - 14.5 % PLATELET 110 454 - 830 K/uL MPV 10.2 8.9 - 12.9 FL  
 NRBC 0.0 0  WBC ABSOLUTE NRBC 0.00 0.00 - 0.01 K/uL NEUTROPHILS 61 32 - 75 % LYMPHOCYTES 24 12 - 49 % MONOCYTES 11 5 - 13 % EOSINOPHILS 3 0 - 7 % BASOPHILS 1 0 - 1 % IMMATURE GRANULOCYTES 0 0.0 - 0.5 % ABS. NEUTROPHILS 4.8 1.8 - 8.0 K/UL  
 ABS. LYMPHOCYTES 1.9 0.8 - 3.5 K/UL  
 ABS. MONOCYTES 0.9 0.0 - 1.0 K/UL  
 ABS. EOSINOPHILS 0.3 0.0 - 0.4 K/UL  
 ABS. BASOPHILS 0.1 0.0 - 0.1 K/UL  
 ABS. IMM. GRANS. 0.0 0.00 - 0.04 K/UL  
 DF AUTOMATED    
PTT Collection Time: 12/07/18  2:46 AM  
Result Value Ref Range aPTT 29.4 22.1 - 32.0 sec  
 aPTT, therapeutic range     58.0 - 77.0 SECS  
PROTHROMBIN TIME + INR Collection Time: 12/07/18  2:46 AM  
Result Value Ref Range INR 1.3 (H) 0.9 - 1.1 Prothrombin time 12.7 (H) 9.0 - 11.1 sec MAGNESIUM Collection Time: 12/07/18  2:46 AM  
Result Value Ref Range Magnesium 2.0 1.6 - 2.4 mg/dL NT-PRO BNP Collection Time: 12/07/18  2:46 AM  
Result Value Ref Range NT pro-BNP 6,252 (H) 0 - 450 PG/ML Current Facility-Administered Medications Medication Dose Route Frequency  potassium chloride SR (KLOR-CON 10) tablet 20 mEq  20 mEq Oral NOW  sodium chloride (NS) flush 5-10 mL  5-10 mL IntraVENous Q8H  
 sodium chloride (NS) flush 5-10 mL  5-10 mL IntraVENous PRN  
 sodium phosphate (FLEET'S) enema 1 Enema  1 Enema Rectal PRN  
 budesonide (PULMICORT) 250 mcg/2ml nebulizer susp  250 mcg Nebulization BID  carvedilol (COREG) tablet 6.25 mg  6.25 mg Oral BID  levothyroxine (SYNTHROID) tablet 25 mcg  25 mcg Oral ACB  nitroglycerin (NITROSTAT) tablet 0.4 mg  0.4 mg SubLINGual PRN  
 PARoxetine (PAXIL) tablet 20 mg  20 mg Oral DAILY  simvastatin (ZOCOR) tablet 40 mg  40 mg Oral QHS  bumetanide (BUMEX) tablet 1 mg  1 mg Oral PCL  bumetanide (BUMEX) tablet 2 mg  2 mg Oral 7am  
 clopidogrel (PLAVIX) tablet 75 mg  75 mg Oral DAILY  dilTIAZem CD (CARDIZEM CD) capsule 180 mg  180 mg Oral QHS  potassium chloride SR (KLOR-CON 10) tablet 10 mEq  10 mEq Oral DAILY Rika Chou NP [facility] [address] [place] [address], [address] 
[phone]

## 2018-12-07 NOTE — PROGRESS NOTES
Problem: Mobility Impaired (Adult and Pediatric) Goal: *Acute Goals and Plan of Care (Insert Text) Physical Therapy Goals Initiated 12/7/2018 1. Patient will move from supine to sit and sit to supine , scoot up and down and roll side to side in bed with modified independence within 7 day(s). 2.  Patient will transfer from bed to chair and chair to bed with modified independence using the least restrictive device within 7 day(s). 3.  Patient will perform sit to stand with modified independence within 7 day(s). 4.  Patient will ambulate with modified independence for 100 feet with the least restrictive device within 7 day(s). physical Therapy EVALUATION Patient: Rishabh Merritt (1 y.o. female) Date: 12/7/2018 Primary Diagnosis: SOB (shortness of breath) Precautions:   Fall ASSESSMENT : 
Based on the objective data described below, the patient presents with decreased independence with mobility limited by impaired memory, impaired safety awareness, decreased activity tolerance, impaired balance, and generalized weakness. Patient with poor compliance using RW and poor control when mobilizing this date. Overall supervision/min A for all mobility tasks with RW. Patient would benefit from returning to [facility] with 24/7 assist and if unable to have 24/7 assist, to healthcare at [facility]. Patient will benefit from skilled intervention to address the above impairments. Patients rehabilitation potential is considered to be Fair Factors which may influence rehabilitation potential include:  
[]         None noted 
[x]         Mental ability/status []         Medical condition 
[]         Home/family situation and support systems 
[x]         Safety awareness 
[]         Pain tolerance/management 
[]         Other: PLAN : 
Recommendations and Planned Interventions: 
[x]           Bed Mobility Training             []    Neuromuscular Re-Education [x]           Transfer Training                   []    Orthotic/Prosthetic Training 
[x]           Gait Training                         []    Modalities [x]           Therapeutic Exercises           []    Edema Management/Control 
[x]           Therapeutic Activities            [x]    Patient and Family Training/Education 
[]           Other (comment): Frequency/Duration: Patient will be followed by physical therapy  3 times a week to address goals. Discharge Recommendations: KYLER Further Equipment Recommendations for Discharge: rolling walker SUBJECTIVE:  
Patient stated I dont use the walker .  OBJECTIVE DATA SUMMARY:  
HISTORY:   
Past Medical History:  
Diagnosis Date  (HFpEF) heart failure with preserved ejection fraction ([address] 10/17/2018  
 acute  Allergic rhinitis  Atherosclerotic cardiovascular disease 1999  
 CHF (congestive heart failure) ([address] 09/11/2018  Cognitive communication deficit  Edema  Environmental allergies   
 dizziness-(chronic)  Fatty liver disease, nonalcoholic  GERD (gastroesophageal reflux disease)  H/O heart artery stent 09/2018  HCVD (hypertensive cardiovascular disease) 10/17/2018  Heart attack ([address] 09/2018  Heart palpitations 2018 infrequent  History of PTCA  Hypercholesterolemia 1999  Hypertension 2010  Liver disease   
 fatty liver  MI (myocardial infarction) ([address] 09/2018  Overactive bladder 08/09/2018  Peripheral neuropathy  Permanent atrial fibrillation ([address] 11/05/2018  Senile dementia, without behavioral disturbance 11/05/2018  Stress incontinence, female 08/09/2018  Vertigo  Vitamin D deficiency Past Surgical History:  
Procedure Laterality Date  BREAST SURGERY PROCEDURE UNLISTED    
 breast cyst Morena Pelaez)  CARDIAC SURG PROCEDURE UNLIST  8/1999  
 + stress thalassemia; neg. cath., (4/02) neg. Holter  HX APPENDECTOMY  HX BREAST BIOPSY Left 1998  
 neg  HX CYST INCISION AND DRAINAGE Right years ago  
 neg  HX DILATION AND CURETTAGE    
 x5  
 HX GYN    
 D&C (x5), ALLEY/BSO (12/01-Juliann/Zedler), Provera intolerance (bleeding), endometrial Bx annually  HX PTCA  09/11/2018  
 stent distal RCA into PLwith KENNETH x 2  
 HX ALLEY AND BSO  12/01 Juliann/Zedler Prior Level of Function/Home Situation: mod I Personal factors and/or comorbidities impacting plan of care: Cognition Home Situation Home Environment: Assisted living Care Facility Name: 0 One/Two Story Residence: One story Living Alone: No 
Support Systems: Child(douglas), Family member(s), Assisted living Patient Expects to be Discharged to[de-identified] Assisted living Current DME Used/Available at Home: Grab bars, Shower chair, Walker, rolling Tub or Shower Type: Shower EXAMINATION/PRESENTATION/DECISION MAKING: Critical Behavior: 
Neurologic State: Alert Orientation Level: Oriented to person Cognition: Appropriate for age attention/concentration, Follows commands, Poor safety awareness Safety/Judgement: Awareness of environment, Fall prevention Hearing: Auditory Auditory Impairment: Hard of hearing, bilateral 
Range Of Motion: 
AROM: Within functional limits Strength:   
Strength: Within functional limits Tone & Sensation:  
Tone: Normal 
  
  
  
  
Sensation: Intact Coordination: 
Coordination: Within functional limits Vision:  
Tracking: Able to track stimulus in all quadrants w/o difficulty Acuity: Impaired near vision; Impaired far vision Corrective Lenses: Glasses Functional Mobility: 
Bed Mobility: 
  
Supine to Sit: Minimum assistance;Assist x1 Scooting: Minimum assistance Transfers: 
Sit to Stand: Contact guard assistance;Minimum assistance;Assist x1 Stand to Sit: Contact guard assistance;Minimum assistance;Assist x1 Balance:  
Sitting: Intact Standing: Impaired; With support Standing - Static: Good Standing - Dynamic : FairAmbulation/Gait Training:Distance (ft): 40 Feet (ft) Assistive Device: Gait belt;Walker, rolling Ambulation - Level of Assistance: Stand-by assistance Gait Abnormalities: Decreased step clearance; Path deviations Base of Support: Narrowed Speed/Татьяна: Slow Step Length: Right shortened;Left shortened Functional Measure: 
Tinetti test: 
 
Sitting Balance: 1 Arises: 1 Attempts to Rise: 1 Immediate Standing Balance: 1 Standing Balance: 1 Nudged: 1 Eyes Closed: 1 Turn 360 Degrees - Continuous/Discontinuous: 1 Turn 360 Degrees - Steady/Unsteady: 1 Sitting Down: 1 Balance Score: 10 Indication of Gait: 1 
R Step Length/Height: 1 L Step Length/Height: 1 
R Foot Clearance: 1 L Foot Clearance: 1 Step Symmetry: 1 Step Continuity: 1 Path: 1 Trunk: 0 Walking Time: 1 Gait Score: 9 Total Score: 19 Tinetti Test and G-code impairment scale: 
Percentage of Impairment CH 
 
0% 
 CI 
 
1-19% CJ 
 
20-39% CK 
 
40-59% CL 
 
60-79% CM 
 
80-99% CN  
 
100% Tinetti Score 0-28 28 23-27 17-22 12-16 6-11 1-5 0 Tinetti Tool Score Risk of Falls 
<19 = High Fall Risk 19-24 = Moderate Fall Risk 25-28 = Low Fall Risk Tinetti ME. Performance-Oriented Assessment of Mobility Problems in Elderly Patients. Knapp 66; [id number]. (Scoring Description: PT Bulletin Feb. 10, 1993) Older adults: Elan Spear et al, 2009; n = 1601 [facility] elderly evaluated with ABC, SHYAM, ADL, and IADL) · Mean SHYAM score for males aged 69-68 years = 26.21(3.40) · Mean SHYAM score for females age 69-68 years = 25.16(4.30) · Mean SHYAM score for males over 80 years = 23.29(6.02) · Mean SHYAM score for females over 80 years = 17.20(8.32) G codes: In compliance with CMSs Claims Based Outcome Reporting, the following G-code set was chosen for this patient based on their primary functional limitation being treated: The outcome measure chosen to determine the severity of the functional limitation was the Tinetti with a score of 19/28 which was correlated with the impairment scale. ? Mobility - Walking and Moving Around:  
  - CURRENT STATUS: CJ - 20%-39% impaired, limited or restricted  - GOAL STATUS: CI - 1%-19% impaired, limited or restricted  - D/C STATUS:  ---------------To be determined--------------- Physical Therapy Evaluation Charge Determination History Examination Presentation Decision-Making HIGH Complexity :3+ comorbidities / personal factors will impact the outcome/ POC  MEDIUM Complexity : 3 Standardized tests and measures addressing body structure, function, activity limitation and / or participation in recreation  LOW Complexity : Stable, uncomplicated  MEDIUM Complexity : FOTO score of 26-74 Based on the above components, the patient evaluation is determined to be of the following complexity level: LOW Pain: 
Pain Scale 1: Numeric (0 - 10) Pain Intensity 1: 0 Activity Tolerance: VSS Please refer to the flowsheet for vital signs taken during this treatment. After treatment:  
[x]         Patient left in no apparent distress sitting up in chair 
[]         Patient left in no apparent distress in bed 
[x]         Call bell left within reach [x]         Nursing notified 
[x]         Caregiver present [x]         Bed alarm activated COMMUNICATION/EDUCATION:  
The patients plan of care was discussed with: Occupational Therapist and Registered Nurse. [x]         Fall prevention education was provided and the patient/caregiver indicated understanding. [x]         Patient/family have participated as able in goal setting and plan of care. [x]         Patient/family agree to work toward stated goals and plan of care. []         Patient understands intent and goals of therapy, but is neutral about his/her participation. []         Patient is unable to participate in goal setting and plan of care. Thank you for this referral. 
Swathi Valdez, PT , DPT Time Calculation: 25 mins

## 2018-12-07 NOTE — ROUTINE PROCESS
TRANSFER - OUT REPORT: 
 
Verbal report given to ELMA Herrera(name) on [facility]  being transferred to 6S(unit) for routine progression of care Report consisted of patients Situation, Background, Assessment and  
Recommendations(SBAR). Information from the following report(s) SBAR, ED Summary, [facility] and Recent Results was reviewed with the receiving nurse. Lines:  
Peripheral IV 12/06/18 Left Antecubital (Active) Site Assessment Clean, dry, & intact 12/6/2018  2:02 PM  
Phlebitis Assessment 0 12/6/2018  2:02 PM  
Infiltration Assessment 0 12/6/2018  2:02 PM  
Dressing Status Clean, dry, & intact 12/6/2018  2:02 PM  
Dressing Type Transparent 12/6/2018  2:02 PM  
Hub Color/Line Status Pink;Flushed;Patent 12/6/2018  2:02 PM  
Action Taken Blood drawn 12/6/2018  2:02 PM  
  
 
Opportunity for questions and clarification was provided. Patient transported with: 
 Monitor O2 @ 2 liters Registered Nurse Tech

## 2018-12-07 NOTE — PROGRESS NOTES
Hospitalist Progress Note Daniela Rivero MD 
Answering service: [phone] OR [phone] from in house phone Date of Service:  2018 NAME:  Hong Harp :  1927 MRN:  [id number] Admission Summary:  
79 yo woman with dementia, hypothyroidism, CAD s/p Wilber x2, PAF on apixaban, hepatic steatosis, DLD, allergic rhinitis, HTN, GERD, HFpEF was sent to the ED from Cardiology office on 18 with bilateral pleural effusions and mild DURAN. She was admitted with bilateral L>R pleural effusions. Interval history / Subjective: S/p left thoracentesis and still on O2 NC but feels better; family at bedside Assessment & Plan:  
 
Bilateral L>R pleural effusions (POA) - s/p L thoracentesis by IR  with 900ml fluid out 
- serum LDH unavailable but fluid seems transudative - check pleural fluid Cx 
- pulmonary toilet Acute hypoxic respiratory failure (POA) - likely due to above 
- wean supplemental O2 as tolerated but may need home O2 eval 
 
HFpEF, NYHA Class 4 - TTE pending 
- continue carvedilol, bumetanide - Cardiology following 
- PT/OT evals Aortic insufficiency - CTS consulted - preop workup pending CAD s/p KENNETH x2 - no c/o CP 
- continue BB, statin, Plavix PAF - rate controlled on carvedilol; digoxin on hold 
- continue apixaban HTN - controlled on current meds Hypothyroidism - continue levothyroxine Dementia Code status: DNR 
DVT prophylaxis: apixaban Care Plan discussed with: Patient/Family, Nurse and  Disposition: TBD Hospital Problems  Date Reviewed: 2018 Codes Class Noted POA  
 AI (aortic insufficiency) ICD-10-CM: I35.1 ICD-9-CM: 424.1  2018 Unknown * (Principal) Bilateral pleural effusion ICD-10-CM: J90 ICD-9-CM: 511.9  2018 Yes  
   
 SOB (shortness of breath) ICD-10-CM: R06.02 
ICD-9-CM: 786.05  2018 Unknown Review of Systems:  
Pertinent items are noted in HPI. Vital Signs:  
 Last 24hrs VS reviewed since prior progress note. Most recent are: 
Visit Vitals BP 93/53 (BP 1 Location: Right arm, BP Patient Position: At rest) Pulse 98 Temp 97.5 °F (36.4 °C) Resp 22 Wt 63 kg (138 lb 12.8 oz) SpO2 94% BMI 26.23 kg/m² No intake or output data in the 24 hours ending 12/07/18 1334 Physical Examination:  
 
 
     
Constitutional:  awake, NAD, NC in place ENT:  oral mucosa moist, oropharynx benign Resp:  diminished BS L base CV:  regular rhythm, normal rate, + murmur, no peripheral edema GI:  +BS, soft, non distended, non tender Musculoskeletal:  HUERTA Neurologic:  awake, responds mostly appropriately to questions and commands Data Review:  
 Review and/or order of clinical lab test 
Review and/or order of tests in the radiology section of CPT Review and/or order of tests in the medicine section of CPT Labs:  
 
Recent Labs 12/07/18 
0246 12/06/18 
1403 WBC 7.9 8.3 HGB 11.4* 13.1 HCT 38.1 43.3  303 Recent Labs 12/07/18 
0246 12/06/18 
1403 12/05/18 
1150  140 144  
K 3.6 3.6 3.7  106 104 CO2 25 26 24 BUN 30* 28* 22  
CREA 1.06* 1.12* 1.10* * 119* 154* CA 8.7 9.2 9.1 MG 2.0  --   --   
 
Recent Labs 12/07/18 
0246 12/05/18 
1150 SGOT 23 26 ALT 19 14 AP 67 64 TBILI 0.4 0.6 TP 6.9 6.8 ALB 3.0* 3.8 GLOB 3.9  --   
 
Recent Labs 12/07/18 
0246 12/06/18 
1403 12/05/18 
1150 INR 1.3* 1.3* 1.3* PTP 12.7* 12.7* 13.4* APTT 29.4  --   -- No results for input(s): FE, TIBC, PSAT, FERR in the last 72 hours. Lab Results Component Value Date/Time Folate 11.7 11/28/2018 01:39 PM  
  
No results for input(s): PH, PCO2, PO2 in the last 72 hours. No results for input(s): CPK, CKNDX, TROIQ in the last 72 hours. No lab exists for component: CPKMB Lab Results Component Value Date/Time Cholesterol, total 118 11/28/2018 01:39 PM  
 HDL Cholesterol 42 11/28/2018 01:39 PM  
 LDL, calculated 56 11/28/2018 01:39 PM  
 Triglyceride 98 11/28/2018 01:39 PM  
 CHOL/HDL Ratio 3.8 12/17/2009 08:35 AM  
 
No results found for: Tacho Hardenjosh Lab Results Component Value Date/Time Color YELLOW/STRAW 12/06/2018 02:53 PM  
 Appearance CLEAR 12/06/2018 02:53 PM  
 Specific gravity 1.011 12/06/2018 02:53 PM  
 pH (UA) 5.5 12/06/2018 02:53 PM  
 Protein NEGATIVE  12/06/2018 02:53 PM  
 Glucose NEGATIVE  12/06/2018 02:53 PM  
 Ketone NEGATIVE  12/06/2018 02:53 PM  
 Bilirubin NEGATIVE  12/06/2018 02:53 PM  
 Urobilinogen 0.2 12/06/2018 02:53 PM  
 Nitrites NEGATIVE  12/06/2018 02:53 PM  
 Leukocyte Esterase NEGATIVE  12/06/2018 02:53 PM  
 Epithelial cells FEW 12/06/2018 02:53 PM  
 Bacteria NEGATIVE  12/06/2018 02:53 PM  
 WBC 0-4 12/06/2018 02:53 PM  
 RBC 0-5 12/06/2018 02:53 PM  
 
 
 
Medications Reviewed:  
 
Current Facility-Administered Medications Medication Dose Route Frequency  bumetanide (BUMEX) injection 1 mg  1 mg IntraVENous BID  predniSONE (DELTASONE) tablet 50 mg  50 mg Oral ONCE  
 [START ON 12/8/2018] predniSONE (DELTASONE) tablet 50 mg  50 mg Oral ONCE  
 [START ON 12/8/2018] diphenhydrAMINE (BENADRYL) capsule 50 mg  50 mg Oral ONCE  
 sodium chloride (NS) flush 5-10 mL  5-10 mL IntraVENous Q8H  
 sodium chloride (NS) flush 5-10 mL  5-10 mL IntraVENous PRN  
 sodium phosphate (FLEET'S) enema 1 Enema  1 Enema Rectal PRN  
 budesonide (PULMICORT) 250 mcg/2ml nebulizer susp  250 mcg Nebulization BID  carvedilol (COREG) tablet 6.25 mg  6.25 mg Oral BID  levothyroxine (SYNTHROID) tablet 25 mcg  25 mcg Oral ACB  nitroglycerin (NITROSTAT) tablet 0.4 mg  0.4 mg SubLINGual PRN  
 PARoxetine (PAXIL) tablet 20 mg  20 mg Oral DAILY  simvastatin (ZOCOR) tablet 40 mg  40 mg Oral QHS  clopidogrel (PLAVIX) tablet 75 mg  75 mg Oral DAILY  dilTIAZem CD (CARDIZEM CD) capsule 180 mg  180 mg Oral QHS  potassium chloride SR (KLOR-CON 10) tablet 10 mEq  10 mEq Oral DAILY  
 
______________________________________________________________________ EXPECTED LENGTH OF STAY: 2d 9h 
ACTUAL LENGTH OF STAY:          1 Skinny Flores MD

## 2018-12-07 NOTE — H&P
[address] HISTORY AND PHYSICAL Name:Cande BUCIO 
MR#: [id number] : 1927 ACCOUNT #: [de-identified] ADMIT DATE: 2018 CHIEF COMPLAINT:  The patient was sent from from cardiology office. HISTORY OF PRESENT ILLNESS:  The patient is a 80-year-old  female with past medical history of dementia, coronary artery disease, fatty liver, dyslipidemia, gastroesophageal reflux disease, atherosclerosis, myocardial infarction, congestive heart failure with ejection fraction of 55%. She tells me that she was sent from cardiologist's office on account of bilateral pleural effusion. She was following up with Cardiology after hospital admission for myocardial infarction in 2017. In cardiology office, she had a chest x-ray done which showed bilateral pleural effusion, so based on this, she was sent to the ER for evaluation. She denies shortness of breath at rest, but she reports some shortness of breath on mild exertion. No lower extremity swelling. Minimal orthopnea, but no paroxysmal nocturnal dyspnea. She has nonproductive cough, but no fevers, chills or rigors. No nausea, vomiting or abdominal pain. PAST MEDICAL HISTORY: 
1. History of heart failure with preserved ejection fraction. 2.  Allergic rhinitis. 3.  Edema. 4.  Nonalcoholic fatty liver disease. 5.  Gastroesophageal reflux disease. 6.  Hypertensive cardiovascular disease. 7.  Dyslipidemia. 8.  Hypertension. 9.  History of coronary artery disease and status post stents. 10.  History of permanent atrial fibrillation. 11.  Dementia. PAST SURGICAL HISTORY:   
1. She is status post a breast surgery procedure due to breast cyst. 
2. Status post appendectomy. 3. Status post cyst incision and drainage. 4. Status post cardiac catheterization. 5. Status post total abdominal hysterectomy and bilateral salpingo-oophorectomy. HOME MEDICATIONS:  Includes DuoNeb 3 mL via nebulization every 4 hours as needed for shortness of breath, Eliquis 2.5 mg 2 times daily with Pulmicort 2 mL via nebulization 2 times a day, Bumex 1 mg by mouth daily,  Bumex 2 mg daily every morning, calcium, vitamin D, Coreg 6.25 mg 3 times a day, Plavix 75 mg by mouth daily,  Cardizem (diltiazem)  mg by mouth nightly, guaifenesin 600 mg by mouth 2 times a day, Synthroid 25 mg by mouth daily, metoprolol tartrate 12.5 mg by mouth 2 times a day, moxifloxacin 400 mg by mouth daily and Protonix 40 mg by mouth daily, paroxetine 20 mg by mouth daily, MiraLax 17 grams by mouth daily, potassium chloride 10 mEq daily and Senna 8.6 mg by mouth nightly and simvastatin 40 mg by mouth nightly sucralfate 10 mL every 4 hours daily. ALLERGIES:  SHE HAS MULTIPLE DRUG ALLERGIES INCLUDING CELECOXIB, SULFONAMIDE ANTIBIOTICS, ATORVASTATIN, CODEINE, DARVOCET, IODINE, STATINS, HMG, COA REDUCTASE INHIBITOR, ARICEPT. FAMILY HISTORY:  She has no pertinent family history to report. SOCIAL HISTORY:  She lives at [facility] independent living facility, but has per son sheet. She tells me that she is being moved to the assisted living facility portion of [facility]. She has 2 children. Mr. Calvin Hernandez is the surrogate. He is the surrogate decision maker. PATIENT IS A DO NOT RESUSCITATE. REVIEW OF SYSTEMS:  A 10-point review of system was reviewed including head, eyes, mouth, respiratory, cardiovascular, GI, genitourinary, musculoskeletal, psychiatric, neurologic and this was found to be negative apart from what was stated in the body of the history and physical. 
 
PHYSICAL EXAMINATION: 
VITAL SIGNS:  Temperature of 97.9, pulse 85 beats per minute, blood pressure 117/61, respirations 16, oxygen saturation 94% on 2 liters of oxygen via nasal cannula. GENERAL APPEARANCE:  She is lying on the bed. She looks her stated age. She is very pleasant. HEENT:  Atraumatic, normocephalic. Pupils are equal, round and reactive to light and accommodation. Extraocular muscles are intact. Oral mucosa is moist. 
NECK:  Supple. No jugular venous distention. LUNGS:  Clear to auscultation with reduced breath sounds at the bases. HEART:  S1 and S2 heard. Regular rate and rhythm. No gallops or murmurs. ABDOMEN:  Soft, nontender, nondistended. No hepatosplenomegaly. No CVA tenderness. EXTREMITIES:  No pitting pedal edema, +2 dorsalis lipids pulses. NEUROLOGIC:  She is alert, awake and oriented x3. No focal neurologic deficit. LABORATORY DATA:  WBC is 8.3, hemoglobin 13.1, platelets 979. Chemistry:  Sodium of 140, potassium 3.6, chloride 106, bicarbonate 26, glucose 119, BUN 28, creatinine 1.12. ProBNP 7752. Hemoglobin A1c 6.4. Chest x-ray as mentioned above shows bilateral pleural effusion and difficult to exclude a component of interstitial pulmonary edema. ASSESSMENT AND PLAN:  The patient is a 35-year-old female with past medical history of heart failure with preserved ejection fraction. She went to see her cardiologist today and she was sent to the ED due to bilateral pleural effusion. 1.  Bilateral pleural effusion. It appears to be worse in the left greater than right and suspect due to heart failure. The patient will be scheduled for thoracocentesis tomorrow. Keep her nothing by mouth past midnight. 2.  History of  heart failure with preserved ejection fraction. Last echocardiogram done and seen on record in 04/2018 shows ejection fraction of 55% with no regional wall motion abnormalities. We will resume home Coreg and statin. Further management per cardiology. We will also continue home Bumex daily, obtain a transthoracic echocardiogram to evaluate ejection fraction/structural heart disease. Cardiology is already on board. 3.  Coronary artery disease status post drug-eluting stents. Continue home Plavix. 4.  History of paroxysmal atrial fibrillation, currently rate controlled on Coreg and diltiazem. She is also on Eliquis 2.5 mg t.i.d.  Hold Eliquis for now in case she will be undergoing ultrasound-guided thoracocentesis tomorrow. Deep venous thrombosis prophylaxis. She is on Eliquis. DISPOSITION:  The patient will be admitted to telemetry for routine progression of care. MD ZULMA [place]/MELECIO 
D: 12/06/2018 22:18    
T: 12/06/2018 23:37 
JOB #: [id number]

## 2018-12-07 NOTE — CARDIO/PULMONARY
Cardiac Wellness: Consult received for preop teaching. Per medical team, no surgery needs at this time. Consult closed.

## 2018-12-08 LAB
ANION GAP SERPL CALC-SCNC: 10 MMOL/L (ref 5–15)
BNP SERPL-MCNC: 8443 PG/ML (ref 0–450)
BUN SERPL-MCNC: 32 MG/DL (ref 6–20)
BUN/CREAT SERPL: 30 (ref 12–20)
CALCIUM SERPL-MCNC: 9.4 MG/DL (ref 8.5–10.1)
CHLORIDE SERPL-SCNC: 105 MMOL/L (ref 97–108)
CO2 SERPL-SCNC: 25 MMOL/L (ref 21–32)
CREAT SERPL-MCNC: 1.07 MG/DL (ref 0.55–1.02)
GLUCOSE SERPL-MCNC: 152 MG/DL (ref 65–100)
LDH SERPL L TO P-CCNC: 269 U/L (ref 81–246)
MAGNESIUM SERPL-MCNC: 1.9 MG/DL (ref 1.6–2.4)
POTASSIUM SERPL-SCNC: 4 MMOL/L (ref 3.5–5.1)
SODIUM SERPL-SCNC: 140 MMOL/L (ref 136–145)

## 2018-12-08 PROCEDURE — 74011250637 HC RX REV CODE- 250/637: Performed by: FAMILY MEDICINE

## 2018-12-08 PROCEDURE — 74011250637 HC RX REV CODE- 250/637: Performed by: INTERNAL MEDICINE

## 2018-12-08 PROCEDURE — 74011000250 HC RX REV CODE- 250: Performed by: NURSE PRACTITIONER

## 2018-12-08 PROCEDURE — 36415 COLL VENOUS BLD VENIPUNCTURE: CPT

## 2018-12-08 PROCEDURE — 83880 ASSAY OF NATRIURETIC PEPTIDE: CPT

## 2018-12-08 PROCEDURE — 83735 ASSAY OF MAGNESIUM: CPT

## 2018-12-08 PROCEDURE — 74011250637 HC RX REV CODE- 250/637: Performed by: HOSPITALIST

## 2018-12-08 PROCEDURE — 65660000000 HC RM CCU STEPDOWN

## 2018-12-08 PROCEDURE — 94640 AIRWAY INHALATION TREATMENT: CPT

## 2018-12-08 PROCEDURE — 74011000250 HC RX REV CODE- 250: Performed by: INTERNAL MEDICINE

## 2018-12-08 PROCEDURE — 80048 BASIC METABOLIC PNL TOTAL CA: CPT

## 2018-12-08 RX ORDER — DILTIAZEM HYDROCHLORIDE 120 MG/1
120 CAPSULE, COATED, EXTENDED RELEASE ORAL
Status: COMPLETED | OUTPATIENT
Start: 2018-12-08 | End: 2018-12-08

## 2018-12-08 RX ADMIN — BUMETANIDE 1 MG: 0.25 INJECTION INTRAMUSCULAR; INTRAVENOUS at 17:50

## 2018-12-08 RX ADMIN — BUMETANIDE 1 MG: 0.25 INJECTION INTRAMUSCULAR; INTRAVENOUS at 10:18

## 2018-12-08 RX ADMIN — DILTIAZEM HYDROCHLORIDE 180 MG: 180 CAPSULE, COATED, EXTENDED RELEASE ORAL at 21:43

## 2018-12-08 RX ADMIN — BUDESONIDE 250 MCG: 0.25 INHALANT RESPIRATORY (INHALATION) at 09:08

## 2018-12-08 RX ADMIN — PAROXETINE HYDROCHLORIDE 20 MG: 20 TABLET, FILM COATED ORAL at 10:20

## 2018-12-08 RX ADMIN — BUDESONIDE 250 MCG: 0.25 INHALANT RESPIRATORY (INHALATION) at 20:39

## 2018-12-08 RX ADMIN — SIMVASTATIN 40 MG: 40 TABLET, FILM COATED ORAL at 21:43

## 2018-12-08 RX ADMIN — LEVOTHYROXINE SODIUM 25 MCG: 50 TABLET ORAL at 06:43

## 2018-12-08 RX ADMIN — CARVEDILOL 6.25 MG: 6.25 TABLET, FILM COATED ORAL at 17:50

## 2018-12-08 RX ADMIN — CLOPIDOGREL BISULFATE 75 MG: 75 TABLET ORAL at 10:20

## 2018-12-08 RX ADMIN — CARVEDILOL 6.25 MG: 6.25 TABLET, FILM COATED ORAL at 10:20

## 2018-12-08 RX ADMIN — Medication 10 ML: at 21:43

## 2018-12-08 RX ADMIN — POTASSIUM CHLORIDE 10 MEQ: 750 TABLET, EXTENDED RELEASE ORAL at 10:20

## 2018-12-08 RX ADMIN — APIXABAN 2.5 MG: 2.5 TABLET, FILM COATED ORAL at 17:50

## 2018-12-08 RX ADMIN — Medication 10 ML: at 06:43

## 2018-12-08 RX ADMIN — Medication 10 ML: at 15:49

## 2018-12-08 RX ADMIN — Medication 10 ML: at 17:51

## 2018-12-08 RX ADMIN — DILTIAZEM HYDROCHLORIDE 120 MG: 120 CAPSULE, COATED, EXTENDED RELEASE ORAL at 02:34

## 2018-12-08 NOTE — PROGRESS NOTES
12/07/18 2205 Vital Signs Pulse (Heart Rate) 92 /57 Checked patient's VS before giving patient's cardizem, BP was on the lower side, so MD was paged to see whether or not he wanted cardizem admined. Was given direction to not give cardizem, recheck VS in an hour and call MD back with HR and BP. 
 
2349 HR 95, /50 continue to hold per MD. 
 
0132  /61 
 
0155  /65, MD paged. [phone] MD re-paged. Orders received to hold 180mg and give 120 mg cardizem one time.

## 2018-12-08 NOTE — PROGRESS NOTES
Bedside shift change report given to Clarita, [facility] (oncoming nurse) by Kailee Martinez RN (offgoing nurse). Report included the following information SBAR, kardex summary, orders, meds, and plan

## 2018-12-08 NOTE — PROGRESS NOTES
0730 - Bedside and Written shift change report given to Kyle Blackburn RN (oncoming nurse) by Ramesh Mae RN (offgoing nurse). Report included the following information SBAR, Kardex, MAR, Recent Results, Med Rec Status and Cardiac Rhythm A Fib.  
 
1945 - Bedside and Verbal shift change report given to [address] [place] [address] (oncoming nurse) by Carolyn Ricks RN (offgoing nurse). Report included the following information SBAR, Kardex, Intake/Output, MAR, Accordion, Recent Results, Med Rec Status and Cardiac Rhythm A Fib.

## 2018-12-08 NOTE — PROGRESS NOTES
[facility] Progress Note 
 
12/8/2018 7:53 AM 
Admit Date: 12/6/2018 Admit Diagnosis: SOB (shortness of breath) Feeling much better after thoracentesis, \"like a miracle\" Laying flat now. Discussed plan for assess valve disease after diuretics adjusted and thoracentesis. No plans for valve repair, etc at this time. Plan for close op followup Assessment/Plan 1. Severe MR - workup per valve team for possible MV clipping/repair 2. Large left pleural effusion on TTE, moderate on CXR - no thoracentesis yet due to anticoagulation, continue bumex 3. AI - workup per valve team 
4. CAD s/p Inferior STEMI 9/11/18 with PTCA/stents to distal RCA (KENNETH x 2) - will try to obtain cardiac cath report from [facility] 
-continue plavix, coreg, statin 
-ECG with inferior, anteroseptal TWI 5. Hypotension - hx of HTN, stopped metoprolol tartrate yesterday, continue other medications for now 6. Hx of HFpEF - NYHA Class IV on admission, NYHA Class III today, BP controlled, continue bumex 7. AF - rate controlled on coreg 6.25mg BID and diltiazem 180mg daily, cotn Eliquis for [address] [place] [address] 8. Dyslipidemia - LDL 56 on simvastatin 40mg daily 9. Hypokalemia - K 3.6, will give KCL 20meq PO once now, continue KCL 10meq daily 10. DNR status in place  
  
Echo 12/6/18 - (report not available), severe MR, LVEF 50%, AI Inferior STEMI 9/11/18 with PTCA/stents to distal RCA (KENNETH x 2) Echo 4/18 - LVEF 55 %, no WMA,mod dilated LA, MAC with mild MR, AV sclerosis with mod AI, mild TR, PASP 30mmHg, mild PI 
  
Soc Hx: no tobacco use x 10 years, no etoh use Fam Hx: mother passed at age 80 from heart problems Subjective:  
 
Orvel Peppers continues with dyspnea at rest.  Denies any chest pain or palpitations. No dizziness. Understands plan for diuresis and evaluation of valve disease. Objective:  
  
Physical Exam: 
Visit Vitals /52 Pulse 87 Temp 97.5 °F (36.4 °C) Resp 17  
 Ht 5' 1\" (1.549 m) Wt 126 lb 12.8 oz (57.5 kg) LMP 01/01/2000 SpO2 92% BMI 23.96 kg/m² General Appearance:  Well developed, well nourished, alert and oriented x 3, in no acute distress. Ears/Nose/Mouth/Throat:   Hearing grossly normal. 
  
    Neck: Supple. Chest:   Bibasilar crackles Cardiovascular:  Irregular rate and rhythm, S1, S2 normal, no murmur. Abdomen:   Soft, non-tender, bowel sounds are active. Extremities: No edema bilaterally. Skin: Warm and dry. Telemetry: AFIB Data Review:  
Labs:   
Recent Results (from the past 24 hour(s)) CELL COUNT, BODY FLUID Collection Time: 12/07/18 10:16 AM  
Result Value Ref Range BODY FLUID TYPE PLEURAL FLUID FLUID COLOR YELLOW    
 FLUID APPEARANCE HAZY FLUID RBC CT. >100 (H) 0 /cu mm FLUID NUCLEATED CELLS 1,430 /cu mm  
 FLD NEUTROPHILS 6 (A) NRRE % FLD LYMPHS 56 (A) NRRE % FLD MONO/MACROPHAGES 35 (A) NRRE % FLD OTHER CELLS 2 (H) 0 % FLUID MESOTHELIAL 1 (A) NRRE %  
PH, FLUID Collection Time: 12/07/18 10:16 AM  
Result Value Ref Range FLUID TYPE(15) PLEURAL FLUID FLUID PH 6.8 PROTEIN TOTAL, FLUID Collection Time: 12/07/18 10:16 AM  
Result Value Ref Range Fluid Type: PLEURAL FLUID Protein total, body fld. 2.1 g/dL GLUCOSE, FLUID Collection Time: 12/07/18 10:16 AM  
Result Value Ref Range Fluid Type: PLEURAL FLUID Glucose, body fld. 126 MG/DL  
LDH, BODY FLUID Collection Time: 12/07/18 10:16 AM  
Result Value Ref Range Fluid Type: PLEURAL   
 LD, body fld. 110 U/L  
CULTURE, BODY FLUID W GRAM STAIN Collection Time: 12/07/18 10:16 AM  
Result Value Ref Range Special Requests: CULTURE OF PLEURAL FLUID AFTER THORACENTESIS   
 GRAM STAIN 2+ WBCS SEEN    
 GRAM STAIN NO ORGANISMS SEEN Culture result: PENDING Current Facility-Administered Medications Medication Dose Route Frequency  bumetanide (BUMEX) injection 1 mg  1 mg IntraVENous BID  budesonide (PULMICORT) 250 mcg/2ml nebulizer susp  250 mcg Nebulization BID RT  
 sodium chloride (NS) flush 5-10 mL  5-10 mL IntraVENous Q8H  
 sodium chloride (NS) flush 5-10 mL  5-10 mL IntraVENous PRN  
 sodium phosphate (FLEET'S) enema 1 Enema  1 Enema Rectal PRN  
 carvedilol (COREG) tablet 6.25 mg  6.25 mg Oral BID  levothyroxine (SYNTHROID) tablet 25 mcg  25 mcg Oral ACB  nitroglycerin (NITROSTAT) tablet 0.4 mg  0.4 mg SubLINGual PRN  
 PARoxetine (PAXIL) tablet 20 mg  20 mg Oral DAILY  simvastatin (ZOCOR) tablet 40 mg  40 mg Oral QHS  clopidogrel (PLAVIX) tablet 75 mg  75 mg Oral DAILY  dilTIAZem CD (CARDIZEM CD) capsule 180 mg  180 mg Oral QHS  potassium chloride SR (KLOR-CON 10) tablet 10 mEq  10 mEq Oral DAILY Alivia Bishop MD 
[facility] [address] [place] [address], [address] 
[phone]

## 2018-12-08 NOTE — PROGRESS NOTES
Hospitalist Progress Note Gianluca Mchugh MD 
Answering service: [phone] OR [phone] from in house phone Date of Service:  2018 NAME:  Kelsi Story :  1927 MRN:  [id number] Admission Summary:  
79 yo woman with dementia, hypothyroidism, CAD s/p Wilber x2, PAF on apixaban, hepatic steatosis, DLD, allergic rhinitis, HTN, GERD, HFpEF was sent to the ED from Cardiology office on 18 with bilateral pleural effusions and mild DURAN. She was admitted with bilateral L>R pleural effusions. Interval history / Subjective: On RA and feels better; no complaints of CP, SOB, cough, swelling, currently on RA Assessment & Plan:  
 
Bilateral L>R pleural effusions (POA) - s/p L thoracentesis by IR  with 900ml fluid out 
- transudate - pleural fluid Cx  pending; Gram stain no organisms - pulmonary toilet Acute hypoxic respiratory failure (POA) - likely due to above 
- resolved and satting well on RA HFpEF, NYHA Class 4 - TTE  EF 55-60%, no RWMA, dilated LA, mod-sev MR, mild AI, mild TR, mild-mod pulmonary, PASP 45mmHg 
- continue carvedilol, bumetanide - Cardiology following 
- PT/OT evals Aortic insufficiency - CTS consulted; no surgery planned at this time - preop workup cancelled CAD s/p KENNETH x2 - no c/o CP 
- continue BB, statin, Plavix PAF - rate controlled on carvedilol; digoxin on hold 
- continue apixaban - Cards following HTN - controlled on current meds Hypothyroidism - continue levothyroxine Dementia Code status: DNR 
DVT prophylaxis: apixaban Care Plan discussed with: Patient/Family, Nurse and . D/w son by phone Disposition: Dc home to [address] [place] [address] Problems  Date Reviewed: 2018 Codes Class Noted POA  
 AI (aortic insufficiency) ICD-10-CM: I35.1 ICD-9-CM: 424.1  2018 Unknown * (Principal) Bilateral pleural effusion ICD-10-CM: J90 ICD-9-CM: 511.9  12/6/2018 Yes  
   
 SOB (shortness of breath) ICD-10-CM: R06.02 
ICD-9-CM: 786.05  12/6/2018 Unknown Review of Systems:  
Pertinent items are noted in HPI. Vital Signs:  
 Last 24hrs VS reviewed since prior progress note. Most recent are: 
Visit Vitals /51 (BP 1 Location: Right arm, BP Patient Position: Sitting; At rest) Pulse 87 Temp 97.8 °F (36.6 °C) Resp 24 Ht 5' 1\" (1.549 m) Wt 57.5 kg (126 lb 12.8 oz) SpO2 94% BMI 23.96 kg/m² No intake or output data in the 24 hours ending 12/08/18 1529 Physical Examination:  
 
 
     
Constitutional:  awake, NAD, NC in place ENT:  oral mucosa moist, oropharynx benign Resp:  diminished BS L base CV:  regular rhythm, normal rate, + murmur, no peripheral edema GI:  +BS, soft, non distended, non tender Musculoskeletal:  HUERTA Neurologic:  awake, responds mostly appropriately to questions and commands Data Review:  
 Review and/or order of clinical lab test 
Review and/or order of tests in the radiology section of CPT Review and/or order of tests in the medicine section of CPT Labs:  
 
Recent Labs 12/07/18 
0246 12/06/18 
1403 WBC 7.9 8.3 HGB 11.4* 13.1 HCT 38.1 43.3  303 Recent Labs 12/08/18 
0319 12/07/18 
0246 12/06/18 
1403  141 140  
K 4.0 3.6 3.6  107 106 CO2 25 25 26 BUN 32* 30* 28* CREA 1.07* 1.06* 1.12* * 115* 119* CA 9.4 8.7 9.2 MG 1.9 2.0  --   
 
Recent Labs 12/07/18 0246 SGOT 23 ALT 19 AP 67 TBILI 0.4 TP 6.9 ALB 3.0*  
GLOB 3.9 Recent Labs 12/07/18 
0246 12/06/18 
1403 INR 1.3* 1.3* PTP 12.7* 12.7* APTT 29.4  -- No results for input(s): FE, TIBC, PSAT, FERR in the last 72 hours. Lab Results Component Value Date/Time  Folate 11.7 11/28/2018 01:39 PM  
  
 No results for input(s): PH, PCO2, PO2 in the last 72 hours. No results for input(s): CPK, CKNDX, TROIQ in the last 72 hours. No lab exists for component: CPKMB Lab Results Component Value Date/Time Cholesterol, total 118 11/28/2018 01:39 PM  
 HDL Cholesterol 42 11/28/2018 01:39 PM  
 LDL, calculated 56 11/28/2018 01:39 PM  
 Triglyceride 98 11/28/2018 01:39 PM  
 CHOL/HDL Ratio 3.8 12/17/2009 08:35 AM  
 
No results found for: Jonne Gene Lab Results Component Value Date/Time Color YELLOW/STRAW 12/06/2018 02:53 PM  
 Appearance CLEAR 12/06/2018 02:53 PM  
 Specific gravity 1.011 12/06/2018 02:53 PM  
 pH (UA) 5.5 12/06/2018 02:53 PM  
 Protein NEGATIVE  12/06/2018 02:53 PM  
 Glucose NEGATIVE  12/06/2018 02:53 PM  
 Ketone NEGATIVE  12/06/2018 02:53 PM  
 Bilirubin NEGATIVE  12/06/2018 02:53 PM  
 Urobilinogen 0.2 12/06/2018 02:53 PM  
 Nitrites NEGATIVE  12/06/2018 02:53 PM  
 Leukocyte Esterase NEGATIVE  12/06/2018 02:53 PM  
 Epithelial cells FEW 12/06/2018 02:53 PM  
 Bacteria NEGATIVE  12/06/2018 02:53 PM  
 WBC 0-4 12/06/2018 02:53 PM  
 RBC 0-5 12/06/2018 02:53 PM  
 
 
 
Medications Reviewed:  
 
Current Facility-Administered Medications Medication Dose Route Frequency  bumetanide (BUMEX) injection 1 mg  1 mg IntraVENous BID  budesonide (PULMICORT) 250 mcg/2ml nebulizer susp  250 mcg Nebulization BID RT  
 sodium chloride (NS) flush 5-10 mL  5-10 mL IntraVENous Q8H  
 sodium chloride (NS) flush 5-10 mL  5-10 mL IntraVENous PRN  
 sodium phosphate (FLEET'S) enema 1 Enema  1 Enema Rectal PRN  
 carvedilol (COREG) tablet 6.25 mg  6.25 mg Oral BID  levothyroxine (SYNTHROID) tablet 25 mcg  25 mcg Oral ACB  nitroglycerin (NITROSTAT) tablet 0.4 mg  0.4 mg SubLINGual PRN  
 PARoxetine (PAXIL) tablet 20 mg  20 mg Oral DAILY  simvastatin (ZOCOR) tablet 40 mg  40 mg Oral QHS  clopidogrel (PLAVIX) tablet 75 mg  75 mg Oral DAILY  dilTIAZem CD (CARDIZEM CD) capsule 180 mg  180 mg Oral QHS  potassium chloride SR (KLOR-CON 10) tablet 10 mEq  10 mEq Oral DAILY  
 
______________________________________________________________________ EXPECTED LENGTH OF STAY: 2d 9h 
ACTUAL LENGTH OF STAY:          2 Maribeth Aggarwal MD

## 2018-12-08 NOTE — ROUTINE PROCESS
Bedside shift change report given to Kali Joy (oncoming nurse) by Clarita (offgoing nurse). Report included the following information SBAR, Kardex, Procedure Summary, Accordion, Recent Results and Cardiac Rhythm A fib.

## 2018-12-08 NOTE — PROCEDURES
[address]  *** FINAL REPORT ***    Name: Link Officer  MRN: [id number]  : 1927  HIS Order #: [id number]  [address] [facility] Visit #: [id number]  Date: 07 Dec 2018    TYPE OF TEST: Cerebrovascular Duplex    REASON FOR TEST  Pre Operative Cardiovascular    Right Carotid:-             Proximal               Mid                 Distal  cm/s  Systolic  Diastolic  Systolic  Diastolic  Systolic  Diastolic  CCA:     36.9       6.0                            48.0      13.0  Bulb:  ECA:     60.0       0.0  ICA:     43.0      18.0       43.0      13.0       60.0      14.0  ICA/CCA:  0.9       1.4    ICA Stenosis: <50%    Right Vertebral:-  Finding: Antegrade  Sys:       26.0  Isi:        5.0    Right Subclavian:    Left Carotid:-            Proximal                Mid                 Distal  cm/s  Systolic  Diastolic  Systolic  Diastolic  Systolic  Diastolic  CCA:     08.9       8.0                            65.0      20.0  Bulb:  ECA:     81.0       5.0  ICA:    102.0      20.0       47.0      13.0       45.0      12.0  ICA/CCA:  1.6       1.0    ICA Stenosis: <50%    Left Vertebral:-  Finding: Antegrade  Sys:       55.0  Isi:       16.0    Left Subclavian:    INTERPRETATION/FINDINGS  PROCEDURE:  Color duplex ultrasound imaging of extracranial  cerebrovascular arteries. FINDINGS:       Right:  Internal carotid velocity is within normal limits. There   is narrowing of the internal carotid flow channel on color Doppler  imaging and [non-calcific  calcific  mixed density] plaque on B-mode  imaging, consistent with less than 50 percent stenosis. The common  and external carotid arteries are patent and without evidence of  hemodynamically significant stenosis. Left:   Internal carotid velocity is within normal limits.   There   is narrowing of the internal carotid flow channel on color Doppler  imaging and [non-calcific  calcific  mixed density] plaque on B-mode  imaging, consistent with less than 50 percent stenosis. The common  and external carotid arteries are patent and without evidence of  hemodynamically significant stenosis. IMPRESSION:  Consistent with 0-49% stenosis of the right internal  carotid and 0-49% stenosis of the left internal carotid. Vertebrals  are patent with antegrade flow. ADDITIONAL COMMENTS    I have personally reviewed the data relevant to the interpretation of  this  study. TECHNOLOGIST: Georgette Casas.  CASEY Mishra  Signed: 12/07/2018 09:30 PM    PHYSICIAN: Daysi Cahvez MD  Signed: 12/08/2018 09:18 AM

## 2018-12-08 NOTE — PROGRESS NOTES
Located a direct number at [facility] for the nursing supervisor in an effort to coordinate discharge over the weekend Direct Line to Nursing Supervisor provided:  [phone].  number for [facility]:  [phone]. Reached out to [facility] in admissions, and have left a voice mail. (Last PT EVAL recommends KYLER level of care on 12/7.) (Last OT EVAL recommends [address] [place] [address] with 24/7 assist or to Healthcare at [facility] on 12/7 ) IDT to be updated.  
 
Olamide Martínez RN

## 2018-12-08 NOTE — PROGRESS NOTES
Problem: Falls - Risk of 
Goal: *Absence of Falls Document Genevive Camera Fall Risk and appropriate interventions in the flowsheet. Outcome: Progressing Towards Goal 
Fall Risk Interventions: 
Mobility Interventions: Patient to call before getting OOB, OT consult for ADLs, PT Consult for mobility concerns, PT Consult for assist device competence, Strengthening exercises (ROM-active/passive) Mentation Interventions: Bed/chair exit alarm, Door open when patient unattended, Familiar objects from home, Increase mobility, More frequent rounding, Reorient patient, Update white board Medication Interventions: Bed/chair exit alarm, Evaluate medications/consider consulting pharmacy Elimination Interventions: Call light in reach, Patient to call for help with toileting needs, Toileting schedule/hourly rounds

## 2018-12-09 LAB
ANION GAP SERPL CALC-SCNC: 7 MMOL/L (ref 5–15)
BUN SERPL-MCNC: 32 MG/DL (ref 6–20)
BUN/CREAT SERPL: 33 (ref 12–20)
CALCIUM SERPL-MCNC: 8.8 MG/DL (ref 8.5–10.1)
CHLORIDE SERPL-SCNC: 102 MMOL/L (ref 97–108)
CO2 SERPL-SCNC: 27 MMOL/L (ref 21–32)
CREAT SERPL-MCNC: 0.96 MG/DL (ref 0.55–1.02)
GLUCOSE SERPL-MCNC: 110 MG/DL (ref 65–100)
MAGNESIUM SERPL-MCNC: 2 MG/DL (ref 1.6–2.4)
POTASSIUM SERPL-SCNC: 3.7 MMOL/L (ref 3.5–5.1)
SODIUM SERPL-SCNC: 136 MMOL/L (ref 136–145)

## 2018-12-09 PROCEDURE — 36415 COLL VENOUS BLD VENIPUNCTURE: CPT

## 2018-12-09 PROCEDURE — 94640 AIRWAY INHALATION TREATMENT: CPT

## 2018-12-09 PROCEDURE — 80048 BASIC METABOLIC PNL TOTAL CA: CPT

## 2018-12-09 PROCEDURE — 74011000250 HC RX REV CODE- 250: Performed by: NURSE PRACTITIONER

## 2018-12-09 PROCEDURE — 74011000250 HC RX REV CODE- 250: Performed by: INTERNAL MEDICINE

## 2018-12-09 PROCEDURE — 74011250637 HC RX REV CODE- 250/637: Performed by: INTERNAL MEDICINE

## 2018-12-09 PROCEDURE — 83735 ASSAY OF MAGNESIUM: CPT

## 2018-12-09 PROCEDURE — 65660000000 HC RM CCU STEPDOWN

## 2018-12-09 PROCEDURE — 74011250637 HC RX REV CODE- 250/637: Performed by: HOSPITALIST

## 2018-12-09 RX ADMIN — CLOPIDOGREL BISULFATE 75 MG: 75 TABLET ORAL at 08:33

## 2018-12-09 RX ADMIN — APIXABAN 2.5 MG: 2.5 TABLET, FILM COATED ORAL at 17:03

## 2018-12-09 RX ADMIN — Medication 10 ML: at 21:31

## 2018-12-09 RX ADMIN — Medication 10 ML: at 06:51

## 2018-12-09 RX ADMIN — LEVOTHYROXINE SODIUM 25 MCG: 50 TABLET ORAL at 06:50

## 2018-12-09 RX ADMIN — SIMVASTATIN 40 MG: 40 TABLET, FILM COATED ORAL at 21:31

## 2018-12-09 RX ADMIN — PAROXETINE HYDROCHLORIDE 20 MG: 20 TABLET, FILM COATED ORAL at 08:33

## 2018-12-09 RX ADMIN — POTASSIUM CHLORIDE 10 MEQ: 750 TABLET, EXTENDED RELEASE ORAL at 08:33

## 2018-12-09 RX ADMIN — DILTIAZEM HYDROCHLORIDE 180 MG: 180 CAPSULE, COATED, EXTENDED RELEASE ORAL at 21:31

## 2018-12-09 RX ADMIN — APIXABAN 2.5 MG: 2.5 TABLET, FILM COATED ORAL at 08:34

## 2018-12-09 RX ADMIN — BUMETANIDE 1 MG: 0.25 INJECTION INTRAMUSCULAR; INTRAVENOUS at 17:04

## 2018-12-09 RX ADMIN — CARVEDILOL 6.25 MG: 6.25 TABLET, FILM COATED ORAL at 17:04

## 2018-12-09 RX ADMIN — BUDESONIDE 250 MCG: 0.25 INHALANT RESPIRATORY (INHALATION) at 20:01

## 2018-12-09 RX ADMIN — Medication 10 ML: at 13:14

## 2018-12-09 NOTE — ROUTINE PROCESS
Bedside shift change report given to Delvin (oncoming nurse) by Clarita (offgoing nurse). Report included the following information SBAR, Kardex, Procedure Summary, Accordion, Recent Results and Cardiac Rhythm A fib.

## 2018-12-09 NOTE — PROGRESS NOTES
Problem: Falls - Risk of 
Goal: *Absence of Falls Document Luis Renee Fall Risk and appropriate interventions in the flowsheet. Outcome: Progressing Towards Goal 
Fall Risk Interventions: 
Mobility Interventions: Bed/chair exit alarm, OT consult for ADLs, Patient to call before getting OOB, Strengthening exercises (ROM-active/passive) Mentation Interventions: Bed/chair exit alarm, Door open when patient unattended, Familiar objects from home, Increase mobility, More frequent rounding, Reorient patient, Update white board Medication Interventions: Bed/chair exit alarm, Evaluate medications/consider consulting pharmacy, Patient to call before getting OOB Elimination Interventions: Bed/chair exit alarm, Call light in reach, Patient to call for help with toileting needs, Toileting schedule/hourly rounds

## 2018-12-09 NOTE — PROGRESS NOTES
Hospitalist Progress Note Skinny Flores MD 
Answering service: [phone] OR [phone] from in house phone Date of Service:  2018 NAME:  Sarah Estes :  1927 MRN:  [id number] Admission Summary:  
81 yo woman with dementia, hypothyroidism, CAD s/p Wilber x2, PAF on apixaban, hepatic steatosis, DLD, allergic rhinitis, HTN, GERD, HFpEF was sent to the ED from Cardiology office on 18 with bilateral pleural effusions and mild DURAN. She was admitted with bilateral L>R pleural effusions. Interval history / Subjective: No complaints of CP, SOB, cough, swelling; back on 2L O2 NC; d/w nurse, no overnight events, unclear why MRI brain not done as ordered Assessment & Plan:  
 
Bilateral L>R pleural effusions (POA) - s/p L thoracentesis by IR  with 900ml fluid out 
- transudate - pleural fluid Cx  NGTD; Gram stain no organisms - pulmonary toilet Acute hypoxic respiratory failure (POA) - likely due to above 
- resolved and satting well on RA 
- no need for home O2 HFpEF, NYHA Class 4 - TTE  EF 55-60%, no RWMA, dilated LA, mod-sev MR, mild AI, mild TR, mild-mod pulmonary, PASP 45mmHg 
- continue carvedilol, bumetanide - Cardiology following 
- PT/OT evals: recommending [facility] Aortic insufficiency - CTS consulted; no surgery planned at this time - preop workup cancelled CAD s/p KENNETH x2 - no c/o CP 
- continue BB, statin, Plavix PAF - rate controlled on carvedilol; digoxin on hold 
- continue apixaban - Cards following HTN - controlled on current meds Hypothyroidism - continue levothyroxine Dementia Code status: DNR 
DVT prophylaxis: apixaban Care Plan discussed with: Patient/Family, Nurse and . Spoke with PCP by phone  Disposition: Dc to [facility] in 1-2 days Hospital Problems  Date Reviewed: 2018 Codes Class Noted POA AI (aortic insufficiency) ICD-10-CM: I35.1 ICD-9-CM: 424.1  12/6/2018 Unknown * (Principal) Bilateral pleural effusion ICD-10-CM: J90 ICD-9-CM: 511.9  12/6/2018 Yes  
   
 SOB (shortness of breath) ICD-10-CM: R06.02 
ICD-9-CM: 786.05  12/6/2018 Unknown Review of Systems:  
Pertinent items are noted in HPI. Vital Signs:  
 Last 24hrs VS reviewed since prior progress note. Most recent are: 
Visit Vitals /52 (BP 1 Location: Right arm, BP Patient Position: At rest) Pulse 78 Temp 97.4 °F (36.3 °C) Resp 20 Ht 5' 1\" (1.549 m) Wt 58.8 kg (129 lb 11.2 oz) SpO2 95% BMI 24.51 kg/m² No intake or output data in the 24 hours ending 12/09/18 1612 Physical Examination:  
 
 
     
Constitutional:  awake, NAD, NC in place ENT:  oral mucosa moist, oropharynx benign Resp:  bibasilar rales, no wheeze CV:  regular rhythm, normal rate, + murmur, no peripheral edema GI:  +BS, soft, non distended, non tender Musculoskeletal:  HUERTA Neurologic:  awake, pleasantly confused Data Review:  
 Review and/or order of clinical lab test 
Review and/or order of tests in the radiology section of CPT Review and/or order of tests in the medicine section of CPT Labs:  
 
Recent Labs 12/07/18 
0246 WBC 7.9 HGB 11.4* HCT 38.1  Recent Labs 12/09/18 
5944 12/08/18 
0319 12/07/18 
0246  140 141  
K 3.7 4.0 3.6  105 107 CO2 27 25 25 BUN 32* 32* 30* CREA 0.96 1.07* 1.06* * 152* 115* CA 8.8 9.4 8.7 MG 2.0 1.9 2.0 Recent Labs 12/07/18 
0246 SGOT 23 ALT 19 AP 67 TBILI 0.4 TP 6.9 ALB 3.0*  
GLOB 3.9 Recent Labs 12/07/18 
0246 INR 1.3* PTP 12.7* APTT 29.4 No results for input(s): FE, TIBC, PSAT, FERR in the last 72 hours. Lab Results Component Value Date/Time Folate 11.7 11/28/2018 01:39 PM  
  
No results for input(s): PH, PCO2, PO2 in the last 72 hours. No results for input(s): CPK, CKNDX, TROIQ in the last 72 hours. No lab exists for component: CPKMB Lab Results Component Value Date/Time Cholesterol, total 118 11/28/2018 01:39 PM  
 HDL Cholesterol 42 11/28/2018 01:39 PM  
 LDL, calculated 56 11/28/2018 01:39 PM  
 Triglyceride 98 11/28/2018 01:39 PM  
 CHOL/HDL Ratio 3.8 12/17/2009 08:35 AM  
 
No results found for: Anan Morris Lab Results Component Value Date/Time Color YELLOW/STRAW 12/06/2018 02:53 PM  
 Appearance CLEAR 12/06/2018 02:53 PM  
 Specific gravity 1.011 12/06/2018 02:53 PM  
 pH (UA) 5.5 12/06/2018 02:53 PM  
 Protein NEGATIVE  12/06/2018 02:53 PM  
 Glucose NEGATIVE  12/06/2018 02:53 PM  
 Ketone NEGATIVE  12/06/2018 02:53 PM  
 Bilirubin NEGATIVE  12/06/2018 02:53 PM  
 Urobilinogen 0.2 12/06/2018 02:53 PM  
 Nitrites NEGATIVE  12/06/2018 02:53 PM  
 Leukocyte Esterase NEGATIVE  12/06/2018 02:53 PM  
 Epithelial cells FEW 12/06/2018 02:53 PM  
 Bacteria NEGATIVE  12/06/2018 02:53 PM  
 WBC 0-4 12/06/2018 02:53 PM  
 RBC 0-5 12/06/2018 02:53 PM  
 
 
 
Medications Reviewed:  
 
Current Facility-Administered Medications Medication Dose Route Frequency  apixaban (ELIQUIS) tablet 2.5 mg  2.5 mg Oral BID  bumetanide (BUMEX) injection 1 mg  1 mg IntraVENous BID  budesonide (PULMICORT) 250 mcg/2ml nebulizer susp  250 mcg Nebulization BID RT  
 sodium chloride (NS) flush 5-10 mL  5-10 mL IntraVENous Q8H  
 sodium chloride (NS) flush 5-10 mL  5-10 mL IntraVENous PRN  
 sodium phosphate (FLEET'S) enema 1 Enema  1 Enema Rectal PRN  
 carvedilol (COREG) tablet 6.25 mg  6.25 mg Oral BID  levothyroxine (SYNTHROID) tablet 25 mcg  25 mcg Oral ACB  nitroglycerin (NITROSTAT) tablet 0.4 mg  0.4 mg SubLINGual PRN  
 PARoxetine (PAXIL) tablet 20 mg  20 mg Oral DAILY  simvastatin (ZOCOR) tablet 40 mg  40 mg Oral QHS  clopidogrel (PLAVIX) tablet 75 mg  75 mg Oral DAILY  dilTIAZem CD (CARDIZEM CD) capsule 180 mg  180 mg Oral QHS  potassium chloride SR (KLOR-CON 10) tablet 10 mEq  10 mEq Oral DAILY  
 
______________________________________________________________________ EXPECTED LENGTH OF STAY: 2d 9h 
ACTUAL LENGTH OF STAY:          3 Brittany President, MD

## 2018-12-10 ENCOUNTER — APPOINTMENT (OUTPATIENT)
Dept: MRI IMAGING | Age: 83
DRG: 291 | End: 2018-12-10
Attending: INTERNAL MEDICINE
Payer: MEDICARE

## 2018-12-10 ENCOUNTER — TELEPHONE (OUTPATIENT)
Dept: GERIATRIC MEDICINE | Age: 83
End: 2018-12-10

## 2018-12-10 VITALS
WEIGHT: 137.5 LBS | OXYGEN SATURATION: 95 % | RESPIRATION RATE: 21 BRPM | HEIGHT: 61 IN | DIASTOLIC BLOOD PRESSURE: 50 MMHG | SYSTOLIC BLOOD PRESSURE: 109 MMHG | BODY MASS INDEX: 25.96 KG/M2 | TEMPERATURE: 97.4 F | HEART RATE: 83 BPM

## 2018-12-10 PROBLEM — I25.10 CAD (CORONARY ARTERY DISEASE): Status: ACTIVE | Noted: 2018-12-10

## 2018-12-10 PROBLEM — J96.01 ACUTE RESPIRATORY FAILURE WITH HYPOXEMIA (HCC): Status: ACTIVE | Noted: 2018-12-10

## 2018-12-10 PROBLEM — I50.30 (HFPEF) HEART FAILURE WITH PRESERVED EJECTION FRACTION (HCC): Status: ACTIVE | Noted: 2018-12-10

## 2018-12-10 PROBLEM — E03.9 HYPOTHYROIDISM: Chronic | Status: ACTIVE | Noted: 2018-12-10

## 2018-12-10 PROBLEM — R06.02 SOB (SHORTNESS OF BREATH): Status: RESOLVED | Noted: 2018-12-06 | Resolved: 2018-12-10

## 2018-12-10 LAB
ANION GAP SERPL CALC-SCNC: 8 MMOL/L (ref 5–15)
BUN SERPL-MCNC: 37 MG/DL (ref 6–20)
BUN/CREAT SERPL: 36 (ref 12–20)
CALCIUM SERPL-MCNC: 8.8 MG/DL (ref 8.5–10.1)
CHLORIDE SERPL-SCNC: 103 MMOL/L (ref 97–108)
CO2 SERPL-SCNC: 27 MMOL/L (ref 21–32)
COMMENT, HOLDF: NORMAL
CREAT SERPL-MCNC: 1.04 MG/DL (ref 0.55–1.02)
GLUCOSE SERPL-MCNC: 111 MG/DL (ref 65–100)
MAGNESIUM SERPL-MCNC: 1.9 MG/DL (ref 1.6–2.4)
POTASSIUM SERPL-SCNC: 3.9 MMOL/L (ref 3.5–5.1)
SAMPLES BEING HELD,HOLD: NORMAL
SODIUM SERPL-SCNC: 138 MMOL/L (ref 136–145)

## 2018-12-10 PROCEDURE — 80048 BASIC METABOLIC PNL TOTAL CA: CPT

## 2018-12-10 PROCEDURE — 74011250637 HC RX REV CODE- 250/637: Performed by: INTERNAL MEDICINE

## 2018-12-10 PROCEDURE — 97116 GAIT TRAINING THERAPY: CPT

## 2018-12-10 PROCEDURE — 36415 COLL VENOUS BLD VENIPUNCTURE: CPT

## 2018-12-10 PROCEDURE — 83735 ASSAY OF MAGNESIUM: CPT

## 2018-12-10 PROCEDURE — 97530 THERAPEUTIC ACTIVITIES: CPT

## 2018-12-10 PROCEDURE — 70551 MRI BRAIN STEM W/O DYE: CPT

## 2018-12-10 PROCEDURE — 74011250637 HC RX REV CODE- 250/637: Performed by: HOSPITALIST

## 2018-12-10 RX ADMIN — LEVOTHYROXINE SODIUM 25 MCG: 50 TABLET ORAL at 07:01

## 2018-12-10 RX ADMIN — POTASSIUM CHLORIDE 10 MEQ: 750 TABLET, EXTENDED RELEASE ORAL at 08:02

## 2018-12-10 RX ADMIN — Medication 10 ML: at 07:01

## 2018-12-10 RX ADMIN — CLOPIDOGREL BISULFATE 75 MG: 75 TABLET ORAL at 08:02

## 2018-12-10 RX ADMIN — APIXABAN 2.5 MG: 2.5 TABLET, FILM COATED ORAL at 08:01

## 2018-12-10 RX ADMIN — PAROXETINE HYDROCHLORIDE 20 MG: 20 TABLET, FILM COATED ORAL at 08:02

## 2018-12-10 NOTE — DISCHARGE INSTRUCTIONS
ADDITIONAL CARE RECOMMENDATIONS:   1. Take medications as prescribed. 2. Keep appointment(s) as recommended/scheduled. 3. Patient does not qualify for home oxygen at this time. DIET: Cardiac Diet    Oral Nutritional Supplements: Ensure Enlive with breakfast and dinner  Evening snack: cheese/crackers  Bedtime snack: [employer] - fruit & nut bar    ACTIVITY: PT/OT Eval and Treat    WOUND CARE: none    EQUIPMENT needed: as per PT/OT    Nutrition Recommendations for Discharge:    Continue Oral Nutrition Supplements at discharge:   Ensure Enlive, Ensure Plus or similar product  Twice daily for 30 days unless otherwise directed by your Primary Care Physician. This product can be purchased at your local grocery store, pharmacy and/or online.      Amanda Lozada RD

## 2018-12-10 NOTE — PROGRESS NOTES
CM received an order to send a referral to [facility]. CM did send a referral to Healthcare at [facility]. Cm also left son, Jolie Brunson ( [phone]), informing him that a referral was made. Aury Owen

## 2018-12-10 NOTE — TELEPHONE ENCOUNTER
I teleponed Arnoldo Gutierrez the CM at [facility] today for Mrs. Salgado dispo. Per a tiger text communication with Dr. Lyly Zhang today at 12:15 pm - Mrs. David Sung is not ready for discharge as they are going to get the MRI done before her discharge is set. Telephone encounter with Arnoldo Gutierrez verified the same information. Mrs. David Sung when ready for dischage will come back to [facility] under her Skilled Benefit for care. I have relayed this information to Katt (admissions) & Tiffany BRADSHAW  ( LKW)

## 2018-12-10 NOTE — PROGRESS NOTES
Sp02 check during activity. 94% Room air at rest (before walking). 89% - 90% Room air (while walking). Back to 94% Room air (at rest)

## 2018-12-10 NOTE — DISCHARGE SUMMARY
Discharge Summary       PATIENT ID: Betzy Pérez  MRN: [id number]   YOB: 1927    DATE OF ADMISSION: 12/6/2018  1:43 PM    DATE OF DISCHARGE: 12/10/18   PRIMARY CARE PROVIDER: Josh De La Cruz NP     DISCHARGING PHYSICIAN: Michael Andrews MD    To contact this individual call [phone] and ask the  to page. If unavailable ask to be transferred the Adult Hospitalist Department. CONSULTATIONS: IP CONSULT TO CARDIAC SURGERY  IP CONSULT TO CARDIOLOGY    PROCEDURES/SURGERIES: * No surgery found *  12/10 bilateral carotid duplex: Consistent with 0-49% stenosis of the right internal  carotid and 0-49% stenosis of the left internal carotid. Vertebrals  are patent with antegrade flow. 12/10 MRI brain wo contrast: Atrophy and chronic white matter disease, with no acute infarction    12/7 US-guided left thoracentesis: Ultrasound guided left thoracentesis performed with 900 mL of clear fluid removed    24/0 TTE: Systolic function was normal. Ejection fraction was estimated in the range of 55 % to 60 %. There were no regional wall motion abnormalities. Left atrium: The atrium was dilated. Mitral valve: There was moderately restricted mobility of the posterior leaflet. There was at least moderate to severe regurgitation. The regurgitant jet was eccentric and directed posteriorly. Aortic valve: There was mild regurgitation. Tricuspid valve: There was mild regurgitation. Pulmonary artery systolic pressure: 45 mmHg. There was mild to moderate pulmonary hypertension. [address] COURSE:   81 yo woman with dementia, hypothyroidism, CAD s/p Wilber x2, PAF on apixaban, hepatic steatosis, DLD, allergic rhinitis, HTN, GERD, HFpEF was sent to the ED from Cardiology office on 12/6/18 with bilateral pleural effusions and mild DURAN.  She was admitted with bilateral L>R pleural effusions.      Bilateral L>R pleural effusions (POA) - s/p L thoracentesis by IR 12/7 with 900ml fluid out  - transudate  - pleural fluid Cx  NGTD; Gram stain no organisms  - pulmonary toilet     Acute hypoxic respiratory failure (POA) - likely due to above  - resolved and satting well on RA  - no need for home O2     HFpEF, NYHA Class 4 - TTE  EF 55-60%, no RWMA, dilated LA, mod-sev MR, mild AI, mild TR, mild-mod pulmonary, PASP 45mmHg  - continue carvedilol, bumetanide  - Cardiology following  - PT/OT evals: recommending [employer]     Aortic insufficiency - CTS consulted; no surgery planned at this time  - preop workup cancelled     CAD s/p KENNETH x2 - no c/o CP  - continue BB, statin, Plavix     PAF - rate controlled on carvedilol; digoxin and metoprolol discontinued  - continue apixaban  - Cards following     HTN - controlled on current meds     Hypothyroidism - continue levothyroxine    Dementia - MRI as above      DISCHARGE DIAGNOSES / PLAN:      Stable for discharge to [facility]. Follow up with PCP, Cardiology. PENDING TEST RESULTS:   At the time of discharge the following test results are still pendin/7 pleural fluid culture    FOLLOW UP APPOINTMENTS:    Follow-up Information     Follow up With Specialties Details Why Contact Info    Paty Rossi NP Nurse Practitioner In 1 week hospital follow up [address] [place] [address]      Rodrigo Olivares MD Cardiology  Cardiology; hospital follow up [place] [address]  [address] [facility] [phone]             ADDITIONAL CARE RECOMMENDATIONS:   1. Take medications as prescribed. 2. Keep appointment(s) as recommended/scheduled. 3. Patient does not qualify for home oxygen at this time.     DIET: Cardiac Diet    Oral Nutritional Supplements: Ensure Enlive with breakfast and dinner  Evening snack: cheese/crackers  Bedtime snack: [employer] - fruit & nut bar    ACTIVITY: PT/OT Eval and Treat    WOUND CARE: none    EQUIPMENT needed: as per PT/OT      DISCHARGE MEDICATIONS:  Current Discharge Medication List      CONTINUE these medications which have NOT CHANGED    Details   albuterol-ipratropium (DUO-NEB) 2.5 mg-0.5 mg/3 ml nebu 3 mL by Nebulization route every 4 hours daily while awake resp. polyethylene glycol (MIRALAX) 17 gram packet Take 17 g by mouth daily. guaiFENesin ER (MUCINEX) 600 mg ER tablet Take 600 mg by mouth two (2) times a day. senna (SENNA) 8.6 mg tablet Take 1 Tab by mouth nightly. calcium-vitamin D (OYSTER SHELL) 500 mg(1,250mg) -200 unit per tablet Take 1 Tab by mouth two (2) times daily (with meals). levothyroxine (SYNTHROID) 25 mcg tablet Take 1 Tab by mouth Daily (before breakfast). Qty: 1 Tab, Refills: 0      !! bumetanide (BUMEX) 2 mg tablet Take 1 Tab by mouth every morning. Qty: 30 Tab, Refills: 0    Associated Diagnoses: Mild bibasilar atelectasis; Acute on chronic combined systolic and diastolic congestive heart failure ([address] Bilateral pleural effusion; Aspiration pneumonia of both lower lobes due to gastric secretions ([address] Pharyngoesophageal dysphagia      !! bumetanide (BUMEX) 1 mg tablet Take 1 Tab by mouth daily (after lunch). Qty: 30 Tab, Refills: 0    Associated Diagnoses: Mild bibasilar atelectasis; Acute on chronic combined systolic and diastolic congestive heart failure ([address] Bilateral pleural effusion; Aspiration pneumonia of both lower lobes due to gastric secretions ([address] Pharyngoesophageal dysphagia      pantoprazole (PROTONIX) 40 mg tablet Take 1 Tab by mouth daily. Qty: 30 Tab, Refills: 0    Associated Diagnoses: Esophageal dysphagia; Esophagitis with gastritis      sucralfate (CARAFATE) 100 mg/mL suspension Take 10 mL by mouth four (4) times daily. Qty: 414 mL, Refills: 2    Comments: I really want her to have the liquid. If not, ok to substitute to tablet with directions 1 tablet dissolved in 10 ml's of water 30 min's prior to meals.   Associated Diagnoses: Esophageal dysphagia; Esophagitis with gastritis clopidogrel (PLAVIX) 75 mg tab Take 1 Tab by mouth daily. Qty: 30 Tab, Refills: 0    Comments: [facility] Staff will  on Saturday. Associated Diagnoses: Paroxysmal atrial fibrillation ([address] Pulmonary heart disease (HCC); ST elevation myocardial infarction involving left anterior descending (LAD) coronary artery (HCC)      apixaban (ELIQUIS) 2.5 mg tablet Take 1 Tab by mouth two (2) times a day. Qty: 30 Tab, Refills: 0    Comments: [facility] staff will  Saturday for patient. Associated Diagnoses: Paroxysmal atrial fibrillation ([facility] Pulmonary heart disease (HCC); ST elevation myocardial infarction involving left anterior descending (LAD) coronary artery ([facility])      simvastatin (ZOCOR) 40 mg tablet Take 40 mg by mouth nightly. PARoxetine (PAXIL) 20 mg tablet Take 20 mg by mouth daily. carvedilol (COREG) 6.25 mg tablet Take 6.25 mg by mouth two (2) times a day. budesonide (PULMICORT) 0.25 mg/2 mL nbsp 2 mL by Nebulization route two (2) times a day. Qty: 60 Each, Refills: 2    Comments: Deliver to [facility]  Associated Diagnoses: Shortness of breath; Chronic combined systolic and diastolic congestive heart failure ([facility] Reactive airway disease, mild persistent, uncomplicated      potassium chloride (KLOR-CON) 10 mEq tablet Take 1 Tab by mouth daily. Qty: 30 Tab, Refills: 1    Comments: Deliver to [facility]  Associated Diagnoses: Diuretic-induced hypokalemia; Dehydration, moderate; Furrowed tongue      diltiazem CD (CARTIA XT) 180 mg ER capsule Take 180 mg by mouth nightly. nitroglycerin (NITROSTAT) 0.4 mg SL tablet Take 0.4 mg by mouth as needed. PROAIR HFA 90 mcg/actuation inhaler Take 2 Inhalation by inhalation four (4) times daily as needed. Nebulizer & Compressor machine 1 Each by Nebulization route four (4) times daily. Qty: 1 Each, Refills: 0    Comments: Dispense machine, tubing, and nebulizer masks for use.   Associated Diagnoses: Pulmonary heart disease [facility]); Shortness of breath; Chronic combined systolic and diastolic congestive heart failure ([facility] Reactive airway disease, mild persistent, uncomplicated      Nebulizer Accessories kit Dispense Nebulizer Accessories - face mask. Daily use due to COPD/Pulmonary Reactive Airway Disease/Hypoxemia  Qty: 1 Kit, Refills: 0    Associated Diagnoses: Shortness of breath; Chronic combined systolic and diastolic congestive heart failure ([facility] Reactive airway disease, mild persistent, uncomplicated      ondansetron hcl (ZOFRAN) 4 mg tablet Take 1 tablet 30 mins prior to breakfast, then q 8 hours prn for nausea/vomiting. Qty: 90 Tab, Refills: 1    Comments: Deliver to [facility]  Associated Diagnoses: Esophageal dysphagia; Choking due to food in larynx, initial encounter; Dehydration, moderate; Furrowed tongue       !! - Potential duplicate medications found. Please discuss with provider. STOP taking these medications       metoprolol tartrate (LOPRESSOR) 25 mg tablet Comments:   Reason for Stopping:         moxifloxacin (AVELOX) 400 mg tablet Comments:   Reason for Stopping:                 NOTIFY YOUR PHYSICIAN FOR ANY OF THE FOLLOWING:   Fever over 101 degrees for 24 hours. Chest pain, shortness of breath, fever, chills, nausea, vomiting, diarrhea, change in mentation, falling, weakness, bleeding. Severe pain or pain not relieved by medications. Or, any other signs or symptoms that you may have questions about.     DISPOSITION:    Home With:   OT  PT  HH  RN      x [facility]    Independent/assisted living    Hospice    Other:       PATIENT CONDITION AT DISCHARGE:     Functional status    Poor    x Deconditioned     Independent      Cognition     Lucid     Forgetful    x Dementia      Catheters/lines (plus indication)    [place]     PICC     PEG    x None      Code status    Full code    x DNR      PHYSICAL EXAMINATION AT DISCHARGE:  Visit Vitals  /50 (BP 1 Location: Right arm, BP Patient Position: At rest)   Pulse 83   Temp 97.4 °F (36.3 °C)   Resp 21   Ht 5' 1\" (1.549 m)   Wt 62.4 kg (137 lb 8 oz)   SpO2 95%   BMI 25.98 kg/m²     Constitutional:  awake, NAD   ENT:  oral mucosa moist, oropharynx benign    Resp:  bibasilar rales, no wheeze   CV:  regular rhythm, normal rate, + murmur, no peripheral edema    GI:  +BS, soft, non distended, non tender     Musculoskeletal:  HUERTA    Neurologic:  awake, pleasantly confused     Labs  Recent Results (from the past 24 hour(s))   METABOLIC PANEL, BASIC    Collection Time: 12/10/18  2:57 AM   Result Value Ref Range    Sodium 138 136 - 145 mmol/L    Potassium 3.9 3.5 - 5.1 mmol/L    Chloride 103 97 - 108 mmol/L    CO2 27 21 - 32 mmol/L    Anion gap 8 5 - 15 mmol/L    Glucose 111 (H) 65 - 100 mg/dL    BUN 37 (H) 6 - 20 MG/DL    Creatinine 1.04 (H) 0.55 - 1.02 MG/DL    BUN/Creatinine ratio 36 (H) 12 - 20      GFR est AA >60 >60 ml/min/1.73m2    GFR est non-AA 50 (L) >60 ml/min/1.73m2    Calcium 8.8 8.5 - 10.1 MG/DL   MAGNESIUM    Collection Time: 12/10/18  2:57 AM   Result Value Ref Range    Magnesium 1.9 1.6 - 2.4 mg/dL   SAMPLES BEING HELD    Collection Time: 12/10/18  2:57 AM   Result Value Ref Range    SAMPLES BEING HELD 1 LAV     COMMENT        Add-on orders for these samples will be processed based on acceptable specimen integrity and analyte stability, which may vary by analyte.        CHRONIC MEDICAL DIAGNOSES:  Problem List as of 12/10/2018 Date Reviewed: 12/10/2018          Codes Class Noted - Resolved    Acute respiratory failure with hypoxemia [facility]) ICD-10-CM: J96.01  ICD-9-CM: 518.81  12/10/2018 - Present        (HFpEF) heart failure with preserved ejection fraction (HCC) ICD-10-CM: I50.30  ICD-9-CM: 428.9  12/10/2018 - Present        CAD (coronary artery disease) ICD-10-CM: I25.10  ICD-9-CM: 414.00  12/10/2018 - Present        Hypothyroidism (Chronic) ICD-10-CM: E03.9  ICD-9-CM: 244.9  12/10/2018 - Present        AI (aortic insufficiency) ICD-10-CM: I35.1  ICD-9-CM: 424.1  12/6/2018 - Present        * (Principal) Bilateral pleural effusion ICD-10-CM: J90  ICD-9-CM: 511.9  12/6/2018 - Present        Paroxysmal atrial fibrillation (HCC) ICD-10-CM: I48.0  ICD-9-CM: 427.31  11/28/2018 - Present        Pulmonary heart disease ([facility]  ICD-10-CM: I27.9  ICD-9-CM: 416.9  11/28/2018 - Present        Diuretic-induced hypokalemia ICD-10-CM: E87.6, T50.2X5A  ICD-9-CM: 276.8, E944.4  11/28/2018 - Present        Moderate protein-calorie malnutrition ([facility]  ICD-10-CM: E44.0  ICD-9-CM: 263.0  11/28/2018 - Present        Atherosclerotic cardiovascular disease ICD-10-CM: I25.10  ICD-9-CM: 429.2  Unknown - Present        Fatty liver disease, nonalcoholic (Chronic) [facility]-BA: K76.0  ICD-9-CM: 571.8  6/5/2017 - Present        Kidney insufficiency (Chronic) ICD-10-CM: N28.9  ICD-9-CM: 593.9  6/5/2017 - Present        Osteoporosis ICD-10-CM: M81.0  ICD-9-CM: 733.00  11/16/2016 - Present        Nuclear cataract ICD-10-CM: [id number]  ICD-9-CM: 366.16  10/6/2016 - Present        Nuclear sclerosis ICD-10-CM: H25.10  ICD-9-CM: 366.16  7/25/2016 - Present        Ocular hypertension ICD-10-CM: H40.059  ICD-9-CM: 365.04  2/1/2016 - Present        HTN (hypertension) ICD-10-CM: I10  ICD-9-CM: 401.9  11/8/2011 - Present        Hyperlipidemia ICD-10-CM: E78.5  ICD-9-CM: 272.4  11/8/2011 - Present        GERD (gastroesophageal reflux disease) ICD-10-CM: K21.9  ICD-9-CM: 530.81  11/8/2011 - Present        Anxiety ICD-10-CM: F41.9  ICD-9-CM: 300.00  11/8/2011 - Present        Vertigo ICD-10-CM: R42  ICD-9-CM: 780.4  11/8/2011 - Present        RESOLVED: SOB (shortness of breath) ICD-10-CM: R06.02  ICD-9-CM: 786.05  12/6/2018 - 12/10/2018        RESOLVED: Age-related nuclear cataract, bilateral ICD-10-CM: H25.13  ICD-9-CM: 366.16  10/25/2017 - 11/28/2018        RESOLVED: Dry eyes ICD-10-CM: S50.947  ICD-9-CM: 375.15  6/19/2017 - 11/28/2018        RESOLVED: Absence of bladder continence ICD-10-CM: R32  ICD-9-CM: 788.30  4/14/2015 - 11/28/2018              Greater than 30 minutes were spent with the patient on counseling and coordination of care.     Signed:   Autumn Morales MD  12/10/2018  3:38 PM

## 2018-12-10 NOTE — PROGRESS NOTES
I have reviewed discharge instructions with the patient and son. The patient and son verbalized understanding. D/C package to travel with son to Health care facility. Report called to [facility].

## 2018-12-10 NOTE — PROGRESS NOTES
Problem: Mobility Impaired (Adult and Pediatric) Goal: *Acute Goals and Plan of Care (Insert Text) Physical Therapy Goals Initiated 12/7/2018 1. Patient will move from supine to sit and sit to supine , scoot up and down and roll side to side in bed with modified independence within 7 day(s). 2.  Patient will transfer from bed to chair and chair to bed with modified independence using the least restrictive device within 7 day(s). 3.  Patient will perform sit to stand with modified independence within 7 day(s). 4.  Patient will ambulate with modified independence for 100 feet with the least restrictive device within 7 day(s). physical Therapy TREATMENT Patient: Sabine Shah (82 y.o. female) Date: 12/10/2018 Diagnosis: SOB (shortness of breath) Bilateral pleural effusion Precautions: Fall Chart, physical therapy assessment, plan of care and goals were reviewed. ASSESSMENT: 
Patient received in bedside chair and enthusiastic to walk. Gait training with RW x300' with SBA and cues for safety. Cues required for obstacle avoidance and to keep the RW within her SARA but no overt LOB. She was oriented to [employer] [facility] and name but indicated that she still lives in her home in [place], [place] (per chart she is at [facility] for memory care). Continue to recommend increased supervision before returning to her home environment. Progression toward goals: 
[x]    Improving appropriately and progressing toward goals 
[]    Improving slowly and progressing toward goals 
[]    Not making progress toward goals and plan of care will be adjusted PLAN: 
Patient continues to benefit from skilled intervention to address the above impairments. Continue treatment per established plan of care. Discharge Recommendations: 24 hour supervision vs to healthcare Further Equipment Recommendations for Discharge:  None SUBJECTIVE:  
Patient stated I have got to get home.  OBJECTIVE DATA SUMMARY:  
 Critical Behavior: 
Neurologic State: Alert Orientation Level: Oriented to person, Disoriented to time, Disoriented to situation, Disoriented to place Cognition: Follows commands, Impaired decision making Safety/Judgement: Awareness of environment, Fall prevention Functional Mobility Training: 
Bed Mobility: 
  
Supine to Sit: Minimum assistance Scooting: Minimum assistance Transfers: 
  
  
     
  
     
  
  
  
  
Balance: 
Sitting: Intact Standing: Impaired; With support Standing - Static: Good Standing - Dynamic : FairAmbulation/Gait Training: 
Distance (ft): 300 Feet (ft) Assistive Device: Gait belt;Walker, rolling Ambulation - Level of Assistance: Stand-by assistance Gait Abnormalities: Decreased step clearance Base of Support: Narrowed Speed/Татьяна: Fluctuations Pain: 
Pain Scale 1: Numeric (0 - 10) Pain Intensity 1: 0 Activity Tolerance:  
 
Please refer to the flowsheet for vital signs taken during this treatment. After treatment:  
[x]    Patient left in no apparent distress sitting up in chair 
[]    Patient left in no apparent distress in bed 
[x]    Call bell left within reach [x]    Nursing notified 
[]    Caregiver present [x]    Bed alarm activated COMMUNICATION/COLLABORATION:  
The patients plan of care was discussed with: Registered Nurse Jeanmarie Toth, PT, DPT Time Calculation: 28 mins

## 2018-12-10 NOTE — PROGRESS NOTES
Problem: Falls - Risk of 
Goal: *Absence of Falls Document [facility] Fall Risk and appropriate interventions in the flowsheet. Outcome: Progressing Towards Goal 
Fall Risk Interventions: 
Mobility Interventions: Bed/chair exit alarm, OT consult for ADLs, Patient to call before getting OOB, Strengthening exercises (ROM-active/passive) Mentation Interventions: Bed/chair exit alarm, Door open when patient unattended, Familiar objects from home, Increase mobility, More frequent rounding, Reorient patient, Update white board Medication Interventions: Bed/chair exit alarm, Evaluate medications/consider consulting pharmacy, Patient to call before getting OOB Elimination Interventions: Bed/chair exit alarm, Call light in reach, Patient to call for help with toileting needs, Toileting schedule/hourly rounds

## 2018-12-10 NOTE — PROGRESS NOTES
Problem: Discharge Planning Goal: *Discharge to safe environment Outcome: Progressing Towards Goal 
Patient being D/C'd to [facility]. Family in agreement with current plan.

## 2018-12-10 NOTE — ROUTINE PROCESS
Bedside shift change report given to Siddhartha Cano (oncoming nurse) by Clarita (offgoing nurse). Report included the following information SBAR, Kardex, Procedure Summary, Accordion, Recent Results and Cardiac Rhythm A fib.

## 2018-12-10 NOTE — PROGRESS NOTES
FINDINGS:   
Ventricles:  Midline, no hydrocephalus. Generalized atrophy. Brain Parenchyma/Brainstem:  Moderate chronic white matter disease throughout the supratentorial brain. No acute infarction. Intracranial Hemorrhage:  Small focus of susceptibility artifact right cerebellum may indicate an area of prior hemorrhage or calcification. Basal Cisterns:  Normal.  
Flow Voids:  Normal. 
 
IMPRESSION: Atrophy and chronic white matter disease, with no acute infarction.

## 2018-12-10 NOTE — PROGRESS NOTES
Faxed AVS/discharge info to [facility]. Call made to [facility], she is aware of Ms. Bruno Kulkarni coming to facility. Discharge Discharging to [facility] RN -  Please call report to [phone] Add AVS 
 Add MAR Add Kardex CRM will fax discharge information to [phone]  Beata Sanchez, RN, BSN, [place] 
ED Care Management [phone]

## 2018-12-10 NOTE — PROGRESS NOTES
Problem: Falls - Risk of 
Goal: *Absence of Falls Document Zenaida Ross Fall Risk and appropriate interventions in the flowsheet. Outcome: Progressing Towards Goal 
Fall Risk Interventions: 
Mobility Interventions: Bed/chair exit alarm, OT consult for ADLs, Patient to call before getting OOB, PT Consult for mobility concerns, PT Consult for assist device competence Mentation Interventions: Door open when patient unattended, Increase mobility, More frequent rounding, Reorient patient, Room close to nurse's station, Update white board Medication Interventions: Bed/chair exit alarm, Patient to call before getting OOB, Evaluate medications/consider consulting pharmacy Elimination Interventions: Bed/chair exit alarm, Call light in reach, Patient to call for help with toileting needs, Toileting schedule/hourly rounds

## 2018-12-11 ENCOUNTER — PATIENT OUTREACH (OUTPATIENT)
Dept: CARDIOLOGY CLINIC | Age: 83
End: 2018-12-11

## 2018-12-11 LAB
BACTERIA SPEC CULT: NORMAL
GRAM STN SPEC: NORMAL
GRAM STN SPEC: NORMAL
SERVICE CMNT-IMP: NORMAL

## 2018-12-11 NOTE — PROGRESS NOTES
Pharmacist Discharge Medication Reconciliation Discharging Provider: Dr. Edward Reading Significant PMH:  
Past Medical History:  
Diagnosis Date  (HFpEF) heart failure with preserved ejection fraction ([facility] 10/17/2018  
 acute  Allergic rhinitis  Atherosclerotic cardiovascular disease 1999  
 CHF (congestive heart failure) ([facility] 09/11/2018  Cognitive communication deficit  Edema  Environmental allergies   
 dizziness-(chronic)  Fatty liver disease, nonalcoholic  GERD (gastroesophageal reflux disease)  H/O heart artery stent 09/2018  HCVD (hypertensive cardiovascular disease) 10/17/2018  Heart attack ([facility] 09/2018  Heart palpitations 2018 infrequent  History of PTCA  Hypercholesterolemia 1999  Hypertension 2010  Liver disease   
 fatty liver  MI (myocardial infarction) ([facility] 09/2018  Overactive bladder 08/09/2018  Peripheral neuropathy  Permanent atrial fibrillation ([facility] 11/05/2018  Senile dementia, without behavioral disturbance 11/05/2018  Stress incontinence, female 08/09/2018  Vertigo  Vitamin D deficiency Chief Complaint for this Admission: Chief Complaint Patient presents with  Referral / Consult Allergies: Celecoxib; Sulfa (sulfonamide antibiotics); Atorvastatin; Codeine; Darvocet a500 [propoxyphene n-acetaminophen]; Iodine; Statins-hmg-coa reductase inhibitors; and Aricept [donepezil] Discharge Medications:  
Discharge Medication List as of 12/10/2018  4:18 PM  
  
 
CONTINUE these medications which have NOT CHANGED Details  
albuterol-ipratropium (DUO-NEB) 2.5 mg-0.5 mg/3 ml nebu 3 mL by Nebulization route every 4 hours daily while awake resp., Historical Med  
  
polyethylene glycol (MIRALAX) 17 gram packet Take 17 g by mouth daily. , Historical Med  
  
guaiFENesin ER (MUCINEX) 600 mg ER tablet Take 600 mg by mouth two (2) times a day., Historical Med  
  
 senna (SENNA) 8.6 mg tablet Take 1 Tab by mouth nightly., Historical Med  
  
calcium-vitamin D (OYSTER SHELL) 500 mg(1,250mg) -200 unit per tablet Take 1 Tab by mouth two (2) times daily (with meals). , Historical Med  
  
levothyroxine (SYNTHROID) 25 mcg tablet Take 1 Tab by mouth Daily (before breakfast). , Normal, Disp-1 Tab, R-0  
  
!! bumetanide (BUMEX) 2 mg tablet Take 1 Tab by mouth every morning., Print, Disp-30 Tab, R-0  
  
!! bumetanide (BUMEX) 1 mg tablet Take 1 Tab by mouth daily (after lunch). , Print, Disp-30 Tab, R-0  
  
pantoprazole (PROTONIX) 40 mg tablet Take 1 Tab by mouth daily. , Normal, Disp-30 Tab, R-0  
  
sucralfate (CARAFATE) 100 mg/mL suspension Take 10 mL by mouth four (4) times daily. , NormalI really want her to have the liquid. If not, ok to substitute to tablet with directions 1 tablet dissolved in 10 ml's of water 30 min's prior to meals. Disp-414 mL, R-2  
  
clopidogrel (PLAVIX) 75 mg tab Take 1 Tab by mouth daily. , [facility] will  on Saturday. Disp-30 Tab, R-0  
  
apixaban (ELIQUIS) 2.5 mg tablet Take 1 Tab by mouth two (2) times a day., [facility] staff will  Saturday for patient. Disp-30 Tab, R-0  
  
simvastatin (ZOCOR) 40 mg tablet Take 40 mg by mouth nightly., Historical Med PARoxetine (PAXIL) 20 mg tablet Take 20 mg by mouth daily. , Historical Med  
  
carvedilol (COREG) 6.25 mg tablet Take 6.25 mg by mouth two (2) times a day., Historical Med  
  
budesonide (PULMICORT) 0.25 mg/2 mL nbsp 2 mL by Nebulization route two (2) times a day., NormalDeliver to [place]wood[place]-60 Each, R-2  
  
potassium chloride (KLOR-CON) 10 mEq tablet Take 1 Tab by mouth daily. , NormalDeliver to [place]woodDisp-30 Tab, R-1  
  
diltiazem CD (CARTIA XT) 180 mg ER capsule Take 180 mg by mouth nightly., Historical Med  
  
!! OTHER Dispense - Portable Oxygen & Supplies due to congestive heart failure, hypoxemia, confusion due to hypoxemia.  Long term treatment is required as patient failed in office walk test. Oxygen saturations noted to be at start 89% RA then dropped to 78% with a ctivity. , Print, Disp-1 Piece, R-0  
  
!! OTHER Dispense Oxygen Concentrator with humidified air and supplies. Dx: I50.42 CHF; Dx: I48.91 Afib uncontrolled; Dx: R06.02 SOB; Dx: R09.02 Hypoxemia. Patient failed walk test in office. Pulse ox at 78% on RA., PrintStat Delivery to Patients Jaimie Romero HT! Disp-1 Piece, R-0  
  
!! OTHER May crush or change to liquid for any prescription prn due to dysphagia. , Print, Disp-1 Piece, R-0  
  
nitroglycerin (NITROSTAT) 0.4 mg SL tablet Take 0.4 mg by mouth as needed., Historical Med PROAIR HFA 90 mcg/actuation inhaler Take 2 Inhalation by inhalation four (4) times daily as needed., Historical Med, JEET Nebulizer & Compressor machine 1 Each by Nebulization route four (4) times daily. , PrintDispense machine, tubing, and nebulizer masks for use. Disp-1 Each, R-0 Nebulizer Accessories kit Dispense Nebulizer Accessories - face mask. Daily use due to COPD/Pulmonary Reactive Airway Disease/Hypoxemia, Print, Disp-1 Kit, R-0  
  
ondansetron hcl (ZOFRAN) 4 mg tablet Take 1 tablet 30 mins prior to breakfast, then q 8 hours prn for nausea/vomiting., NormalDeliver to [place]Disp-90 Tab, R-1  
  
 !! - Potential duplicate medications found. Please discuss with provider.   
  
 
STOP taking these medications  
  
 metoprolol tartrate (LOPRESSOR) 25 mg tablet Comments:  
Reason for Stopping:   
   
 moxifloxacin (AVELOX) 400 mg tablet Comments:  
Reason for Stopping:   
   
  
 
 
The patient's chart, MAR and AVS were reviewed by EUGENIA BeaverD.

## 2018-12-13 ENCOUNTER — DOCUMENTATION ONLY (OUTPATIENT)
Dept: CARDIOLOGY CLINIC | Age: 83
End: 2018-12-13

## 2018-12-13 NOTE — PROGRESS NOTES
Received hospital notes from [address] after her hospitalization at [facility]    Updates to known medical history    Hx of HTN  Symptoms preceding STEMI were palpitations, nausea, dizziness, diaphoresis, maybe some chest heaviness  Troponin > 50      Cardiac Cath 9/11/18  Thrombectomy of RCA, PTCA/stent of the distal RCA into the PLB with a 2.75 x 12 and 3.0 x 15 mm KENNETH (resolute stent), complicated case  Ao 160/84, LVEDP 27, no gradient across AV valve   RCA dominant, totally occluded in distal segment, LM normal, LAD transapical vessel and small caliber normal.  LCX normal.  LVEF 50%, 2+ MR  Creatinine up to 1.5 post cath/MI  Lipids 9/18 - , TG 81, , HDL 50, TSH 1.7    Head CT normal 9/14/18  Mild pulmonary edema on CXRs during admission

## 2018-12-19 ENCOUNTER — PATIENT OUTREACH (OUTPATIENT)
Dept: CARDIOLOGY CLINIC | Age: 83
End: 2018-12-19

## 2018-12-19 NOTE — PROGRESS NOTES
Goals      Reduce risk of CHF exacerbations and complications. 12/12/18   Working with Easton Kassiens to coordinate [place]. Nurse at [facility] states she is \"really swamped right now\" and can not perform med rec with me. Put me on hold to ask if admissions director could assist. She came back and stated that the admissions director says Mata Mars got everything they needed from hospital at discharge. \"  NN will fax over weight sheet to have staff fill out and will follow up next week---mkrw    12/14/18  Working with [facility] on [place]. Was able to speak with Hermelinda, nurse talking care of patient. Med Rec performed. She reports patient has orders for daily weights, however did not have weight for several days. Most recent weight on 12/10/18 is 128 lbs, which is down from discharge weight of 137 lbs. NN encouraged obtaining weight daily and to report to house MD weigh gain greater than 2 lbs/day or 5 lbs/week. She reports that patient is on AHA diet. Hermelinda reports patient saw sangita Driscoll within 48 hours of returning to [facility]--she did not have access to exactly when patient was seen however this is standard practice for their patients. Patient does have cardiology appt with Dr Hortencia Sepulveda on 12/20/18. Patient's son is to transport her. NN will follow up with son and facility for updates. ---mkrw    12/19/18     Spoke to Rita Dow at [facility]. She reports that patient is doing well. She participates in PT/OT. Her last weight on 12/17/18 was 131lbs, 3 lbs increase since 12/10/18. NN voiced concern that patient isn't being weighed daily. She states she will have to weigh the patient daily herself since it is not getting done every day. Patient does not have any leg edema, no SOB, lungs clear. She states that patient will be going to see Dr Hortencia Sepulveda on 12/20/18. Patient continues to maintain low sodium diet.  She reports that there are no plans at this time to send patient back to her apt from skilled at this time. NN will follow up with Hermelinda next week for updates.  ---macey

## 2018-12-20 ENCOUNTER — OFFICE VISIT (OUTPATIENT)
Dept: CARDIOLOGY CLINIC | Age: 83
End: 2018-12-20

## 2018-12-20 ENCOUNTER — HOSPITAL ENCOUNTER (OUTPATIENT)
Dept: GENERAL RADIOLOGY | Age: 83
Discharge: HOME | End: 2018-12-20
Payer: MEDICARE

## 2018-12-20 VITALS
OXYGEN SATURATION: 87 % | HEART RATE: 80 BPM | BODY MASS INDEX: 25.53 KG/M2 | WEIGHT: 135.2 LBS | HEIGHT: 61 IN | DIASTOLIC BLOOD PRESSURE: 60 MMHG | SYSTOLIC BLOOD PRESSURE: 100 MMHG | RESPIRATION RATE: 16 BRPM

## 2018-12-20 DIAGNOSIS — I48.0 PAROXYSMAL ATRIAL FIBRILLATION (HCC): ICD-10-CM

## 2018-12-20 DIAGNOSIS — I25.10 CORONARY ARTERY DISEASE INVOLVING NATIVE CORONARY ARTERY OF NATIVE HEART WITHOUT ANGINA PECTORIS: ICD-10-CM

## 2018-12-20 DIAGNOSIS — J90 PLEURAL EFFUSION: ICD-10-CM

## 2018-12-20 DIAGNOSIS — I95.2 HYPOTENSION DUE TO DRUGS: ICD-10-CM

## 2018-12-20 DIAGNOSIS — I34.0 NON-RHEUMATIC MITRAL REGURGITATION: Primary | ICD-10-CM

## 2018-12-20 PROCEDURE — 71046 X-RAY EXAM CHEST 2 VIEWS: CPT

## 2018-12-20 RX ORDER — CARVEDILOL 3.12 MG/1
3.12 TABLET ORAL 2 TIMES DAILY
Qty: 180 TAB | Refills: 1 | Status: SHIPPED | OUTPATIENT
Start: 2018-12-20 | End: 2019-05-13

## 2018-12-20 NOTE — PROGRESS NOTES
[facility]  [phone]    HPI: Evelyn Ambrose, a 80y.o. year-old who presents for follow up regarding her severe MR and diastolic heart failure. She is in healthcare now at [facility] says they plan to move her to assisted living but I suggested they hold off on transfer out of healthcare until we make sure she is stable  She reports that her stamina is better  Says she gets dyspnea with walking depending on how fast she is walking  Denies PND and sleeps on 2 pillows  Weight down 2 lbs from the hospital, also reports poor appetite, we talked about eating 3 meals/day  Says she is sleeping well   Denies any chest pain, pressure, tightness  Reports rare palpitations which only last a few seconds and without any high risk features  Says she has very little dizziness now, no syncope  BP low today - 100/60  O2 Sats 90% with ambulation   Says she coughs all the time but it is not productive, no fevers or chills  Son reports she is coughing much less now than she was prior to admission  Patient's son is with her today, she is a poor historian    **After last office visit labs received dated 12/24/18  BUN 15, Cr 0.97, K 3.3, Mg 1.7    Assessment/Plan:  1. CAD s/p Inferior STEMI 9/11/18 with PTCA/stents to distal RCA (KENNETH x 2)  -symptoms preceding STEMI were palpitations, nausea, dizziness, diaphoresis, maybe some chest heaviness, troponin > 50  -continue plavix, coreg, statin  2. Severe MR - seen by valve team during admission, no plans for valve repair, etc at this time   -will repeat TTE in 1 month to reassess severity of MR and follow up with Dr. Jeanmarie Penaloza at that time   3. Large left pleural effusion on TTE, moderate on CXR on admission - will repeat CXR now to reassess, continue bumex  4. AI - moderate by TTE   5. Hypotension - hx of HTN, advised her to reduce her coreg to 3.125mg BID and continue other medications for now, will need to watch BP closely  6.   HFpEF - BP controlled, continue bumex, will check BMP and magnesium level prior to her return appointment in 1 month  7. AF - rate controlled on coreg 6.25mg BID and diltiazem 180mg daily, reducing coreg dose as above for hypotension, continue Eliquis 2.5mg BID for anticoagulation  8. Dyslipidemia - LDL 56 on simvastatin 40mg daily   -Lipids 9/18 - , TG 81, , HDL 50  9. CHEMA post cardiac cath - now creatinine normal  10. DNR status in place    Echo 12/6/18 - LVEF 50 %, no WMA, grade 3 dd, LA severely dilated, MAC with eccentric severe regurgitation that was posteriorly directed, AV sclerosis without stenosis and mod AI, mild TR, PASP mildly increased, dilated IVC, large right pleural effusion, large left pleural effusion.   Head CT 9/18 normal  Inferior STEMI 9/11/18, Cardiac Cath 9/11/18  Thrombectomy of RCA, PTCA/stent of the distal RCA into the PLB with a 2.75 x 12 and 3.0 x 15 mm KENNETH (resolute stent), complicated case Ao 537/26, LVEDP 27, no gradient across AV valve, RCA dominant, totally occluded in distal segment, LM normal, LAD transapical vessel and small caliber normal.  LCX normal.  LVEF 50%, 2+ MR  Echo 4/18 - LVEF 55 %, no WMA,mod dilated LA, MAC with mild MR, AV sclerosis with mod AI, mild TR, PASP 30mmHg, mild PI    Soc Hx: no tobacco use x 10 years, no etoh use   Fam Hx: mother passed at age 80 from heart problems    She  has a past medical history of (HFpEF) heart failure with preserved ejection fraction ([address] Allergic rhinitis, Atherosclerotic cardiovascular disease, CHF (congestive heart failure) ([address] Cognitive communication deficit, Edema, Environmental allergies, Fatty liver disease, nonalcoholic, GERD (gastroesophageal reflux disease), H/O heart artery stent, HCVD (hypertensive cardiovascular disease), Heart attack ([address] Heart palpitations, History of PTCA, Hypercholesterolemia, Hypertension, Liver disease, MI (myocardial infarction) ([address] Overactive bladder, Peripheral neuropathy, Permanent atrial fibrillation ([facility] Senile dementia, without behavioral disturbance, Stress incontinence, female, Vertigo, and Vitamin D deficiency. Cardiovascular ROS: no chest pain, positive for dyspnea   Respiratory ROS: positive for cough, shortness of breath  Neurological ROS: no TIA or stroke symptoms  All other systems negative except as above. PE  Vitals:    12/20/18 1006   BP: 100/60   Pulse: 80   Resp: 16   SpO2: (!) 87%   Weight: 135 lb 3.2 oz (61.3 kg)   Height: 5' 1\" (1.549 m)    Body mass index is 25.55 kg/m².    General appearance - alert, well appearing, and in no distress  Mental status - affect appropriate to mood  Eyes - sclera anicteric, moist mucous membranes  Neck - supple  Lymphatics - not assessed  Chest - diminished base on left side, right lung clear    Heart - normal rate, irregular rhythm, normal S1, S2, no murmurs, rubs, clicks or gallops  Abdomen - soft, nontender, nondistended  Back exam - full range of motion  Neurological - no focal deficit  Musculoskeletal - normal strength  Extremities - peripheral pulses normal, trace LE edema  Skin - normal coloration  no rashes    Recent Labs:  Lab Results   Component Value Date/Time    Cholesterol, total 118 11/28/2018 01:39 PM    HDL Cholesterol 42 11/28/2018 01:39 PM    LDL, calculated 56 11/28/2018 01:39 PM    Triglyceride 98 11/28/2018 01:39 PM    CHOL/HDL Ratio 3.8 12/17/2009 08:35 AM     Lab Results   Component Value Date/Time    Creatinine 1.04 (H) 12/10/2018 02:57 AM     Lab Results   Component Value Date/Time    BUN 37 (H) 12/10/2018 02:57 AM     Lab Results   Component Value Date/Time    Potassium 3.9 12/10/2018 02:57 AM     Lab Results   Component Value Date/Time    Hemoglobin A1c 6.4 (H) 12/06/2018 02:03 PM     Lab Results   Component Value Date/Time    HGB 11.4 (L) 12/07/2018 02:46 AM     Lab Results   Component Value Date/Time    PLATELET 874 12/97/7068 02:46 AM       Reviewed:  Past Medical History:   Diagnosis Date    (HFpEF) heart failure with preserved ejection fraction ([facility] 10/17/2018    acute    Allergic rhinitis     Atherosclerotic cardiovascular disease     CHF (congestive heart failure) ([facility] 2018    Cognitive communication deficit     Edema     Environmental allergies     dizziness-(chronic)    Fatty liver disease, nonalcoholic     GERD (gastroesophageal reflux disease)     H/O heart artery stent 2018    HCVD (hypertensive cardiovascular disease) 10/17/2018    Heart attack ([facility] 2018    Heart palpitations 2018    infrequent     History of PTCA     Hypercholesterolemia     Hypertension     Liver disease     fatty liver    MI (myocardial infarction) ([facility] 2018    Overactive bladder 2018    Peripheral neuropathy     Permanent atrial fibrillation ([facility] 2018    Senile dementia, without behavioral disturbance 2018    Stress incontinence, female 2018    Vertigo     Vitamin D deficiency      Social History     Tobacco Use   Smoking Status Former Smoker    Packs/day: 4.00    Types: Cigarettes    Last attempt to quit: 1955    Years since quittin.0   Smokeless Tobacco Never Used     Social History     Substance and Sexual Activity   Alcohol Use No    Alcohol/week: 0.0 - 0.5 oz     Allergies   Allergen Reactions    Celecoxib Other (comments)    Sulfa (Sulfonamide Antibiotics) Unknown (comments) and Hives    Atorvastatin Unknown (comments)    Codeine Unknown (comments)    Darvocet A500 [Propoxyphene N-Acetaminophen] Unknown (comments)    Iodine Unknown (comments)    Statins-Hmg-Coa Reductase Inhibitors Other (comments)     (intolerance)-myalgias    Aricept [Donepezil] Myalgia     Headaches - noted as intolerance        Current Outpatient Medications   Medication Sig    carvedilol (COREG) 3.125 mg tablet Take 1 Tab by mouth two (2) times a day.     albuterol-ipratropium (DUO-NEB) 2.5 mg-0.5 mg/3 ml nebu 3 mL by Nebulization route every 4 hours daily while awake resp.  polyethylene glycol (MIRALAX) 17 gram packet Take 17 g by mouth daily.  guaiFENesin ER (MUCINEX) 600 mg ER tablet Take 600 mg by mouth two (2) times a day.  senna (SENNA) 8.6 mg tablet Take 1 Tab by mouth nightly.  calcium-vitamin D (OYSTER SHELL) 500 mg(1,250mg) -200 unit per tablet Take 1 Tab by mouth two (2) times daily (with meals).  levothyroxine (SYNTHROID) 25 mcg tablet Take 1 Tab by mouth Daily (before breakfast).  OTHER Dispense - Portable Oxygen & Supplies due to congestive heart failure, hypoxemia, confusion due to hypoxemia. Long term treatment is required as patient failed in office walk test. Oxygen saturations noted to be at start 89% RA then dropped to 78% with activity.  OTHER Dispense Oxygen Concentrator with humidified air and supplies. Dx: I50.42 CHF; Dx: I48.91 Afib uncontrolled; Dx: R06.02 SOB; Dx: R09.02 Hypoxemia. Patient failed walk test in office. Pulse ox at 78% on RA.  bumetanide (BUMEX) 2 mg tablet Take 1 Tab by mouth every morning.  bumetanide (BUMEX) 1 mg tablet Take 1 Tab by mouth daily (after lunch).  OTHER May crush or change to liquid for any prescription prn due to dysphagia.  pantoprazole (PROTONIX) 40 mg tablet Take 1 Tab by mouth daily.  sucralfate (CARAFATE) 100 mg/mL suspension Take 10 mL by mouth four (4) times daily.  clopidogrel (PLAVIX) 75 mg tab Take 1 Tab by mouth daily.  apixaban (ELIQUIS) 2.5 mg tablet Take 1 Tab by mouth two (2) times a day.  simvastatin (ZOCOR) 40 mg tablet Take 40 mg by mouth nightly.  PARoxetine (PAXIL) 20 mg tablet Take 20 mg by mouth daily.  nitroglycerin (NITROSTAT) 0.4 mg SL tablet Take 0.4 mg by mouth as needed.  PROAIR HFA 90 mcg/actuation inhaler Take 2 Inhalation by inhalation four (4) times daily as needed.  budesonide (PULMICORT) 0.25 mg/2 mL nbsp 2 mL by Nebulization route two (2) times a day.     Nebulizer & Compressor machine 1 Each by Nebulization route four (4) times daily.  Nebulizer Accessories kit Dispense Nebulizer Accessories - face mask. Daily use due to COPD/Pulmonary Reactive Airway Disease/Hypoxemia    potassium chloride (KLOR-CON) 10 mEq tablet Take 1 Tab by mouth daily.  ondansetron hcl (ZOFRAN) 4 mg tablet Take 1 tablet 30 mins prior to breakfast, then q 8 hours prn for nausea/vomiting.  diltiazem CD (CARTIA XT) 180 mg ER capsule Take 180 mg by mouth nightly. No current facility-administered medications for this visit.         Anita Sandifer, NP  [facility] [address], [address] [address]  [place], [facility]  [phone]

## 2018-12-20 NOTE — PATIENT INSTRUCTIONS
Please get chest xray today   Please reduce your carvedilol to 3.125mg twice daily because your blood pressure is too low   Please have labs drawn prior to your next visit

## 2018-12-21 ENCOUNTER — TELEPHONE (OUTPATIENT)
Dept: CARDIOLOGY CLINIC | Age: 83
End: 2018-12-21

## 2018-12-21 ENCOUNTER — PATIENT OUTREACH (OUTPATIENT)
Dept: CASE MANAGEMENT | Age: 83
End: 2018-12-21

## 2018-12-21 DIAGNOSIS — E03.9 ACQUIRED HYPOTHYROIDISM: ICD-10-CM

## 2018-12-21 DIAGNOSIS — J69.0 ASPIRATION PNEUMONIA OF BOTH LOWER LOBES DUE TO GASTRIC SECRETIONS (HCC): ICD-10-CM

## 2018-12-21 DIAGNOSIS — N28.9 KIDNEY INSUFFICIENCY: Chronic | ICD-10-CM

## 2018-12-21 DIAGNOSIS — R47.89 WORD FINDING DIFFICULTY: ICD-10-CM

## 2018-12-21 DIAGNOSIS — Z09 HOSPITAL DISCHARGE FOLLOW-UP: ICD-10-CM

## 2018-12-21 DIAGNOSIS — R41.0 CONFUSION AND DISORIENTATION: ICD-10-CM

## 2018-12-21 DIAGNOSIS — I34.0 NON-RHEUMATIC MITRAL REGURGITATION: ICD-10-CM

## 2018-12-21 DIAGNOSIS — J90 BILATERAL PLEURAL EFFUSION: ICD-10-CM

## 2018-12-21 DIAGNOSIS — R13.14 PHARYNGOESOPHAGEAL DYSPHAGIA: ICD-10-CM

## 2018-12-21 DIAGNOSIS — I48.0 PAROXYSMAL ATRIAL FIBRILLATION (HCC): Chronic | ICD-10-CM

## 2018-12-21 DIAGNOSIS — J90 PLEURAL EFFUSION: ICD-10-CM

## 2018-12-21 DIAGNOSIS — J98.11 MILD BIBASILAR ATELECTASIS: ICD-10-CM

## 2018-12-21 DIAGNOSIS — I50.43 ACUTE ON CHRONIC COMBINED SYSTOLIC AND DIASTOLIC CONGESTIVE HEART FAILURE (HCC): ICD-10-CM

## 2018-12-21 DIAGNOSIS — I48.0 PAROXYSMAL ATRIAL FIBRILLATION (HCC): ICD-10-CM

## 2018-12-21 DIAGNOSIS — I48.0 PAROXYSMAL ATRIAL FIBRILLATION (HCC): Primary | ICD-10-CM

## 2018-12-21 RX ORDER — BUMETANIDE 2 MG/1
2 TABLET ORAL 2 TIMES DAILY
Qty: 180 TAB | Refills: 1
Start: 2018-12-21 | End: 2019-02-28

## 2018-12-21 NOTE — TELEPHONE ENCOUNTER
No answer on patient's home phone. Unable to leave message on cell phone. Left message on patient's son's cell phone.        ----- Message from Luis Rico NP sent at 12/21/2018  9:12 AM EST -----  Moderate left pleural effusion, probably more than her last CXR after diuresis in the hospital.  Not very symptomatic so I spoke with Jeanna Trinidad NP and recommended she increase her bumex to 2mg BID and check BMP, Mg in 1 week and repeat CXR in 1 week. Please call patient with update.

## 2018-12-21 NOTE — PROGRESS NOTES
Goals      Reduce risk of CHF exacerbations and complications. 12/12/18   Working with Mehnaz Walls to coordinate [place]. Nurse at [facility] states she is \"really swamped right now\" and can not perform med rec with me. Put me on hold to ask if admissions director could assist. She came back and stated that the admissions director says Misael Lopez got everything they needed from hospital at discharge. \"  NN will fax over weight sheet to have staff fill out and will follow up next week---mkrw    12/14/18  Working with [facility] on [place]. Was able to speak with Hermelinda, nurse talking care of patient. Med Rec performed. She reports patient has orders for daily weights, however did not have weight for several days. Most recent weight on 12/10/18 is 128 lbs, which is down from discharge weight of 137 lbs. NN encouraged obtaining weight daily and to report to house MD weigh gain greater than 2 lbs/day or 5 lbs/week. She reports that patient is on AHA diet. Hermelinda reports patient saw house MD Dr Minnie Amador within 48 hours of returning to [facility]--she did not have access to exactly when patient was seen however this is standard practice for their patients. Patient does have cardiology appt with Dr Alvaro Garcia on 12/20/18. Patient's son is to transport her. NN will follow up with son and facility for updates. ---mkrw    12/19/18     Spoke to Pina Ceron at [facility]. She reports that patient is doing well. She participates in PT/OT. Her last weight on 12/17/18 was 131lbs, 3 lbs increase since 12/10/18. NN voiced concern that patient isn't being weighed daily. She states she will have to weigh the patient daily herself since it is not getting done every day. Patient does not have any leg edema, no SOB, lungs clear. She states that patient will be going to see Dr Alvaro Garcia on 12/20/18. Patient continues to maintain low sodium diet.  She reports that there are no plans at this time to send patient back to her apt from skilled at this time. AKI will follow up with Hermelinda next week for updates. ---macey    12/21/18  Per cardiology office visit notes, patient ordered to have labs drawn, CXR, decrease carvedilol and increase bumex. Maty Jenkins to follow up on new orders. Was transferred to Nurse Supervisor voicemail , left message for return call. Noted that MD office also wanted Bumex given BID however chart still reflects daily. Staff Message sent to NP Toribio Chandler. To let her know.  NN will follow up on Monday---macey

## 2018-12-21 NOTE — TELEPHONE ENCOUNTER
----- Message from Leticia Denise NP sent at 12/21/2018  9:12 AM EST -----  Moderate left pleural effusion, probably more than her last CXR after diuresis in the hospital.  Not very symptomatic so I spoke with Donna Hinton NP and recommended she increase her bumex to 2mg BID and check BMP, Mg in 1 week and repeat CXR in 1 week. Please call patient with update.

## 2018-12-21 NOTE — PROGRESS NOTES
Goals      Reduce risk of CHF exacerbations and complications. 12/12/18   Working with Macie Hawk to coordinate [place]. Nurse at [facility] states she is \"really swamped right now\" and can not perform med rec with me. Put me on hold to ask if admissions director could assist. She came back and stated that the admissions director says Lyn Alvarado got everything they needed from hospital at discharge. \"  NN will fax over weight sheet to have staff fill out and will follow up next week---mkrw    12/14/18  Working with [facility] on [place]. Was able to speak with Hermelinda nurse talking care of patient. Med Rec performed. She reports patient has orders for daily weights, however did not have weight for several days. Most recent weight on 12/10/18 is 128 lbs, which is down from discharge weight of 137 lbs. NN encouraged obtaining weight daily and to report to house MD weigh gain greater than 2 lbs/day or 5 lbs/week. She reports that patient is on AHA diet. Hermelinda reports patient saw sangita Bauer within 48 hours of returning to [facility]--she did not have access to exactly when patient was seen however this is standard practice for their patients. Patient does have cardiology appt with Dr Lobo Self on 12/20/18. Patient's son is to transport her. NN will follow up with son and facility for updates. ---mkrw    12/19/18     Spoke to Mere Banegas at [facility]. She reports that patient is doing well. She participates in PT/OT. Her last weight on 12/17/18 was 131lbs, 3 lbs increase since 12/10/18. NN voiced concern that patient isn't being weighed daily. She states she will have to weigh the patient daily herself since it is not getting done every day. Patient does not have any leg edema, no SOB, lungs clear. She states that patient will be going to see Dr Lobo Self on 12/20/18. Patient continues to maintain low sodium diet.  She reports that there are no plans at this time to send patient back to her apt from skilled at this time. AKI will follow up with Hermelinda next week for updates. ---macey    12/21/18  Per cardiology office visit notes, patient ordered to have labs drawn, CXR, decrease carvedilol and increase bumex. Maty Jenkins to follow up on new orders. Was transferred to Nurse Supervisor voicemail , left message for return call. Noted that MD office also wanted Bumex given BID however chart still reflects daily. Staff Message sent to DYLAN Mckeon. To let her know.  NN will follow up on Monday---macey

## 2018-12-21 NOTE — PROGRESS NOTES
Moderate left pleural effusion, probably more than her last CXR after diuresis in the hospital.  Not very symptomatic so I spoke with Stephie Dobson NP and recommended she increase her bumex to 2mg BID and check BMP, Mg in 1 week and repeat CXR in 1 week. Please call patient with update.

## 2018-12-21 NOTE — TELEPHONE ENCOUNTER
Spoke to patients son, Ro Gonzalez. Name noted on permission to release information. Identifiers x 2. Informed of recommendations per DESI Gómez NP. Verbalized understanding. Patient now residing at [facility]. [phone].

## 2018-12-21 NOTE — TELEPHONE ENCOUNTER
Telephone Orders received from [facility] HELDER GENAO NP on behalf of Dr. Antoinette Styles     Enlarging left pleural effusion with Right side effusion resolving per chest xray on 12/20/2018. The following orders were transcribed to [facility] and approved for me to write per Dr. Claudia Magana. 1. Weights BID  2. Vital signs q 4 hours with SPO2, if SPO2 is less than 90% start Nasal Cannula at 2 L/min with humidified air continuous due to increasing pleural effusion. Hold coreg dose if systolic is less than 336. Call Vadim Alexandre NP over the weekend [phone] to report BP please. 3. Increase Bumex from 3 mg daily to 4 mg daily. 2 mg AM & 2 mg at 1400 daily. 4. CMP, Mag, TSH, Chest Xray Monday 12/24/2018 to monitor renal values & left pleural effusion.

## 2018-12-21 NOTE — TELEPHONE ENCOUNTER
Maty Jenkins and spoke to representative. Informed to increase bumex to 2 mg bid. Labs of BMP, MG and chest x-ray in 1 week. Faxed order. To be done at facility. Confirmation received. Medication profile updated. Verbal order per DESI Gómez NP.

## 2018-12-24 ENCOUNTER — TELEPHONE (OUTPATIENT)
Dept: CARDIOLOGY CLINIC | Age: 83
End: 2018-12-24

## 2018-12-24 NOTE — TELEPHONE ENCOUNTER
Medication profile received via Brock Ames NP. Medication profile updated. Verbal order per Dr. Radha Burk. Charlene Garcia to place order for labs and chest x-ray.

## 2018-12-26 ENCOUNTER — PATIENT OUTREACH (OUTPATIENT)
Dept: CARDIOLOGY CLINIC | Age: 83
End: 2018-12-26

## 2018-12-26 NOTE — PROGRESS NOTES
Goals      Reduce risk of CHF exacerbations and complications. 12/12/18   Working with Jesse Skelton to coordinate [place]. Nurse at [facility] states she is \"really swamped right now\" and can not perform med rec with me. Put me on hold to ask if admissions director could assist. She came back and stated that the admissions director says Esther Kebede got everything they needed from hospital at discharge. \"  NN will fax over weight sheet to have staff fill out and will follow up next week---mkrw    12/14/18  Working with [facility] on [place]. Was able to speak with Hermelinda nurse talking care of patient. Med Rec performed. She reports patient has orders for daily weights, however did not have weight for several days. Most recent weight on 12/10/18 is 128 lbs, which is down from discharge weight of 137 lbs. NN encouraged obtaining weight daily and to report to house MD weigh gain greater than 2 lbs/day or 5 lbs/week. She reports that patient is on AHA diet. Hermelinda reports patient saw sangita Haro within 48 hours of returning to [facility]--she did not have access to exactly when patient was seen however this is standard practice for their patients. Patient does have cardiology appt with Dr Emre Charles on 12/20/18. Patient's son is to transport her. NN will follow up with son and facility for updates. ---mkrw    12/19/18     Spoke to Lizbet Raymond at [facility]. She reports that patient is doing well. She participates in PT/OT. Her last weight on 12/17/18 was 131lbs, 3 lbs increase since 12/10/18. NN voiced concern that patient isn't being weighed daily. She states she will have to weigh the patient daily herself since it is not getting done every day. Patient does not have any leg edema, no SOB, lungs clear. She states that patient will be going to see Dr Emre Charles on 12/20/18. Patient continues to maintain low sodium diet.  She reports that there are no plans at this time to send patient back to her apt from skilled at this time. NN will follow up with Hermelinda next week for updates. ---mkrw    12/21/18  Per cardiology office visit notes, patient ordered to have labs drawn, CXR, decrease carvedilol and increase bumex. Maty Jenkins to follow up on new orders. Was transferred to Nurse Supervisor voicemail , left message for return call. Noted that MD office also wanted Bumex given BID however chart still reflects daily. Staff Message sent to NP Raiza Franocis. To let her know. NN will follow up on Monday---mkrw    12/26/18  Spoke to [address]  at [facility] is nurse taking care of patient today. She reports that Repeat CXR has not been performed yet---she is putting order in the computer now to have it done today. CMP, MG, and TSH levels drawn on 12/24/18. She reports Mg level 1.7, K level 3.3. Patient is on 10 meq potassium daily. Will send note over to MD office to see if increase may be needed. She is faxing lab results to Dr Delane Hammans at [phone] today. She will also have CXR results faxed to MD office as well. Patient's weight today is 129.4lbs. On 12/20 at MD office weight 130 lbs 3 oz. Heather did not note any swelling in ankles or feet, however patient did request 02 this morning so she is wearing it at 2L NC. Confirmed that Coreg is now 3.125 mg BID and Bumex is 2 mg BID. Next appt is 1/18/19 with Dr Delane Hammans.     NN will follow up next week for updates from [facility], [facility].

## 2018-12-27 ENCOUNTER — TELEPHONE (OUTPATIENT)
Dept: GERIATRIC MEDICINE | Age: 83
End: 2018-12-27

## 2018-12-27 DIAGNOSIS — Q23.9 MITRAL VALVE DYSPLASIA: ICD-10-CM

## 2018-12-27 DIAGNOSIS — I35.1 AORTIC VALVE INSUFFICIENCY, ETIOLOGY OF CARDIAC VALVE DISEASE UNSPECIFIED: ICD-10-CM

## 2018-12-27 DIAGNOSIS — I50.30 (HFPEF) HEART FAILURE WITH PRESERVED EJECTION FRACTION (HCC): ICD-10-CM

## 2018-12-27 DIAGNOSIS — J90 BILATERAL PLEURAL EFFUSION: Primary | ICD-10-CM

## 2018-12-27 NOTE — TELEPHONE ENCOUNTER
Per Dr. Ilir Canales NP     We should send Mrs. Lilian Alvarado for ultrasound guided thoracocentesis due to bilateral Pleural Effusions are increasing. I advised Dr. Jane Emery I ordered the test to be completed per [facility] DYLAN GENAO's orders. Orders given to [facility] to get patient to the procedure.

## 2018-12-28 ENCOUNTER — TELEPHONE (OUTPATIENT)
Dept: GERIATRIC MEDICINE | Age: 83
End: 2018-12-28

## 2018-12-28 DIAGNOSIS — J98.11 MILD BIBASILAR ATELECTASIS: ICD-10-CM

## 2018-12-28 DIAGNOSIS — I27.9 PULMONARY HEART DISEASE (HCC): ICD-10-CM

## 2018-12-28 DIAGNOSIS — Q23.9 MITRAL VALVE DYSPLASIA: ICD-10-CM

## 2018-12-28 DIAGNOSIS — I50.42 CHRONIC COMBINED SYSTOLIC AND DIASTOLIC CONGESTIVE HEART FAILURE (HCC): ICD-10-CM

## 2018-12-28 DIAGNOSIS — R06.02 SHORTNESS OF BREATH: ICD-10-CM

## 2018-12-28 DIAGNOSIS — I50.30 (HFPEF) HEART FAILURE WITH PRESERVED EJECTION FRACTION (HCC): ICD-10-CM

## 2018-12-28 DIAGNOSIS — J90 PLEURAL EFFUSION, LEFT: ICD-10-CM

## 2018-12-28 DIAGNOSIS — J90 BILATERAL PLEURAL EFFUSION: Primary | ICD-10-CM

## 2018-12-28 DIAGNOSIS — I35.1 AORTIC VALVE INSUFFICIENCY, ETIOLOGY OF CARDIAC VALVE DISEASE UNSPECIFIED: ICD-10-CM

## 2018-12-28 DIAGNOSIS — I48.0 PAROXYSMAL ATRIAL FIBRILLATION (HCC): ICD-10-CM

## 2018-12-28 NOTE — TELEPHONE ENCOUNTER
Provider Progress Note:     I received communication from Josafat Guardado., NP with Dr. Mitchel Ratliff (cardiologist) to transcribe the following orders to [facility] for Mrs. Rafi Faith. 1. Start Metolazone 5 mg tablet P.O. q day on Sat, (12/29/2018) and Tue. (1/1/2019) Dx: fluid overload, mitral valve dysfunction. 2. Weights BID until further notice. Call provider if a weight gain of 3# in 24 hours. 3. Strict I & O monitoring (not with cobb, use a toilet hat) until Tuesday Jan. 2, 2019 per Cardiology. 4. Arrange transportation/nursing accompaniment to the following appointment scheduled for Tuesday Jan. 2, 2019 at [facility] with an arrival time of 9:00 am for echocardiogram testing; followed by a 10:00 am office visit with Dr. Mitchel Ratliff to discuss results as well as plan of care due to fluid overload because of mitral valve dysfunction. At [id number] 12/28/18, writer informed patient's son, Brown Francois) of the events and orders received from Dr. Denise Baxter office. Mr. Kajal Patel was noted to be in [place] until Jt Dec. 30, 2018 for vacation. I discussed with Mr. Kajal Patel my concern of patient going to these appointments alone. He agreed verbally with me to allow [address] [place] [address] to accompany his mother to visits on Jan. 2, 2019 and Jan. 7, 2019 appointments. (SEE LETTER TO REINIER Pierre 1874)       12/28/2018 at 1800; Writer has secured / confirmed availability with [facility] to offer the service of transporting Mrs. Salgado to these  scheduled appointments due to increased fragility and her compromised condition. Either ORLY Sharpe or Jamal Cowan will be accepting care  from [facility] for the appointment.      5. Arrange transportation / nursing accompaniment to the following procedure appointment scheduled for Monday Jan. 7, 2019 at [address] Radiology/Interventional Radiology for ultrasound guided bilateral thoracocentesis with arrival to [facility] registration by 1300 for noted appointment. Mr. Indigo Scott has stated to writer on the phone tonight he can not make it to accompany his mother to this appointment either. Writer again made the request to allow [facility] staff to transport Mrs. Raji Asher to procedure and remain with her until she is either stable to return to [facility] or placed inpatient at [facility]. Mr. Indigo Scott gave me verbal approval to schedule such care. 12/28/2018 at 1800; Writer has secured / confirmed availability with [facility] to offer the service of transporting Mrs. Salgado to these  scheduled appointments due to increased fragility and her compromised conditions. Either LPN; Andrae Renteria or Garett Pulido will be accepting care  from [facility] for the appointment. 6. Per Shira Arora NP (with Dr. Herman Mejia) Patient is not approved to hold Plavix prior to procedure due to recent MI/PCI but [facility] Staff to hold Eliquis 2.5 mg bid for only 48 hours prior to procedure on Jan. 7. 2019.    - Jan. 4, 2019 hold evening dose of Eliquis through Jan. 7, 2019, to resume Eliquis 2.5 mg bid on Jan. 8, 2019.     7. Mrs. Indigo Scott to be NPO 1/7/18 following sips of water for medication administration in AM only prior to procedure. Please send updated MAR with medications administered that AM, orders list, documentation of Eliquis being held prior to Jan. 7, 2019, and insurance cards with [facility] to [facility]. 8. Rx: Allow Mrs. Salgado to use a light weight, easy transport wheelchair for her off campus for safe transportation to appointments as well it will help with her stamina. If able to order her own, please do so. 9. Discontinued Duo Nebs q 4 hours, change order to Duo Nebs q 4 hours if wheezing (PRN). Dx: pleural effusions, duo nebs will not help this. Written orders have been given to [facility] staff at 1900 12/28/2018 with the above information as well as a copy given to [facility] TIMBO Any questions or concerns please call Jesus Cartagena NP (PCP) at [phone] regarding patient's health status, medical needs if unable to get Dr. Rima Koehler.

## 2018-12-31 ENCOUNTER — TELEPHONE (OUTPATIENT)
Dept: GERIATRIC MEDICINE | Age: 83
End: 2018-12-31

## 2019-01-02 ENCOUNTER — OFFICE VISIT (OUTPATIENT)
Dept: CARDIOLOGY CLINIC | Age: 84
End: 2019-01-02

## 2019-01-02 ENCOUNTER — TELEPHONE (OUTPATIENT)
Dept: CARDIOLOGY CLINIC | Age: 84
End: 2019-01-02

## 2019-01-02 ENCOUNTER — CLINICAL SUPPORT (OUTPATIENT)
Dept: CARDIOLOGY CLINIC | Age: 84
End: 2019-01-02

## 2019-01-02 VITALS
HEIGHT: 61 IN | RESPIRATION RATE: 16 BRPM | DIASTOLIC BLOOD PRESSURE: 60 MMHG | BODY MASS INDEX: 23.03 KG/M2 | HEART RATE: 80 BPM | SYSTOLIC BLOOD PRESSURE: 100 MMHG | WEIGHT: 122 LBS

## 2019-01-02 DIAGNOSIS — R06.02 SOB (SHORTNESS OF BREATH): ICD-10-CM

## 2019-01-02 DIAGNOSIS — I34.0 NON-RHEUMATIC MITRAL REGURGITATION: ICD-10-CM

## 2019-01-02 DIAGNOSIS — I10 ESSENTIAL HYPERTENSION: Primary | ICD-10-CM

## 2019-01-02 DIAGNOSIS — R42 DIZZINESS: ICD-10-CM

## 2019-01-02 DIAGNOSIS — E78.2 MIXED HYPERLIPIDEMIA: ICD-10-CM

## 2019-01-02 DIAGNOSIS — R60.9 SWELLING: ICD-10-CM

## 2019-01-02 DIAGNOSIS — I25.10 CORONARY ARTERY DISEASE INVOLVING NATIVE CORONARY ARTERY OF NATIVE HEART WITHOUT ANGINA PECTORIS: ICD-10-CM

## 2019-01-02 DIAGNOSIS — I50.32 CHRONIC DIASTOLIC CONGESTIVE HEART FAILURE (HCC): ICD-10-CM

## 2019-01-02 DIAGNOSIS — J90 PLEURAL EFFUSION: ICD-10-CM

## 2019-01-02 DIAGNOSIS — J98.11 MILD BIBASILAR ATELECTASIS: ICD-10-CM

## 2019-01-02 DIAGNOSIS — I95.2 HYPOTENSION DUE TO DRUGS: ICD-10-CM

## 2019-01-02 DIAGNOSIS — J90 BILATERAL PLEURAL EFFUSION: ICD-10-CM

## 2019-01-02 DIAGNOSIS — R06.02 SHORTNESS OF BREATH: ICD-10-CM

## 2019-01-02 DIAGNOSIS — I48.0 PAROXYSMAL ATRIAL FIBRILLATION (HCC): ICD-10-CM

## 2019-01-02 RX ORDER — METOLAZONE 5 MG/1
5 TABLET ORAL DAILY
COMMUNITY
End: 2019-02-11 | Stop reason: ALTCHOICE

## 2019-01-02 NOTE — TELEPHONE ENCOUNTER
Returned call to [facility],  pt identifiers used    Patient is scheduled to see CTS  On Friday 1/18/19 @ 1 pm. Office directions provided. [address].      Future Appointments   Date Time Provider Maty Schrader   1/7/2019  1:00 PM [facility]   [facility] [address]   1/18/2019  1:00 PM Chucky Jimenez MD [facility]   2/1/2019  9:00 AM Adela Rivera  [address]    4/22/2019  1:20 PM Adela Rivera  [address]

## 2019-01-02 NOTE — PROGRESS NOTES
[facility]  [phone]    HPI: Inocencia Hidalgo, a 80y.o. year-old who presents for follow up regarding her severe MR and diastolic heart failure. She is in healthcare now at [facility]  She is still in healthcare unit at [facility]. Wonders about that. Assisted means ADLs independent but can call for help. They manage her medication. Now they check on her once an hour check her vitals, etc.   More bad days than good. She feels wobbly some days. Vertigo is an issue at times. Left sided pleural effusion is reaccumulating. She looks good today. Walking with walker here and looks better, stronger. She and her son inquire as to MV clip. At this time proceed with tap of Left pleural effusion again an then get CTS eval. She appears to be a candidate and I would like her to talk it over with them again. Continues to have pittman  Work on appetite, exercise. Says she is sleeping well   Denies any chest pain, pressure, tightness  Reports rare palpitations which only last a few seconds and without any high risk features  Says she has very little dizziness now, no syncope  BP low today - 100/60  O2 Sats 90% with ambulation   Says she coughs all the time but it is not productive, no fevers or chills  Son reports she is coughing much less now than she was prior to admission  Patient's son is with her today, she is a poor historian    **After last office visit labs received dated 12/24/18  BUN 15, Cr 0.97, K 3.3, Mg 1.7    Assessment/Plan:  1. CAD s/p Inferior STEMI 9/11/18 with PTCA/stents to distal RCA (KENNETH x 2)  -symptoms preceding STEMI were palpitations, nausea, dizziness, diaphoresis, maybe some chest heaviness, troponin > 50  -continue plavix, coreg, statin  2. Severe MR - PISA . 9cmwiht RVE and dysfunction.  Hard to say that MV repair would improve her sx given other issues of RV failure and large effusion.   -Given her clinical improvement and overall improvement in conditioning, mentation,etc, I would consider her to be a candidate at this jj. 3.  Large left pleural effusion on TTE, moderate on CXR on admission - will repeat CXR now to reassess, continue bumex  4. AI - moderate by TTE   5. Hypotension - hx of HTN,cont low dose coreg at goal today  6. HFpEF - BP controlled, continue bumex   7. AF - rate controlled on coreg 3/125mg BID and diltiazem 180mg daily, Eliquis 2.5mg BID  8. Dyslipidemia - at goal on simvastatin 40mg daily   9. CHEMA post cardiac cath - now creatinine normal  10. DNR status in place    Echo 12/6/18 - LVEF 50 %, no WMA, grade 3 dd, LA severely dilated, MAC with eccentric severe regurgitation that was posteriorly directed, AV sclerosis without stenosis and mod AI, mild TR, PASP mildly increased, dilated IVC, large right pleural effusion, large left pleural effusion.   Head CT 9/18 normal  Inferior STEMI 9/11/18, Cardiac Cath 9/11/18  Thrombectomy of RCA, PTCA/stent of the distal RCA into the PLB with a 2.75 x 12 and 3.0 x 15 mm KENNETH (resolute stent), complicated case Ao 946/47, LVEDP 27, no gradient across AV valve, RCA dominant, totally occluded in distal segment, LM normal, LAD transapical vessel and small caliber normal.  LCX normal.  LVEF 50%, 2+ MR  Echo 4/18 - LVEF 55 %, no WMA,mod dilated LA, MAC with mild MR, AV sclerosis with mod AI, mild TR, PASP 30mmHg, mild PI    Soc Hx: no tobacco use x 10 years, no etoh use   Fam Hx: mother passed at age 80 from heart problems    She  has a past medical history of (HFpEF) heart failure with preserved ejection fraction ([address] Allergic rhinitis, Atherosclerotic cardiovascular disease, CHF (congestive heart failure) ([address] Cognitive communication deficit, Edema, Environmental allergies, Fatty liver disease, nonalcoholic, GERD (gastroesophageal reflux disease), H/O heart artery stent, HCVD (hypertensive cardiovascular disease), Heart attack ([address] Heart palpitations, History of PTCA, Hypercholesterolemia, Hypertension, Liver disease, MI (myocardial infarction) ([place]), Overactive bladder, Peripheral neuropathy, Permanent atrial fibrillation ([place]), Senile dementia, without behavioral disturbance, Stress incontinence, female, Vertigo, and Vitamin D deficiency. Cardiovascular ROS: no chest pain, positive for dyspnea   Respiratory ROS: positive for cough, shortness of breath  Neurological ROS: no TIA or stroke symptoms  All other systems negative except as above. PE  There were no vitals filed for this visit. There is no height or weight on file to calculate BMI.    General appearance - alert, well appearing, and in no distress  Mental status - affect appropriate to mood  Eyes - sclera anicteric, moist mucous membranes  Neck - supple  Lymphatics - not assessed  Chest - diminished base on left side, right lung clear    Heart - normal rate, irregular rhythm, normal S1, S2, no murmurs, rubs, clicks or gallops  Abdomen - soft, nontender, nondistended  Back exam - full range of motion  Neurological - no focal deficit  Musculoskeletal - normal strength  Extremities - peripheral pulses normal, trace LE edema  Skin - normal coloration  no rashes    Recent Labs:  Lab Results   Component Value Date/Time    Cholesterol, total 118 11/28/2018 01:39 PM    HDL Cholesterol 42 11/28/2018 01:39 PM    LDL, calculated 56 11/28/2018 01:39 PM    Triglyceride 98 11/28/2018 01:39 PM    CHOL/HDL Ratio 3.8 12/17/2009 08:35 AM     Lab Results   Component Value Date/Time    Creatinine 1.04 (H) 12/10/2018 02:57 AM     Lab Results   Component Value Date/Time    BUN 37 (H) 12/10/2018 02:57 AM     Lab Results   Component Value Date/Time    Potassium 3.9 12/10/2018 02:57 AM     Lab Results   Component Value Date/Time    Hemoglobin A1c 6.4 (H) 12/06/2018 02:03 PM     Lab Results   Component Value Date/Time    HGB 11.4 (L) 12/07/2018 02:46 AM     Lab Results   Component Value Date/Time    PLATELET 111 49/81/9231 02:46 AM       Reviewed:  Past Medical History:   Diagnosis Date    (HFpEF) heart failure with preserved ejection fraction ([facility] 10/17/2018    acute    Allergic rhinitis     Atherosclerotic cardiovascular disease     CHF (congestive heart failure) ([facility] 2018    Cognitive communication deficit     Edema     Environmental allergies     dizziness-(chronic)    Fatty liver disease, nonalcoholic     GERD (gastroesophageal reflux disease)     H/O heart artery stent 2018    HCVD (hypertensive cardiovascular disease) 10/17/2018    Heart attack ([facility] 2018    Heart palpitations 2018    infrequent     History of PTCA     Hypercholesterolemia     Hypertension 2010    Liver disease     fatty liver    MI (myocardial infarction) ([facility] 2018    Overactive bladder 2018    Peripheral neuropathy     Permanent atrial fibrillation ([facility] 2018    Senile dementia, without behavioral disturbance 2018    Stress incontinence, female 2018    Vertigo     Vitamin D deficiency      Social History     Tobacco Use   Smoking Status Former Smoker    Packs/day: 4.00    Types: Cigarettes    Last attempt to quit: 1955    Years since quittin.0   Smokeless Tobacco Never Used     Social History     Substance and Sexual Activity   Alcohol Use No    Alcohol/week: 0.0 - 0.5 oz     Allergies   Allergen Reactions    Brilinta [Ticagrelor] Shortness of Breath    Celecoxib Other (comments)    Sulfa (Sulfonamide Antibiotics) Unknown (comments) and Hives    Atorvastatin Unknown (comments)    Codeine Unknown (comments)    Darvocet A500 [Propoxyphene N-Acetaminophen] Unknown (comments)    Iodine Unknown (comments)    Statins-Hmg-Coa Reductase Inhibitors Other (comments)     (intolerance)-myalgias    Aricept [Donepezil] Myalgia     Headaches - noted as intolerance        Current Outpatient Medications   Medication Sig    bumetanide (BUMEX) 2 mg tablet Take 1 Tab by mouth two (2) times a day.     carvedilol (COREG) 3.125 mg tablet Take 1 Tab by mouth two (2) times a day.  albuterol-ipratropium (DUO-NEB) 2.5 mg-0.5 mg/3 ml nebu 3 mL by Nebulization route every 4 hours daily while awake resp.  polyethylene glycol (MIRALAX) 17 gram packet Take 17 g by mouth daily.  senna (SENNA) 8.6 mg tablet Take 1 Tab by mouth nightly.  calcium-vitamin D (OYSTER SHELL) 500 mg(1,250mg) -200 unit per tablet Take 1 Tab by mouth two (2) times daily (with meals).  levothyroxine (SYNTHROID) 25 mcg tablet Take 1 Tab by mouth Daily (before breakfast).  OTHER Dispense - Portable Oxygen & Supplies due to congestive heart failure, hypoxemia, confusion due to hypoxemia. Long term treatment is required as patient failed in office walk test. Oxygen saturations noted to be at start 89% RA then dropped to 78% with activity.  OTHER Dispense Oxygen Concentrator with humidified air and supplies. Dx: I50.42 CHF; Dx: I48.91 Afib uncontrolled; Dx: R06.02 SOB; Dx: R09.02 Hypoxemia. Patient failed walk test in office. Pulse ox at 78% on RA.    OTHER May crush or change to liquid for any prescription prn due to dysphagia.  pantoprazole (PROTONIX) 40 mg tablet Take 1 Tab by mouth daily.  sucralfate (CARAFATE) 100 mg/mL suspension Take 10 mL by mouth four (4) times daily.  clopidogrel (PLAVIX) 75 mg tab Take 1 Tab by mouth daily.  apixaban (ELIQUIS) 2.5 mg tablet Take 1 Tab by mouth two (2) times a day.  simvastatin (ZOCOR) 40 mg tablet Take 40 mg by mouth nightly.  PARoxetine (PAXIL) 20 mg tablet Take 20 mg by mouth daily.  nitroglycerin (NITROSTAT) 0.4 mg SL tablet Take 0.4 mg by mouth as needed.  PROAIR HFA 90 mcg/actuation inhaler Take 2 Inhalation by inhalation four (4) times daily as needed.  budesonide (PULMICORT) 0.25 mg/2 mL nbsp 2 mL by Nebulization route two (2) times a day.  Nebulizer & Compressor machine 1 Each by Nebulization route four (4) times daily.     Nebulizer Accessories kit Dispense Nebulizer Accessories - face mask. Daily use due to COPD/Pulmonary Reactive Airway Disease/Hypoxemia    potassium chloride (KLOR-CON) 10 mEq tablet Take 1 Tab by mouth daily.  ondansetron hcl (ZOFRAN) 4 mg tablet Take 1 tablet 30 mins prior to breakfast, then q 8 hours prn for nausea/vomiting.  diltiazem CD (CARTIA XT) 180 mg ER capsule Take 180 mg by mouth nightly. No current facility-administered medications for this visit. Gabriella Lane MD  [facility] [address], [address] [facility]  [place]  [phone]  [address]  [phone]    HPI: Alysha Hidalgo, a 80y.o. year-old who presents for follow up regarding her severe MR and diastolic heart failure. She is in healthcare now at [facility] says they plan to move her to assisted living but I suggested they hold off on transfer out of healthcare until we make sure she is stable  She reports that her stamina is better  Says she gets dyspnea with walking depending on how fast she is walking  Denies PND and sleeps on 2 pillows  Weight down 2 lbs from the hospital, also reports poor appetite, we talked about eating 3 meals/day  Says she is sleeping well   Denies any chest pain, pressure, tightness  Reports rare palpitations which only last a few seconds and without any high risk features  Says she has very little dizziness now, no syncope  BP low today - 100/60  O2 Sats 90% with ambulation   Says she coughs all the time but it is not productive, no fevers or chills  Son reports she is coughing much less now than she was prior to admission  Patient's son is with her today, she is a poor historian    **After last office visit labs received dated 12/24/18  BUN 15, Cr 0.97, K 3.3, Mg 1.7    Assessment/Plan:  1.   CAD s/p Inferior STEMI 9/11/18 with PTCA/stents to distal RCA (KENNETH x 2)  -symptoms preceding STEMI were palpitations, nausea, dizziness, diaphoresis, maybe some chest heaviness, troponin > 50  -continue plavix, coreg, statin  2. Severe MR - seen by valve team during admission, no plans for valve repair, etc at this time   -will repeat TTE in 1 month to reassess severity of MR and follow up with Dr. Jackson Bee at that time   3. Large left pleural effusion on TTE, moderate on CXR on admission - will repeat CXR now to reassess, continue bumex  4. AI - moderate by TTE   5. Hypotension - hx of HTN, advised her to reduce her coreg to 3.125mg BID and continue other medications for now, will need to watch BP closely  6. HFpEF - BP controlled, continue bumex, will check BMP and magnesium level prior to her return appointment in 1 month  7. AF - rate controlled on coreg 6.25mg BID and diltiazem 180mg daily, reducing coreg dose as above for hypotension, continue Eliquis 2.5mg BID for anticoagulation  8. Dyslipidemia - LDL 56 on simvastatin 40mg daily   -Lipids 9/18 - , TG 81, , HDL 50  9. CHEMA post cardiac cath - now creatinine normal  10. DNR status in place    Echo 12/6/18 - LVEF 50 %, no WMA, grade 3 dd, LA severely dilated, MAC with eccentric severe regurgitation that was posteriorly directed, AV sclerosis without stenosis and mod AI, mild TR, PASP mildly increased, dilated IVC, large right pleural effusion, large left pleural effusion.   Head CT 9/18 normal  Inferior STEMI 9/11/18, Cardiac Cath 9/11/18  Thrombectomy of RCA, PTCA/stent of the distal RCA into the PLB with a 2.75 x 12 and 3.0 x 15 mm KENNETH (resolute stent), complicated case Ao 427/27, LVEDP 27, no gradient across AV valve, RCA dominant, totally occluded in distal segment, LM normal, LAD transapical vessel and small caliber normal.  LCX normal.  LVEF 50%, 2+ MR  Echo 4/18 - LVEF 55 %, no WMA,mod dilated LA, MAC with mild MR, AV sclerosis with mod AI, mild TR, PASP 30mmHg, mild PI    Soc Hx: no tobacco use x 10 years, no etoh use   Fam Hx: mother passed at age 80 from heart problems    She  has a past medical history of (HFpEF) heart failure with preserved ejection fraction ([facility] Allergic rhinitis, Atherosclerotic cardiovascular disease, CHF (congestive heart failure) ([facility] Cognitive communication deficit, Edema, Environmental allergies, Fatty liver disease, nonalcoholic, GERD (gastroesophageal reflux disease), H/O heart artery stent, HCVD (hypertensive cardiovascular disease), Heart attack ([facility] Heart palpitations, History of PTCA, Hypercholesterolemia, Hypertension, Liver disease, MI (myocardial infarction) ([facility] Overactive bladder, Peripheral neuropathy, Permanent atrial fibrillation ([facility] Senile dementia, without behavioral disturbance, Stress incontinence, female, Vertigo, and Vitamin D deficiency. Cardiovascular ROS: no chest pain, positive for dyspnea   Respiratory ROS: positive for cough, shortness of breath  Neurological ROS: no TIA or stroke symptoms  All other systems negative except as above. PE  There were no vitals filed for this visit. There is no height or weight on file to calculate BMI.    General appearance - alert, well appearing, and in no distress  Mental status - affect appropriate to mood  Eyes - sclera anicteric, moist mucous membranes  Neck - supple  Lymphatics - not assessed  Chest - diminished base on left side, right lung clear    Heart - normal rate, irregular rhythm, normal S1, S2, no murmurs, rubs, clicks or gallops  Abdomen - soft, nontender, nondistended  Back exam - full range of motion  Neurological - no focal deficit  Musculoskeletal - normal strength  Extremities - peripheral pulses normal, trace LE edema  Skin - normal coloration  no rashes    Recent Labs:  Lab Results   Component Value Date/Time    Cholesterol, total 118 11/28/2018 01:39 PM    HDL Cholesterol 42 11/28/2018 01:39 PM    LDL, calculated 56 11/28/2018 01:39 PM    Triglyceride 98 11/28/2018 01:39 PM    CHOL/HDL Ratio 3.8 12/17/2009 08:35 AM     Lab Results   Component Value Date/Time    Creatinine 1.04 (H) 12/10/2018 02:57 AM     Lab Results   Component Value Date/Time    BUN 37 (H) 12/10/2018 02:57 AM     Lab Results   Component Value Date/Time    Potassium 3.9 12/10/2018 02:57 AM     Lab Results   Component Value Date/Time    Hemoglobin A1c 6.4 (H) 2018 02:03 PM     Lab Results   Component Value Date/Time    HGB 11.4 (L) 2018 02:46 AM     Lab Results   Component Value Date/Time    PLATELET 928  02:46 AM       Reviewed:  Past Medical History:   Diagnosis Date    (HFpEF) heart failure with preserved ejection fraction ([facility] 10/17/2018    acute    Allergic rhinitis     Atherosclerotic cardiovascular disease     CHF (congestive heart failure) ([facility] 2018    Cognitive communication deficit     Edema     Environmental allergies     dizziness-(chronic)    Fatty liver disease, nonalcoholic     GERD (gastroesophageal reflux disease)     H/O heart artery stent 2018    HCVD (hypertensive cardiovascular disease) 10/17/2018    Heart attack ([facility] 2018    Heart palpitations 2018    infrequent     History of PTCA     Hypercholesterolemia     Hypertension     Liver disease     fatty liver    MI (myocardial infarction) ([facility] 2018    Overactive bladder 2018    Peripheral neuropathy     Permanent atrial fibrillation ([facility] 2018    Senile dementia, without behavioral disturbance 2018    Stress incontinence, female 2018    Vertigo     Vitamin D deficiency      Social History     Tobacco Use   Smoking Status Former Smoker    Packs/day: 4.00    Types: Cigarettes    Last attempt to quit: 1955    Years since quittin.0   Smokeless Tobacco Never Used     Social History     Substance and Sexual Activity   Alcohol Use No    Alcohol/week: 0.0 - 0.5 oz     Allergies   Allergen Reactions    Brilinta [Ticagrelor] Shortness of Breath    Celecoxib Other (comments)    Sulfa (Sulfonamide Antibiotics) Unknown (comments) and Hives    Atorvastatin Unknown (comments)    Codeine Unknown (comments)    Darvocet A500 [Propoxyphene N-Acetaminophen] Unknown (comments)    Iodine Unknown (comments)    Statins-Hmg-Coa Reductase Inhibitors Other (comments)     (intolerance)-myalgias    Aricept [Donepezil] Myalgia     Headaches - noted as intolerance        Current Outpatient Medications   Medication Sig    bumetanide (BUMEX) 2 mg tablet Take 1 Tab by mouth two (2) times a day.  carvedilol (COREG) 3.125 mg tablet Take 1 Tab by mouth two (2) times a day.  albuterol-ipratropium (DUO-NEB) 2.5 mg-0.5 mg/3 ml nebu 3 mL by Nebulization route every 4 hours daily while awake resp.  polyethylene glycol (MIRALAX) 17 gram packet Take 17 g by mouth daily.  senna (SENNA) 8.6 mg tablet Take 1 Tab by mouth nightly.  calcium-vitamin D (OYSTER SHELL) 500 mg(1,250mg) -200 unit per tablet Take 1 Tab by mouth two (2) times daily (with meals).  levothyroxine (SYNTHROID) 25 mcg tablet Take 1 Tab by mouth Daily (before breakfast).  OTHER Dispense - Portable Oxygen & Supplies due to congestive heart failure, hypoxemia, confusion due to hypoxemia. Long term treatment is required as patient failed in office walk test. Oxygen saturations noted to be at start 89% RA then dropped to 78% with activity.  OTHER Dispense Oxygen Concentrator with humidified air and supplies. Dx: I50.42 CHF; Dx: I48.91 Afib uncontrolled; Dx: R06.02 SOB; Dx: R09.02 Hypoxemia. Patient failed walk test in office. Pulse ox at 78% on RA.    OTHER May crush or change to liquid for any prescription prn due to dysphagia.  pantoprazole (PROTONIX) 40 mg tablet Take 1 Tab by mouth daily.  sucralfate (CARAFATE) 100 mg/mL suspension Take 10 mL by mouth four (4) times daily.  clopidogrel (PLAVIX) 75 mg tab Take 1 Tab by mouth daily.  apixaban (ELIQUIS) 2.5 mg tablet Take 1 Tab by mouth two (2) times a day.  simvastatin (ZOCOR) 40 mg tablet Take 40 mg by mouth nightly.     PARoxetine (PAXIL) 20 mg tablet Take 20 mg by mouth daily.  nitroglycerin (NITROSTAT) 0.4 mg SL tablet Take 0.4 mg by mouth as needed.  PROAIR HFA 90 mcg/actuation inhaler Take 2 Inhalation by inhalation four (4) times daily as needed.  budesonide (PULMICORT) 0.25 mg/2 mL nbsp 2 mL by Nebulization route two (2) times a day.  Nebulizer & Compressor machine 1 Each by Nebulization route four (4) times daily.  Nebulizer Accessories kit Dispense Nebulizer Accessories - face mask. Daily use due to COPD/Pulmonary Reactive Airway Disease/Hypoxemia    potassium chloride (KLOR-CON) 10 mEq tablet Take 1 Tab by mouth daily.  ondansetron hcl (ZOFRAN) 4 mg tablet Take 1 tablet 30 mins prior to breakfast, then q 8 hours prn for nausea/vomiting.  diltiazem CD (CARTIA XT) 180 mg ER capsule Take 180 mg by mouth nightly. No current facility-administered medications for this visit.         Rudy Yanez MD  [facility] [address] [place] [address] [address], [address] [place] [address]  [facility], [facility]  [phone]

## 2019-01-03 ENCOUNTER — PATIENT OUTREACH (OUTPATIENT)
Dept: CARDIOLOGY CLINIC | Age: 84
End: 2019-01-03

## 2019-01-03 NOTE — PROGRESS NOTES
Goals  Reduce risk of CHF exacerbations and complications. 12/12/18 Working with Edin Schaeffer to coordinate [place]. Nurse at [facility] states she is \"really swamped right now\" and can not perform med rec with me. Put me on hold to ask if admissions director could assist. She came back and stated that the admissions director says Cait Paniagua got everything they needed from hospital at discharge. \"  NN will fax over weight sheet to have staff fill out and will follow up next week---mkrw 12/14/18 Working with [facility] on [place]. Was able to speak with Hermelinda, nurse talking care of patient. Med Rec performed. She reports patient has orders for daily weights, however did not have weight for several days. Most recent weight on 12/10/18 is 128 lbs, which is down from discharge weight of 137 lbs. NN encouraged obtaining weight daily and to report to house MD weigh gain greater than 2 lbs/day or 5 lbs/week. She reports that patient is on AHA diet. Hermelinda reports patient saw sangita Blakely within 48 hours of returning to [facility]--she did not have access to exactly when patient was seen however this is standard practice for their patients. Patient does have cardiology appt with Dr Ruiz Orellana on 12/20/18. Patient's son is to transport her. NN will follow up with son and facility for updates. ---mkrw 12/19/18 Spoke to Larry Sarkar at [facility]. She reports that patient is doing well. She participates in PT/OT. Her last weight on 12/17/18 was 131lbs, 3 lbs increase since 12/10/18. NN voiced concern that patient isn't being weighed daily. She states she will have to weigh the patient daily herself since it is not getting done every day. Patient does not have any leg edema, no SOB, lungs clear. She states that patient will be going to see Dr Ruiz Orellana on 12/20/18. Patient continues to maintain low sodium diet.  She reports that there are no plans at this time to send patient back to her apt from [facility] at this time. NN will follow up with Hermelinda next week for updates. ---mkrw 12/21/18 Per cardiology office visit notes, patient ordered to have labs drawn, CXR, decrease carvedilol and increase bumex. Maty Jenkins to follow up on new orders. Was transferred to Nurse Supervisor voicemail , left message for return call. Noted that MD office also wanted Bumex given BID however chart still reflects daily. Staff Message sent to DYLAN Brown. To let her know. NN will follow up on Monday---mkrw 12/26/18 Spoke to [address] at [facility] [place] is nurse taking care of patient today. She reports that Repeat CXR has not been performed yet---she is putting order in the computer now to have it done today. CMP, MG, and TSH levels drawn on 12/24/18. She reports Mg level 1.7, K level 3.3. Patient is on 10 meq potassium daily. Will send note over to MD office to see if increase may be needed. She is faxing lab results to Dr Alicja Menendez at [phone] today. She will also have CXR results faxed to MD office as well. Patient's weight today is 129.4lbs. On 12/20 at MD office weight 130 lbs 3 oz. Heather did not note any swelling in ankles or feet, however patient did request 02 this morning so she is wearing it at 2L NC. Confirmed that Coreg is now 3.125 mg BID and Bumex is 2 mg BID. Next appt is 1/18/19 with Dr Alicja Menendez. NN will follow up next week for updates from [facility], [facility]. 01/03/19 Per chart notes, patient saw Dr Alicja Menendez. CXR confirms increase in pleural effusions and requires thoracentesis, which will be performed on Monday 1/7/19. Spoke to Larry Sarkar at [facility]. She confirms that they have orders to hold Eliquis on Friday night and to restart Tuesday 1/8/19. Plavix is not to be held. They have been weighing patient per orders. Today's weight is 123 lbs, which is down from 129-130 lbs from last week.  Hermelinda also confirms patient is on Zaroxylyn. NN will follow up next week after procedure---macey

## 2019-01-07 ENCOUNTER — HOSPITAL ENCOUNTER (OUTPATIENT)
Dept: ULTRASOUND IMAGING | Age: 84
Discharge: HOME | End: 2019-01-07
Attending: NURSE PRACTITIONER
Payer: MEDICARE

## 2019-01-07 ENCOUNTER — TELEPHONE (OUTPATIENT)
Dept: GERIATRIC MEDICINE | Age: 84
End: 2019-01-07

## 2019-01-07 ENCOUNTER — HOSPITAL ENCOUNTER (OUTPATIENT)
Dept: GENERAL RADIOLOGY | Age: 84
Discharge: HOME | End: 2019-01-07
Attending: RADIOLOGY
Payer: MEDICARE

## 2019-01-07 VITALS
RESPIRATION RATE: 18 BRPM | DIASTOLIC BLOOD PRESSURE: 52 MMHG | HEART RATE: 83 BPM | TEMPERATURE: 97.6 F | OXYGEN SATURATION: 97 % | SYSTOLIC BLOOD PRESSURE: 115 MMHG

## 2019-01-07 DIAGNOSIS — J90 BILATERAL PLEURAL EFFUSION: ICD-10-CM

## 2019-01-07 DIAGNOSIS — I50.30 (HFPEF) HEART FAILURE WITH PRESERVED EJECTION FRACTION (HCC): ICD-10-CM

## 2019-01-07 DIAGNOSIS — I35.1 AORTIC VALVE INSUFFICIENCY, ETIOLOGY OF CARDIAC VALVE DISEASE UNSPECIFIED: ICD-10-CM

## 2019-01-07 DIAGNOSIS — Q23.9 MITRAL VALVE DYSPLASIA: ICD-10-CM

## 2019-01-07 PROCEDURE — 77030018870 HC TY PARCNT BD -B

## 2019-01-07 PROCEDURE — 77030032034 HC SYS EVAC ASEPT DISP BBMI -B

## 2019-01-07 PROCEDURE — C1729 CATH, DRAINAGE: HCPCS

## 2019-01-07 PROCEDURE — 71045 X-RAY EXAM CHEST 1 VIEW: CPT

## 2019-01-07 PROCEDURE — 32555 ASPIRATE PLEURA W/ IMAGING: CPT

## 2019-01-07 NOTE — DISCHARGE INSTRUCTIONS
THORACENTESIS DISCHARGE INSTRUCTIONS    General Information:     During these procedures, the doctor will insert a needle into the body to drain fluid from the chest. After the procedure, you will be able to take a deep breath much easier. The site of the puncture may ooze the first day. This will decrease and eventually stop. With the Thoracentesis (draining fluid from the chest), there is a risk of air leaking into the chest around the lung, and risk of bleeding into the chest, with the resulting pressure on the lung possibly making it collapse. A chest x-ray is done after the procedure to detect possible complications. Home Care Instructions:     Keep the puncture site clean and dry. No tub baths or swimming until puncture site heals. Showering is acceptable. Resume your normal diet, and resume your normal activity slowly and as you tolerate. If you are short of breath, rest. If shortness of breath does not ease, please call your ordering doctor. Fluid can re-accumulate in the chest . If this should occur, your doctor needs to know as you may need to have the procedure done again. Call If:     You should call your Physician and/or the Radiology Nurse if you notice any signs of infection, like pus draining, or if it is swollen or reddened. Also call if you have a fever, or if you are bleeding from the puncture site more than a small amount on the dressing. Call if the puncture site keeps draining fluid. Some oozing is to be expected, but should slow and then stop. Call if you feel like your have pressure in your abdomen. SEEK IMMEDIATE CARE OR CALL 911 IF YOU SUDDENLY HAVE TROUBLE BREATHING, OR IF YOUR LIPS TURN BLUE, OR IF YOU NOTICE BLOOD IN YOUR SPUTUM. Follow-Up Instructions: Please see your ordering doctor as he/she has requested. To Reach Us:  Radiology Department [phone] 730 a.m. To 10 p.m. After 10 p.m.  Call [phone]. [facility] number and ask for on call Angio Nurse    Patient Signature:  Date: 1/7/2019  Discharging Nurse: Jin Yanez RN

## 2019-01-07 NOTE — TELEPHONE ENCOUNTER
Please call Lauren De La Cruz @ [phone] @ [facility] and let her know the stop date of Eliquis for Ms. Jiang Nurse.  Tnx.

## 2019-01-07 NOTE — PROGRESS NOTES
IMPRESSION: Successful ultrasound guided left  thoracentesis yielding approximately 400 ml of fluid.

## 2019-01-09 ENCOUNTER — PATIENT OUTREACH (OUTPATIENT)
Dept: CARDIOLOGY CLINIC | Age: 84
End: 2019-01-09

## 2019-01-09 NOTE — PROGRESS NOTES
Goals Addressed This Visit's Progress  Reduce risk of CHF exacerbations and complications. 12/12/18 Working with Karen Fong to coordinate [place]. Nurse at [facility] states she is \"really swamped right now\" and can not perform med rec with me. Put me on hold to ask if admissions director could assist. She came back and stated that the admissions director says Agustina Torres got everything they needed from hospital at discharge. \"  NN will fax over weight sheet to have staff fill out and will follow up next week---mkrw 12/14/18 Working with [facility] on [employer]. Was able to speak with Hermelinda, nurse talking care of patient. Med Rec performed. She reports patient has orders for daily weights, however did not have weight for several days. Most recent weight on 12/10/18 is 128 lbs, which is down from discharge weight of 137 lbs. NN encouraged obtaining weight daily and to report to house MD weigh gain greater than 2 lbs/day or 5 lbs/week. She reports that patient is on AHA diet. Hermelinda reports patient saw sangita Goodwin within 48 hours of returning to [facility]--she did not have access to exactly when patient was seen however this is standard practice for their patients. Patient does have cardiology appt with Dr Romina Delgadillo on 12/20/18. Patient's son is to transport her. NN will follow up with son and facility for updates. ---mkrw 12/19/18 Spoke to Larry Sarkar at [facility]. She reports that patient is doing well. She participates in PT/OT. Her last weight on 12/17/18 was 131lbs, 3 lbs increase since 12/10/18. NN voiced concern that patient isn't being weighed daily. She states she will have to weigh the patient daily herself since it is not getting done every day. Patient does not have any leg edema, no SOB, lungs clear. She states that patient will be going to see Dr Romina Delgadillo on 12/20/18. Patient continues to maintain low sodium diet. She reports that there are no plans at this time to send patient back to her apt from [facility] at this time. NN will follow up with [place] next week for updates. ---mkrw 12/21/18 Per cardiology office visit notes, patient ordered to have labs drawn, CXR, decrease carvedilol and increase bumex. Maty Jenkins to follow up on new orders. Was transferred to Nurse Supervisor [employer] , left message for return call. Noted that MD office also wanted Bumex given BID however chart still reflects daily. Staff Message sent to NP Dominic Wyatt. To let her know. NN will follow up on Monday---mkrw 12/26/18 Spoke to [address] at [facility] [place] is nurse taking care of patient today. She reports that Repeat CXR has not been performed yet---she is putting order in the computer now to have it done today. CMP, MG, and TSH levels drawn on 12/24/18. She reports Mg level 1.7, K level 3.3. Patient is on 10 meq potassium daily. Will send note over to MD office to see if increase may be needed. She is faxing lab results to Dr Ra Canales at [phone] today. She will also have CXR results faxed to MD office as well. Patient's weight today is 129.4lbs. On 12/20 at MD office weight 130 lbs 3 oz. Heather did not note any swelling in ankles or feet, however patient did request 02 this morning so she is wearing it at 2L NC. Confirmed that Coreg is now 3.125 mg BID and Bumex is 2 mg BID. Next appt is 1/18/19 with Dr Ra Canales. NN will follow up next week for updates from [facility], [facility]. 01/03/19 Per chart notes, patient saw Dr Ra Canales. CXR confirms increase in pleural effusions and requires thoracentesis, which will be performed on Monday 1/7/19. Spoke to Larry Sarkar at [facility]. She confirms that they have orders to hold Eliquis on Friday night and to restart Tuesday 1/8/19. Plavix is not to be held. They have been weighing patient per orders.  Today's weight is 123 lbs, which is down from 129-130 lbs from last week. Hermelinda also confirms patient is on Zaroxylyn. NN will follow up next week after procedure---macey 01/09/19 Per patient chart, patient had successful US guided L thoracentesis on 1/7/19 resulting in 400 ml fluid output. Spoke to nurse taking care of patient today at [facility]--she reports patient is up walking around, she notices that the patient is not coughing like she did prior to procedure. She has resumed her Eliquis. Today's weight is 122.4lbs---stable. She reports she fluctuates between 120-123 lbs per her records. NN will follow up next week for updates--macey Spoke to nurse at [facility]--

## 2019-01-11 ENCOUNTER — TELEPHONE (OUTPATIENT)
Dept: CARDIOLOGY CLINIC | Age: 84
End: 2019-01-11

## 2019-01-11 NOTE — TELEPHONE ENCOUNTER
LVM for Lyn Branch (director of nursing at [facility] return call at earliest convenience.

## 2019-01-11 NOTE — TELEPHONE ENCOUNTER
Derek Valdez called from [facility] regarding an order for assisted living from healthcare. She stated she wanted to clear the order.   Phone #[phone]  Thanks

## 2019-01-11 NOTE — TELEPHONE ENCOUNTER
Donta Langford from [facility] called stating she is returning nurse call.   Phone #[phone]  Thanks

## 2019-01-11 NOTE — TELEPHONE ENCOUNTER
I spoke to Maddie and verified Dr. Alicja Menendez does want patient to stay in healthcare at [facility] until after her visit on 1/18/19 with Dr. Keaton Marti.   2 pt identifiers used

## 2019-01-16 ENCOUNTER — PATIENT OUTREACH (OUTPATIENT)
Dept: CARDIOLOGY CLINIC | Age: 84
End: 2019-01-16

## 2019-01-16 NOTE — PROGRESS NOTES
Goals Addressed This Visit's Progress  Reduce risk of CHF exacerbations and complications. 12/12/18 Working with Ruth Dumont to coordinate [place]. Nurse at [facility] states she is \"really swamped right now\" and can not perform med rec with me. Put me on hold to ask if admissions director could assist. She came back and stated that the admissions director says Hoang Mack got everything they needed from hospital at discharge. \"  NN will fax over weight sheet to have staff fill out and will follow up next week---mkrw 12/14/18 Working with [facility] on [facility]. Was able to speak with Hermelinda nurse talking care of patient. Med Rec performed. She reports patient has orders for daily weights, however did not have weight for several days. Most recent weight on 12/10/18 is 128 lbs, which is down from discharge weight of 137 lbs. NN encouraged obtaining weight daily and to report to house MD weigh gain greater than 2 lbs/day or 5 lbs/week. She reports that patient is on AHA diet. Hermelinda reports patient saw house MD Dr Malorie Pemberton within 48 hours of returning to [facility]--she did not have access to exactly when patient was seen however this is standard practice for their patients. Patient does have cardiology appt with Dr Mounika Lombardi on 12/20/18. Patient's son is to transport her. NN will follow up with son and facility for updates. ---mkrw 12/19/18 Spoke to Larry Sarkar at [facility]. She reports that patient is doing well. She participates in PT/OT. Her last weight on 12/17/18 was 131lbs, 3 lbs increase since 12/10/18. NN voiced concern that patient isn't being weighed daily. She states she will have to weigh the patient daily herself since it is not getting done every day. Patient does not have any leg edema, no SOB, lungs clear. She states that patient will be going to see Dr Mounika Lombardi on 12/20/18. Patient continues to maintain low sodium diet. She reports that there are no plans at this time to send patient back to her apt from [facility] at this time. NN will follow up with Hermelinda next week for updates. ---mkrw 12/21/18 Per cardiology office visit notes, patient ordered to have labs drawn, CXR, decrease carvedilol and increase bumex. Maty Jenkins to follow up on new orders. Was transferred to Nurse Supervisor voicemail , left message for return call. Noted that MD office also wanted Bumex given BID however chart still reflects daily. Staff Message sent to DYLAN Tinoco. To let her know. NN will follow up on Monday---mkrw 12/26/18 Spoke to [address] at [facility] [place] is nurse taking care of patient today. She reports that Repeat CXR has not been performed yet---she is putting order in the computer now to have it done today. CMP, MG, and TSH levels drawn on 12/24/18. She reports Mg level 1.7, K level 3.3. Patient is on 10 meq potassium daily. Will send note over to MD office to see if increase may be needed. She is faxing lab results to Dr Janey Bond at [phone] today. She will also have CXR results faxed to MD office as well. Patient's weight today is 129.4lbs. On 12/20 at MD office weight 130 lbs 3 oz. Heather did not note any swelling in ankles or feet, however patient did request 02 this morning so she is wearing it at 2L NC. Confirmed that Coreg is now 3.125 mg BID and Bumex is 2 mg BID. Next appt is 1/18/19 with Dr Janey Bond. NN will follow up next week for updates from [facility], [facility]. 01/03/19 Per chart notes, patient saw Dr Janey Bond. CXR confirms increase in pleural effusions and requires thoracentesis, which will be performed on Monday 1/7/19. Spoke to Larry Sarkar at [facility]. She confirms that they have orders to hold Eliquis on Friday night and to restart Tuesday 1/8/19. Plavix is not to be held. They have been weighing patient per orders.  Today's weight is 123 lbs, which is down from 129-130 lbs from last week. Hermelinda also confirms patient is on Zaroxylyn. NN will follow up next week after procedure---macey 01/09/19 Per patient chart, patient had successful US guided L thoracentesis on 1/7/19 resulting in 400 ml fluid output. Spoke to nurse taking care of patient today at [facility]--she reports patient is up walking around, she notices that the patient is not coughing like she did prior to procedure. She has resumed her Eliquis. Today's weight is 122.4lbs---stable. She reports she fluctuates between 120-123 lbs per her records. NN will follow up next week for updates--macey 01/16/19 Per Hermelinda at [facility], Patient's weight today 127.8. Asked her if there was any changes to her orders that would cause her weight to spike up like that--she said she wasn't aware of medication change, however patient was eating better now and was on a low sodium diet. Asked for a weight range over the last couple days---she reports 123lbs, 122 lbs, 125 lbs, 127 lbs, 129 lbs, 127.8 lbs. Hermelinda said she would notify MD about this. NN will contact cardiology as well via staff message. NN will follow up---macey

## 2019-01-17 ENCOUNTER — TELEPHONE (OUTPATIENT)
Dept: CARDIOLOGY CLINIC | Age: 84
End: 2019-01-17

## 2019-01-17 NOTE — TELEPHONE ENCOUNTER
Returned son's call, 2 pt identifiers used  Provided son with Dr. Marlen Eddy address and contact information.     Future Appointments   Date Time Provider Maty Macrina   1/18/2019  1:00 PM Maury Hidalgo MD [facility]   2/1/2019  9:00 AM Domenico Ramírez  [address]   4/22/2019  1:20 PM Domenico Ramírez  [address]

## 2019-01-17 NOTE — TELEPHONE ENCOUNTER
Pt's son called to get the information on a surgeon the pt will see tomorrow 1/18/19.   Phone #[phone]  Thanks

## 2019-01-18 ENCOUNTER — OFFICE VISIT (OUTPATIENT)
Dept: CARDIOLOGY CLINIC | Age: 84
End: 2019-01-18

## 2019-01-18 VITALS
BODY MASS INDEX: 24.26 KG/M2 | RESPIRATION RATE: 16 BRPM | HEIGHT: 61 IN | SYSTOLIC BLOOD PRESSURE: 108 MMHG | DIASTOLIC BLOOD PRESSURE: 58 MMHG | HEART RATE: 80 BPM | TEMPERATURE: 98.2 F | OXYGEN SATURATION: 94 % | WEIGHT: 128.5 LBS

## 2019-01-18 DIAGNOSIS — I35.1 AORTIC VALVE INSUFFICIENCY, ETIOLOGY OF CARDIAC VALVE DISEASE UNSPECIFIED: Primary | ICD-10-CM

## 2019-01-18 NOTE — PROGRESS NOTES
Pt seen and examined  Full note reviewed and confirmed by Natacha Calixto NP  Discussed with Dr Mounika Lombardi  ECHO reviewed  She is regaining strength but limited by SOB  Her MR is severe. 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

## 2019-01-18 NOTE — PROGRESS NOTES
[facility]   History and Physical    Subjective:      Corinne Celeste is a 80 y.o. female who was referred for cardiac evaluation by Dr. Shaaron Schaumann for MR. The patient was seen by [facility] on 12/6/18 in the emergency room for MR. At that time she had increasing SOB and had an urgent appointment with Dr. Shaaron Schaumann where she was found to have a pleural effusion and sent to the ER. She reported improvement in her SOB on O2. The pleural effusion was drained at that time. Since then she has developed a second pleural effusion that required draining on 1/7/19. She now wears a NC with 2L O2 at all times. The patient currently reports SOB with activity. She sleeps with her HOB elevated. She also has intermittent dizziness and LE edema. She walks mostly with a walker but occasionally uses a wheelchair for convenience. She has a past medical history of HTN, CAD/STEMI s/p stents 9/2018, GERD, renal insufficiency, HFpEF, HLD, peripheral neuropathy, afib, hypothyroid, vertigo, and dementia. Her son reports a possible stroke in the past couple of months but symptoms have resolved. At the time of the possible stroke she had generalized weakness and slurred speech. Her son also reports that she was very independent until 9/11/18 when she had a MI. Since then she has had significant memory loss and weakness that has slowly improved. At this time she is living in the [facility] side [place] [id number] with plans to be transitioned to assisted living in the future. She is a former smoker. She denies alcohol use. She has a family history of HTN, heart failure, and DM. She is accompanied by her son.     Cardiac Testing     Cardiac catheterization: none in [facility]     ECHO 4/5/18: SUMMARY:  Left ventricle: Systolic function was normal. Ejection fraction was  estimated to be 55 % by visual assessment. EF measured 50% by 4D volume  and Hunter's biplane techniques. There were no regional wall motion  abnormalities.  Wall thickness was minimally increased. Left atrium: The atrium was moderately dilated. Atrial septum: The septum was thickened. Mitral valve: There was mild posterior annular calcification. There was  very mild regurgitation. Aortic valve: The valve was trileaflet. Leaflets exhibited sclerosis. There was moderate regurgitation. PHT measured 459 ms. Tricuspid valve: There was mild regurgitation. Pulmonary artery systolic  pressure: 30 mmHg. Pulmonic valve: There was mild regurgitation. TTE 1/2/19: SUMMARY:  Left ventricle: Systolic function was normal. Ejection fraction was  estimated to be 62 % by Hunter's biplane technique. There were no  regional wall motion abnormalities. Wall thickness was at the upper limits  of normal.    Right ventricle: The ventricle was moderately dilated. Systolic function  was markedly reduced. Left atrium: The atrium was mildly dilated. LA volume index was 36 ml/mï¾². Mitral valve: There was severe regurgitation. The regurgitant jet was  eccentric and directed posteriorly. PISA measured 0.9 cm. Aortic valve: There was mild regurgitation. Tricuspid valve: There was mild to moderate regurgitation. Pulmonary  artery systolic pressure: 40 mmHg. There was mild pulmonary hypertension. Pulmonic valve: There was mild regurgitation. Pericardium: There was a left pleural effusion.       Past Medical History:   Diagnosis Date    (HFpEF) heart failure with preserved ejection fraction ([facility] [address] 10/17/2018    acute    Allergic rhinitis     Atherosclerotic cardiovascular disease 1999    CHF (congestive heart failure) ([address] 09/11/2018    Cognitive communication deficit     Edema     Environmental allergies     dizziness-(chronic)    Fatty liver disease, nonalcoholic     GERD (gastroesophageal reflux disease)     H/O heart artery stent 09/2018    HCVD (hypertensive cardiovascular disease) 10/17/2018    Heart attack ([facility] [address] 09/2018    Heart palpitations 2018    infrequent     History of PTCA     Hypercholesterolemia     Hypertension     Liver disease     fatty liver    MI (myocardial infarction) ([facility] [address] 2018    Overactive bladder 2018    Peripheral neuropathy     Permanent atrial fibrillation ([facility] [address] 2018    Senile dementia, without behavioral disturbance 2018    Stress incontinence, female 2018    Vertigo     Vitamin D deficiency      Past Surgical History:   Procedure Laterality Date    BREAST SURGERY PROCEDURE UNLISTED      breast cyst Efren Rynicola)    CARDIAC SURG PROCEDURE UNLIST  1999    + stress thalassemia; 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 Holter    HX APPENDECTOMY      HX BREAST BIOPSY Left     neg    HX CYST INCISION AND DRAINAGE Right years ago    neg    HX DILATION AND CURETTAGE      x5    HX GYN      D&C (x5), ALLEY/BSO (-Juliann/Zedler), Provera intolerance (bleeding), endometrial Bx annually    HX PTCA  2018    stent distal RCA into PLwith KENNETH x 2    HX ALLEY AND BSO      Juliann/Zedler      Social History     Tobacco Use    Smoking status: Former Smoker     Packs/day: 4.00     Types: Cigarettes     Last attempt to quit: 1955     Years since quittin.0    Smokeless tobacco: Never Used   Substance Use Topics    Alcohol use: No     Alcohol/week: 0.0 - 0.5 oz      Family History   Problem Relation Age of Onset    Diabetes Mother     Hypertension Mother     Heart Failure Mother         CHF ( @ 80)   [place]ri[place] Alzheimer Mother     Cancer Father         lung ( @ 77)   Ori[place] Alzheimer Father     No Known Problems Son     No Known Problems Son     Ovarian Cancer Sister         hysterectomy    Breast Cancer Sister 28        mastectomy    Cancer Sister         breast and ovarian    Alzheimer Sister     Breast Problems Sister         breast cyst    Cataract Sister     Hypertension Sister     Diabetes Brother      Prior to Admission medications    Medication Sig Start Date End Date Taking?  Authorizing Provider bumetanide (BUMEX) 2 mg tablet Take 1 Tab by mouth two (2) times a day. 12/21/18  Yes Luis Miguel Gómez NP   carvedilol (COREG) 3.125 mg tablet Take 1 Tab by mouth two (2) times a day. 12/20/18  Yes Luis Miguel Gómez NP   albuterol-ipratropium (DUO-NEB) 2.5 mg-0.5 mg/3 ml nebu 3 mL by Nebulization route every 4 hours daily while awake resp. Yes Provider, Historical   polyethylene glycol (MIRALAX) 17 gram packet Take 17 g by mouth daily. Yes Provider, Historical   senna (SENNA) 8.6 mg tablet Take 1 Tab by mouth nightly. Yes Provider, Historical   calcium-vitamin D (OYSTER SHELL) 500 mg(1,250mg) -200 unit per tablet Take 1 Tab by mouth two (2) times daily (with meals). Yes Provider, Historical   levothyroxine (SYNTHROID) 25 mcg tablet Take 1 Tab by mouth Daily (before breakfast). 12/5/18  Yes Honorio Oshea NP   OTHER Dispense - Portable Oxygen & Supplies due to congestive heart failure, hypoxemia, confusion due to hypoxemia. Long term treatment is required as patient failed in office walk test. Oxygen saturations noted to be at start 89% RA then dropped to 78% with activity. 12/5/18  Yes Honorio Oshea NP   OTHER Dispense Oxygen Concentrator with humidified air and supplies. Dx: I50.42 CHF; Dx: I48.91 Afib uncontrolled; Dx: R06.02 SOB; Dx: R09.02 Hypoxemia. Patient failed walk test in office. Pulse ox at 78% on RA. 12/5/18  Yes Honorio Oshea NP   OTHER May crush or change to liquid for any prescription prn due to dysphagia. 12/5/18  Yes Honorio Oshea NP   pantoprazole (PROTONIX) 40 mg tablet Take 1 Tab by mouth daily. 12/3/18  Yes Honorio Oshea NP   sucralfate (CARAFATE) 100 mg/mL suspension Take 10 mL by mouth four (4) times daily. 12/3/18  Yes Honorio Oshea NP   clopidogrel (PLAVIX) 75 mg tab Take 1 Tab by mouth daily. 11/30/18  Yes Honorio Oshea NP   apixaban (ELIQUIS) 2.5 mg tablet Take 1 Tab by mouth two (2) times a day.  11/30/18  Yes Jody Aleman, Tate Greenfield NP   simvastatin (ZOCOR) 40 mg tablet Take 40 mg by mouth nightly. 11/26/18  Yes Provider, Historical   PARoxetine (PAXIL) 20 mg tablet Take 20 mg by mouth daily. 11/26/18  Yes Provider, Historical   nitroglycerin (NITROSTAT) 0.4 mg SL tablet Take 0.4 mg by mouth as needed. 11/26/18  Yes Provider, Historical   PROAIR HFA 90 mcg/actuation inhaler Take 2 Inhalation by inhalation four (4) times daily as needed. 11/26/18  Yes Provider, Historical   Nebulizer & Compressor machine 1 Each by Nebulization route four (4) times daily. 11/28/18  Yes Jez Quiñonez NP   Nebulizer Accessories kit Dispense Nebulizer Accessories - face mask. Daily use due to COPD/Pulmonary Reactive Airway Disease/Hypoxemia 11/28/18  Yes Jez Quiñonez NP   potassium chloride (KLOR-CON) 10 mEq tablet Take 1 Tab by mouth daily. 11/28/18  Yes Jez Quiñonez NP   ondansetron hcl (ZOFRAN) 4 mg tablet Take 1 tablet 30 mins prior to breakfast, then q 8 hours prn for nausea/vomiting. 11/28/18  Yes Jez Quiñonez NP   diltiazem CD (CARTIA XT) 180 mg ER capsule Take 180 mg by mouth nightly. Yes Provider, Historical   metOLazone (ZAROXOLYN) 5 mg tablet Take 5 mg by mouth daily. Provider, Historical   budesonide (PULMICORT) 0.25 mg/2 mL nbsp 2 mL by Nebulization route two (2) times a day. 11/28/18   Jez Quiñonez NP       Allergies   Allergen Reactions    Brilinta [Ticagrelor] Shortness of Breath    Celecoxib Other (comments)    Sulfa (Sulfonamide Antibiotics) Unknown (comments) and Hives    Atorvastatin Unknown (comments)    Codeine Unknown (comments)    Darvocet A500 [Propoxyphene N-Acetaminophen] Unknown (comments)    Iodine Unknown (comments)    Statins-Hmg-Coa Reductase Inhibitors Other (comments)     (intolerance)-myalgias    Aricept [Donepezil] Myalgia     Headaches - noted as intolerance        Review of Systems: pertinent positives in HPI  Consititutional: Denies fever or chills.   Eyes:  Denies use of glasses or vision problems(cataracts). ENT:  Denies hearing or swallowing difficulty. CV: Denies CP, claudication, HTN. Resp: Denies dyspnea, productive cough. : Denies dialysis or kidney problems. GI: Denies ulcers, esophageal strictures, liver problems. M/S: Denies joint or bone problems, or implanted artificial hardware. Skin: Denies varicose veins, edema. Neuro: Denies strokes, or TIAs. Psych: Denies anxiety or depression. Endocrine: Denies thyroid problems or diabetes. Heme/Lymphatic: Denies easy bruising or lymphedema. Objective:     VS:   Visit Vitals  /58 (BP 1 Location: Right arm, BP Patient Position: Sitting)   Pulse 80   Temp 98.2 °F (36.8 °C) (Oral)   Resp 16   Ht 5' 1\" (1.549 m)   Wt 128 lb 8 oz (58.3 kg)   SpO2 94%   BMI 24.28 kg/m²       Physical Exam:    General appearance: alert, cooperative, no distress  Head: normocephalic, without obvious abnormality; atraumatic  Eyes: conjunctivae/corneas clear; EOM's intact. Nose: nares normal; no drainage. Neck: no carotid bruit and no JVD  Lungs: clear to auscultation bilaterally  Heart: regular rate and rhythm; +2/6 murmur  Abdomen: soft, non-tender; bowel sounds normal  Extremities: moves all extremities; no weakness. Skin: Skin color normal; No varicose veins, 1+ pedal edema. Neurologic: Grossly normal      Diagnostics:   PA and lateral 12/20/18: IMPRESSION:  1. Enlarging left pleural effusion  2. Right pleural effusion has decreased in size   3. Resolved pulmonary vascular congestion    Carotid doppler 12/8/18: IMPRESSION:  Consistent with 0-49% stenosis of the right internal  carotid and 0-49% stenosis of the left internal carotid. Vertebrals  are patent with antegrade flow.     PFTS-FEV1: none    EKG 12/6/18: Atrial fibrillation   ST & T wave abnormality, consider inferior ischemia   ST & T wave abnormality, consider anterior ischemia   When compared with ECG of 03-DEC-2018 10:16,   No significant change was found Plan:     1. MR: Surgical plans per Dr. Lilliana Peralta. 2. CAD s/p Inferior STEMI 9/11/18 with PTCA/stents to distal RCA (KENNETH x 2): On plavix, coreg, statin  3. Dyslipidemia: On statin  4. AF: Rate controlled with coreg and dilt, anticoagulated with eliquis 2.5 BID   5. Hx HTN: on coreg  6. Hypothyroid: On synthroid  7. Renal insufficiency: Monitor Cr  8. Respiratory insufficiency: On 2L NC, pulmicort, duonebs, metolazone and bumex  9. GERD: on protonix  10.  HFpEF: On BB, bumex and metolazone        Signed By: Chris Barton NP     January 18, 2019

## 2019-01-22 ENCOUNTER — PATIENT OUTREACH (OUTPATIENT)
Dept: CARDIOLOGY CLINIC | Age: 84
End: 2019-01-22

## 2019-01-22 ENCOUNTER — TELEPHONE (OUTPATIENT)
Dept: CARDIOLOGY CLINIC | Age: 84
End: 2019-01-22

## 2019-01-22 NOTE — PROGRESS NOTES
Goals Addressed This Visit's Progress  Reduce risk of CHF exacerbations and complications. 12/12/18 Working with Rosa Maria Cason to coordinate [place]. Nurse at [facility] states she is \"really swamped right now\" and can not perform med rec with me. Put me on hold to ask if admissions director could assist. She came back and stated that the admissions director says [facility] got everything they needed from hospital at discharge. \"  NN will fax over weight sheet to have staff fill out and will follow up next week---sonyaw 12/14/18 Working with [facility] on [employer]. Was able to speak with Hermelinda nurse talking care of patient. Med Rec performed. She reports patient has orders for daily weights, however did not have weight for several days. Most recent weight on 12/10/18 is 128 lbs, which is down from discharge weight of 137 lbs. NN encouraged obtaining weight daily and to report to house MD weigh gain greater than 2 lbs/day or 5 lbs/week. She reports that patient is on AHA diet. Hermelinda reports patient saw sangita Vazquez within 48 hours of returning to [facility]--she did not have access to exactly when patient was seen however this is standard practice for their patients. Patient does have cardiology appt with Dr Connor Torres on 12/20/18. Patient's son is to transport her. NN will follow up with son and facility for updates. ---sonyaw 12/19/18 Spoke to Rip Almonte at [facility]. She reports that patient is doing well. She participates in PT/OT. Her last weight on 12/17/18 was 131lbs, 3 lbs increase since 12/10/18. NN voiced concern that patient isn't being weighed daily. She states she will have to weigh the patient daily herself since it is not getting done every day. Patient does not have any leg edema, no SOB, lungs clear. She states that patient will be going to see Dr Connor Torres on 12/20/18. Patient continues to maintain low sodium diet. She reports that there are no plans at this time to send patient back to her apt from [facility] at this time. NN will follow up with Hermelinda next week for updates. ---mkrw 12/21/18 Per cardiology office visit notes, patient ordered to have labs drawn, CXR, decrease carvedilol and increase bumex. Maty Jenkins to follow up on new orders. Was transferred to Nurse Supervisor voicemail , left message for return call. Noted that MD office also wanted Bumex given BID however chart still reflects daily. Staff Message sent to NP Perry Porter. To let her know. NN will follow up on Monday---mkrw 12/26/18 Spoke to [address] at [facility] [place] is nurse taking care of patient today. She reports that Repeat CXR has not been performed yet---she is putting order in the computer now to have it done today. CMP, MG, and TSH levels drawn on 12/24/18. She reports Mg level 1.7, K level 3.3. Patient is on 10 meq potassium daily. Will send note over to MD office to see if increase may be needed. She is faxing lab results to Dr Luis Enrique Mckinley at [phone] today. She will also have CXR results faxed to MD office as well. Patient's weight today is 129.4lbs. On 12/20 at MD office weight 130 lbs 3 oz. Heather did not note any swelling in ankles or feet, however patient did request 02 this morning so she is wearing it at 2L NC. Confirmed that Coreg is now 3.125 mg BID and Bumex is 2 mg BID. Next appt is 1/18/19 with Dr Luis Enrique Mckinley. NN will follow up next week for updates from [facility]. 01/03/19 Per chart notes, patient saw Dr Luis Enrique Mckinley. CXR confirms increase in pleural effusions and requires thoracentesis, which will be performed on Monday 1/7/19. Spoke to Larry Sarkar at [facility]. She confirms that they have orders to hold Eliquis on Friday night and to restart Tuesday 1/8/19. Plavix is not to be held. They have been weighing patient per orders.  Today's weight is 123 lbs, which is down from 129-130 lbs from last week. Hermelinda also confirms patient is on Zaroxylyn. NN will follow up next week after procedure---macey 01/09/19 Per patient chart, patient had successful US guided L thoracentesis on 1/7/19 resulting in 400 ml fluid output. Spoke to nurse taking care of patient today at [facility]--she reports patient is up walking around, she notices that the patient is not coughing like she did prior to procedure. She has resumed her Eliquis. Today's weight is 122.4lbs---stable. She reports she fluctuates between 120-123 lbs per her records. NN will follow up next week for updates--macey 01/16/19 Per Hermelinda at [facility], Patient's weight today 127.8. Asked her if there was any changes to her orders that would cause her weight to spike up like that--she said she wasn't aware of medication change, however patient was eating better now and was on a low sodium diet. Asked for a weight range over the last couple days---she reports 123lbs, 122 lbs, 125 lbs, 127 lbs, 129 lbs, 127.8 lbs. Hermelinda said she would notify MD about this. NN will contact cardiology as well via staff message. NN will follow up---macey 01/22/19 Per chart notes, patient saw Dr Luci Sainz 1/18/19, weight noted to be 128.8 lbs at visit. Spoke to Kiana Combs, nurse at [facility]. She reports weight is 127 lbs this morning. Patient is confused at times, disoriented to place/time. She requires 02 2L/NC continuously, will desat quickly when removed. Nurse reports that patient will remove the NC and 02 sat has been 79-84%, will recover quickly when NC replaced. Patient must be watched closely because of this. Nurse also reports that patient stays in her room most of the time, will come out only when encouraged by visitors or staff to walk to dining room. NN will contact cardiology office for plan and follow up with [facility] next week---macey

## 2019-01-22 NOTE — TELEPHONE ENCOUNTER
[facility] can not schedule the IFRAH with this pt until her appointment with dr palencia (2/1/19) is moved

## 2019-01-24 NOTE — TELEPHONE ENCOUNTER
Returned call to [address] [place], [place] [address]can Dr and advised   Dr. Hussain Jones appointment has been cancelled. She can proceed with Dr. Clau Arias proceeding with a 3D IFRAH. They will contact patient/son regarding procedure on 2/1/19 @ 8am with Dr. Clau Arias. M for patient's son to return call at earliest convenience. Son returned call, 2 pt identifiers used  He is aware of procedure on 2/1/19 with Dr. Clau Arias. Future appointment with Dr. Josh Pryor will depend if patient proceeds with Tavr. She will f/u as scheduled or call me back for sooner appt.     Future Appointments   Date Time Provider Maty Schrader   4/22/2019  1:20 PM José Luu  [address]

## 2019-01-31 ENCOUNTER — PATIENT OUTREACH (OUTPATIENT)
Dept: CARDIOLOGY CLINIC | Age: 84
End: 2019-01-31

## 2019-01-31 NOTE — PROGRESS NOTES
Goals  Reduce risk of CHF exacerbations and complications. 12/12/18 Working with Hermes Bermudez to coordinate [place]. Nurse at [facility] states she is \"really swamped right now\" and can not perform med rec with me. Put me on hold to ask if admissions director could assist. She came back and stated that the admissions director says Lesly Hugo got everything they needed from hospital at discharge. \"  NN will fax over weight sheet to have staff fill out and will follow up next week---mkrw 12/14/18 Working with [facility] on [place]. Was able to speak with Hermelinda, nurse talking care of patient. Med Rec performed. She reports patient has orders for daily weights, however did not have weight for several days. Most recent weight on 12/10/18 is 128 lbs, which is down from discharge weight of 137 lbs. NN encouraged obtaining weight daily and to report to house MD weigh gain greater than 2 lbs/day or 5 lbs/week. She reports that patient is on AHA diet. Hermelinda reports patient saw sangita Hernandez within 48 hours of returning to [facility]--she did not have access to exactly when patient was seen however this is standard practice for their patients. Patient does have cardiology appt with Dr Marina Somers on 12/20/18. Patient's son is to transport her. NN will follow up with son and facility for updates. ---mkrw 12/19/18 Spoke to Larry Sarkar at [facility]. She reports that patient is doing well. She participates in PT/OT. Her last weight on 12/17/18 was 131lbs, 3 lbs increase since 12/10/18. NN voiced concern that patient isn't being weighed daily. She states she will have to weigh the patient daily herself since it is not getting done every day. Patient does not have any leg edema, no SOB, lungs clear. She states that patient will be going to see Dr Marina Somers on 12/20/18. Patient continues to maintain low sodium diet.  She reports that there are no plans at this time to send patient back to her apt from [facility] at this time. AKI will follow up with Hermelinda next week for updates. ---mkrw 12/21/18 Per cardiology office visit notes, patient ordered to have labs drawn, CXR, decrease carvedilol and increase bumex. Maty Jenkins to follow up on new orders. Was transferred to Nurse Supervisor voicemail , left message for return call. Noted that MD office also wanted Bumex given BID however chart still reflects daily. Staff Message sent to NP Sebas Knows. To let her know. NN will follow up on Monday---mkrw 12/26/18 Spoke to Joshua8 So King Alberto at [facility] is nurse taking care of patient today. She reports that Repeat CXR has not been performed yet---she is putting order in the computer now to have it done today. CMP, MG, and TSH levels drawn on 12/24/18. She reports Mg level 1.7, K level 3.3. Patient is on 10 meq potassium daily. Will send note over to MD office to see if increase may be needed. She is faxing lab results to Dr Neo Harrington at [phone] today. She will also have CXR results faxed to MD office as well. Patient's weight today is 129.4lbs. On 12/20 at MD office weight 130 lbs 3 oz. Heather did not note any swelling in ankles or feet, however patient did request 02 this morning so she is wearing it at 2L NC. Confirmed that Coreg is now 3.125 mg BID and Bumex is 2 mg BID. Next appt is 1/18/19 with Dr Neo Harrington. NN will follow up next week for updates from [facility]. 01/03/19 Per chart notes, patient saw Dr Neo Harrington. CXR confirms increase in pleural effusions and requires thoracentesis, which will be performed on Monday 1/7/19. Spoke to Larry Sarkar at [facility]. She confirms that they have orders to hold Eliquis on Friday night and to restart Tuesday 1/8/19. Plavix is not to be held. They have been weighing patient per orders. Today's weight is 123 lbs, which is down from 129-130 lbs from last week.  Hermelinda also confirms patient is on Zaroxylyn. NN will follow up next week after procedure---mkrw 01/09/19 Per patient chart, patient had successful US guided L thoracentesis on 1/7/19 resulting in 400 ml fluid output. Spoke to nurse taking care of patient today at [facility]--she reports patient is up walking around, she notices that the patient is not coughing like she did prior to procedure. She has resumed her Eliquis. Today's weight is 122.4lbs---stable. She reports she fluctuates between 120-123 lbs per her records. NN will follow up next week for updates--mkrw 01/16/19 Per Hermelinda at [facility], Patient's weight today 127.8. Asked her if there was any changes to her orders that would cause her weight to spike up like that--she said she wasn't aware of medication change, however patient was eating better now and was on a low sodium diet. Asked for a weight range over the last couple days---she reports 123lbs, 122 lbs, 125 lbs, 127 lbs, 129 lbs, 127.8 lbs. Hermelinda said she would notify MD about this. NN will contact cardiology as well via staff message. NN will follow up---mkrw 01/22/19 Per chart notes, patient saw Dr Abbie Greene 1/18/19, weight noted to be 128.8 lbs at visit. Spoke to [employer], nurse at [facility]. She reports weight is 127 lbs this morning. Patient is confused at times, disoriented to place/time. She requires 02 2L/NC continuously, will desat quickly when removed. Nurse reports that patient will remove the NC and 02 sat has been 79-84%, will recover quickly when NC replaced. Patient must be watched closely because of this. Nurse also reports that patient stays in her room most of the time, will come out only when encouraged by visitors or staff to walk to dining room. NN will contact cardiology office for plan and follow up with [facility] next week---mkrw 01/31/19 Spoke with Hermelinda at [facility].  She reports there have been no changes in patient condition. She was unable to obtain recent weight from computer at time of call, reports that patient's weight has been stable without significant increases. She confirms that patient will be going for IFRAH tomorrow. NN will follow up next week for patient update---macey

## 2019-02-01 ENCOUNTER — HOSPITAL ENCOUNTER (OUTPATIENT)
Dept: NON INVASIVE DIAGNOSTICS | Age: 84
Discharge: HOME | End: 2019-02-01
Attending: INTERNAL MEDICINE
Payer: MEDICARE

## 2019-02-01 VITALS
OXYGEN SATURATION: 93 % | SYSTOLIC BLOOD PRESSURE: 122 MMHG | RESPIRATION RATE: 18 BRPM | DIASTOLIC BLOOD PRESSURE: 70 MMHG | HEART RATE: 84 BPM

## 2019-02-01 DIAGNOSIS — I48.0 PAROXYSMAL ATRIAL FIBRILLATION (HCC): ICD-10-CM

## 2019-02-01 PROCEDURE — 74011250636 HC RX REV CODE- 250/636

## 2019-02-01 PROCEDURE — 99153 MOD SED SAME PHYS/QHP EA: CPT

## 2019-02-01 PROCEDURE — 99152 MOD SED SAME PHYS/QHP 5/>YRS: CPT

## 2019-02-01 PROCEDURE — 93325 DOPPLER ECHO COLOR FLOW MAPG: CPT

## 2019-02-01 PROCEDURE — 74011000250 HC RX REV CODE- 250: Performed by: INTERNAL MEDICINE

## 2019-02-01 RX ORDER — FENTANYL CITRATE 50 UG/ML
25-50 INJECTION, SOLUTION INTRAMUSCULAR; INTRAVENOUS
Status: DISCONTINUED | OUTPATIENT
Start: 2019-02-01 | End: 2019-02-01

## 2019-02-01 RX ORDER — LIDOCAINE HYDROCHLORIDE 20 MG/ML
15 SOLUTION OROPHARYNGEAL ONCE
Status: COMPLETED | OUTPATIENT
Start: 2019-02-01 | End: 2019-02-01

## 2019-02-01 RX ORDER — MIDAZOLAM HYDROCHLORIDE 1 MG/ML
.5-1 INJECTION, SOLUTION INTRAMUSCULAR; INTRAVENOUS
Status: DISCONTINUED | OUTPATIENT
Start: 2019-02-01 | End: 2019-02-01

## 2019-02-01 RX ADMIN — MIDAZOLAM HYDROCHLORIDE 1 MG: 1 INJECTION, SOLUTION INTRAMUSCULAR; INTRAVENOUS at 08:03

## 2019-02-01 RX ADMIN — BENZOCAINE, BUTAMBEN, AND TETRACAINE HYDROCHLORIDE 1 SPRAY: .028; .004; .004 AEROSOL, SPRAY TOPICAL at 08:03

## 2019-02-01 RX ADMIN — LIDOCAINE HYDROCHLORIDE 15 ML: 20 SOLUTION ORAL; TOPICAL at 08:02

## 2019-02-01 NOTE — DISCHARGE INSTRUCTIONS
DISCHARGE SUMMARY       The following personal items collected during your admission are returned to you:   Clothing:  Shirt and jacket, walker        PATIENT INSTRUCTIONS: Continue taking all the same medications. You had a transesophageal echocardiogram, your throat was numbed for the procedure, so start with sips of water and advance diet as swallowing returns to normal. Avoid any scalding hot beverages for the next 2 hours. Call to make an appointment with Dr. Honorio Oshea. What to do at Home:  Recommended activity: No driving today      The discharge information has been reviewed with the PATIENT . The PATIENT  verbalized understanding.

## 2019-02-01 NOTE — ROUTINE PROCESS
Patient arrived to Non-Invasive Cardiology Lab for Out Patient IFRAH Procedure. Staff introduced to patient. Patient identifiers verified with Name and Date of Birth. Procedure verified with patient. Consent forms reviewed and signed by patient or authorized representative and verified. Allergies verified. Patient informed of procedure and plan of care. Questions answered with review. Patient on cardiac monitor, non-invasive blood pressure, SPO2 monitor. On Nasal Cannula at 2LPM. Patient is A&Ox3. Patient reports no complaints. Patient on stretcher, in low position, with side rails up. Patient instructed to call for assistance as needed. Family in waiting room.

## 2019-02-01 NOTE — PROGRESS NOTES
Discharge instructions reviewed with patient and son, Lori Stinson. Allowed adequate time to ask questions, all questions answered. Printed copy of AVS given to patient. All belongings gathered, IV and tele discontinued. Transported via wheelchair to main entrance and into care of family.

## 2019-02-06 ENCOUNTER — OFFICE VISIT (OUTPATIENT)
Dept: CARDIOLOGY CLINIC | Age: 84
End: 2019-02-06

## 2019-02-06 VITALS
DIASTOLIC BLOOD PRESSURE: 62 MMHG | HEART RATE: 94 BPM | RESPIRATION RATE: 16 BRPM | OXYGEN SATURATION: 96 % | BODY MASS INDEX: 23.79 KG/M2 | HEIGHT: 61 IN | WEIGHT: 126 LBS | SYSTOLIC BLOOD PRESSURE: 110 MMHG | TEMPERATURE: 98 F

## 2019-02-06 DIAGNOSIS — K76.0 FATTY LIVER DISEASE, NONALCOHOLIC: Chronic | ICD-10-CM

## 2019-02-06 DIAGNOSIS — E03.9 ACQUIRED HYPOTHYROIDISM: Chronic | ICD-10-CM

## 2019-02-06 DIAGNOSIS — I34.0 MITRAL VALVE INSUFFICIENCY, UNSPECIFIED ETIOLOGY: Primary | ICD-10-CM

## 2019-02-06 DIAGNOSIS — I25.10 CORONARY ARTERY DISEASE INVOLVING NATIVE HEART WITHOUT ANGINA PECTORIS, UNSPECIFIED VESSEL OR LESION TYPE: ICD-10-CM

## 2019-02-06 DIAGNOSIS — J90 BILATERAL PLEURAL EFFUSION: ICD-10-CM

## 2019-02-06 DIAGNOSIS — I48.0 PAROXYSMAL ATRIAL FIBRILLATION (HCC): ICD-10-CM

## 2019-02-06 NOTE — PROGRESS NOTES
Patient: Aide Quinteros   Age: 80 y.o. Patient Care Team:  Honorio Oshea NP as PCP - General  Mary Sharpe MD as Physician (Orthopedic Surgery)  Mariangel Mckeon MD (Ophthalmology)  Fady Rodriguez MD as Consulting Provider (Cardiology)  Yajaira Barton MD as Physician (Gynecology)  Pb Barnes MD as Physician (Cardiology)  Vianney Pitt MD as Physician (Neurology)  Jacqui Syed MD (Pulmonary Disease)  Karsten Brice RN as Ambulatory Care Navigator (Cardiology)  Lyle Milian NP (Nurse Practitioner)  Faith Peters MD (Cardiothoracic Surgery)    PCP: Honorio Oshea NP    Cardiologist: Caryle Chol    Diagnosis/Reason for Consultation: The primary encounter diagnosis was Mitral valve insufficiency, unspecified etiology. Diagnoses of Paroxysmal atrial fibrillation (HCC), Fatty liver disease, nonalcoholic, Coronary artery disease involving native heart without angina pectoris, unspecified vessel or lesion type, Acquired hypothyroidism, and Bilateral pleural effusion were also pertinent to this visit.     Problem List:   Patient Active Problem List   Diagnosis Code    HTN (hypertension) I10    Hyperlipidemia E78.5    GERD (gastroesophageal reflux disease) K21.9    Anxiety F41.9    Vertigo R42    Nuclear cataract [id number]    Osteoporosis M81.0    Ocular hypertension H40.059    Nuclear sclerosis H25.10    Fatty liver disease, nonalcoholic E45.5    Kidney insufficiency N28.9    Atherosclerotic cardiovascular disease I25.10    Paroxysmal atrial fibrillation (HCC) I48.0    Pulmonary heart disease (HCC)  I27.9    Diuretic-induced hypokalemia E87.6, T50.2X5A    Moderate protein-calorie malnutrition (HCC)  E44.0    AI (aortic insufficiency) I35.1    Bilateral pleural effusion J90    Acute respiratory failure with hypoxemia (HCC) J96.01    (HFpEF) heart failure with preserved ejection fraction (HCC) I50.30    CAD (coronary artery disease) I25.10  Hypothyroidism E03.9         HPI: 80 y.o.  female with PMHx of MR, HTN, CAD/STEMI s/p stents 9/2018, CVA, GERD, renal insufficiency, HFpEF, HLD, peripheral neuropathy, afib, hypothyroid, vertigo, and dementia that is referred to the [address] [place] [address] by Dr. Keaton Marti for interventional evaluation of MR. She was originally referred to Dr. Keaton Marti by Dr. Alicja Menendez when he was consulted when pt in ED presenting with worsening SOB, hypoxia and pleural effusions. She is here today to discuss interventional evaluation after her IFRAH. Ms. Sofi Blank was quite independent prior to her MI in September 2018, to include driving but since then has had considerable decline in her overall health and functionality. She has had significant weakness, activity intolerance, progressive fatigue, LE edema, SOB/DURAN with minimal activity and memory loss. She has had two thoracenteses done with the most recent being 1/7/2019. She also has supplemental oxygen ATC. She reports considerable DURAN w/ walking short distances on flat surfaces and if supplemental oxygen cannula gets dislodged. She denies routine dizziness & has a hx of vertigo but states it is particularly noticeable if her supplemental oxygen is removed. She states she only has CP/chest tightness with activity, again, if her oxygen is misplaced. She c/o LE edema that started in August of last yr and persists today and has not responded particularly well to medication adjustments. She also has orthopnea but denies PND. Currently Ms. Sofi Blank lives in [address] [place] [address] that provides considerable nursing assistance with bathing and dressing if needed. Ms. Sofi Blank states she bathes herself using a chair in the shower but does have supervision. She has an assisted living apartment on hold that she hopes to go to once her strength will allow her to be more independent.   She uses a rollator today and walked from the door of the building to our office. She and her son both feel her strength, stamina and memory are improving. She is currently getting daily PT. Ms. Angelo Liriano is accompanied by her son, a local  for the [place], today. NYHA Classification: II   Class II (Mild): Slight limitation of physical activity. Comfortable at rest, but ordinary physical activity results in fatigue, palpitation, or dyspnea. Past Medical History:   Diagnosis Date    (HFpEF) heart failure with preserved ejection fraction ([address] 10/17/2018    acute    Allergic rhinitis     Atherosclerotic cardiovascular disease 1999    CHF (congestive heart failure) ([address] 09/11/2018    Cognitive communication deficit     Edema     Environmental allergies     dizziness-(chronic)    Fatty liver disease, nonalcoholic     GERD (gastroesophageal reflux disease)     H/O heart artery stent 09/2018    HCVD (hypertensive cardiovascular disease) 10/17/2018    Heart attack ([address] 09/2018    Heart palpitations 2018    infrequent     History of PTCA     Hypercholesterolemia 1999    Hypertension 2010    Liver disease     fatty liver    MI [facility] infarction) [facility] 09/2018    Overactive bladder 08/09/2018    Peripheral neuropathy     Permanent atrial fibrillation ([address] 11/05/2018    Senile dementia, without behavioral disturbance 11/05/2018    Stress incontinence, female 08/09/2018    Vertigo     Vitamin D deficiency        Past Surgical History:   Procedure Laterality Date    BREAST SURGERY PROCEDURE UNLISTED      breast cyst Precilla Docker)    CARDIAC SURG PROCEDURE UNLIST  8/1999    + stress thalassemia; neg. cath., (4/02) neg.  Holter    HX APPENDECTOMY      HX BREAST BIOPSY Left 1998    neg    HX CYST INCISION AND DRAINAGE Right years ago    neg    HX DILATION AND CURETTAGE      x5    HX GYN      D&C (x5), ALLEY/BSO (12/01-Juliann/Jerome), Provera intolerance (bleeding), endometrial Bx annually    HX PTCA  09/11/2018    stent distal RCA into PLwith KENNETH x 2    HX ALLEY AND BSO      Juliann/Jerome      Social History     Tobacco Use    Smoking status: Former Smoker     Packs/day: 4.00     Types: Cigarettes     Last attempt to quit: 1955     Years since quittin.1    Smokeless tobacco: Never Used   Substance Use Topics    Alcohol use: No     Alcohol/week: 0.0 - 0.5 oz      Family History   Problem Relation Age of Onset    Diabetes Mother     Hypertension Mother     Heart Failure Mother         CHF ( @ 80)   [facility] Alzheimer Mother     Cancer Father         lung ( @ 77)   [facility] Alzheimer Father     No Known Problems Son     No Known Problems Son     Ovarian Cancer Sister         hysterectomy    Breast Cancer Sister 28        mastectomy    Cancer Sister         breast and ovarian    Alzheimer Sister     Breast Problems Sister         breast cyst    Cataract Sister     Hypertension Sister     Diabetes Brother      Prior to Admission medications    Medication Sig Start Date End Date Taking? Authorizing Provider   bumetanide (BUMEX) 2 mg tablet Take 1 Tab by mouth two (2) times a day. 18  Yes Oracio Gómez NP   carvedilol (COREG) 3.125 mg tablet Take 1 Tab by mouth two (2) times a day. 18  Yes Oracio Gómez NP   albuterol-ipratropium (DUO-NEB) 2.5 mg-0.5 mg/3 ml nebu 3 mL by Nebulization route every 4 hours daily while awake resp. Yes Provider, Historical   polyethylene glycol (MIRALAX) 17 gram packet Take 17 g by mouth daily. Yes Provider, Historical   senna (SENNA) 8.6 mg tablet Take 1 Tab by mouth nightly. Yes Provider, Historical   calcium-vitamin D (OYSTER SHELL) 500 mg(1,250mg) -200 unit per tablet Take 1 Tab by mouth two (2) times daily (with meals). Yes Provider, Historical   levothyroxine (SYNTHROID) 25 mcg tablet Take 1 Tab by mouth Daily (before breakfast).  18  Yes Russ Brown NP   OTHER Dispense - Portable Oxygen & Supplies due to congestive heart failure, hypoxemia, confusion due to hypoxemia. Long term treatment is required as patient failed in office walk test. Oxygen saturations noted to be at start 89% RA then dropped to 78% with activity. 12/5/18  Yes Crys Marin NP   OTHER Dispense Oxygen Concentrator with humidified air and supplies. Dx: I50.42 CHF; Dx: I48.91 Afib uncontrolled; Dx: R06.02 SOB; Dx: R09.02 Hypoxemia. Patient failed walk test in office. Pulse ox at 78% on RA. 12/5/18  Yes Crys Marin NP   OTHER May crush or change to liquid for any prescription prn due to dysphagia. 12/5/18  Yes Crys Marin NP   pantoprazole (PROTONIX) 40 mg tablet Take 1 Tab by mouth daily. 12/3/18  Yes Crys Marin NP   sucralfate (CARAFATE) 100 mg/mL suspension Take 10 mL by mouth four (4) times daily. 12/3/18  Yes Crys Marin NP   clopidogrel (PLAVIX) 75 mg tab Take 1 Tab by mouth daily. 11/30/18  Yes Crys Marin NP   apixaban (ELIQUIS) 2.5 mg tablet Take 1 Tab by mouth two (2) times a day. 11/30/18  Yes Crys Marin NP   simvastatin (ZOCOR) 40 mg tablet Take 40 mg by mouth nightly. 11/26/18  Yes Provider, Historical   PARoxetine (PAXIL) 20 mg tablet Take 20 mg by mouth daily. 11/26/18  Yes Provider, Historical   nitroglycerin (NITROSTAT) 0.4 mg SL tablet Take 0.4 mg by mouth as needed. 11/26/18  Yes Provider, Historical   PROAIR HFA 90 mcg/actuation inhaler Take 2 Inhalation by inhalation four (4) times daily as needed. 11/26/18  Yes Provider, Historical   Nebulizer & Compressor machine 1 Each by Nebulization route four (4) times daily. 11/28/18  Yes Crys Marin NP   Nebulizer Accessories kit Dispense Nebulizer Accessories - face mask. Daily use due to COPD/Pulmonary Reactive Airway Disease/Hypoxemia 11/28/18  Yes Crys Marin NP   potassium chloride (KLOR-CON) 10 mEq tablet Take 1 Tab by mouth daily. Patient taking differently: Take 20 mEq by mouth daily.  11/28/18  Yes Active-Semi, NP ondansetron hcl (ZOFRAN) 4 mg tablet Take 1 tablet 30 mins prior to breakfast, then q 8 hours prn for nausea/vomiting. 11/28/18  Yes Ifeoma Gamino, NP   diltiazem CD (CARTIA XT) 180 mg ER capsule Take 180 mg by mouth nightly. Yes Provider, Historical   metOLazone (ZAROXOLYN) 5 mg tablet Take 5 mg by mouth daily. Provider, Historical   budesonide (PULMICORT) 0.25 mg/2 mL nbsp 2 mL by Nebulization route two (2) times a day. 11/28/18   Ifeoma Stabs, NP       Allergies   Allergen Reactions    Brilinta [Ticagrelor] Shortness of Breath    Celecoxib Other (comments)    Sulfa (Sulfonamide Antibiotics) Unknown (comments) and Hives    Atorvastatin Unknown (comments)    Codeine Unknown (comments)    Darvocet A500 [Propoxyphene N-Acetaminophen] Unknown (comments)    Iodine Unknown (comments)    Statins-Hmg-Coa Reductase Inhibitors Other (comments)     (intolerance)-myalgias    Aricept [Donepezil] Myalgia     Headaches - noted as intolerance        Current Medications:   Current Outpatient Medications   Medication Sig Dispense Refill    bumetanide (BUMEX) 2 mg tablet Take 1 Tab by mouth two (2) times a day. 180 Tab 1    carvedilol (COREG) 3.125 mg tablet Take 1 Tab by mouth two (2) times a day. 180 Tab 1    albuterol-ipratropium (DUO-NEB) 2.5 mg-0.5 mg/3 ml nebu 3 mL by Nebulization route every 4 hours daily while awake resp.  polyethylene glycol (MIRALAX) 17 gram packet Take 17 g by mouth daily.  senna (SENNA) 8.6 mg tablet Take 1 Tab by mouth nightly.  calcium-vitamin D (OYSTER SHELL) 500 mg(1,250mg) -200 unit per tablet Take 1 Tab by mouth two (2) times daily (with meals).  levothyroxine (SYNTHROID) 25 mcg tablet Take 1 Tab by mouth Daily (before breakfast). 1 Tab 0    OTHER Dispense - Portable Oxygen & Supplies due to congestive heart failure, hypoxemia, confusion due to hypoxemia.  Long term treatment is required as patient failed in office walk test. Oxygen saturations noted to be at start 89% RA then dropped to 78% with activity. 1 Piece 0    OTHER Dispense Oxygen Concentrator with humidified air and supplies. Dx: I50.42 CHF; Dx: I48.91 Afib uncontrolled; Dx: R06.02 SOB; Dx: R09.02 Hypoxemia. Patient failed walk test in office. Pulse ox at 78% on RA. 1 Piece 0    OTHER May crush or change to liquid for any prescription prn due to dysphagia. 1 Piece 0    pantoprazole (PROTONIX) 40 mg tablet Take 1 Tab by mouth daily. 30 Tab 0    sucralfate (CARAFATE) 100 mg/mL suspension Take 10 mL by mouth four (4) times daily. 414 mL 2    clopidogrel (PLAVIX) 75 mg tab Take 1 Tab by mouth daily. 30 Tab 0    apixaban (ELIQUIS) 2.5 mg tablet Take 1 Tab by mouth two (2) times a day. 30 Tab 0    simvastatin (ZOCOR) 40 mg tablet Take 40 mg by mouth nightly.  PARoxetine (PAXIL) 20 mg tablet Take 20 mg by mouth daily.  nitroglycerin (NITROSTAT) 0.4 mg SL tablet Take 0.4 mg by mouth as needed.  PROAIR HFA 90 mcg/actuation inhaler Take 2 Inhalation by inhalation four (4) times daily as needed.  Nebulizer & Compressor machine 1 Each by Nebulization route four (4) times daily. 1 Each 0    Nebulizer Accessories kit Dispense Nebulizer Accessories - face mask. Daily use due to COPD/Pulmonary Reactive Airway Disease/Hypoxemia 1 Kit 0    potassium chloride (KLOR-CON) 10 mEq tablet Take 1 Tab by mouth daily. (Patient taking differently: Take 20 mEq by mouth daily.) 30 Tab 1    ondansetron hcl (ZOFRAN) 4 mg tablet Take 1 tablet 30 mins prior to breakfast, then q 8 hours prn for nausea/vomiting. 90 Tab 1    diltiazem CD (CARTIA XT) 180 mg ER capsule Take 180 mg by mouth nightly.  metOLazone (ZAROXOLYN) 5 mg tablet Take 5 mg by mouth daily.  budesonide (PULMICORT) 0.25 mg/2 mL nbsp 2 mL by Nebulization route two (2) times a day. 60 Each 2       Vitals: Blood pressure 110/62, pulse 94, temperature 98 °F (36.7 °C), temperature source Oral, resp.  rate 16, height 5' 1\" (1.549 m), weight 126 lb (57.2 kg), last menstrual period 01/01/2000, SpO2 96 %. On 2L NC    Allergies: is allergic to brilinta [ticagrelor]; celecoxib; sulfa (sulfonamide antibiotics); atorvastatin; codeine; darvocet a500 [propoxyphene n-acetaminophen]; iodine; statins-hmg-coa reductase inhibitors; and aricept [donepezil]. Review of Systems: Pertinent Positives per HPI   [x]Total of 13 systems reviewed as follows:  Constitutional: Negative fever, negative chills  Eyes:   Negative for amauroses fugax  ENT:   Negative sore throat,oral absecess  Endocrine Negative for thyroid goiter; DM  Respiratory:  Negative chronic cough,sputum production  Cards:   Negative for palpitations, varicosities, claudication  GI:   Negative for dysphagia, bleeding, nausea, vomiting, diarrhea, and abdominal pain  Genitourinary: Negative for frequency, dysuria  Integument:  Negative for rash and pruritus  Hematologic:  Negative for easy bruising; bleeding dyscarsia  Musculoskel: Negative for muscle weakness inhibiting ambulation  Neurological:  Negative for stroke, TIA, syncope  Behavl/Psych: Negative for feelings of anxiety, depression     Cardiovascular Testing:   TTE 1/2/2019:  LEFT VENTRICLE: Size was normal. Systolic function was normal. Ejection fraction was estimated to be 62 % by Hunter's biplane technique. There were no regional wall motion abnormalities. Wall thickness was at the upper limits of normal. RIGHT VENTRICLE: The ventricle was moderately dilated. Systolic function was markedly reduced. LEFT ATRIUM: The atrium was mildly dilated. LA volume index was 36 ml/mï¾². ATRIAL SEPTUM: The atrial septum appeared intact. RIGHT ATRIUM: Size was normal. MITRAL VALVE: There was mild-moderate thickening. DOPPLER: There was no evidence for stenosis. There was severe regurgitation. The regurgitant jet was eccentric and directed posteriorly. PISA measured 0.9 cm. AORTIC VALVE: The valve was trileaflet.  Leaflets exhibited mildly increased thickness. DOPPLER: Transaortic velocity was within the normal range. There was no stenosis. There was mild regurgitation. TRICUSPID VALVE: Normal valve structure. DOPPLER: There was no evidence for tricuspid stenosis. There was mild to moderate regurgitation. Right atrial pressure estimate: 5 mmHg. Pulmonary artery systolic pressure: 40 mmHg. There was mild pulmonary hypertension. PULMONIC VALVE: Normal valve structure. DOPPLER: There was no stenosis. There was mild regurgitation. AORTA: The root exhibited normal size. SYSTEMIC VEINS: IVC: The inferior vena cava was normal in size. Respirophasic changes were normal. PERICARDIUM: There was no pericardial effusion. There was a left pleural effusion. MR ERO: 0.34 cm2    IFRAH 2/1/2019: Estimated left ventricular ejection fraction is 56 - 60%. Left atrial cavity size is moderately dilated. Left atrial appendage velocity is normal (greater than 40 cm/sec). Right ventricular cavity size is mildly dilated. Right ventricular global systolic function is mildly reduced. Right atrial cavity size is mildly dilated. Severe mitral valve regurgitation. Moderately decreased posterior leaflet mobility of the mitral valve. Mild tricuspid valve regurgitation is present. Mild pulmonary hypertension is present    Left Ventricle Normal cavity size, wall thickness and systolic function (ejection fraction normal). The estimated ejection fraction is 56 - 60%. Left Atrium The cavity size is moderately dilated. There is no thrombus. There is no mass. Left atrial appendage is normal. There is no thrombus within the left atrial appendage. There is no mass within the left atrial appendage. Appendage velocity is normal (greater than 40 cm/sec). Right Ventricle The cavity size is mildly dilated. Global systolic function is mildly reduced. Right Atrium The cavity size is mildly dilated. Aortic Valve Normal, trileaflet aortic valve structure. No stenosis. Trace aortic valve regurgitation. Mitral Valve No stenosis. Leaflet calcification. Severe regurgitation. The mitral regurgitant jet is posteriorly directed. There is no vegetation on the mitral valve. There is no mass on the mitral valve. Moderately decreased posterior leaflet mobility. Tricuspid Valve Normal valve structure and no stenosis. Mild tricuspid valve regurgitation. Mild pulmonary hypertension. Pulmonic Valve Normal valve structure, no stenosis and no regurgitation. Aorta Normal aortic root, ascending aortic, and aortic arch. Pericardium No evidence of pericardial effusion. Cardiac catheterization: September at O[facility]    Carotid Dopplers 12/6/18: Consistent with 0-49% stenosis of the right internal carotid and 0-49% stenosis of the left internal carotid.  Vertebrals are patent with antegrade flow    Brain MRI 12/10/2018:  Ventricles:  Midline, no hydrocephalus. Generalized atrophy. Brain Parenchyma/Brainstem: Moderate chronic white matter disease throughout the supratentorial brain. No acute infarction. Intracranial Hemorrhage:  Small focus of susceptibility artifact right cerebellum may indicate an area of prior hemorrhage or calcification. Basal Cisterns:  Normal.   Flow Voids:  Normal.   IMPRESSION: Atrophy and chronic white matter disease, with no acute infarction. Physical Exam:  General: Well nourished well groomed elderly woman appearing stated age accompanied by her son  Neuro: A&OX3. HUERTA. PERRL. Steady assisted gait with rollator  Head:Normocephalic. Atraumatic. Symmetrical  Neck: Trachea Midline  Resp: CTA B. No Adv BS/cough/sputum/tachypnea with seated conversation  CV: S1S2 RRR. VANGIE III/VI. No JVD/carotid bruits. Pink/warm/dry extremities. Mod LE peripheral edema  GI:Benign ab. Soft. NT/ND. Active BS  : Voids  Integ: No obvious s/s of infection or breakdown  Musculo/Skeletal: FROM in all major joints.  Modest muscle tone    Clinic Evaluation:   KCCQ-12: scanned into EMR    6 minute walk test: refused    Julienne Matt Survey:  Jesse Folds Index ADL - 5/6  scanned into EMR     Assessment/Plan:   1. MR - severe and symptomatic functional MR. High surgical risk. Interested in clinical trial options at [facility]. 2. HFpEF - BB/Loop diuretic/aldosterone antagonist per cards   3. CAD s/p Inferior STEMI 9/11/18 with PTCA/stents to distal RCA (KENNETH x 2) - Clopidogrel/BB/statin per cards  4. HLD - statin per cards  5. Afib - Rate controlled with BB/CCB. Anticoagulated w/ apixaban 2.5mg BID   6. HTN - BB per cards  7. Hypothyroid -  Synthroid per PCP  8. Renal insufficiency - Cr 1.04 / eGFR > 50 in December 2018  9. Respiratory insufficiency - d/t CHF. On 2L NC ATC. pulmicort, duonebs, metolazone and bumex  10. GERD - PPI per PCP  11. Further plan/care by Oscar Kruse [place]

## 2019-02-06 NOTE — PROGRESS NOTES
Patient: Kelle Mueller   Age: 80 y.o. Patient Care Team:  Cy Lopez NP as PCP - General  Jamar Carbajal MD as Physician (Orthopedic Surgery)  Robel Tavarez MD (Ophthalmology)  Rafaela Pang MD as Consulting Provider (Cardiology)  Itzel Iyer MD as Physician (Gynecology)  Loyda Clark MD as Physician (Cardiology)  Roro Barton MD as Physician (Neurology)  Elisa Velazquez MD (Pulmonary Disease)  Sona Bustos RN as Ambulatory Care Navigator (Cardiology)  Deonte Caal NP (Nurse Practitioner)  Ness Lockwood MD (Cardiothoracic Surgery)    PCP: Cy Lopez NP    Cardiologist: Marina Somers    Diagnosis/Reason for Consultation: The primary encounter diagnosis was Mitral valve insufficiency, unspecified etiology. Diagnoses of Paroxysmal atrial fibrillation (HCC), Fatty liver disease, nonalcoholic, Coronary artery disease involving native heart without angina pectoris, unspecified vessel or lesion type, Acquired hypothyroidism, and Bilateral pleural effusion were also pertinent to this visit.     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  female with PMHx of MR, HTN, CAD/STEMI s/p stents 9/2018, CVA, GERD, renal insufficiency, HFpEF, HLD, peripheral neuropathy, afib, hypothyroid, vertigo, and dementia that is referred to the [address] [place] [address] by Dr. Dalia Marin for interventional evaluation of MR. She was originally referred to Dr. Dalia Marin by Dr. Alexis Almodovar when he was consulted when pt in ED presenting with worsening SOB, hypoxia and pleural effusions. She is here today to discuss interventional evaluation after her IFRAH. Ms. Jose Teixeira was quite independent prior to her MI in September 2018, to include driving but since then has had a considerable decline in her overall health and functionality. She has had significant weakness, activity intolerance, progressive fatigue, LE edema, SOB/DURAN with minimal activity and memory loss. She has had two thoracentesis done with the most recent being 1/7/2019. She also has supplemental oxygen ATC. She reports considerable DURAN with walking short distances on flat surfaces and if supplemental oxygen cannula get dislodged. She denies routine dizziness & has a hx of vertigo but states it is particularly noticeable if her supplemental oxygen is removed. She states she only has CP/chest tightness with activity, again, if her oxygen is misplaced. She c/o LE edema that started in August of last yr and is persistent today and has not responded particularly well to medication adjustments. She also has orthopnea but denies PND. Currently Ms. Jose Teixeira lives in the [address] [place] [address] that provides considerable nursing assistance with bathing and dressing if needed. Ms. Jose Teixeira states she bathes herself using a chair in the shower but does have supervision. She has an assisted living apartment on hold that she hopes to go to once her strength will allow her to be more independent.   She uses a rollator today and walked from the door to our office. She and her son both feel her strength and stamina are slowly improving with her daily PT sessions. Ms. Humza Leija is accompanied by her son, a local  for the [facility], today. NYHA Classification: III   Class III (Moderate): Marked limitation of physical activity. Comfortable at rest, but less than ordinary activity causes fatigue, palpitation, or dyspnea      Past Medical History:   Diagnosis Date    (HFpEF) heart failure with preserved ejection fraction ([address] 10/17/2018    acute    Allergic rhinitis     Atherosclerotic cardiovascular disease 1999    CHF (congestive heart failure) ([address] 09/11/2018    Cognitive communication deficit     Edema     Environmental allergies     dizziness-(chronic)    Fatty liver disease, nonalcoholic     GERD (gastroesophageal reflux disease)     H/O heart artery stent 09/2018    HCVD (hypertensive cardiovascular disease) 10/17/2018    Heart attack ([address] 09/2018    Heart palpitations 2018    infrequent     History of PTCA     Hypercholesterolemia 1999    Hypertension 2010    Liver disease     fatty liver    MI (myocardial infarction) ([address] 09/2018    Overactive bladder 08/09/2018    Peripheral neuropathy     Permanent atrial fibrillation ([address] 11/05/2018    Senile dementia, without behavioral disturbance 11/05/2018    Stress incontinence, female 08/09/2018    Vertigo     Vitamin D deficiency      Endocarditis: no  Pulmonary HTN: yes  Greater than 2/3 systemic: no  Immunocompromised/Steroids: no  Heart Failure Admission w/in past year:  yes  Heart Failure Admission w/in past 2 weeks: no  Liver Dz/Cirrhosis: no   If yes, MELD score:  N/A    Past Surgical History:   Procedure Laterality Date    BREAST SURGERY PROCEDURE UNLISTED      breast cyst Purviylin )    CARDIAC SURG PROCEDURE UNLIST  8/1999    + stress thalassemia; neg. cath., (4/02) neg.  Holter    HX APPENDECTOMY      HX BREAST BIOPSY Left 1998    neg    HX CYST INCISION AND DRAINAGE Right years ago    neg    HX DILATION AND CURETTAGE      x5    HX GYN      D&C (x5), ALLEY/BSO (-Juliann/Zedler), Provera intolerance (bleeding), endometrial Bx annually    HX PTCA  2018    stent distal RCA into PLwith KENNETH x 2    HX ALLEY AND BSO      Juliann/Zedler      Social History     Tobacco Use    Smoking status: Former Smoker     Packs/day: 4.00     Types: Cigarettes     Last attempt to quit: 1955     Years since quittin.1    Smokeless tobacco: Never Used   Substance Use Topics    Alcohol use: No     Alcohol/week: 0.0 - 0.5 oz      Family History   Problem Relation Age of Onset    Diabetes Mother     Hypertension Mother     Heart Failure Mother         CHF ( @ 80)   [de-identified] Alzheimer Mother     Cancer Father         lung ( @ 77)   [de-identified] Alzheimer Father     No Known Problems Son     No Known Problems Son     Ovarian Cancer Sister         hysterectomy    Breast Cancer Sister 28        mastectomy    Cancer Sister         breast and ovarian    Alzheimer Sister     Breast Problems Sister         breast cyst    Cataract Sister     Hypertension Sister     Diabetes Brother      Prior to Admission medications    Medication Sig Start Date End Date Taking? Authorizing Provider   bumetanide (BUMEX) 2 mg tablet Take 1 Tab by mouth two (2) times a day. 18  Yes Rin Gómez NP   carvedilol (COREG) 3.125 mg tablet Take 1 Tab by mouth two (2) times a day. 18  Yes Rin Gómez NP   albuterol-ipratropium (DUO-NEB) 2.5 mg-0.5 mg/3 ml nebu 3 mL by Nebulization route every 4 hours daily while awake resp. Yes Provider, Historical   polyethylene glycol (MIRALAX) 17 gram packet Take 17 g by mouth daily. Yes Provider, Historical   senna (SENNA) 8.6 mg tablet Take 1 Tab by mouth nightly. Yes Provider, Historical   calcium-vitamin D (OYSTER SHELL) 500 mg(1,250mg) -200 unit per tablet Take 1 Tab by mouth two (2) times daily (with meals).    Yes Provider, Historical   levothyroxine (SYNTHROID) 25 mcg tablet Take 1 Tab by mouth Daily (before breakfast). 12/5/18  Yes Britt Chaudhari NP   OTHER Dispense - Portable Oxygen & Supplies due to congestive heart failure, hypoxemia, confusion due to hypoxemia. Long term treatment is required as patient failed in office walk test. Oxygen saturations noted to be at start 89% RA then dropped to 78% with activity. 12/5/18  Yes Britt Chaudhari NP   OTHER Dispense Oxygen Concentrator with humidified air and supplies. Dx: I50.42 CHF; Dx: I48.91 Afib uncontrolled; Dx: R06.02 SOB; Dx: R09.02 Hypoxemia. Patient failed walk test in office. Pulse ox at 78% on RA. 12/5/18  Yes Britt Chaudhari NP   OTHER May crush or change to liquid for any prescription prn due to dysphagia. 12/5/18  Yes Britt Chaudhari NP   pantoprazole (PROTONIX) 40 mg tablet Take 1 Tab by mouth daily. 12/3/18  Yes Britt Chaudhari NP   sucralfate (CARAFATE) 100 mg/mL suspension Take 10 mL by mouth four (4) times daily. 12/3/18  Yes Britt Chaudhari NP   clopidogrel (PLAVIX) 75 mg tab Take 1 Tab by mouth daily. 11/30/18  Yes Britt Chaudhari NP   apixaban (ELIQUIS) 2.5 mg tablet Take 1 Tab by mouth two (2) times a day. 11/30/18  Yes Britt Chaudhari NP   simvastatin (ZOCOR) 40 mg tablet Take 40 mg by mouth nightly. 11/26/18  Yes Provider, Historical   PARoxetine (PAXIL) 20 mg tablet Take 20 mg by mouth daily. 11/26/18  Yes Provider, Historical   nitroglycerin (NITROSTAT) 0.4 mg SL tablet Take 0.4 mg by mouth as needed. 11/26/18  Yes Provider, Historical   PROAIR HFA 90 mcg/actuation inhaler Take 2 Inhalation by inhalation four (4) times daily as needed. 11/26/18  Yes Provider, Historical   Nebulizer & Compressor machine 1 Each by Nebulization route four (4) times daily. 11/28/18  Yes Britt Fara, NP   Nebulizer Accessories kit Dispense Nebulizer Accessories - face mask.  Daily use due to COPD/Pulmonary Reactive Airway Disease/Hypoxemia 11/28/18  Yes Kaylyn Jenkins NP   potassium chloride (KLOR-CON) 10 mEq tablet Take 1 Tab by mouth daily. Patient taking differently: Take 20 mEq by mouth daily. 11/28/18  Yes Kaylyn Jenkins NP   ondansetron hcl (ZOFRAN) 4 mg tablet Take 1 tablet 30 mins prior to breakfast, then q 8 hours prn for nausea/vomiting. 11/28/18  Yes Kaylyn Jenkins NP   diltiazem CD (CARTIA XT) 180 mg ER capsule Take 180 mg by mouth nightly. Yes Provider, Historical   metOLazone (ZAROXOLYN) 5 mg tablet Take 5 mg by mouth daily. Provider, Historical   budesonide (PULMICORT) 0.25 mg/2 mL nbsp 2 mL by Nebulization route two (2) times a day. 11/28/18   Kaylyn Jenkins NP       Allergies   Allergen Reactions    Brilinta [Ticagrelor] Shortness of Breath    Celecoxib Other (comments)    Sulfa (Sulfonamide Antibiotics) Unknown (comments) and Hives    Atorvastatin Unknown (comments)    Codeine Unknown (comments)    Darvocet A500 [Propoxyphene N-Acetaminophen] Unknown (comments)    Iodine Unknown (comments)    Statins-Hmg-Coa Reductase Inhibitors Other (comments)     (intolerance)-myalgias    Aricept [Donepezil] Myalgia     Headaches - noted as intolerance        Current Medications:   Current Outpatient Medications   Medication Sig Dispense Refill    bumetanide (BUMEX) 2 mg tablet Take 1 Tab by mouth two (2) times a day. 180 Tab 1    carvedilol (COREG) 3.125 mg tablet Take 1 Tab by mouth two (2) times a day. 180 Tab 1    albuterol-ipratropium (DUO-NEB) 2.5 mg-0.5 mg/3 ml nebu 3 mL by Nebulization route every 4 hours daily while awake resp.  polyethylene glycol (MIRALAX) 17 gram packet Take 17 g by mouth daily.  senna (SENNA) 8.6 mg tablet Take 1 Tab by mouth nightly.  calcium-vitamin D (OYSTER SHELL) 500 mg(1,250mg) -200 unit per tablet Take 1 Tab by mouth two (2) times daily (with meals).  levothyroxine (SYNTHROID) 25 mcg tablet Take 1 Tab by mouth Daily (before breakfast). 1 Tab 0    OTHER Dispense - Portable Oxygen & Supplies due to congestive heart failure, hypoxemia, confusion due to hypoxemia. Long term treatment is required as patient failed in office walk test. Oxygen saturations noted to be at start 89% RA then dropped to 78% with activity. 1 Piece 0    OTHER Dispense Oxygen Concentrator with humidified air and supplies. Dx: I50.42 CHF; Dx: I48.91 Afib uncontrolled; Dx: R06.02 SOB; Dx: R09.02 Hypoxemia. Patient failed walk test in office. Pulse ox at 78% on RA. 1 Piece 0    OTHER May crush or change to liquid for any prescription prn due to dysphagia. 1 Piece 0    pantoprazole (PROTONIX) 40 mg tablet Take 1 Tab by mouth daily. 30 Tab 0    sucralfate (CARAFATE) 100 mg/mL suspension Take 10 mL by mouth four (4) times daily. 414 mL 2    clopidogrel (PLAVIX) 75 mg tab Take 1 Tab by mouth daily. 30 Tab 0    apixaban (ELIQUIS) 2.5 mg tablet Take 1 Tab by mouth two (2) times a day. 30 Tab 0    simvastatin (ZOCOR) 40 mg tablet Take 40 mg by mouth nightly.  PARoxetine (PAXIL) 20 mg tablet Take 20 mg by mouth daily.  nitroglycerin (NITROSTAT) 0.4 mg SL tablet Take 0.4 mg by mouth as needed.  PROAIR HFA 90 mcg/actuation inhaler Take 2 Inhalation by inhalation four (4) times daily as needed.  Nebulizer & Compressor machine 1 Each by Nebulization route four (4) times daily. 1 Each 0    Nebulizer Accessories kit Dispense Nebulizer Accessories - face mask. Daily use due to COPD/Pulmonary Reactive Airway Disease/Hypoxemia 1 Kit 0    potassium chloride (KLOR-CON) 10 mEq tablet Take 1 Tab by mouth daily. (Patient taking differently: Take 20 mEq by mouth daily.) 30 Tab 1    ondansetron hcl (ZOFRAN) 4 mg tablet Take 1 tablet 30 mins prior to breakfast, then q 8 hours prn for nausea/vomiting. 90 Tab 1    diltiazem CD (CARTIA XT) 180 mg ER capsule Take 180 mg by mouth nightly.  metOLazone (ZAROXOLYN) 5 mg tablet Take 5 mg by mouth daily.       budesonide (PULMICORT) 0.25 mg/2 mL nbsp 2 mL by Nebulization route two (2) times a day. 60 Each 2       Vitals: Blood pressure 110/62, pulse 94, temperature 98 °F (36.7 °C), temperature source Oral, resp. rate 16, height 5' 1\" (1.549 m), weight 126 lb (57.2 kg), last menstrual period 01/01/2000, SpO2 96 %. Allergies: is allergic to brilinta [ticagrelor]; celecoxib; sulfa (sulfonamide antibiotics); atorvastatin; codeine; darvocet a500 [propoxyphene n-acetaminophen]; iodine; statins-hmg-coa reductase inhibitors; and aricept [donepezil]. Review of Systems: Pertinent Positives per HPI   [x]Total of 13 systems reviewed as follows:  Constitutional: Negative fever, negative chills  Eyes:   Negative for amauroses fugax  ENT:   Negative sore throat,oral absecess  Endocrine Negative for thyroid replacement Rx; goiter; DM  Respiratory:  Negative chronic cough,sputum production  Cards:   Negative for palpitations; varicosities, claudication  GI:   Negative for dysphagia, bleeding, nausea, vomiting, diarrhea, and abdominal pain  Genitourinary: Negative for frequency, dysuria  Integument:  Negative for rash and pruritus  Hematologic:  Negative for easy bruising; bleeding dyscarsia  Musculoskel: Negative for muscle weakness inhibiting ambulation  Neurological:  Negative for stroke, TIA, syncope  Behavl/Psych: Negative for feelings of anxiety, depression     Cardiovascular Testing:   TTE 1/2/2019:  LEFT VENTRICLE: Size was normal. Systolic function was normal. Ejection fraction was estimated to be 62 % by Hunter's biplane technique. There were no regional wall motion abnormalities. Wall thickness was at the upper limits of normal. RIGHT VENTRICLE: The ventricle was moderately dilated. Systolic function was markedly reduced. LEFT ATRIUM: The atrium was mildly dilated. LA volume index was 36 ml/mï¾². ATRIAL SEPTUM: The atrial septum appeared intact. RIGHT ATRIUM: Size was normal. MITRAL VALVE: There was mild-moderate thickening. AOU DOPPLER: There was no evidence for stenosis. There was severe regurgitation. The regurgitant jet was eccentric and directed posteriorly. PISA measured 0.9 cm. AORTIC VALVE: The valve was trileaflet. Leaflets exhibited mildly increased thickness. DOPPLER: Transaortic velocity was within the normal range. There was no stenosis. There was mild regurgitation. TRICUSPID VALVE: Normal valve structure. DOPPLER: There was no evidence for tricuspid stenosis. There was mild to moderate regurgitation. Right atrial pressure estimate: 5 mmHg. Pulmonary artery systolic pressure: 40 mmHg. There was mild pulmonary hypertension. PULMONIC VALVE: Normal valve structure. DOPPLER: There was no stenosis. There was mild regurgitation. AORTA: The root exhibited normal size. SYSTEMIC VEINS: IVC: The inferior vena cava was normal in size. Respirophasic changes were normal.  PERICARDIUM: There was no pericardial effusion. There was a left pleural effusion. MR ERO: 0.34 cm2    IFRAH 2/1/2019: Estimated left ventricular ejection fraction is 56 - 60%. Left atrial cavity size is moderately dilated. Left atrial appendage velocity is normal (greater than 40 cm/sec). Right ventricular cavity size is mildly dilated. Right ventricular global systolic function is mildly reduced. Right atrial cavity size is mildly dilated. Severe mitral valve regurgitation. Moderately decreased posterior leaflet mobility of the mitral valve. Mild tricuspid valve regurgitation is present. Mild pulmonary hypertension is present    Left Ventricle Normal cavity size, wall thickness and systolic function (ejection fraction normal). The estimated ejection fraction is 56 - 60%. Left Atrium The cavity size is moderately dilated. There is no thrombus. There is no mass. Left atrial appendage is normal. There is no thrombus within the left atrial appendage. There is no mass within the left atrial appendage. Appendage velocity is normal (greater than 40 cm/sec).    Right Ventricle The cavity size is mildly dilated. Global systolic function is mildly reduced. Right Atrium The cavity size is mildly dilated. Aortic Valve Normal, trileaflet aortic valve structure. No stenosis. Trace aortic valve regurgitation. Mitral Valve No stenosis. Leaflet calcification. Severe regurgitation. The mitral regurgitant jet is posteriorly directed. There is no vegetation on the mitral valve. There is no mass on the mitral valve. Moderately decreased posterior leaflet mobility. Tricuspid Valve Normal valve structure and no stenosis. Mild tricuspid valve regurgitation. Mild pulmonary hypertension. Pulmonic Valve Normal valve structure, no stenosis and no regurgitation. Aorta Normal aortic root, ascending aortic, and aortic arch. Pericardium No evidence of pericardial effusion. Cardiac catheterization: September at OSH    Carotid Dopplers 12/6/18: Consistent with 0-49% stenosis of the right internal carotid and 0-49% stenosis of the left internal carotid.  Vertebrals are patent with antegrade flow    Brain MRI 12/10/2018:  Ventricles:  Midline, no hydrocephalus. Generalized atrophy. Brain Parenchyma/Brainstem: Moderate chronic white matter disease throughout the supratentorial brain. No acute infarction. Intracranial Hemorrhage:  Small focus of susceptibility artifact right cerebellum may indicate an area of prior hemorrhage or calcification. Basal Cisterns:  Normal.   Flow Voids:   Normal.  IMPRESSION: Atrophy and chronic white matter disease, with no acute infarction. Physical Exam:  General: Well nourished well groomed elderly woman appearing stated age accompanied by her son  Neuro: A&OX3. UHERTA. PERRL. Steady assisted gait with rollator  Head:Normocephalic. Atraumatic. Symmetrical  Neck: Trachea Midline  Resp: CTA B. No Adv BS/cough/sputum/tachypnea with seated conversation  CV: S1S2 RRR. VANGIE III/VI. No JVD/carotid bruits. Pink/warm/dry extremities.  Mod LE peripheral edema  GI:Benign ab. Soft. NT/ND. Active BS  : Voids  Integ: No obvious s/s of infection or breakdown  Musculo/Skeletal: FROM in all major joints. Modest muscle tone    Clinic Evaluation:   KCCQ-12: scanned into EMR    6 minute walk test: refused    Carol Ann Wildwood Survey:  Cheryeyo Boom Index ADL - 5/6  scanned into EMR     Assessment/Plan:   1. MR - severe and symptomatic functional MR. High Surgical Risk. Interested in clinical trial options at [facility]. 2. HFpEF - BB/Loop diuretic/aldosterone antagonist per cards   3. CAD s/p Inferior STEMI 9/11/18 with PTCA/stents to distal RCA (KENNETH x 2) - Clopidogrel/BB/statin per cards  4. HLD - statin per cards  5. Afib - Rate controlled with BB/CCB. Anticoagulated w/ apixaban 2.5mg BID   6. HTN - BB per cards  7. Hypothyroid -  Synthroid per PCP  8. Renal insufficiency - Cr 1.04 / eGFR > 50 in December 2018  9. Respiratory insufficiency - d/t CHF. On 2L NC ATC. pulmicort, duonebs, metolazone and bumex  10. GERD - PPI per PCP  11. Further plan/care by Oscar Moreno

## 2019-02-06 NOTE — PROGRESS NOTES
I had the pleasure of seeing Ms. Malgorzata Washington in the valve clinic earlier today with Ms. Soumya Hernández NP - please see her note for details. She has severe FMR and impressive RV dysfunction after recent MI in September. She had PCI at [facility], which we will need to get. She is also concerned about her ascites and liver function, so will get that evaluated. Discussed options with her and her son a local , and will have her go to [facility] for consideration of FMR studies - ideally Ellen although [facility] a possibility.

## 2019-02-08 ENCOUNTER — TELEPHONE (OUTPATIENT)
Dept: GERIATRIC MEDICINE | Age: 84
End: 2019-02-08

## 2019-02-08 NOTE — TELEPHONE ENCOUNTER
Usama Taylor has been contacted regarding recent hospital admission. Current medications were reviewed with the patient/caregiver by telephone. Date of Admission:  December 2018     Date of Discharge: 2/8/2018     Facility patient was discharged from: [facility]    Reason for Admission: Mitral Valve insufficiency   Any medication changes? No     How is the patient feeling? Well     Does the patient have any questions or problems? no   Does the patient need transportation? no   Follow-up Appointment date: 2/11/19 at 1:30 pm     I advised the patient to bring her medications in the original bottles and to contact office, or go to either urgent care or emergency care if symptoms return before scheduled appointment.     Rojelio Blakely LPN 4/2/5784 4:78 PM

## 2019-02-11 ENCOUNTER — OFFICE VISIT (OUTPATIENT)
Dept: GERIATRIC MEDICINE | Age: 84
End: 2019-02-11

## 2019-02-11 VITALS
WEIGHT: 129 LBS | BODY MASS INDEX: 24.35 KG/M2 | DIASTOLIC BLOOD PRESSURE: 70 MMHG | HEART RATE: 90 BPM | TEMPERATURE: 97.8 F | RESPIRATION RATE: 18 BRPM | SYSTOLIC BLOOD PRESSURE: 125 MMHG | OXYGEN SATURATION: 99 % | HEIGHT: 61 IN

## 2019-02-11 DIAGNOSIS — I05.9 MITRAL VALVULAR DISORDER: Primary | ICD-10-CM

## 2019-02-11 DIAGNOSIS — F33.1 MODERATE EPISODE OF RECURRENT MAJOR DEPRESSIVE DISORDER (HCC): ICD-10-CM

## 2019-02-11 DIAGNOSIS — Z71.89 ACP (ADVANCE CARE PLANNING): ICD-10-CM

## 2019-02-11 DIAGNOSIS — E03.9 ACQUIRED HYPOTHYROIDISM: ICD-10-CM

## 2019-02-11 DIAGNOSIS — Z00.00 MEDICARE ANNUAL WELLNESS VISIT, SUBSEQUENT: ICD-10-CM

## 2019-02-11 DIAGNOSIS — E44.0 MODERATE PROTEIN-CALORIE MALNUTRITION (HCC): ICD-10-CM

## 2019-02-11 DIAGNOSIS — Z66 DNR (DO NOT RESUSCITATE): ICD-10-CM

## 2019-02-11 DIAGNOSIS — E55.9 VITAMIN D DEFICIENCY: ICD-10-CM

## 2019-02-11 PROBLEM — J96.01 ACUTE RESPIRATORY FAILURE WITH HYPOXEMIA (HCC): Status: RESOLVED | Noted: 2018-12-10 | Resolved: 2019-02-11

## 2019-02-11 PROBLEM — J90 BILATERAL PLEURAL EFFUSION: Status: RESOLVED | Noted: 2018-12-06 | Resolved: 2019-02-11

## 2019-02-11 NOTE — LETTER
2/17/2019 9:27 PM 
 
Patient: Héctor Lechuga YOB: 1927 Date of Visit: 2/11/2019 Dear No Recipients: This letter is to advise you of a recent visit Ms. Hardik Hansen had with Ly Eaton NP for: Chief Complaint Patient presents with  Transitions Of Care Patient here for transition of care from healthcare to assisted living at [facility]. Below are the relevant portions of my assessment and plan of care. If you have questions, please do not hesitate to call me. I look forward to continuing to follow Ms. Celsa Gibson along with you.  
 
 
 
Sincerely, 
Ly Eaton NP

## 2019-02-11 NOTE — PROGRESS NOTES
ADVISED PATIENT OF THE FOLLOWING HEALTH MAINTAINCE DUE Health Maintenance Due Topic Date Due  Influenza Age 5 to Adult  08/01/2018  MEDICARE YEARLY EXAM  01/19/2019 Chief Complaint Patient presents with  Transitions Of Care Patient here for transition of care from healthcare to assisted living at [facility]. 1. Have you been to the ER, urgent care clinic since your last visit? Hospitalized since your last visit? No 
 
2. Have you seen or consulted any other health care providers outside of the [address] [place] [address] since your last visit? Include any DEXA scan, mammography  or colon screening. No 
 
3. Do you have an Advance Directive on file? yes 4. Do you have a DNR on file? DNR Patient is accompanied by self I have received verbal consent from Dread Rivero to discuss any/all medical information while they are present in the room. Advance Care Planning 12/6/2018 Primary Decision Maker Name -  
Primary Decision Maker Phone Number -  
Primary Decision Maker Relationship to Patient -  
Confirm Advance Directive Yes, on file Does the patient have other document types - [employer] [place], [address] [address] Phone: [phone] Fax: [phone]

## 2019-02-12 ENCOUNTER — CLINICAL SUPPORT (OUTPATIENT)
Dept: GERIATRIC MEDICINE | Age: 84
End: 2019-02-12

## 2019-02-12 DIAGNOSIS — I05.9 MITRAL VALVULAR DISORDER: Primary | ICD-10-CM

## 2019-02-12 DIAGNOSIS — I48.0 PAROXYSMAL ATRIAL FIBRILLATION (HCC): ICD-10-CM

## 2019-02-12 DIAGNOSIS — I10 ESSENTIAL HYPERTENSION: ICD-10-CM

## 2019-02-12 DIAGNOSIS — T50.2X5A DIURETIC-INDUCED HYPOKALEMIA: ICD-10-CM

## 2019-02-12 DIAGNOSIS — J69.0 ASPIRATION PNEUMONIA OF BOTH LOWER LOBES DUE TO GASTRIC SECRETIONS (HCC): ICD-10-CM

## 2019-02-12 DIAGNOSIS — N28.9 KIDNEY INSUFFICIENCY: ICD-10-CM

## 2019-02-12 DIAGNOSIS — I50.30 (HFPEF) HEART FAILURE WITH PRESERVED EJECTION FRACTION (HCC): ICD-10-CM

## 2019-02-12 DIAGNOSIS — R06.02 SHORTNESS OF BREATH: ICD-10-CM

## 2019-02-12 DIAGNOSIS — J98.11 MILD BIBASILAR ATELECTASIS: ICD-10-CM

## 2019-02-12 DIAGNOSIS — K29.70 ESOPHAGITIS WITH GASTRITIS: ICD-10-CM

## 2019-02-12 DIAGNOSIS — I35.1 AORTIC VALVE INSUFFICIENCY, ETIOLOGY OF CARDIAC VALVE DISEASE UNSPECIFIED: ICD-10-CM

## 2019-02-12 DIAGNOSIS — E55.9 VITAMIN D DEFICIENCY: ICD-10-CM

## 2019-02-12 DIAGNOSIS — I50.43 ACUTE ON CHRONIC COMBINED SYSTOLIC AND DIASTOLIC CONGESTIVE HEART FAILURE (HCC): ICD-10-CM

## 2019-02-12 DIAGNOSIS — K20.90 ESOPHAGITIS WITH GASTRITIS: ICD-10-CM

## 2019-02-12 DIAGNOSIS — I48.91 UNCONTROLLED ATRIAL FIBRILLATION (HCC): ICD-10-CM

## 2019-02-12 DIAGNOSIS — I67.81 ACUTE CEREBROVASCULAR INSUFFICIENCY: ICD-10-CM

## 2019-02-12 DIAGNOSIS — J90 PLEURAL EFFUSION, LEFT: ICD-10-CM

## 2019-02-12 DIAGNOSIS — E87.6 DIURETIC-INDUCED HYPOKALEMIA: ICD-10-CM

## 2019-02-12 NOTE — PROGRESS NOTES
Chelsi Rashid presents for lab draw ordered by Aidan Paz RN MSN [id number]    The following labs were drawn and sent to lab by Jim Noyola LPN:    CBC, CMP, GWH9A and lipid panel, prealbumin, magnesium, Vitamin D, Vitamin B12 folate, thyroid panel,     The following tubes were sent:    Lavender  ( 2) and Tiger (2)    Patient tolerated procedure well, blood obtained via venipuncture to right antecubital

## 2019-02-13 LAB
25(OH)D3+25(OH)D2 SERPL-MCNC: 46.5 NG/ML (ref 30–100)
ALBUMIN SERPL-MCNC: 3.9 G/DL (ref 3.2–4.6)
ALBUMIN/GLOB SERPL: 1.1 {RATIO} (ref 1.2–2.2)
ALP SERPL-CCNC: 92 IU/L (ref 39–117)
ALT SERPL-CCNC: 12 IU/L (ref 0–32)
AST SERPL-CCNC: 21 IU/L (ref 0–40)
BASOPHILS # BLD AUTO: 0 X10E3/UL (ref 0–0.2)
BASOPHILS NFR BLD AUTO: 1 %
BILIRUB SERPL-MCNC: 0.4 MG/DL (ref 0–1.2)
BUN SERPL-MCNC: 26 MG/DL (ref 10–36)
BUN/CREAT SERPL: 17 (ref 12–28)
CALCIUM SERPL-MCNC: 9.2 MG/DL (ref 8.7–10.3)
CHLORIDE SERPL-SCNC: 99 MMOL/L (ref 96–106)
CHOLEST SERPL-MCNC: 119 MG/DL (ref 100–199)
CO2 SERPL-SCNC: 28 MMOL/L (ref 20–29)
CREAT SERPL-MCNC: 1.5 MG/DL (ref 0.57–1)
EOSINOPHIL # BLD AUTO: 0.2 X10E3/UL (ref 0–0.4)
EOSINOPHIL NFR BLD AUTO: 3 %
ERYTHROCYTE [DISTWIDTH] IN BLOOD BY AUTOMATED COUNT: 15.9 % (ref 12.3–15.4)
FOLATE SERPL-MCNC: >20 NG/ML
FT4I SERPL CALC-MCNC: 2.2 (ref 1.2–4.9)
GLOBULIN SER CALC-MCNC: 3.5 G/DL (ref 1.5–4.5)
GLUCOSE SERPL-MCNC: 116 MG/DL (ref 65–99)
HCT VFR BLD AUTO: 35.2 % (ref 34–46.6)
HDLC SERPL-MCNC: 42 MG/DL
HGB BLD-MCNC: 11.3 G/DL (ref 11.1–15.9)
IMM GRANULOCYTES # BLD AUTO: 0 X10E3/UL (ref 0–0.1)
IMM GRANULOCYTES NFR BLD AUTO: 0 %
LDLC SERPL CALC-MCNC: 58 MG/DL (ref 0–99)
LYMPHOCYTES # BLD AUTO: 1.1 X10E3/UL (ref 0.7–3.1)
LYMPHOCYTES NFR BLD AUTO: 18 %
MAGNESIUM SERPL-MCNC: 2 MG/DL (ref 1.6–2.3)
MCH RBC QN AUTO: 28.2 PG (ref 26.6–33)
MCHC RBC AUTO-ENTMCNC: 32.1 G/DL (ref 31.5–35.7)
MCV RBC AUTO: 88 FL (ref 79–97)
MONOCYTES # BLD AUTO: 0.8 X10E3/UL (ref 0.1–0.9)
MONOCYTES NFR BLD AUTO: 13 %
NEUTROPHILS # BLD AUTO: 4 X10E3/UL (ref 1.4–7)
NEUTROPHILS NFR BLD AUTO: 65 %
PLATELET # BLD AUTO: 288 X10E3/UL (ref 150–379)
POTASSIUM SERPL-SCNC: 4.2 MMOL/L (ref 3.5–5.2)
PREALB SERPL-MCNC: 17 MG/DL (ref 9–32)
PROT SERPL-MCNC: 7.4 G/DL (ref 6–8.5)
RBC # BLD AUTO: 4.01 X10E6/UL (ref 3.77–5.28)
SODIUM SERPL-SCNC: 143 MMOL/L (ref 134–144)
T3RU NFR SERPL: 29 % (ref 24–39)
T4 SERPL-MCNC: 7.7 UG/DL (ref 4.5–12)
TRIGL SERPL-MCNC: 97 MG/DL (ref 0–149)
TSH SERPL DL<=0.005 MIU/L-ACNC: 6.07 UIU/ML (ref 0.45–4.5)
VIT B12 SERPL-MCNC: 499 PG/ML (ref 232–1245)
VLDLC SERPL CALC-MCNC: 19 MG/DL (ref 5–40)
WBC # BLD AUTO: 6.1 X10E3/UL (ref 3.4–10.8)

## 2019-02-15 ENCOUNTER — PATIENT OUTREACH (OUTPATIENT)
Dept: CARDIOLOGY CLINIC | Age: 84
End: 2019-02-15

## 2019-02-18 NOTE — PROGRESS NOTES
Reason for Visit/HPI:   
Dread Rivero is a 80 y.o. female patient who presents today for Chief Complaint Patient presents with  Transitions Of Care Patient here for transition of care from healthcare to assisted living at [facility]. Diagnosis/Treatment Plan:   
Diagnoses and all orders for this visit: 
 
1. Mitral valvular disorder Comments: This is new onset since Nov. 2018 following her heart attack in Sept 2018. She is working with [facility] now to have treatment for the disorder. Orders: 
-     CBC WITH AUTOMATED DIFF 
-     COLLECTION VENOUS BLOOD,VENIPUNCTURE 
-     LIPID PANEL 
-     METABOLIC PANEL, COMPREHENSIVE 
-     THYROID PANEL W/TSH 
-     VITAMIN B12 & FOLATE 
-     VITAMIN D, 25 HYDROXY 
-     MAGNESIUM 2. Transition of care performed with sharing of clinical summary Comments: 
Pt. transitioned from [facility] to [place] Thursday 2/07/2019. She is here today for her [place] visit & medicare wellness. Orders: 
-     CBC WITH AUTOMATED DIFF 
-     COLLECTION VENOUS BLOOD,VENIPUNCTURE 
-     LIPID PANEL 
-     METABOLIC PANEL, COMPREHENSIVE 
-     THYROID PANEL W/TSH 
-     VITAMIN B12 & FOLATE 
-     VITAMIN D, 25 HYDROXY 
-     MAGNESIUM 3. Moderate protein-calorie malnutrition ([address] Comments: 
Since her heart attack she has lost over 25#. She has little to no interest in eating. She is now on taking in boost well. Working on increasing her nutrition. Orders: 
-     CBC WITH AUTOMATED DIFF 
-     COLLECTION VENOUS BLOOD,VENIPUNCTURE 
-     LIPID PANEL 
-     METABOLIC PANEL, COMPREHENSIVE 
-     THYROID PANEL W/TSH 
-     VITAMIN B12 & FOLATE 
-     VITAMIN D, 25 HYDROXY 
-     MAGNESIUM 
-     PREALBUMIN 
 
4. Moderate episode of recurrent major depressive disorder (HCC) Comments: 
Paxil is not working. She is having more episodes of highs & lows. Confusion as well as she has lost the ability to use thoughtful processes. Orders: 
-     PREALBUMIN 
 
5. Vitamin D deficiency Comments: 
Chronic, ongoing. Going to evaluate treatment today with labs. Orders: -     VITAMIN D, 25 HYDROXY 6. Acquired hypothyroidism Comments: 
Since her heart 7. Medicare annual wellness visit, subsequent -     MD ANNUAL ALCOHOL SCREEN 15 MIN 
-     MD MEDICATION LIST DOCUMENTED IN MEDICAL RECORD 
-     MD PRESENCE/ABSENCE URINARY INCONTINENCE ASSESSED 
-     MD PT FALLS ASSESS DOC 0-1 FALLS W/OUT INJ PAST YR 
-     DO NOT RESUSCITATE 8. ACP (advance care planning) -     MD ANNUAL ALCOHOL SCREEN 15 MIN 
-     MD MEDICATION LIST DOCUMENTED IN MEDICAL RECORD 
-     MD PRESENCE/ABSENCE URINARY INCONTINENCE ASSESSED 
-     MD PT FALLS ASSESS DOC 0-1 FALLS W/OUT INJ PAST YR 
-     DO NOT RESUSCITATE 9. DNR (do not resuscitate) -     MD ANNUAL ALCOHOL SCREEN 15 MIN 
-     MD MEDICATION LIST DOCUMENTED IN MEDICAL RECORD 
-     MD PRESENCE/ABSENCE URINARY INCONTINENCE ASSESSED 
-     MD PT FALLS ASSESS DOC 0-1 FALLS W/OUT INJ PAST YR 
-     DO NOT RESUSCITATE Discontinued Care: The following treatment modalities have been discontinued by the provider today:  
Medications Discontinued During This Encounter Medication Reason  metOLazone (ZAROXOLYN) 5 mg tablet Therapy Completed  albuterol-ipratropium (DUO-NEB) 2.5 mg-0.5 mg/3 ml nebu Therapy Completed  OTHER Therapy Completed  budesonide (PULMICORT) 0.25 mg/2 mL nbsp Therapy Completed  Nebulizer & Compressor machine Therapy Completed  Nebulizer Accessories kit Therapy Completed  ondansetron hcl (ZOFRAN) 4 mg tablet Therapy Completed  sucralfate (CARAFATE) 100 mg/mL suspension Therapy Completed Follow Up: Follow-up Disposition: 
Return in about 1 week (around 2/18/2019) for with the NP for follow up to your treatment plan. Subjective: Allergies Allergen Reactions  Brilinta [Ticagrelor] Shortness of Breath  Celecoxib Other (comments)  Codeine Hives  Sulfa (Sulfonamide Antibiotics) Unknown (comments) and Hives  Iodine Unknown (comments)  Aricept [Donepezil] Other (comments) Headaches - noted as intolerance  Darvocet A500 [Propoxyphene N-Acetaminophen] Nausea Only Prior to Admission medications Medication Sig Start Date End Date Taking? Authorizing Provider  
bumetanide (BUMEX) 2 mg tablet Take 1 Tab by mouth two (2) times a day. 12/21/18  Yes Dat Gómez NP  
carvedilol (COREG) 3.125 mg tablet Take 1 Tab by mouth two (2) times a day. 12/20/18  Yes Dat Gómez NP  
polyethylene glycol (MIRALAX) 17 gram packet Take 17 g by mouth daily. Yes Provider, Historical  
senna (SENNA) 8.6 mg tablet Take 1 Tab by mouth nightly. Yes Provider, Historical  
calcium-vitamin D (OYSTER SHELL) 500 mg(1,250mg) -200 unit per tablet Take 1 Tab by mouth two (2) times daily (with meals). Yes Provider, Historical  
levothyroxine (SYNTHROID) 25 mcg tablet Take 1 Tab by mouth Daily (before breakfast). 12/5/18  Yes Jez Quiñonez NP  
OTHER Dispense - Portable Oxygen & Supplies due to congestive heart failure, hypoxemia, confusion due to hypoxemia. Long term treatment is required as patient failed in office walk test. Oxygen saturations noted to be at start 89% RA then dropped to 78% with activity. 12/5/18  Yes Jez Quiñonez NP  
OTHER Dispense Oxygen Concentrator with humidified air and supplies. Dx: I50.42 CHF; Dx: I48.91 Afib uncontrolled; Dx: R06.02 SOB; Dx: R09.02 Hypoxemia. Patient failed walk test in office. Pulse ox at 78% on RA. 12/5/18  Yes Jez Quiñonez NP  
pantoprazole (PROTONIX) 40 mg tablet Take 1 Tab by mouth daily. 12/3/18  Yes Jez Quiñonez NP  
clopidogrel (PLAVIX) 75 mg tab Take 1 Tab by mouth daily. 11/30/18  Yes Jez Quiñonez NP  
apixaban (ELIQUIS) 2.5 mg tablet Take 1 Tab by mouth two (2) times a day.  11/30/18  Yes Jez Quiñonez NP  
 simvastatin (ZOCOR) 40 mg tablet Take 40 mg by mouth nightly. 11/26/18  Yes Provider, Historical  
PARoxetine (PAXIL) 20 mg tablet Take 20 mg by mouth daily. 11/26/18  Yes Provider, Historical  
nitroglycerin (NITROSTAT) 0.4 mg SL tablet Take 0.4 mg by mouth as needed. 11/26/18  Yes Provider, Historical  
PROAIR HFA 90 mcg/actuation inhaler Take 2 Inhalation by inhalation four (4) times daily as needed. 11/26/18  Yes Provider, Historical  
potassium chloride (KLOR-CON) 10 mEq tablet Take 1 Tab by mouth daily. Patient taking differently: Take 20 mEq by mouth daily. 11/28/18  Yes Blanca Snell NP  
diltiazem CD (CARTIA XT) 180 mg ER capsule Take 180 mg by mouth nightly. Yes Provider, Historical  
 
Past Medical History:  
Diagnosis Date  (HFpEF) heart failure with preserved ejection fraction ([facility] [address] 10/17/2018  
 acute  Allergic rhinitis  Atherosclerotic cardiovascular disease 1999  
 CHF (congestive heart failure) ([facility] [address] 09/11/2018  Cognitive communication deficit  Edema  Environmental allergies   
 dizziness-(chronic)  Fatty liver disease, nonalcoholic  GERD (gastroesophageal reflux disease)  H/O heart artery stent 09/2018  HCVD (hypertensive cardiovascular disease) 10/17/2018  Heart attack ([address] 09/2018  Heart palpitations 2018 infrequent  History of PTCA  Hypercholesterolemia 1999  Hypertension 2010  Liver disease   
 fatty liver  MI (myocardial infarction) ([address] 09/2018  Overactive bladder 08/09/2018  Peripheral neuropathy  Permanent atrial fibrillation ([facility] [address] 11/05/2018  Senile dementia, without behavioral disturbance 11/05/2018  Stress incontinence, female 08/09/2018  Vertigo  Vitamin D deficiency Past Surgical History:  
Procedure Laterality Date  BREAST SURGERY PROCEDURE UNLISTED    
 breast cyst [place])  CARDIAC SURG PROCEDURE UNLIST  8/1999  
 + stress thalassemia; neg. cath., (4/02) neg. Holter  HX APPENDECTOMY  HX BREAST BIOPSY Left   
 neg  HX CYST INCISION AND DRAINAGE Right years ago  
 neg  HX DILATION AND CURETTAGE    
 x5  
 HX GYN    
 D&C (x5), ALLEY/BSO (-), Provera intolerance (bleeding), endometrial Bx annually  HX PTCA  2018  
 stent distal RCA into PLwith KENNETH x 2  
 HX ALLEY AND BSO   Juliann/dler Social History Tobacco Use  Smoking status: Former Smoker Packs/day: 4.00 Types: Cigarettes Last attempt to quit: 1955 Years since quittin.1  Smokeless tobacco: Never Used Substance Use Topics  Alcohol use: No  
  Alcohol/week: 0.0 - 0.5 oz  Drug use: No  
  
Family History Problem Relation Age of Onset  Diabetes Mother  Hypertension Mother  Heart Failure Mother CHF ( @ 80)  Alzheimer Mother  Cancer Father   
     lung ( @ 77)  Alzheimer Father  No Known Problems Son  No Known Problems Son   
 Ovarian Cancer Sister   
     hysterectomy  Breast Cancer Sister 28  
     mastectomy  Cancer Sister   
     breast and ovarian  Alzheimer Sister  Breast Problems Sister   
     breast cyst  
 Cataract Sister  Hypertension Sister  Diabetes Brother Advance Care Planning 2018 Primary Decision Maker Name -  
Primary Decision Maker Phone Number -  
Primary Decision Maker Relationship to Patient -  
Confirm Advance Directive Yes, on file Does the patient have other document types - Test Results:  
 
Office Visit on 2019 Component Date Value Ref Range Status  WBC 2019 6.1  3.4 - 10.8 x10E3/uL Final  
 RBC 2019 4.01  3.77 - 5.28 x10E6/uL Final  
 HGB 2019 11.3  11.1 - 15.9 g/dL Final  
 HCT 2019 35.2  34.0 - 46.6 % Final  
 MCV 2019 88  79 - 97 fL Final  
 MCH 2019 28.2  26.6 - 33.0 pg Final  
 MCHC 2019 32.1  31.5 - 35.7 g/dL Final  
 RDW 2019 15.9* 12.3 - 15.4 % Final  
  PLATELET 12/74/4103 631  150 - 379 x10E3/uL Final  
 NEUTROPHILS 02/12/2019 65  Not Estab. % Final  
 Lymphocytes 02/12/2019 18  Not Estab. % Final  
 MONOCYTES 02/12/2019 13  Not Estab. % Final  
 EOSINOPHILS 02/12/2019 3  Not Estab. % Final  
 BASOPHILS 02/12/2019 1  Not Estab. % Final  
 ABS. NEUTROPHILS 02/12/2019 4.0  1.4 - 7.0 x10E3/uL Final  
 Abs Lymphocytes 02/12/2019 1.1  0.7 - 3.1 x10E3/uL Final  
 ABS. MONOCYTES 02/12/2019 0.8  0.1 - 0.9 x10E3/uL Final  
 ABS. EOSINOPHILS 02/12/2019 0.2  0.0 - 0.4 x10E3/uL Final  
 ABS. BASOPHILS 02/12/2019 0.0  0.0 - 0.2 x10E3/uL Final  
 IMMATURE GRANULOCYTES 02/12/2019 0  Not Estab. % Final  
 ABS. IMM. GRANS. 02/12/2019 0.0  0.0 - 0.1 x10E3/uL Final  
 Cholesterol, total 02/12/2019 119  100 - 199 mg/dL Final  
 Triglyceride 02/12/2019 97  0 - 149 mg/dL Final  
 HDL Cholesterol 02/12/2019 42  >39 mg/dL Final  
 VLDL, calculated 02/12/2019 19  5 - 40 mg/dL Final  
 LDL, calculated 02/12/2019 58  0 - 99 mg/dL Final  
 Glucose 02/12/2019 116* 65 - 99 mg/dL Final  
 BUN 02/12/2019 26  10 - 36 mg/dL Final  
 Creatinine 02/12/2019 1.50* 0.57 - 1.00 mg/dL Final  
 GFR est non-AA 02/12/2019 30* >59 mL/min/1.73 Final  
 GFR est AA 02/12/2019 35* >59 mL/min/1.73 Final  
 BUN/Creatinine ratio 02/12/2019 17  12 - 28 Final  
 Sodium 02/12/2019 143  134 - 144 mmol/L Final  
 Potassium 02/12/2019 4.2  3.5 - 5.2 mmol/L Final  
 Chloride 02/12/2019 99  96 - 106 mmol/L Final  
 CO2 02/12/2019 28  20 - 29 mmol/L Final  
 Calcium 02/12/2019 9.2  8.7 - 10.3 mg/dL Final  
 Protein, total 02/12/2019 7.4  6.0 - 8.5 g/dL Final  
 Albumin 02/12/2019 3.9  3.2 - 4.6 g/dL Final  
 GLOBULIN, TOTAL 02/12/2019 3.5  1.5 - 4.5 g/dL Final  
 A-G Ratio 02/12/2019 1.1* 1.2 - 2.2 Final  
 Bilirubin, total 02/12/2019 0.4  0.0 - 1.2 mg/dL Final  
 Alk.  phosphatase 02/12/2019 92  39 - 117 IU/L Final  
 AST (SGOT) 02/12/2019 21  0 - 40 IU/L Final  
  ALT (SGPT) 02/12/2019 12  0 - 32 IU/L Final  
 TSH 02/12/2019 6.070* 0.450 - 4.500 uIU/mL Final  
 T4, Total 02/12/2019 7.7  4.5 - 12.0 ug/dL Final  
 T3 Uptake 02/12/2019 29  24 - 39 % Final  
 Free Thyroxine Index (FTI) 02/12/2019 2.2  1.2 - 4.9 Final  
 Vitamin B12 02/12/2019 499  232 - 1,245 pg/mL Final  
 Folate 02/12/2019 >20.0  >3.0 ng/mL Final  
 Comment: A serum folate concentration of less than 3.1 ng/mL is 
considered to represent clinical deficiency.  VITAMIN D, 25-HYDROXY 02/12/2019 46.5  30.0 - 100.0 ng/mL Final  
 Comment: Vitamin D deficiency has been defined by the [facility] practice guideline as a 
level of serum 25-OH vitamin D less than 20 ng/mL (1,2). The Endocrine Society went on to further define vitamin D 
insufficiency as a level between 21 and 29 ng/mL (2). 1. IOM ([place] of [employer]). 2010. Dietary reference 
   intakes for calcium and D. [facility]: The 
   [place]. 2. Shantelle MF, Lora NC, Gary CARSON, et al. 
   Evaluation, treatment, and prevention of vitamin D 
   deficiency: an Endocrine Society clinical practice 
   guideline. JCEM. 2011 Jul; 96(7):1911-30.  Magnesium 02/12/2019 2.0  1.6 - 2.3 mg/dL Final  
 Prealbumin 02/12/2019 17  9 - 32 mg/dL Final  
Admission on 12/06/2018, Discharged on 12/10/2018 No results displayed because visit has over 200 results. Clinical Support on 12/05/2018 Component Date Value Ref Range Status  WBC 12/05/2018 7.7  3.4 - 10.8 x10E3/uL Final  
 RBC 12/05/2018 4.41  3.77 - 5.28 x10E6/uL Final  
 HGB 12/05/2018 12.4  11.1 - 15.9 g/dL Final  
 HCT 12/05/2018 39.1  34.0 - 46.6 % Final  
 MCV 12/05/2018 89  79 - 97 fL Final  
 MCH 12/05/2018 28.1  26.6 - 33.0 pg Final  
 MCHC 12/05/2018 31.7  31.5 - 35.7 g/dL Final  
 RDW 12/05/2018 15.8* 12.3 - 15.4 % Final  
 PLATELET 68/77/9082 622  150 - 379 x10E3/uL Final  
  NEUTROPHILS 12/05/2018 74  Not Estab. % Final  
 Lymphocytes 12/05/2018 15  Not Estab. % Final  
 MONOCYTES 12/05/2018 9  Not Estab. % Final  
 EOSINOPHILS 12/05/2018 2  Not Estab. % Final  
 BASOPHILS 12/05/2018 0  Not Estab. % Final  
 ABS. NEUTROPHILS 12/05/2018 5.7  1.4 - 7.0 x10E3/uL Final  
 Abs Lymphocytes 12/05/2018 1.2  0.7 - 3.1 x10E3/uL Final  
 ABS. MONOCYTES 12/05/2018 0.7  0.1 - 0.9 x10E3/uL Final  
 ABS. EOSINOPHILS 12/05/2018 0.2  0.0 - 0.4 x10E3/uL Final  
 ABS. BASOPHILS 12/05/2018 0.0  0.0 - 0.2 x10E3/uL Final  
 IMMATURE GRANULOCYTES 12/05/2018 0  Not Estab. % Final  
 ABS. IMM. GRANS. 12/05/2018 0.0  0.0 - 0.1 x10E3/uL Final  
 Glucose 12/05/2018 154* 65 - 99 mg/dL Final  
 BUN 12/05/2018 22  10 - 36 mg/dL Final  
 Creatinine 12/05/2018 1.10* 0.57 - 1.00 mg/dL Final  
 GFR est non-AA 12/05/2018 44* >59 mL/min/1.73 Final  
 GFR est AA 12/05/2018 51* >59 mL/min/1.73 Final  
 BUN/Creatinine ratio 12/05/2018 20  12 - 28 Final  
 Sodium 12/05/2018 144  134 - 144 mmol/L Final  
 Potassium 12/05/2018 3.7  3.5 - 5.2 mmol/L Final  
 Chloride 12/05/2018 104  96 - 106 mmol/L Final  
 CO2 12/05/2018 24  20 - 29 mmol/L Final  
 Calcium 12/05/2018 9.1  8.7 - 10.3 mg/dL Final  
 Protein, total 12/05/2018 6.8  6.0 - 8.5 g/dL Final  
 Albumin 12/05/2018 3.8  3.2 - 4.6 g/dL Final  
 GLOBULIN, TOTAL 12/05/2018 3.0  1.5 - 4.5 g/dL Final  
 A-G Ratio 12/05/2018 1.3  1.2 - 2.2 Final  
 Bilirubin, total 12/05/2018 0.6  0.0 - 1.2 mg/dL Final  
 Alk. phosphatase 12/05/2018 64  39 - 117 IU/L Final  
 AST (SGOT) 12/05/2018 26  0 - 40 IU/L Final  
 ALT (SGPT) 12/05/2018 14  0 - 32 IU/L Final  
 INR 12/05/2018 1.3* 0.8 - 1.2 Final  
 Comment: Reference interval is for non-anticoagulated patients. Suggested INR therapeutic range for Vitamin K 
antagonist therapy: 
   Standard Dose (moderate intensity 
                  therapeutic range):       2.0 - 3.0 Higher intensity therapeutic range       2.5 - 3.5  Prothrombin time 12/05/2018 13.4* 9.1 - 12.0 sec Final  
 TSH 12/05/2018 8.390* 0.450 - 4.500 uIU/mL Final  
 T4, Total 12/05/2018 8.0  4.5 - 12.0 ug/dL Final  
 T3 Uptake 12/05/2018 33  24 - 39 % Final  
 Free Thyroxine Index (FTI) 12/05/2018 2.6  1.2 - 4.9 Final  
 PROBNP 12/05/2018 8,153* 0 - 738 pg/mL Final  
 Comment: The following cut-points have been suggested for the 
use of proBNP for the diagnostic evaluation of heart 
failure (HF) in patients with acute dyspnea: 
Modality                     Age           Optimal Cut 
                           (years)            Point 
------------------------------------------------------ 
Diagnosis (rule in HF)        <50            450 pg/mL 50 - 75            900 pg/mL 
                              >75           1800 pg/mL Exclusion (rule out HF)  Age independent     300 pg/mL Hospital Outpatient Visit on 12/03/2018 Component Date Value Ref Range Status  Ventricular Rate 12/03/2018 89  BPM Final  
 Atrial Rate 12/03/2018 101  BPM Final  
 QRS Duration 12/03/2018 88  ms Final  
 Q-T Interval 12/03/2018 390  ms Final  
 QTC Calculation (Bezet) 12/03/2018 474  ms Final  
 Calculated R Axis 12/03/2018 71  degrees Final  
 Calculated T Axis 12/03/2018 -49  degrees Final  
 Diagnosis 12/03/2018    Final  
                 Value:Atrial fibrillation ST & T wave abnormality, consider inferior ischemia or digitalis effect ST & T wave abnormality, consider anterior ischemia or digitalis effect Prolonged QT No previous ECGs available Confirmed by Jade Mendez M.D., Ardath Harpin ([id number]) on 12/3/2018 12:17:42 PM 
  
Office Visit on 11/28/2018 Component Date Value Ref Range Status  Ammonia, Plasma 11/28/2018 36  19 - 87 ug/dL Final  
 WBC 11/28/2018 6.4  3.4 - 10.8 x10E3/uL Final  
 RBC 11/28/2018 4.20  3.77 - 5.28 x10E6/uL Final  
  HGB 11/28/2018 12.2  11.1 - 15.9 g/dL Final  
 HCT 11/28/2018 35.7  34.0 - 46.6 % Final  
 MCV 11/28/2018 85  79 - 97 fL Final  
 MCH 11/28/2018 29.0  26.6 - 33.0 pg Final  
 MCHC 11/28/2018 34.2  31.5 - 35.7 g/dL Final  
 RDW 11/28/2018 15.1  12.3 - 15.4 % Final  
 PLATELET 15/25/4182 132  150 - 379 x10E3/uL Final  
 NEUTROPHILS 11/28/2018 62  Not Estab. % Final  
 Lymphocytes 11/28/2018 23  Not Estab. % Final  
 MONOCYTES 11/28/2018 11  Not Estab. % Final  
 EOSINOPHILS 11/28/2018 4  Not Estab. % Final  
 BASOPHILS 11/28/2018 0  Not Estab. % Final  
 ABS. NEUTROPHILS 11/28/2018 3.9  1.4 - 7.0 x10E3/uL Final  
 Abs Lymphocytes 11/28/2018 1.5  0.7 - 3.1 x10E3/uL Final  
 ABS. MONOCYTES 11/28/2018 0.7  0.1 - 0.9 x10E3/uL Final  
 ABS. EOSINOPHILS 11/28/2018 0.3  0.0 - 0.4 x10E3/uL Final  
 ABS. BASOPHILS 11/28/2018 0.0  0.0 - 0.2 x10E3/uL Final  
 Creatine Kinase,Total 11/28/2018 59  24 - 173 U/L Final  
 Creatine Kinase (CK), MB 11/28/2018 <1.0  0.0 - 5.3 ng/mL Final  
 Hemoglobin A1c 11/28/2018 6.4* 4.8 - 5.6 % Final  
 Comment:          Prediabetes: 5.7 - 6.4 Diabetes: >6.4 Glycemic control for adults with diabetes: <7.0  Estimated average glucose 11/28/2018 137  mg/dL Final  
 Lactic Acid, Plasma 11/28/2018 10.3  4.8 - 25.7 mg/dL Final  
 Magnesium 11/28/2018 2.0  1.6 - 2.3 mg/dL Final  
 Cholesterol, total 11/28/2018 118  100 - 199 mg/dL Final  
 Triglyceride 11/28/2018 98  0 - 149 mg/dL Final  
 HDL Cholesterol 11/28/2018 42  >39 mg/dL Final  
 VLDL, calculated 11/28/2018 20  5 - 40 mg/dL Final  
 LDL, calculated 11/28/2018 56  0 - 99 mg/dL Final  
 Glucose 11/28/2018 105* 65 - 99 mg/dL Final  
 BUN 11/28/2018 28  10 - 36 mg/dL Final  
 Creatinine 11/28/2018 1.29* 0.57 - 1.00 mg/dL Final  
 GFR est non-AA 11/28/2018 36* >59 mL/min/1.73 Final  
 GFR est AA 11/28/2018 42* >59 mL/min/1.73 Final  
 BUN/Creatinine ratio 11/28/2018 22  12 - 28 Final  
  Sodium 11/28/2018 140  134 - 144 mmol/L Final  
 Potassium 11/28/2018 3.1* 3.5 - 5.2 mmol/L Final  
 Chloride 11/28/2018 98  96 - 106 mmol/L Final  
 CO2 11/28/2018 25  20 - 29 mmol/L Final  
 Calcium 11/28/2018 9.4  8.7 - 10.3 mg/dL Final  
 Protein, total 11/28/2018 6.9  6.0 - 8.5 g/dL Final  
 Albumin 11/28/2018 3.7  3.2 - 4.6 g/dL Final  
 GLOBULIN, TOTAL 11/28/2018 3.2  1.5 - 4.5 g/dL Final  
 A-G Ratio 11/28/2018 1.2  1.2 - 2.2 Final  
 Bilirubin, total 11/28/2018 0.5  0.0 - 1.2 mg/dL Final  
 Alk. phosphatase 11/28/2018 71  39 - 117 IU/L Final  
 AST (SGOT) 11/28/2018 24  0 - 40 IU/L Final  
 ALT (SGPT) 11/28/2018 12  0 - 32 IU/L Final  
 PROBNP 11/28/2018 6,632* 0 - 738 pg/mL Final  
 Comment: The following cut-points have been suggested for the 
use of proBNP for the diagnostic evaluation of heart 
failure (HF) in patients with acute dyspnea: 
Modality                     Age           Optimal Cut 
                           (years)            Point 
------------------------------------------------------ 
Diagnosis (rule in HF)        <50            450 pg/mL 50 - 75            900 pg/mL 
                              >75           1800 pg/mL Exclusion (rule out HF)  Age independent     300 pg/mL  Phosphorus 11/28/2018 3.5  2.5 - 4.5 mg/dL Final  
 Prealbumin 11/28/2018 16  9 - 32 mg/dL Final  
 INR 11/28/2018 1.5* 0.8 - 1.2 Final  
 Comment: Reference interval is for non-anticoagulated patients. Suggested INR therapeutic range for Vitamin K 
antagonist therapy: 
   Standard Dose (moderate intensity 
                  therapeutic range):       2.0 - 3.0 Higher intensity therapeutic range       2.5 - 3.5  Prothrombin time 11/28/2018 14.9* 9.1 - 12.0 sec Final  
 TSH 11/28/2018 9.220* 0.450 - 4.500 uIU/mL Final  
 Specific Gravity 11/28/2018 1.012  1.005 - 1.030 Final  
 pH (UA) 11/28/2018 6.5  5.0 - 7.5 Final  
  Color 11/28/2018 Yellow  Yellow Final  
 Appearance 11/28/2018 Clear  Clear Final  
 Leukocyte Esterase 11/28/2018 Negative  Negative Final  
 Protein 11/28/2018 Negative  Negative/Trace Final  
 Glucose 11/28/2018 Negative  Negative Final  
 Ketone 11/28/2018 Negative  Negative Final  
 Blood 11/28/2018 Negative  Negative Final  
 Bilirubin 11/28/2018 Negative  Negative Final  
 Urobilinogen 11/28/2018 0.2  0.2 - 1.0 mg/dL Final  
 Nitrites 11/28/2018 Negative  Negative Final  
 Microscopic Examination 11/28/2018 Comment   Final  
 Microscopic follows if indicated.  Microscopic exam 11/28/2018 See additional order   Final  
 Microscopic was indicated and was performed.  URINALYSIS REFLEX 11/28/2018 Comment   Final  
 This specimen will not reflex to a Urine Culture.  Vitamin B12 11/28/2018 927  232 - 1,245 pg/mL Final  
 Folate 11/28/2018 11.7  >3.0 ng/mL Final  
 Comment: A serum folate concentration of less than 3.1 ng/mL is 
considered to represent clinical deficiency.  VITAMIN D, 25-HYDROXY 11/28/2018 44.3  30.0 - 100.0 ng/mL Final  
 Comment: Vitamin D deficiency has been defined by the [facility] practice guideline as a 
level of serum 25-OH vitamin D less than 20 ng/mL (1,2). The Endocrine Society went on to further define vitamin D 
insufficiency as a level between 21 and 29 ng/mL (2). 1. IOM ([place] of [employer]). 2010. Dietary reference 
   intakes for calcium and D. 430 [facility]: The 
   [place]. 2. Shantelle MF, Lora NC, Gary CARSON, et al. 
   Evaluation, treatment, and prevention of vitamin D 
   deficiency: an Endocrine Society clinical practice 
   guideline. JCEM. 2011 Jul; 96(7):1911-30.  
  
 Sed rate (ESR) 11/28/2018 45* 0 - 40 mm/hr Final  
 C-Reactive Protein, Qt 11/28/2018 5.4* 0.0 - 4.9 mg/L Final  
 WBC 11/28/2018 0-5  0 - 5 /hpf Final  
 RBC 11/28/2018 0-2  0 - 2 /hpf Final  
  Epithelial cells 11/28/2018 0-10  0 - 10 /hpf Final  
 Casts 11/28/2018 None seen  None seen /lpf Final  
 Mucus 11/28/2018 Present  Not Estab. Final  
 Bacteria 11/28/2018 Few  None seen/Few Final  
 T4, Total 11/28/2018 10.3  4.5 - 12.0 ug/dL Final  
 
 
Objective:  
 
Vitals:  
 02/11/19 1348 BP: 125/70 Pulse: 90 Resp: 18 Temp: 97.8 °F (36.6 °C) TempSrc: Oral  
SpO2: 99% Weight: 129 lb (58.5 kg) Height: 5' 1\" (1.549 m) Wt Readings from Last 3 Encounters:  
02/11/19 129 lb (58.5 kg) 02/06/19 126 lb (57.2 kg) 01/18/19 128 lb 8 oz (58.3 kg) BP Readings from Last 3 Encounters:  
02/11/19 125/70  
02/06/19 110/62  
02/01/19 122/70 Review of Systems Unable to perform ROS: Mental status change Physical Exam  
Constitutional: Vital signs are normal. She appears malnourished and dehydrated. She appears not lethargic and to not be writhing in pain. She appears unhealthy. She appears cachectic. Non-toxic appearance. She does not have a sickly appearance. No distress. Frail, fragile, alert but not oriented, elderly  female. 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 HENT:  
Head: Normocephalic and atraumatic. Right Ear: External ear and ear canal normal. A middle ear effusion is present. Decreased hearing is noted. Left Ear: External ear and ear canal normal. A middle ear effusion is present. Decreased hearing is noted. Nose: Nose normal. No mucosal edema or rhinorrhea. Mouth/Throat: Uvula is midline and oropharynx is clear and moist. Mucous membranes are pale, dry and not cyanotic. No oral lesions. No uvula swelling. No posterior oropharyngeal edema or posterior oropharyngeal erythema. Eyes: Conjunctivae, EOM and lids are normal. Pupils are equal, round, and reactive to light. Lids are everted and swept, no foreign bodies found. Right pupil is round and reactive. Left pupil is reactive. Left pupil required 20 to 30 secs extra to respond. But it finally did respond. Neck: Trachea normal, normal range of motion and full passive range of motion without pain. Neck supple. JVD present. No hepatojugular reflux present. Carotid bruit is not present. No thyromegaly present. JVD present bilaterally. SOB, hypoxic. Cardiovascular: S1 normal, S2 normal, intact distal pulses and normal pulses. An irregularly irregular rhythm present. Frequent extrasystoles are present. Tachycardia present. Exam reveals gallop and distant heart sounds. Exam reveals no friction rub. No murmur heard. No extremity edema noted on visit. Pulmonary/Chest: Effort normal. No tachypnea. She has decreased breath sounds in the right upper field, the right lower field, the left upper field, the left middle field and the left lower field. She has no wheezes. On continuous nasal cannula 2 L/min. Abdominal: Soft. Normal appearance. She exhibits no distension, no fluid wave and no mass. Bowel sounds are hyperactive. There is no hepatosplenomegaly. There is no tenderness. There is no rebound and no CVA tenderness. Continues to struggle with constipation. The [employer] is working but she can not take it every day as the stools get better but the urgency is too much. Musculoskeletal:  
Generalized weakness with balance issues and intermittent dizziness. Atrophy in extremities. Loss of tone in extremities. Lymphadenopathy:  
     Head (right side): No submandibular adenopathy present. Head (left side): No submandibular and no tonsillar adenopathy present. She has no cervical adenopathy. She has no axillary adenopathy. Neurological: She has normal reflexes. She appears not lethargic. She is disoriented. She is not agitated. She displays weakness, atrophy, facial asymmetry, abnormal stance and abnormal speech. A cranial nerve deficit and sensory deficit is present. She exhibits abnormal muscle tone. Coordination and gait abnormal. GCS score is 15. Skin: Skin is warm, dry and intact. No bruising, no ecchymosis and no rash noted. She is not diaphoretic. No cyanosis. There is pallor. Nails show no clubbing. Psychiatric: Her mood appears not anxious. Her affect is labile. She expresses impulsivity. She exhibits a depressed mood. She is apathetic. She exhibits disordered thought content, abnormal new learning ability and abnormal remote memory. She does not have a flat affect. Patient is debilitated. Nursing note and vitals reviewed. Disclaimer: Ms. Elizabet Alvarez has been advised to call or return to our office if symptoms worsen/change/persist. We as a care team including the patient; discussed expected course/resolution/complications of diagnosis in detail today. Medication risks/benefits/costs/interactions/alternatives discussed Donna Brooks was given an after visit summary which includes diagnoses, current medications, & vitals. Elizabet Alvarez expressed understanding with the diagnosis and plan.

## 2019-02-18 NOTE — PROGRESS NOTES
Reason For Visit:  
 
Chief Complaint Patient presents with  Transitions Of Care Patient here for transition of care from healthcare to assisted living at [facility]. Andres Avilez is a 80 y.o. female who presents for an annual Medicare Wellness Visit. Patient History: PSH: Reviewed with patient Past Surgical History:  
Procedure Laterality Date  BREAST SURGERY PROCEDURE UNLISTED    
 breast cyst Verdis Hernán)  CARDIAC SURG PROCEDURE UNLIST  1999  
 + stress thalassemia; 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 Holter  HX APPENDECTOMY  HX BREAST BIOPSY Left   
 neg  HX CYST INCISION AND DRAINAGE Right years ago  
 neg  HX DILATION AND CURETTAGE    
 x5  
 HX GYN    
 D&C (x5), ALLEY/BSO (-Juliann/Zedler), Provera intolerance (bleeding), endometrial Bx annually  HX PTCA  2018  
 stent distal RCA into PLwith KENNETH x 2  
 HX ALLEY AND BSO   Juliann/Zedler SH: Reviewed with patient Social History Tobacco Use  Smoking status: Former Smoker Packs/day: 4.00 Types: Cigarettes Last attempt to quit: 1955 Years since quittin.1  Smokeless tobacco: Never Used Substance Use Topics  Alcohol use: No  
  Alcohol/week: 0.0 - 0.5 oz  Drug use: No  
 
FH: Reviewed with patient Family History Problem Relation Age of Onset  Diabetes Mother  Hypertension Mother  Heart Failure Mother CHF ( @ 80)  Alzheimer Mother  Cancer Father   
     lung ( @ 77)  Alzheimer Father  No Known Problems Son  No Known Problems Son   
 Ovarian Cancer Sister   
     hysterectomy  Breast Cancer Sister 28  
     mastectomy  Cancer Sister   
     breast and ovarian  Alzheimer Sister  Breast Problems Sister   
     breast cyst  
 Cataract Sister  Hypertension Sister  Diabetes Brother Medications/Allergies: Reviewed with patient. Current Outpatient Medications on File Prior to Visit Medication Sig Dispense Refill  bumetanide (BUMEX) 2 mg tablet Take 1 Tab by mouth two (2) times a day. 180 Tab 1  carvedilol (COREG) 3.125 mg tablet Take 1 Tab by mouth two (2) times a day. 180 Tab 1  polyethylene glycol (MIRALAX) 17 gram packet Take 17 g by mouth daily.  senna (SENNA) 8.6 mg tablet Take 1 Tab by mouth nightly.  calcium-vitamin D (OYSTER SHELL) 500 mg(1,250mg) -200 unit per tablet Take 1 Tab by mouth two (2) times daily (with meals).  levothyroxine (SYNTHROID) 25 mcg tablet Take 1 Tab by mouth Daily (before breakfast). 1 Tab 0  
 OTHER Dispense - Portable Oxygen & Supplies due to congestive heart failure, hypoxemia, confusion due to hypoxemia. Long term treatment is required as patient failed in office walk test. Oxygen saturations noted to be at start 89% RA then dropped to 78% with activity. 1 Piece 0  
 OTHER Dispense Oxygen Concentrator with humidified air and supplies. Dx: I50.42 CHF; Dx: I48.91 Afib uncontrolled; Dx: R06.02 SOB; Dx: R09.02 Hypoxemia. Patient failed walk test in office. Pulse ox at 78% on RA. 1 Piece 0  
 pantoprazole (PROTONIX) 40 mg tablet Take 1 Tab by mouth daily. 30 Tab 0  clopidogrel (PLAVIX) 75 mg tab Take 1 Tab by mouth daily. 30 Tab 0  
 apixaban (ELIQUIS) 2.5 mg tablet Take 1 Tab by mouth two (2) times a day. 30 Tab 0  
 simvastatin (ZOCOR) 40 mg tablet Take 40 mg by mouth nightly.  PARoxetine (PAXIL) 20 mg tablet Take 20 mg by mouth daily.  nitroglycerin (NITROSTAT) 0.4 mg SL tablet Take 0.4 mg by mouth as needed.  PROAIR HFA 90 mcg/actuation inhaler Take 2 Inhalation by inhalation four (4) times daily as needed.  potassium chloride (KLOR-CON) 10 mEq tablet Take 1 Tab by mouth daily. (Patient taking differently: Take 20 mEq by mouth daily.) 30 Tab 1  
 diltiazem CD (CARTIA XT) 180 mg ER capsule Take 180 mg by mouth nightly. No current facility-administered medications on file prior to visit. Allergies Allergen Reactions  Brilinta [Ticagrelor] Shortness of Breath  Celecoxib Other (comments)  Codeine Hives  Sulfa (Sulfonamide Antibiotics) Unknown (comments) and Hives  Iodine Unknown (comments)  Aricept [Donepezil] Other (comments) Headaches - noted as intolerance  Darvocet A500 [Propoxyphene N-Acetaminophen] Nausea Only Patient Care Team: 
Adriana Redd NP as PCP - Kim Whitley MD as Physician (Orthopedic Surgery) Lauryn Barrett MD (Ophthalmology) Josette Canales MD as Consulting Provider (Cardiology) Janie Vasquez MD as Physician (Gynecology) Vega Cuenca MD as Physician (Cardiology) Aide Lowe MD as Physician (Neurology) Veronica Ybeoah MD (Pulmonary Disease) Bora Hughes RN as Ambulatory Care Navigator (Cardiology) Garo Walker NP (Nurse Practitioner) Ally Dillon MD (Cardiothoracic Surgery) Objective:  
 
Visit Vitals /70 (BP 1 Location: Right arm, BP Patient Position: Sitting) Pulse 90 Temp 97.8 °F (36.6 °C) (Oral) Resp 18 Ht 5' 1\" (1.549 m) Wt 129 lb (58.5 kg) LMP 01/01/2000 SpO2 99% BMI 24.37 kg/m² Body mass index is 24.37 kg/m². Last Weight Metrics: 
Weight Loss Metrics 2/11/2019 2/6/2019 1/18/2019 1/2/2019 12/20/2018 12/10/2018 12/6/2018 Today's Wt 129 lb 126 lb 128 lb 8 oz 122 lb 135 lb 3.2 oz 137 lb 8 oz -  
BMI 24.37 kg/m2 23.81 kg/m2 24.28 kg/m2 23.05 kg/m2 25.55 kg/m2 - 25.98 kg/m2 No physical exam was performed today per Medicare Wellness Guidelines. Health Maintenance:  
Daily Aspirin: yes Immunizations: stated as up to date, no records available Health Maintenance reviewed Health Maintenance Due Topic Date Due  MEDICARE YEARLY EXAM  01/19/2019 Functional Assessment:  
ALCOHOL SCREENING:  
How often do you have a drink containing alcohol: Never How many drinks do you have on a typical day when you are drinking: None How often do you have 6 or more drinks on one occasion: Never How often during the last year have you found that you were not able to stop drinking once you had started: Never How often during the last year have you failed to do what was normally expected from you because of drinking: Never How often during the last year have you needed a first drink in the morning to get yourself going after a heavy drinking session: Never How often during the last year have you had a feeling of guilt or remorse after drinking: Never How often during the last year have you been unable to remember what happened the night before because you had been drinking: Never Have you or someone else been injured as a result of your drinking: Never Have you or someone else been injured as a result of your drinking: Never Has a relative or friend, or a doctor or other health worker been concerned about your drinking or suggested you cut down: Never AUDIT Score: 0 ADL FUNCTIONALITY ADL Assessment 2/11/2019 Feeding yourself No Help Needed Getting from bed to chair No Help Needed Getting dressed No Help Needed Bathing or showering Help Needed Walk across the room (includes cane/walker) Help Needed Using the telphone Help Needed Taking your medications Help Needed Preparing meals Help Needed Managing money (expenses/bills) Help Needed Moderately strenuous housework (laundry) Help Needed Shopping for personal items (toiletries/medicines) Help Needed Shopping for groceries Help Needed Driving Help Needed Climbing a flight of stairs Help Needed Getting to places beyond walking distances Help Needed DEPRESSION SCREENING:  
3 most recent PHQ Screens 2/11/2019 Little interest or pleasure in doing things Not at all Feeling down, depressed, irritable, or hopeless Not at all Total Score PHQ 2 0 Trouble falling or staying asleep, or sleeping too much Not at all Feeling tired or having little energy Several days Poor appetite, weight loss, or overeating Not at all Feeling bad about yourself - or that you are a failure or have let yourself or your family down Not at all Trouble concentrating on things such as school, work, reading, or watching TV Not at all Moving or speaking so slowly that other people could have noticed; or the opposite being so fidgety that others notice Not at all Thoughts of being better off dead, or hurting yourself in some way Not at all PHQ 9 Score 1 How difficult have these problems made it for you to do your work, take care of your home and get along with others - Mini Mental State Exam 2/11/2019 1/18/2018 What is the Year 0 1 What is the Season 0 1 What is the Date 0 1 What is the Day 1 1 What is the Month 0 1 Where are we State 1 1 Where are we Country 1 1 Where are we [place] or [facility] 1 1 Where are we Floor 0 1 Name three objects, then ask the patient to say them 3 3 Serial sevens Subtract 7 from 100 in increments 0 3 Ask for the three objects repeated above 0 3 Name a pencil 1 1 Name a watch 1 1 Have the patient repeat this phrase \"No ifs, ands, or buts\" 1 1 Three stage command: Take the paper in your right hand 1 1 Fold the paper in half 1 1 Put the paper on the floor 1 1 Read and obey the following: CLOSE YOUR EYES 0 1 Have the patient write a sentence 0 1 Have the patient copy a figure 0 1 Mini Mental Score 13 28 FALL RISK:  
Fall Risk Assessment, last 12 mths 2/11/2019 Able to walk? Yes Fall in past 12 months? Yes Fall with injury? Yes  
Number of falls in past 12 months 3 Fall Risk Score 4 ABUSE SCREEN:  
Abuse Screening Questionnaire 2/11/2019 Do you ever feel afraid of your partner? Osmin Estrada Are you in a relationship with someone who physically or mentally threatens you? Osmin Estrada Is it safe for you to go home? Y  
  
HEARING SCREEN: impaired; wears hearing aides NUTRITION SCREEN: eats a balanced diet adequate What are the patient's living arrangements - the patient lives in an assisted living facility. This patient resides in [place] [address] [place] [facility]. Does the patient use any ambulatory aids: yes If so, what type: walker, Rollator and Motorized Chair Does the patient exhibit a steady gait?  no   
Is the patient self reliant?  (ie can do own laundry, meals, household chores)  no Does the patient handle his/her own medications?  no  
Does the patient handle his/her own money? no  
Is the patients home safe (ie good lighting, handrails on stairs and bath, etc.)? yes Did you notice or did patient express any hearing difficulties? yes Did you notice or did patient express any vision difficulties? yes Were distance and reading eye charts used? yes Advance Care Planning & Durable Do Not Resuscitate :   
 
Advance Care Planning 12/6/2018 Primary Decision Maker Name -  
Primary Decision Maker Phone Number -  
Primary Decision Maker Relationship to Patient -  
Confirm Advance Directive Yes, on file Does the patient have other document types - Date of ACP Conversation: 02/17/19 Location of Conversation: In Office Visit 02/17/19. Persons included in Conversation:  patient and family Length of ACP Conversation in minutes:  16 minutes Is the patient deemed incompetent to make his/her health decisions: no Authorized Decision Maker: Healthcare Agent/Medical Power of  under Advance Directive Conversation: The following was discussed with the Ms. Emi Grullon with time given to answer questions concerning advance care planning: 
Understanding of medical condition Understanding of CPR, goals and expected outcomes, benefits and burdens discussed.  
Information on CPR success rates provided (e.g. for CPR in hospital, survival to d/c at two weeks is 22%, for chronically ill or elderly/frail survival is less than 3%); Individual asked to communicate understanding of information in his/her own words. Explored fears and concerns regarding CPR or possible outcomes Provided ACP educational materials: Understanding Advance Directives by 2025 Clari Dominguez ([email or web address]) Reviewed existing Advance Directive Reveiwed existing DDNR. Patient & family wishes to keep her a DDNR Referrals:   
 
Problem List Items Addressed This Visit Hypothyroidism (Chronic) Moderate protein-calorie malnutrition ([address] Relevant Orders CBC WITH AUTOMATED DIFF (Completed) COLLECTION VENOUS BLOOD,VENIPUNCTURE  
 LIPID PANEL (Completed) METABOLIC PANEL, COMPREHENSIVE (Completed) THYROID PANEL W/TSH (Completed) VITAMIN B12 & FOLATE (Completed) VITAMIN D, 25 HYDROXY (Completed) MAGNESIUM (Completed) PREALBUMIN (Completed) Other Visit Diagnoses Mitral valvular disorder    -  Primary This is new onset since Nov. 2018 following her heart attack in Sept 2018. She is working with [facility] now to have treatment for the disorder. Relevant Orders CBC WITH AUTOMATED DIFF (Completed) COLLECTION VENOUS BLOOD,VENIPUNCTURE  
 LIPID PANEL (Completed) METABOLIC PANEL, COMPREHENSIVE (Completed) THYROID PANEL W/TSH (Completed) VITAMIN B12 & FOLATE (Completed) VITAMIN D, 25 HYDROXY (Completed) MAGNESIUM (Completed) Transition of care performed with sharing of clinical summary Pt. transitioned from [facility] to [place] Thursday 2/07/2019. She is here today for her [place] visit & medicare wellness. Relevant Orders CBC WITH AUTOMATED DIFF (Completed) COLLECTION VENOUS BLOOD,VENIPUNCTURE  
 LIPID PANEL (Completed) METABOLIC PANEL, COMPREHENSIVE (Completed) THYROID PANEL W/TSH (Completed) VITAMIN B12 & FOLATE (Completed) VITAMIN D, 25 HYDROXY (Completed) MAGNESIUM (Completed) Moderate episode of recurrent major depressive disorder (HCC) Paxil is not working. She is having more episodes of highs & lows. Confusion as well as she has lost the ability to use thoughtful processes. Relevant Orders PREALBUMIN (Completed) Vitamin D deficiency Chronic, ongoing. Going to evaluate treatment today with labs. Relevant Orders VITAMIN D, 25 HYDROXY (Completed) Medicare annual wellness visit, subsequent Relevant Orders MN ANNUAL ALCOHOL SCREEN 15 MIN  
 MN MEDICATION LIST DOCUMENTED IN MEDICAL RECORD  
 MN PRESENCE/ABSENCE URINARY INCONTINENCE ASSESSED  
 MN PT FALLS ASSESS DOC 0-1 FALLS W/OUT INJ PAST YR  
 DO NOT RESUSCITATE (Completed) ACP (advance care planning) Relevant Orders MN ANNUAL ALCOHOL SCREEN 15 MIN  
 MN MEDICATION LIST DOCUMENTED IN MEDICAL RECORD  
 MN PRESENCE/ABSENCE URINARY INCONTINENCE ASSESSED  
 MN PT FALLS ASSESS DOC 0-1 FALLS W/OUT INJ PAST YR  
 DO NOT RESUSCITATE (Completed) DNR (do not resuscitate) Relevant Orders MN ANNUAL ALCOHOL SCREEN 15 MIN  
 MN MEDICATION LIST DOCUMENTED IN MEDICAL RECORD  
 MN PRESENCE/ABSENCE URINARY INCONTINENCE ASSESSED  
 MN PT FALLS ASSESS DOC 0-1 FALLS W/OUT INJ PAST YR  
 DO NOT RESUSCITATE (Completed) Treatment Plan: The following medication/treatments have been discontinued by the provider today after performing detailed medication reconciliation with patient:  
Medications Discontinued During This Encounter Medication Reason  metOLazone (ZAROXOLYN) 5 mg tablet Therapy Completed  albuterol-ipratropium (DUO-NEB) 2.5 mg-0.5 mg/3 ml nebu Therapy Completed  OTHER Therapy Completed  budesonide (PULMICORT) 0.25 mg/2 mL nbsp Therapy Completed  Nebulizer & Compressor machine Therapy Completed  Nebulizer Accessories kit Therapy Completed  ondansetron hcl (ZOFRAN) 4 mg tablet Therapy Completed  sucralfate (CARAFATE) 100 mg/mL suspension Therapy Completed Disclaimer: This is an [facility] Exam (AWV). Patient verbalized understanding and agreement with the plan. A copy of the After Visit Summary with personalized health plan was given to the patient today. Greater than 30 minutes was spent with patient discussing advance directives, wellness initiatives, and preventative measures of their health. I have reviewed the patient's medical history in detail and updated the computerized patient record. Rickey Wei RN, MSN, [facility] Acute Care/Adult Gerontology Nurse Practitioner [place] [id number] [facility] [place], [address] [place] [address] 
[phone] (office) [phone] (cell) [phone] (office fax)

## 2019-02-19 NOTE — PROGRESS NOTES
Lab Result Review performed, medication changes were sent to [facility], results were also sent to [facility] for pre procedure testing. Reviewed results 02/19/19 by: Portia Monique NP  
CBC - mild elevation in creatine but she is on high doses of diuretics for her MR. Monitor & manage. CMP- stable. No signs of kidney insufficiencies, liver enzymes are normal, nutrition is great. HGA1C- stable. No signs of diabetes. Lipid Panel- stable. Thyroid Profile- function is better with treatment but her TSH is still elevated. I will increase her Thyroxine to next dose & recheck labs in 6 weeks. Vitamin D- stable, no need for further supplement. Vitamin B 12 & Folate- stable. No need for further supplement. RECOMMENDATIONS INCLUDE: Increase levothyroxine and recheck labs in 6 weeks.

## 2019-02-25 ENCOUNTER — TELEPHONE (OUTPATIENT)
Dept: GERIATRIC MEDICINE | Age: 84
End: 2019-02-25

## 2019-02-25 DIAGNOSIS — I05.9 MITRAL VALVULAR DISORDER: Primary | ICD-10-CM

## 2019-02-25 NOTE — TELEPHONE ENCOUNTER
Patient's son Darinel Gibson left me a message at my office on 2/22/19 requesting an update on his mother going to [facility] for a study. I have reached out to Dr. Ezra PENDLETON,NP; Dr. Fortunato Punch NP Merrianne Gosselin for an update. Dr. Starla Kussmaul are not involved in the process of having the MV repaired in a study at [facility]. I tiger text Lucretia Degroot, Dr. Gina Reyes NP today to try and get some sort of an idea to answer [place] questions with. Per Merrianne Gosselin, Mrs. Yuval Washington is not a candidate for surgery with Dr. Lance Patel or Darby. The son requested to try [facility]'s study. Merrianne Gosselin gave me the contact information for [facility] the  for the clinic trial and all medicals were sent to them over 3 weeks ago. The number to call is [phone] and [facility] is the . 2:51 PM  I left a message at the number provided to obtain any information they could update me to. I will reach out to the son and update him.

## 2019-02-28 ENCOUNTER — OFFICE VISIT (OUTPATIENT)
Dept: GERIATRIC MEDICINE | Age: 84
End: 2019-02-28

## 2019-02-28 VITALS
DIASTOLIC BLOOD PRESSURE: 62 MMHG | HEIGHT: 61 IN | WEIGHT: 126.3 LBS | RESPIRATION RATE: 18 BRPM | HEART RATE: 62 BPM | OXYGEN SATURATION: 99 % | BODY MASS INDEX: 23.85 KG/M2 | SYSTOLIC BLOOD PRESSURE: 105 MMHG | TEMPERATURE: 97.8 F

## 2019-02-28 DIAGNOSIS — T50.2X5A DIURETIC-INDUCED HYPOKALEMIA: ICD-10-CM

## 2019-02-28 DIAGNOSIS — Z79.899 MEDICATION MANAGEMENT: ICD-10-CM

## 2019-02-28 DIAGNOSIS — R35.1 NOCTURIA MORE THAN TWICE PER NIGHT: ICD-10-CM

## 2019-02-28 DIAGNOSIS — E87.6 DIURETIC-INDUCED HYPOKALEMIA: ICD-10-CM

## 2019-02-28 DIAGNOSIS — I05.9 MITRAL VALVULAR DISORDER: Primary | ICD-10-CM

## 2019-02-28 DIAGNOSIS — F51.01 PRIMARY INSOMNIA: ICD-10-CM

## 2019-02-28 DIAGNOSIS — K59.1 FUNCTIONAL DIARRHEA: ICD-10-CM

## 2019-02-28 DIAGNOSIS — F33.1 MODERATE EPISODE OF RECURRENT MAJOR DEPRESSIVE DISORDER (HCC): ICD-10-CM

## 2019-02-28 DIAGNOSIS — R35.0 URINARY FREQUENCY: ICD-10-CM

## 2019-02-28 DIAGNOSIS — I50.30 (HFPEF) HEART FAILURE WITH PRESERVED EJECTION FRACTION (HCC): ICD-10-CM

## 2019-02-28 RX ORDER — ESCITALOPRAM OXALATE 10 MG/1
10 TABLET ORAL DAILY
COMMUNITY
End: 2019-02-28

## 2019-02-28 RX ORDER — ESCITALOPRAM OXALATE 10 MG/1
15 TABLET ORAL DAILY
Qty: 45 TAB | Refills: 0
Start: 2019-02-28 | End: 2019-03-27 | Stop reason: DRUGHIGH

## 2019-02-28 RX ORDER — BUMETANIDE 2 MG/1
TABLET ORAL
Qty: 180 TAB | Refills: 1
Start: 2019-02-28 | End: 2019-03-07

## 2019-02-28 RX ORDER — LANOLIN ALCOHOL/MO/W.PET/CERES
3 CREAM (GRAM) TOPICAL
Qty: 90 TAB | Refills: 0
Start: 2019-02-28 | End: 2019-03-27 | Stop reason: ALTCHOICE

## 2019-02-28 NOTE — PROGRESS NOTES
ADVISED PATIENT OF THE FOLLOWING HEALTH MAINTAINCE DUE There are no preventive care reminders to display for this patient. Chief Complaint Patient presents with  Urinary Frequency Patient being seen for increase urination at night, then cant go back to sleep.  Diarrhea Reports having loose stools,  
 
 
1. Have you been to the ER, urgent care clinic since your last visit? Hospitalized since your last visit? No 
 
2. Have you seen or consulted any other health care providers outside of the [address] [place] [address] since your last visit? Include any DEXA scan, mammography  or colon screening. No 
 
3. Do you have an Advance Directive on file? yes 4. Do you have a DNR on file? DNR Patient is accompanied by self I have received verbal consent from Corinne Celeste to discuss any/all medical information while they are present in the room. Advance Care Planning 12/6/2018 Primary Decision Maker Name -  
Primary Decision Maker Phone Number -  
Primary Decision Maker Relationship to Patient -  
Confirm Advance Directive Yes, on file Does the patient have other document types - [employer] [facility] [place], [address] [place] [address] Phone: [phone] Fax: [phone]

## 2019-02-28 NOTE — PATIENT INSTRUCTIONS
Learning About Saving Energy When You Have a Chronic Condition Introduction Everyday tasks can be tiring when you have COPD, heart failure, or another long-term (chronic) condition. You may feel at times that you've lost your ability to live your life. But learning to conserve, or save, your energy can help you be less tired. Conserving your energy means finding ways of doing daily activities with as little effort as possible. With some small changes in the way you do things, you can get your tasks done more easily. Some treatments are available that might help. Pulmonary rehabilitation can teach you ways to breathe easier. Cardiac rehabilitation can help make your heart stronger. You also may want to see an occupational or physical therapist. The therapist can give you more tips on building strength and moving with less effort. What can you do to conserve your energy? Planning · Make a list of what you have to do every day. Group the tasks by location. · Do all the chores in one part of your house around the same time. · Go out for errands or do chores at the time of day when you have the most energy. · Plan rest periods into your day. Getting things done · Sit down as often as you can when you get dressed, do chores, or cook. · Use a cart with wheels to roll items, such as laundry, from one room to another. · Push or slide boxes or other large items instead of lifting them. Reaching and bending · Put things you use the most on shelves that are at the level of your waist or shoulder. · Use long-handled grabbers or other tools to reach items on a high shelf or to  things off the floor. Use long-handled dusters when you clean the house. · Use a raised toilet seat to avoid bending too far to sit or stand up. Eating · Eat several small meals instead of three larger meals.  
· If you get too tired to eat much, try to choose healthy foods that have more calories. Have a yogurt-and-fruit smoothie for breakfast. Put avocado on a sandwich. Or add cheese or peanut butter to snacks. · If you don't feel very hungry, try to eat first and drink water or other fluids later, after a meal. This can help keep you from losing weight. Sip small amounts of fluids if you need to drink while you eat. Having sex · Choose the time of day when you have more energy. · A [id number] position for sex can be less tiring. Sometimes you may want to focus more on caressing. Watch closely for changes in your health, and be sure to contact your doctor if you have any problems. Where can you learn more? Go to http://pia-colby.info/. Enter H190 in the search box to learn more about \"Learning About Saving Energy When You Have a Chronic Condition. \" Current as of: September 5, 2018 Content Version: 11.9 © 2599-6535 [employer]. Care instructions adapted under license by [employer] (which disclaims liability or warranty for this information). If you have questions about a medical condition or this instruction, always ask your healthcare professional. [address] any warranty or liability for your use of this information. Anxiety Disorder: Care Instructions Your Care Instructions Anxiety is a normal reaction to stress. Difficult situations can cause you to have symptoms such as sweaty palms and a nervous feeling. In an anxiety disorder, the symptoms are far more severe. Constant worry, muscle tension, trouble sleeping, nausea and diarrhea, and other symptoms can make normal daily activities difficult or impossible. These symptoms may occur for no reason, and they can affect your work, school, or social life. Medicines, counseling, and self-care can all help. Follow-up care is a key part of your treatment and safety.  Be sure to make and go to all appointments, and call your doctor if you are having problems. It's also a good idea to know your test results and keep a list of the medicines you take. How can you care for yourself at home? · Take medicines exactly as directed. Call your doctor if you think you are having a problem with your medicine. · Go to your counseling sessions and follow-up appointments. · Recognize and accept your anxiety. Then, when you are in a situation that makes you anxious, say to yourself, \"This is not an emergency. I feel uncomfortable, but I am not in danger. I can keep going even if I feel anxious. \" · Be kind to your body: 
? Relieve tension with exercise or a massage. ? Get enough rest. 
? Avoid alcohol, caffeine, nicotine, and illegal drugs. They can increase your anxiety level and cause sleep problems. ? Learn and do relaxation techniques. See below for more about these techniques. · Engage your mind. Get out and do something you enjoy. Go to a funny movie, or take a walk or hike. Plan your day. Having too much or too little to do can make you anxious. · Keep a record of your symptoms. Discuss your fears with a good friend or family member, or join a support group for people with similar problems. Talking to others sometimes relieves stress. · Get involved in social groups, or volunteer to help others. Being alone sometimes makes things seem worse than they are. · Get at least 30 minutes of exercise on most days of the week to relieve stress. Walking is a good choice. You also may want to do other activities, such as running, swimming, cycling, or playing tennis or team sports. Relaxation techniques Do relaxation exercises 10 to 20 minutes a day. You can play soothing, relaxing music while you do them, if you wish. · Tell others in your house that you are going to do your relaxation exercises. Ask them not to disturb you. · Find a comfortable place, away from all distractions and noise. · Lie down on your back, or sit with your back straight. · Focus on your breathing. Make it slow and steady. · Breathe in through your nose. Breathe out through either your nose or mouth. · Breathe deeply, filling up the area between your navel and your rib cage. Breathe so that your belly goes up and down. · Do not hold your breath. · Breathe like this for 5 to 10 minutes. Notice the feeling of calmness throughout your whole body. As you continue to breathe slowly and deeply, relax by doing the following for another 5 to 10 minutes: · Tighten and relax each muscle group in your body. You can begin at your toes and work your way up to your head. · Imagine your muscle groups relaxing and becoming heavy. · Empty your mind of all thoughts. · Let yourself relax more and more deeply. · Become aware of the state of calmness that surrounds you. · When your relaxation time is over, you can bring yourself back to alertness by moving your fingers and toes and then your hands and feet and then stretching and moving your entire body. Sometimes people fall asleep during relaxation, but they usually wake up shortly afterward. · Always give yourself time to return to full alertness before you drive a car or do anything that might cause an accident if you are not fully alert. Never play a relaxation tape while you drive a car. When should you call for help? Call 911 anytime you think you may need emergency care. For example, call if: 
  · You feel you cannot stop from hurting yourself or someone else.  
[facility] the numbers for these national suicide hotlines: 1-694-527-TALK (4-416.374.3723) and 5-991-HXJAHOL (7-888.810.4545). If you or someone you know talks about suicide or feeling hopeless, get help right away. 
 Watch closely for changes in your health, and be sure to contact your doctor if: 
  · You have anxiety or fear that affects your life.  
  · You have symptoms of anxiety that are new or different from those you had before. Where can you learn more? Go to http://pia-colby.info/. Enter P754 in the search box to learn more about \"Anxiety Disorder: Care Instructions. \" Current as of: September 11, 2018 Content Version: 11.9 © 1608-7161 [employer]. Care instructions adapted under license by [employer] (which disclaims liability or warranty for this information). If you have questions about a medical condition or this instruction, always ask your healthcare professional. [address] any warranty or liability for your use of this information. Atrial Fibrillation: Care Instructions Your Care Instructions Atrial fibrillation is an irregular and often fast heartbeat. Treating this condition is important for several reasons. It can cause blood clots, which can travel from your heart to your brain and cause a stroke. If you have a fast heartbeat, you may feel lightheaded, dizzy, and weak. An irregular heartbeat can also increase your risk for heart failure. Atrial fibrillation is often the result of another heart condition, such as high blood pressure or coronary artery disease. Making changes to improve your heart condition will help you stay healthy and active. Follow-up care is a key part of your treatment and safety. Be sure to make and go to all appointments, and call your doctor if you are having problems. It's also a good idea to know your test results and keep a list of the medicines you take. How can you care for yourself at home? Medicines 
  · Take your medicines exactly as prescribed. Call your doctor if you think you are having a problem with your medicine. You will get more details on the specific medicines your doctor prescribes.  
  · If your doctor has given you a blood thinner to prevent a stroke, be sure you get instructions about how to take your medicine safely. Blood thinners can cause serious bleeding problems.   · Do not take any vitamins, over-the-counter drugs, or herbal products without talking to your doctor first.  
 Lifestyle changes 
  · Do not smoke. Smoking can increase your chance of a stroke and heart attack. If you need help quitting, talk to your doctor about stop-smoking programs and medicines. These can increase your chances of quitting for good.  
  · Eat a heart-healthy diet.  
  · Stay at a healthy weight. Lose weight if you need to.  
  · Limit alcohol to 2 drinks a day for men and 1 drink a day for women. Too much alcohol can cause health problems.  
  · Avoid colds and flu. Get a pneumococcal vaccine shot. If you have had one before, ask your doctor whether you need another dose. Get a flu shot every year. If you must be around people with colds or flu, wash your hands often. Activity 
  · If your doctor recommends it, get more exercise. Walking is a good choice. Bit by bit, increase the amount you walk every day. Try for at least 30 minutes on most days of the week. You also may want to swim, bike, or do other activities. Your doctor may suggest that you join a cardiac rehabilitation program so that you can have help increasing your physical activity safely.  
  · Start light exercise if your doctor says it is okay. Even a small amount will help you get stronger, have more energy, and manage stress. Walking is an easy way to get exercise. Start out by walking a little more than you did in the hospital. Gradually increase the amount you walk.  
  · When you exercise, watch for signs that your heart is working too hard. You are pushing too hard if you cannot talk while you are exercising. If you become short of breath or dizzy or have chest pain, sit down and rest immediately.  
  · Check your pulse regularly. Place two fingers on the artery at the palm side of your wrist, in line with your thumb. If your heartbeat seems uneven or fast, talk to your doctor. When should you call for help? Call 911 anytime you think you may need emergency care. For example, call if: 
  · You have symptoms of a heart attack. These may include: 
? Chest pain or pressure, or a strange feeling in the chest. 
? Sweating. ? Shortness of breath. ? Nausea or vomiting. ? Pain, pressure, or a strange feeling in the back, neck, jaw, or upper belly or in one or both shoulders or arms. ? Lightheadedness or sudden weakness. ? A fast or irregular heartbeat. After you call 911, the  may tell you to chew 1 adult-strength or 2 to 4 low-dose aspirin. Wait for an ambulance. Do not try to drive yourself.  
  · You have symptoms of a stroke. These may include: 
? Sudden numbness, tingling, weakness, or loss of movement in your face, arm, or leg, especially on only one side of your body. ? Sudden vision changes. ? Sudden trouble speaking. ? Sudden confusion or trouble understanding simple statements. ? Sudden problems with walking or balance. ? A sudden, severe headache that is different from past headaches.  
  · You passed out (lost consciousness).  
 Call your doctor now or seek immediate medical care if: 
  · You have new or increased shortness of breath.  
  · You feel dizzy or lightheaded, or you feel like you may faint.  
  · Your heart rate becomes irregular.  
  · You can feel your heart flutter in your chest or skip heartbeats. Tell your doctor if these symptoms are new or worse.  
 Watch closely for changes in your health, and be sure to contact your doctor if you have any problems. Where can you learn more? Go to http://pia-colby.info/. Enter U020 in the search box to learn more about \"Atrial Fibrillation: Care Instructions. \" Current as of: July 22, 2018 Content Version: 11.9 © 5124-9731 [employer].  Care instructions adapted under license by [employer] (which disclaims liability or warranty for this information). If you have questions about a medical condition or this instruction, always ask your healthcare professional. [place] [address] any warranty or liability for your use of this information.

## 2019-02-28 NOTE — PROGRESS NOTES
Reason for Visit/HPI:   
Héctor Lechuga is a 80 y.o. female patient who presents today for Chief Complaint Patient presents with  Urinary Frequency Patient being seen for increase urination at night, then cant go back to sleep.  Diarrhea Reports having loose stools, Follow Up: Follow-up Disposition: Return in about 5 days (around 3/5/2019) for with the NP for follow up to your treatment plan. Appointment Reminder Card given to [facility] with date, time, & necessary items to send with Mrs. Celsa Gibson for follow up on Tuesday March 5, 2019 at 9 am.  
 
Diagnosis/Treatment Plan:   
Diagnoses and all orders for this visit: 
 
1. Mitral valvular disorder Comments: 
Currently we are on hold with regards to a treatment option for her MVR. She is not surgical here in town but has been referred to [facility] for a trail. Orders: 
-     bumetanide (BUMEX) 2 mg tablet; Take 1 Tab by mouth every morning AND 0.5 Tabs daily (with lunch). 2. (HFpEF) heart failure with preserved ejection fraction ([address] Comments: 
Chronic since her MI in Sept 2018. Currently she is not building up fluid. Mild 1+ non pitting lower ankles. But she did not have her compression stocking on. 3. Diuretic-induced hypokalemia Comments: 
Last lab on Feb 21st showed she is managing well with potassium orally. Continue this therapy. Orders: 
-     bumetanide (BUMEX) 2 mg tablet; Take 1 Tab by mouth every morning AND 0.5 Tabs daily (with lunch). 4. Moderate episode of recurrent major depressive disorder (HCC) Comments: 
Chronic in nature. She has transitioned from Paxil to Lexapro due to the anxiety she is displaying. she has more moments of loneliness currently. Orders: 
-     escitalopram oxalate (LEXAPRO) 10 mg tablet; Take 1.5 Tabs by mouth daily. -     melatonin 3 mg tablet; Take 1 Tab by mouth nightly. 5. Urinary frequency Comments: Due to being on Bumex 2 mg bid she is urinating 24 hours per day. It has become an issue with her being social or going out of her apartment as she is embarrass Orders: 
-     bumetanide (BUMEX) 2 mg tablet; Take 1 Tab by mouth every morning AND 0.5 Tabs daily (with lunch). 6. Nocturia more than twice per night Comments: 
we are going to move her Bumex to lunch instead of PM so maybe she can get some control over her need to urinate frequently. Orders: 
-     bumetanide (BUMEX) 2 mg tablet; Take 1 Tab by mouth every morning AND 0.5 Tabs daily (with lunch). -     melatonin 3 mg tablet; Take 1 Tab by mouth nightly. 7. Primary insomnia Comments: 
Some is related to her anxiety, some is related to her urinating due to her diuretic is being given in the evening before bed. We will work on both. Orders: 
-     melatonin 3 mg tablet; Take 1 Tab by mouth nightly. 8. Functional diarrhea Comments: 
Currently she is having BM that are diarrhea in nature. She is not able to manage her medications to help with adjusting due to the loose stools. 9. Medication management Comments: 
Chronic, ongoing. Each visit requires us to review these meds for appropraiteness. Orders: 
-     bumetanide (BUMEX) 2 mg tablet; Take 1 Tab by mouth every morning AND 0.5 Tabs daily (with lunch). -     escitalopram oxalate (LEXAPRO) 10 mg tablet; Take 1.5 Tabs by mouth daily. Weights are stable. Today she was 128.1# Yesterday 125 # Blood pressures are still up and down. Running between 100 to 130 Discontinued Care: The following treatment modalities have been discontinued by the provider today:  
Medications Discontinued During This Encounter Medication Reason  PARoxetine (PAXIL) 20 mg tablet Clinically Ineffective  bumetanide (BUMEX) 2 mg tablet  escitalopram oxalate (LEXAPRO) 10 mg tablet  senna (SENNA) 8.6 mg tablet Alternate Therapy Subjective: Allergies Allergen Reactions  Brilinta [Ticagrelor] Shortness of Breath  Celecoxib Other (comments)  Codeine Hives  Sulfa (Sulfonamide Antibiotics) Unknown (comments) and Hives  Iodine Unknown (comments)  Aricept [Donepezil] Other (comments) Headaches - noted as intolerance  Darvocet A500 [Propoxyphene N-Acetaminophen] Nausea Only Prior to Admission medications Medication Sig Start Date End Date Taking? Authorizing Provider  
lactose-reduced food (BOOST HIGH PROTEIN PO) Take  by mouth two (2) times a day. Yes Provider, Historical  
bumetanide (BUMEX) 2 mg tablet Take 1 Tab by mouth every morning AND 0.5 Tabs daily (with lunch). 2/28/19  Yes Tra Hinojosa NP  
escitalopram oxalate (LEXAPRO) 10 mg tablet Take 1.5 Tabs by mouth daily. 2/28/19  Yes Tra Hinojosa NP  
melatonin 3 mg tablet Take 1 Tab by mouth nightly. 2/28/19  Yes Tra Hinojosa NP  
carvedilol (COREG) 3.125 mg tablet Take 1 Tab by mouth two (2) times a day. 12/20/18  Yes Jen Gómez NP  
polyethylene glycol (MIRALAX) 17 gram packet Take 17 g by mouth daily. Yes Provider, Historical  
calcium-vitamin D (OYSTER SHELL) 500 mg(1,250mg) -200 unit per tablet Take 1 Tab by mouth two (2) times daily (with meals). Yes Provider, Historical  
levothyroxine (SYNTHROID) 25 mcg tablet Take 1 Tab by mouth Daily (before breakfast). Patient taking differently: Take 37.5 mcg by mouth Daily (before breakfast). 12/5/18  Yes Tra Hinojosa NP  
OTHER Dispense - Portable Oxygen & Supplies due to congestive heart failure, hypoxemia, confusion due to hypoxemia. Long term treatment is required as patient failed in office walk test. Oxygen saturations noted to be at start 89% RA then dropped to 78% with activity. 12/5/18  Yes Tra Hinojosa NP  
OTHER Dispense Oxygen Concentrator with humidified air and supplies. Dx: I50.42 CHF; Dx: I48.91 Afib uncontrolled; Dx: R06.02 SOB;  Dx: R09.02 Hypoxemia. Patient failed walk test in office. Pulse ox at 78% on RA. 12/5/18  Yes Cindy Lloyd NP  
pantoprazole (PROTONIX) 40 mg tablet Take 1 Tab by mouth daily. 12/3/18  Yes Cindy Lloyd NP  
clopidogrel (PLAVIX) 75 mg tab Take 1 Tab by mouth daily. 11/30/18  Yes Cindy Lloyd NP  
apixaban (ELIQUIS) 2.5 mg tablet Take 1 Tab by mouth two (2) times a day. 11/30/18  Yes Cindy Lloyd NP  
simvastatin (ZOCOR) 40 mg tablet Take 40 mg by mouth nightly. 11/26/18  Yes Provider, Historical  
nitroglycerin (NITROSTAT) 0.4 mg SL tablet Take 0.4 mg by mouth as needed. 11/26/18  Yes Provider, Historical  
potassium chloride (KLOR-CON) 10 mEq tablet Take 1 Tab by mouth daily. Patient taking differently: Take 20 mEq by mouth daily. 11/28/18  Yes Cindy Lloyd NP  
diltiazem CD (CARTIA XT) 180 mg ER capsule Take 180 mg by mouth nightly. Yes Provider, Historical  
PROAIR HFA 90 mcg/actuation inhaler Take 2 Inhalation by inhalation four (4) times daily as needed. 11/26/18   Provider, Historical  
 
Past Medical History:  
Diagnosis Date  (HFpEF) heart failure with preserved ejection fraction ([address] 10/17/2018  
 acute  Allergic rhinitis  Atherosclerotic cardiovascular disease 1999  
 CHF (congestive heart failure) ([address] 09/11/2018  Cognitive communication deficit  Edema  Environmental allergies   
 dizziness-(chronic)  Fatty liver disease, nonalcoholic  GERD (gastroesophageal reflux disease)  H/O heart artery stent 09/2018  HCVD (hypertensive cardiovascular disease) 10/17/2018  Heart attack ([address] 09/2018  Heart palpitations 2018 infrequent  History of PTCA  Hypercholesterolemia 1999  Hypertension 2010  Liver disease   
 fatty liver  MI (myocardial infarction) ([address] 09/2018  Overactive bladder 08/09/2018  Peripheral neuropathy  Permanent atrial fibrillation ([address] 11/05/2018  Senile dementia, without behavioral disturbance 2018  Stress incontinence, female 2018  Vertigo  Vitamin D deficiency Past Surgical History:  
Procedure Laterality Date  BREAST SURGERY PROCEDURE UNLISTED    
 breast cyst [facility])  CARDIAC SURG PROCEDURE UNLIST  1999  
 + stress thalassemia; 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 Holter  HX APPENDECTOMY  HX BREAST BIOPSY Left   
 neg  HX CYST INCISION AND DRAINAGE Right years ago  
 neg  HX DILATION AND CURETTAGE    
 x5  
 HX GYN    
 D&C (x5), ALLEY/BSO (-Juliann/Zedle), Provera intolerance (bleeding), endometrial Bx annually  HX PTCA  2018  
 stent distal RCA into PLwith KENNETH x 2  
 HX ALLEY AND BSO   Juliann/Zedler Social History Tobacco Use  Smoking status: Former Smoker Packs/day: 4.00 Types: Cigarettes Last attempt to quit: 1955 Years since quittin.2  Smokeless tobacco: Never Used Substance Use Topics  Alcohol use: No  
  Alcohol/week: 0.0 - 0.5 oz  Drug use: No  
  
Family History Problem Relation Age of Onset  Diabetes Mother  Hypertension Mother  Heart Failure Mother CHF ( @ 80)  Alzheimer Mother  Cancer Father   
     lung ( @ 77)  Alzheimer Father  No Known Problems Son  No Known Problems Son   
 Ovarian Cancer Sister   
     hysterectomy  Breast Cancer Sister 28  
     mastectomy  Cancer Sister   
     breast and ovarian  Alzheimer Sister  Breast Problems Sister   
     breast cyst  
 Cataract Sister  Hypertension Sister  Diabetes Brother Advance Care Planning 2018 Primary Decision Maker Name -  
Primary Decision Maker Phone Number -  
Primary Decision Maker Relationship to Patient -  
Confirm Advance Directive Yes, on file Does the patient have other document types - Test Results:  
 
Office Visit on 2019 Component Date Value Ref Range Status  WBC 02/12/2019 6.1  3.4 - 10.8 x10E3/uL Final  
 RBC 02/12/2019 4.01  3.77 - 5.28 x10E6/uL Final  
 HGB 02/12/2019 11.3  11.1 - 15.9 g/dL Final  
 HCT 02/12/2019 35.2  34.0 - 46.6 % Final  
 MCV 02/12/2019 88  79 - 97 fL Final  
 MCH 02/12/2019 28.2  26.6 - 33.0 pg Final  
 MCHC 02/12/2019 32.1  31.5 - 35.7 g/dL Final  
 RDW 02/12/2019 15.9* 12.3 - 15.4 % Final  
 PLATELET 69/91/6001 764  150 - 379 x10E3/uL Final  
 NEUTROPHILS 02/12/2019 65  Not Estab. % Final  
 Lymphocytes 02/12/2019 18  Not Estab. % Final  
 MONOCYTES 02/12/2019 13  Not Estab. % Final  
 EOSINOPHILS 02/12/2019 3  Not Estab. % Final  
 BASOPHILS 02/12/2019 1  Not Estab. % Final  
 ABS. NEUTROPHILS 02/12/2019 4.0  1.4 - 7.0 x10E3/uL Final  
 Abs Lymphocytes 02/12/2019 1.1  0.7 - 3.1 x10E3/uL Final  
 ABS. MONOCYTES 02/12/2019 0.8  0.1 - 0.9 x10E3/uL Final  
 ABS. EOSINOPHILS 02/12/2019 0.2  0.0 - 0.4 x10E3/uL Final  
 ABS. BASOPHILS 02/12/2019 0.0  0.0 - 0.2 x10E3/uL Final  
 IMMATURE GRANULOCYTES 02/12/2019 0  Not Estab. % Final  
 ABS. IMM.  GRANS. 02/12/2019 0.0  0.0 - 0.1 x10E3/uL Final  
 Cholesterol, total 02/12/2019 119  100 - 199 mg/dL Final  
 Triglyceride 02/12/2019 97  0 - 149 mg/dL Final  
 HDL Cholesterol 02/12/2019 42  >39 mg/dL Final  
 VLDL, calculated 02/12/2019 19  5 - 40 mg/dL Final  
 LDL, calculated 02/12/2019 58  0 - 99 mg/dL Final  
 Glucose 02/12/2019 116* 65 - 99 mg/dL Final  
 BUN 02/12/2019 26  10 - 36 mg/dL Final  
 Creatinine 02/12/2019 1.50* 0.57 - 1.00 mg/dL Final  
 GFR est non-AA 02/12/2019 30* >59 mL/min/1.73 Final  
 GFR est AA 02/12/2019 35* >59 mL/min/1.73 Final  
 BUN/Creatinine ratio 02/12/2019 17  12 - 28 Final  
 Sodium 02/12/2019 143  134 - 144 mmol/L Final  
 Potassium 02/12/2019 4.2  3.5 - 5.2 mmol/L Final  
 Chloride 02/12/2019 99  96 - 106 mmol/L Final  
 CO2 02/12/2019 28  20 - 29 mmol/L Final  
 Calcium 02/12/2019 9.2  8.7 - 10.3 mg/dL Final  
  Protein, total 02/12/2019 7.4  6.0 - 8.5 g/dL Final  
 Albumin 02/12/2019 3.9  3.2 - 4.6 g/dL Final  
 GLOBULIN, TOTAL 02/12/2019 3.5  1.5 - 4.5 g/dL Final  
 A-G Ratio 02/12/2019 1.1* 1.2 - 2.2 Final  
 Bilirubin, total 02/12/2019 0.4  0.0 - 1.2 mg/dL Final  
 Alk. phosphatase 02/12/2019 92  39 - 117 IU/L Final  
 AST (SGOT) 02/12/2019 21  0 - 40 IU/L Final  
 ALT (SGPT) 02/12/2019 12  0 - 32 IU/L Final  
 TSH 02/12/2019 6.070* 0.450 - 4.500 uIU/mL Final  
 T4, Total 02/12/2019 7.7  4.5 - 12.0 ug/dL Final  
 T3 Uptake 02/12/2019 29  24 - 39 % Final  
 Free Thyroxine Index (FTI) 02/12/2019 2.2  1.2 - 4.9 Final  
 Vitamin B12 02/12/2019 499  232 - 1,245 pg/mL Final  
 Folate 02/12/2019 >20.0  >3.0 ng/mL Final  
 Comment: A serum folate concentration of less than 3.1 ng/mL is 
considered to represent clinical deficiency.  VITAMIN D, 25-HYDROXY 02/12/2019 46.5  30.0 - 100.0 ng/mL Final  
 Comment: Vitamin D deficiency has been defined by the [address] [facility] practice guideline as a 
level of serum 25-OH vitamin D less than 20 ng/mL (1,2). The Endocrine Society went on to further define vitamin D 
insufficiency as a level between 21 and 29 ng/mL (2). 1. IOM ([place] [employer]). 2010. Dietary reference 
   intakes for calcium and D. [facility] The 
   [employer]. 2. Shantelle MF, Lora ROE, Gary CARSON, et al. 
   Evaluation, treatment, and prevention of vitamin D 
   deficiency: an Endocrine Society clinical practice 
   guideline. JCEM. 2011 Jul; 96(7):1911-30.  Magnesium 02/12/2019 2.0  1.6 - 2.3 mg/dL Final  
 Prealbumin 02/12/2019 17  9 - 32 mg/dL Final  
Admission on 12/06/2018, Discharged on 12/10/2018 No results displayed because visit has over 200 results. Clinical Support on 12/05/2018 Component Date Value Ref Range Status  WBC 12/05/2018 7.7  3.4 - 10.8 x10E3/uL Final  
  RBC 12/05/2018 4.41  3.77 - 5.28 x10E6/uL Final  
 HGB 12/05/2018 12.4  11.1 - 15.9 g/dL Final  
 HCT 12/05/2018 39.1  34.0 - 46.6 % Final  
 MCV 12/05/2018 89  79 - 97 fL Final  
 MCH 12/05/2018 28.1  26.6 - 33.0 pg Final  
 MCHC 12/05/2018 31.7  31.5 - 35.7 g/dL Final  
 RDW 12/05/2018 15.8* 12.3 - 15.4 % Final  
 PLATELET 32/95/6818 828  150 - 379 x10E3/uL Final  
 NEUTROPHILS 12/05/2018 74  Not Estab. % Final  
 Lymphocytes 12/05/2018 15  Not Estab. % Final  
 MONOCYTES 12/05/2018 9  Not Estab. % Final  
 EOSINOPHILS 12/05/2018 2  Not Estab. % Final  
 BASOPHILS 12/05/2018 0  Not Estab. % Final  
 ABS. NEUTROPHILS 12/05/2018 5.7  1.4 - 7.0 x10E3/uL Final  
 Abs Lymphocytes 12/05/2018 1.2  0.7 - 3.1 x10E3/uL Final  
 ABS. MONOCYTES 12/05/2018 0.7  0.1 - 0.9 x10E3/uL Final  
 ABS. EOSINOPHILS 12/05/2018 0.2  0.0 - 0.4 x10E3/uL Final  
 ABS. BASOPHILS 12/05/2018 0.0  0.0 - 0.2 x10E3/uL Final  
 IMMATURE GRANULOCYTES 12/05/2018 0  Not Estab. % Final  
 ABS. IMM. GRANS. 12/05/2018 0.0  0.0 - 0.1 x10E3/uL Final  
 Glucose 12/05/2018 154* 65 - 99 mg/dL Final  
 BUN 12/05/2018 22  10 - 36 mg/dL Final  
 Creatinine 12/05/2018 1.10* 0.57 - 1.00 mg/dL Final  
 GFR est non-AA 12/05/2018 44* >59 mL/min/1.73 Final  
 GFR est AA 12/05/2018 51* >59 mL/min/1.73 Final  
 BUN/Creatinine ratio 12/05/2018 20  12 - 28 Final  
 Sodium 12/05/2018 144  134 - 144 mmol/L Final  
 Potassium 12/05/2018 3.7  3.5 - 5.2 mmol/L Final  
 Chloride 12/05/2018 104  96 - 106 mmol/L Final  
 CO2 12/05/2018 24  20 - 29 mmol/L Final  
 Calcium 12/05/2018 9.1  8.7 - 10.3 mg/dL Final  
 Protein, total 12/05/2018 6.8  6.0 - 8.5 g/dL Final  
 Albumin 12/05/2018 3.8  3.2 - 4.6 g/dL Final  
 GLOBULIN, TOTAL 12/05/2018 3.0  1.5 - 4.5 g/dL Final  
 A-G Ratio 12/05/2018 1.3  1.2 - 2.2 Final  
 Bilirubin, total 12/05/2018 0.6  0.0 - 1.2 mg/dL Final  
 Alk.  phosphatase 12/05/2018 64  39 - 117 IU/L Final  
  AST (SGOT) 12/05/2018 26  0 - 40 IU/L Final  
 ALT (SGPT) 12/05/2018 14  0 - 32 IU/L Final  
 INR 12/05/2018 1.3* 0.8 - 1.2 Final  
 Comment: Reference interval is for non-anticoagulated patients. Suggested INR therapeutic range for Vitamin K 
antagonist therapy: 
   Standard Dose (moderate intensity 
                  therapeutic range):       2.0 - 3.0 Higher intensity therapeutic range       2.5 - 3.5  Prothrombin time 12/05/2018 13.4* 9.1 - 12.0 sec Final  
 TSH 12/05/2018 8.390* 0.450 - 4.500 uIU/mL Final  
 T4, Total 12/05/2018 8.0  4.5 - 12.0 ug/dL Final  
 T3 Uptake 12/05/2018 33  24 - 39 % Final  
 Free Thyroxine Index (FTI) 12/05/2018 2.6  1.2 - 4.9 Final  
 PROBNP 12/05/2018 8,153* 0 - 738 pg/mL Final  
 Comment: The following cut-points have been suggested for the 
use of proBNP for the diagnostic evaluation of heart 
failure (HF) in patients with acute dyspnea: 
Modality                     Age           Optimal Cut 
                           (years)            Point 
------------------------------------------------------ 
Diagnosis (rule in HF)        <50            450 pg/mL 50 - 75            900 pg/mL 
                              >75           1800 pg/mL Exclusion (rule out HF)  Age independent     300 pg/mL Hospital Outpatient Visit on 12/03/2018 Component Date Value Ref Range Status  Ventricular Rate 12/03/2018 89  BPM Final  
 Atrial Rate 12/03/2018 101  BPM Final  
 QRS Duration 12/03/2018 88  ms Final  
 Q-T Interval 12/03/2018 390  ms Final  
 QTC Calculation (Bezet) 12/03/2018 474  ms Final  
 Calculated R Axis 12/03/2018 71  degrees Final  
 Calculated T Axis 12/03/2018 -49  degrees Final  
 Diagnosis 12/03/2018    Final  
                 Value:Atrial fibrillation ST & T wave abnormality, consider inferior ischemia or digitalis effect ST & T wave abnormality, consider anterior ischemia or digitalis effect Prolonged QT 
 No previous ECGs available Confirmed by Reece Thompson M.D., Flory Beckman ([id number]) on 12/3/2018 12:17:42 PM 
  
 
 
Objective:  
 
Vitals:  
 02/28/19 1042 BP: 105/62 Pulse: 62 Resp: 18 Temp: 97.8 °F (36.6 °C) TempSrc: Oral  
SpO2: 99% Weight: 126 lb 4.8 oz (57.3 kg) Height: 5' 1\" (1.549 m) Wt Readings from Last 3 Encounters:  
02/28/19 126 lb 4.8 oz (57.3 kg) 02/11/19 129 lb (58.5 kg) 02/06/19 126 lb (57.2 kg) BP Readings from Last 3 Encounters:  
02/28/19 105/62  
02/11/19 125/70  
02/06/19 110/62 Review of Systems Unable to perform ROS: Mental status change Physical Exam  
Constitutional: Vital signs are normal. She appears dehydrated. She appears not lethargic, to not be writhing in pain and not malnourished. She appears healthy. She appears not cachectic. Non-toxic appearance. She does not have a sickly appearance. No distress. Frail, fragile, alert but not oriented, elderly  female. 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 Now resides in [facility]. HENT:  
Head: Normocephalic and atraumatic. Right Ear: External ear and ear canal normal. A middle ear effusion is present. Decreased hearing is noted. Left Ear: External ear and ear canal normal. A middle ear effusion is present. Decreased hearing is noted. Nose: Nose normal. No mucosal edema or rhinorrhea. Mouth/Throat: Uvula is midline and oropharynx is clear and moist. Mucous membranes are pale, dry and not cyanotic. No oral lesions. No uvula swelling. No posterior oropharyngeal edema or posterior oropharyngeal erythema. Eyes: Conjunctivae, EOM and lids are normal. Pupils are equal, round, and reactive to light. Lids are everted and swept, no foreign bodies found. Right pupil is round and reactive. Left pupil is reactive. Left pupil required 20 to 30 secs extra to respond. But it finally did respond.    
Neck: Trachea normal, normal range of motion and full passive range of motion without pain. Neck supple. No hepatojugular reflux and no JVD present. Carotid bruit is not present. No thyromegaly present. JVD present bilaterally. SOB, hypoxic. Cardiovascular: S1 normal, S2 normal, intact distal pulses and normal pulses. An irregularly irregular rhythm present. Frequent extrasystoles are present. Tachycardia present. Exam reveals gallop and distant heart sounds. Exam reveals no friction rub. No murmur heard. 1+ non pitting lower extremity edema present today but she also does not have her compression stockings in place. Pulmonary/Chest: Effort normal and breath sounds normal. No tachypnea. She has no decreased breath sounds. She has no wheezes. On continuous nasal cannula 2 L/min. Abdominal: Soft. Normal appearance. She exhibits distension. She exhibits no fluid wave and no mass. Bowel sounds are hyperactive. There is no hepatosplenomegaly. There is no tenderness. There is no rebound and no CVA tenderness. Continues to struggle with constipation. The Sherl Jan is working but she can not take it every day as the stools get better but the urgency is too much. Musculoskeletal:  
Generalized weakness with balance issues and intermittent dizziness. Atrophy in extremities. Loss of tone in extremities. Lymphadenopathy:  
     Head (right side): No submandibular adenopathy present. Head (left side): No submandibular and no tonsillar adenopathy present. She has no cervical adenopathy. She has no axillary adenopathy. Neurological: She has normal reflexes. She appears not lethargic. She is disoriented. She is not agitated. She displays weakness, atrophy, facial asymmetry, abnormal stance and abnormal speech. A cranial nerve deficit and sensory deficit is present. She exhibits abnormal muscle tone. Coordination and gait abnormal. GCS score is 15. Skin: Skin is warm, dry and intact.  No bruising, no ecchymosis and no rash noted. She is not diaphoretic. No cyanosis. There is pallor. Nails show no clubbing. Psychiatric: Her mood appears not anxious. Her affect is labile. She expresses impulsivity. She exhibits a depressed mood. She is apathetic. She exhibits disordered thought content, abnormal new learning ability and abnormal remote memory. She does not have a flat affect. Patient is debilitated. Nursing note and vitals reviewed. Disclaimer: Ms. Monica Rudolph has been advised to call or return to our office if symptoms worsen/change/persist. We as a care team including the patient; discussed expected course/resolution/complications of diagnosis in detail today. Medication risks/benefits/costs/interactions/alternatives discussed Donna Armando was given an after visit summary which includes diagnoses, current medications, & vitals. Monica Rudolph expressed understanding with the diagnosis and plan.

## 2019-02-28 NOTE — LETTER
2/28/2019 1:53 PM 
 
Patient: Lili Miranda YOB: 1927 Date of Visit: 2/28/2019 Dear Missy Aguero 
[address] [place] [id number] VIA Facsimile: [phone] 
 : This letter is to advise you of a recent visit Ms. Nanette Freitas had with Balaji Jain NP for: Chief Complaint Patient presents with  Urinary Frequency Patient being seen for increase urination at night, then cant go back to sleep.  Diarrhea Reports having loose stools, Below are the relevant portions of my assessment and plan of care. If you have questions, please do not hesitate to call me. I look forward to continuing to follow Ms. Tracy Wilson along with you.  
 
 
 
Sincerely, 
Balaji Jain NP

## 2019-03-05 ENCOUNTER — PATIENT OUTREACH (OUTPATIENT)
Dept: CARDIOLOGY CLINIC | Age: 84
End: 2019-03-05

## 2019-03-05 ENCOUNTER — OFFICE VISIT (OUTPATIENT)
Dept: GERIATRIC MEDICINE | Age: 84
End: 2019-03-05

## 2019-03-05 VITALS
HEIGHT: 61 IN | HEART RATE: 96 BPM | SYSTOLIC BLOOD PRESSURE: 120 MMHG | BODY MASS INDEX: 24.35 KG/M2 | RESPIRATION RATE: 20 BRPM | DIASTOLIC BLOOD PRESSURE: 70 MMHG | WEIGHT: 129 LBS | OXYGEN SATURATION: 98 %

## 2019-03-05 DIAGNOSIS — R35.0 URINARY FREQUENCY: ICD-10-CM

## 2019-03-05 DIAGNOSIS — E87.6 DIURETIC-INDUCED HYPOKALEMIA: ICD-10-CM

## 2019-03-05 DIAGNOSIS — R35.1 NOCTURIA MORE THAN TWICE PER NIGHT: ICD-10-CM

## 2019-03-05 DIAGNOSIS — R35.1 NOCTURIA MORE THAN TWICE PER NIGHT: Primary | ICD-10-CM

## 2019-03-05 DIAGNOSIS — T50.2X5A DIURETIC-INDUCED HYPOKALEMIA: ICD-10-CM

## 2019-03-05 DIAGNOSIS — R06.02 SHORTNESS OF BREATH: ICD-10-CM

## 2019-03-05 DIAGNOSIS — F33.1 MODERATE EPISODE OF RECURRENT MAJOR DEPRESSIVE DISORDER (HCC): ICD-10-CM

## 2019-03-05 DIAGNOSIS — F51.01 PRIMARY INSOMNIA: ICD-10-CM

## 2019-03-05 DIAGNOSIS — Z79.899 MEDICATION MANAGEMENT: ICD-10-CM

## 2019-03-05 DIAGNOSIS — I05.9 MITRAL VALVULAR DISORDER: ICD-10-CM

## 2019-03-05 RX ORDER — BUMETANIDE 2 MG/1
2 TABLET ORAL DAILY
COMMUNITY
End: 2019-03-05 | Stop reason: ALTCHOICE

## 2019-03-05 NOTE — LETTER
3/7/2019 3:08 PM 
 
Patient: Jose M Chong YOB: 1927 Date of Visit: 3/5/2019 Dear No Recipients: This letter is to advise you of a recent visit Ms. Amparo Santana had with Carmen Alfredo NP for: Chief Complaint Patient presents with  Medication Evaluation Patient being seen to follow up with medication changes from visit on 2/28 Below are the relevant portions of my assessment and plan of care. If you have questions, please do not hesitate to call me. I look forward to continuing to follow Ms. Alex Warren along with you.  
 
 
 
Sincerely, 
Carmen Alfredo NP

## 2019-03-05 NOTE — PROGRESS NOTES
Goals      Reduce risk of CHF exacerbations and complications. 12/12/18   Working with Grant Mensah to coordinate [place]. Nurse at [facility] states she is \"really swamped right now\" and can not perform med rec with me. Put me on hold to ask if admissions director could assist. She came back and stated that the admissions director says Mazin Barbosa got everything they needed from hospital at discharge. \"  NN will fax over weight sheet to have staff fill out and will follow up next week---mkrw    12/14/18  Working with [facility] on [place]. Was able to speak with Hermelinda nurse talking care of patient. Med Rec performed. She reports patient has orders for daily weights, however did not have weight for several days. Most recent weight on 12/10/18 is 128 lbs, which is down from discharge weight of 137 lbs. NN encouraged obtaining weight daily and to report to house MD weigh gain greater than 2 lbs/day or 5 lbs/week. She reports that patient is on AHA diet. Hermelinda reports patient saw sangita Cunningham within 48 hours of returning to [facility]--she did not have access to exactly when patient was seen however this is standard practice for their patients. Patient does have cardiology appt with Dr Caryle Chol on 12/20/18. Patient's son is to transport her. NN will follow up with son and facility for updates. ---mkrw    12/19/18     Spoke to Noel Gore at [facility]. She reports that patient is doing well. She participates in PT/OT. Her last weight on 12/17/18 was 131lbs, 3 lbs increase since 12/10/18. NN voiced concern that patient isn't being weighed daily. She states she will have to weigh the patient daily herself since it is not getting done every day. Patient does not have any leg edema, no SOB, lungs clear. She states that patient will be going to see Dr Caryle Chol on 12/20/18. Patient continues to maintain low sodium diet.  She reports that there are no plans at this time to send patient back to her apt from skilled at this time. NN will follow up with Hermelinda next week for updates. ---mkrw    12/21/18  Per cardiology office visit notes, patient ordered to have labs drawn, CXR, decrease carvedilol and increase bumex. Maty Jenkins to follow up on new orders. Was transferred to Nurse Supervisor voicemail , left message for return call. Noted that MD office also wanted Bumex given BID however chart still reflects daily. Staff Message sent to NP Adama Hernandez To let her know. NN will follow up on Monday---mkrw    12/26/18  Spoke to [address] at [facility] is nurse taking care of patient today. She reports that Repeat CXR has not been performed yet---she is putting order in the computer now to have it done today. CMP, MG, and TSH levels drawn on 12/24/18. She reports Mg level 1.7, K level 3.3. Patient is on 10 meq potassium daily. Will send note over to MD office to see if increase may be needed. She is faxing lab results to Dr Woodrow Kaplan at [phone] today. She will also have CXR results faxed to MD office as well. Patient's weight today is 129.4lbs. On 12/20 at MD office weight 130 lbs 3 oz. Heather did not note any swelling in ankles or feet, however patient did request 02 this morning so she is wearing it at 2L NC. Confirmed that Coreg is now 3.125 mg BID and Bumex is 2 mg BID. Next appt is 1/18/19 with Dr Woodrow Kaplan. NN will follow up next week for updates from [facility], [facility].    01/03/19    Per chart notes, patient saw Dr Woodrow Kalpan. CXR confirms increase in pleural effusions and requires thoracentesis, which will be performed on Monday 1/7/19. Spoke to Larry Sarkar at [facility]. She confirms that they have orders to hold Eliquis on Friday night and to restart Tuesday 1/8/19. Plavix is not to be held. They have been weighing patient per orders. Today's weight is 123 lbs, which is down from 129-130 lbs from last week. Hermelinda also confirms patient is on Zaroxylyn.   NN will follow up next week after procedure---mkrw    01/09/19   Per patient chart, patient had successful US guided L thoracentesis on 1/7/19 resulting in 400 ml fluid output. Spoke to nurse taking care of patient today at [facility]--she reports patient is up walking around, she notices that the patient is not coughing like she did prior to procedure. She has resumed her Eliquis. Today's weight is 122.4lbs---stable. She reports she fluctuates between 120-123 lbs per her records. NN will follow up next week for updates--mkrw    01/16/19  Per Hermelinda at [facility], Patient's weight today 127.8. Asked her if there was any changes to her orders that would cause her weight to spike up like that--she said she wasn't aware of medication change, however patient was eating better now and was on a low sodium diet. Asked for a weight range over the last couple days---she reports 123lbs, 122 lbs, 125 lbs, 127 lbs, 129 lbs, 127.8 lbs. Hermelinda said she would notify MD about this. NN will contact cardiology as well via staff message. NN will follow up---mkrw    01/22/19  Per chart notes, patient saw Dr Ascencio Simpler 1/18/19, weight noted to be 128.8 lbs at visit. Spoke to Kiana Combs, nurse at [facility]. She reports weight is 127 lbs this morning. Patient is confused at times, disoriented to place/time. She requires 02 2L/NC continuously, will desat quickly when removed. Nurse reports that patient will remove the NC and 02 sat has been 79-84%, will recover quickly when NC replaced. Patient must be watched closely because of this. Nurse also reports that patient stays in her room most of the time, will come out only when encouraged by visitors or staff to walk to dining room. NN will contact cardiology office for plan and follow up with [facility] next week---mkrw    01/31/19  Spoke with Renard Lee at [facility]. She reports there have been no changes in patient condition.  She was unable to obtain recent weight from computer at time of call, reports that patient's weight has been stable without significant increases. She confirms that patient will be going for IFRAH tomorrow. NN will follow up next week for patient update---mkrw    02/15/19  Spoke with Constance Casiano, nurse at [facility]. Patient recently transferred to assisted living from medical unit. She reports she is doing well---recent weight is 129 lbs. Weights in January were fluctuating. Nurse reports patient's appetite has improved and has been putting on nutritional weight. Reports no edema or increased SOB. NN reminded nurse to report additional weight increases of 2lb/day or 5 lbs/week to facility NP. She verbalizes understanding. She states patient is on low sodium diet. Per Constance Casiano, patient ambulates in the halls with walker and is leaving room for activities. Per chart notes, patient had labs drawn 2/12/19. Saw PCP Madalyn Nicely. NN will follow up with patient in 2 weeks---mkrw    03/05/19  NN was informed that patient was out seeing NP Varsha Arenas at this time for follow up appt. Per chart notes, labs drawn on 2/12/19 were good---only abnormal is elevated Cr  at 1.5. Cr level on 12/5/18 was 1.1. Bumex was adjusted to 2 mg in AM and 1 mg at lunchtime. Patient voiced concerns of urinating all day and night --this was causing her distress and making her stay in her apartment all the time. On 2/28/19 visit is also noted that patient was referred to see Dr Ganesh Forbes for anxiety/depression symptoms. No chart updates regarding [facility] trial for patient's heart valve and if she is going to participate. Patient's reported weights are as follows: 2/27 125 lbs, 2/28/19 128.1 lbs and today 3/5/19 is 129 lbs. Weights are stable.     NN will follow up for updates next week---mkrw

## 2019-03-05 NOTE — PROGRESS NOTES
ADVISED PATIENT OF THE FOLLOWING HEALTH MAINTAINCE DUE  There are no preventive care reminders to display for this patient. Chief Complaint   Patient presents with    Medication Evaluation     Patient being seen to follow up with medication changes from visit on 2/28       1. Have you been to the ER, urgent care clinic since your last visit? Hospitalized since your last visit? No    2. Have you seen or consulted any other health care providers outside of the [address] [place] [address] since your last visit? Include any DEXA scan, mammography  or colon screening. No    3. Do you have an Advance Directive on file? yes    4. Do you have a DNR on file? DNR    Patient is accompanied by self I have received verbal consent from Chelsi Rashid to discuss any/all medical information while they are present in the room. Advance Care Planning 12/6/2018   Primary Decision Maker Name -   Primary Decision Maker Phone Number -   Primary Decision Maker Relationship to Patient -   Confirm Advance Directive Yes, on file   Does the patient have other document types -         [facility], [address] [address]  Phone: [phone] Fax: [phone]        Patient reminded during visit to bring all medication bottles, OTC medications to all appointments.  ( AL nurse came with patient and has copy of medication list)

## 2019-03-05 NOTE — PROGRESS NOTES
Goals Addressed                 This Visit's Progress     Reduce risk of CHF exacerbations and complications. 12/12/18   Working with Dougie Schofield to coordinate [place]. Nurse at [facility] states she is \"really swamped right now\" and can not perform med rec with me. Put me on hold to ask if admissions director could assist. She came back and stated that the admissions director says Esthela Khan got everything they needed from hospital at discharge. \"  NN will fax over weight sheet to have staff fill out and will follow up next week---mkrw    12/14/18  Working with [facility] on [place]. Was able to speak with Hermelinda nurse talking care of patient. Med Rec performed. She reports patient has orders for daily weights, however did not have weight for several days. Most recent weight on 12/10/18 is 128 lbs, which is down from discharge weight of 137 lbs. NN encouraged obtaining weight daily and to report to house MD weigh gain greater than 2 lbs/day or 5 lbs/week. She reports that patient is on AHA diet. Hermelinda reports patient saw sangita Fisher within 48 hours of returning to [facility]--she did not have access to exactly when patient was seen however this is standard practice for their patients. Patient does have cardiology appt with Dr Ra Canales on 12/20/18. Patient's son is to transport her. NN will follow up with son and facility for updates. ---mkrw    12/19/18     Spoke to Popeye Sierra at [facility]. She reports that patient is doing well. She participates in PT/OT. Her last weight on 12/17/18 was 131lbs, 3 lbs increase since 12/10/18. NN voiced concern that patient isn't being weighed daily. She states she will have to weigh the patient daily herself since it is not getting done every day. Patient does not have any leg edema, no SOB, lungs clear. She states that patient will be going to see Dr Ra Canales on 12/20/18. Patient continues to maintain low sodium diet.  She reports that there are no plans at this time to send patient back to her apt from [facility] at this time. NN will follow up with Hermelinda next week for updates. ---mkrw    12/21/18  Per cardiology office visit notes, patient ordered to have labs drawn, CXR, decrease carvedilol and increase bumex. Maty Jenkins to follow up on new orders. Was transferred to Nurse Supervisor voicemail , left message for return call. Noted that MD office also wanted Bumex given BID however chart still reflects daily. Staff Message sent to NP Carlos Bennett. To let her know. NN will follow up on Monday---mkrw    12/26/18  Spoke to [address] at [facility] [place] [facility] is nurse taking care of patient today. She reports that Repeat CXR has not been performed yet---she is putting order in the computer now to have it done today. CMP, MG, and TSH levels drawn on 12/24/18. She reports Mg level 1.7, K level 3.3. Patient is on 10 meq potassium daily. Will send note over to MD office to see if increase may be needed. She is faxing lab results to Dr Jaspreet Leal at [phone] today. She will also have CXR results faxed to MD office as well. Patient's weight today is 129.4lbs. On 12/20 at MD office weight 130 lbs 3 oz. Heather did not note any swelling in ankles or feet, however patient did request 02 this morning so she is wearing it at 2L NC. Confirmed that Coreg is now 3.125 mg BID and Bumex is 2 mg BID. Next appt is 1/18/19 with Dr Jaspreet Leal. NN will follow up next week for updates from [facility].    01/03/19    Per chart notes, patient saw Dr Jaspreet Leal. CXR confirms increase in pleural effusions and requires thoracentesis, which will be performed on Monday 1/7/19. Spoke to Larry Sarkar at [facility]. She confirms that they have orders to hold Eliquis on Friday night and to restart Tuesday 1/8/19. Plavix is not to be held. They have been weighing patient per orders. Today's weight is 123 lbs, which is down from 129-130 lbs from last week.  Hermelinda also confirms patient is on [place]. NN will follow up next week after procedure---mkrw    01/09/19   Per patient chart, patient had successful US guided L thoracentesis on 1/7/19 resulting in 400 ml fluid output. Spoke to nurse taking care of patient today at [facility]--she reports patient is up walking around, she notices that the patient is not coughing like she did prior to procedure. She has resumed her Eliquis. Today's weight is 122.4lbs---stable. She reports she fluctuates between 120-123 lbs per her records. NN will follow up next week for updates--mkrw    01/16/19  Per Hermelinda at [facility], Patient's weight today 127.8. Asked her if there was any changes to her orders that would cause her weight to spike up like that--she said she wasn't aware of medication change, however patient was eating better now and was on a low sodium diet. Asked for a weight range over the last couple days---she reports 123lbs, 122 lbs, 125 lbs, 127 lbs, 129 lbs, 127.8 lbs. Hermelinda said she would notify MD about this. NN will contact cardiology as well via staff message. NN will follow up---mkrw    01/22/19  Per chart notes, patient saw Dr Luci Sainz 1/18/19, weight noted to be 128.8 lbs at visit. Spoke to [employer], nurse at [facility]. She reports weight is 127 lbs this morning. Patient is confused at times, disoriented to place/time. She requires 02 2L/NC continuously, will desat quickly when removed. Nurse reports that patient will remove the NC and 02 sat has been 79-84%, will recover quickly when NC replaced. Patient must be watched closely because of this. Nurse also reports that patient stays in her room most of the time, will come out only when encouraged by visitors or staff to walk to dining room. NN will contact cardiology office for plan and follow up with [facility] next week---mkrw    01/31/19  Spoke with Carli Hinojosa at [facility]. She reports there have been no changes in patient condition.  She was unable to obtain recent weight from computer at time of call, reports that patient's weight has been stable without significant increases. She confirms that patient will be going for IFRAH tomorrow. NN will follow up next week for patient update---macey    02/15/19  Spoke with Tirso Abreu, nurse at [facility]. Patient recently transferred to assisted living from medical unit. She reports she is doing well---recent weight is 129 lbs. Weights in January were fluctuating. Nurse reports patient's appetite has improved and has been putting on nutritional weight. Reports no edema or increased SOB. NN reminded nurse to report additional weight increases of 2lb/day or 5 lbs/week to facility NP. She verbalizes understanding. She states patient is on low sodium diet. Per Tirso Abreu, patient ambulates in the halls with walker and is leaving room for activities. Per chart notes, patient had labs drawn 2/12/19. Saw PCP Melissa Ross.     NN will follow up with patient in 2 weeks---macey

## 2019-03-07 RX ORDER — LORAZEPAM 0.5 MG/1
TABLET ORAL
Qty: 30 TAB | Refills: 3 | Status: SHIPPED | OUTPATIENT
Start: 2019-03-07 | End: 2019-03-27 | Stop reason: ALTCHOICE

## 2019-03-07 RX ORDER — BUMETANIDE 2 MG/1
TABLET ORAL
Qty: 180 TAB | Refills: 1
Start: 2019-03-07 | End: 2019-03-27 | Stop reason: ALTCHOICE

## 2019-03-07 NOTE — PROGRESS NOTES
Reason for Visit/HPI:    Delta July is a 80 y.o. female patient who presents today for   Chief Complaint   Patient presents with    Medication Evaluation     Patient being seen to follow up with medication changes from visit on 2/28     [facility] Nurse came with her this morning to the appointment to help me understand if the treatments ordered last week were helping at all. Unfortunately she did not start the Melatonin until last night but she has been doing better without having to get up in the nighttime multiple times for urination. She is also tethered to continuous oxygenation she must have to function at all times and has been tripping over the cords in the room having to get up multiple times in the night will increase her fall risk with poor outcomes 10 fold. Currently she is continuing to have issues with her memory, hopelessness, sadness, irritability even with her medication changes. It really has not been that long since the change from the Paxil to Lexpro. We will continue to monitor and address as needed. Follow Up: Follow-up Disposition:  Return in about 1 week (around 3/12/2019) for follow up for labs; insominia, anxiety. .    Diagnosis/Treatment Plan:    Diagnoses and all orders for this visit:    1. Nocturia more than twice per night  Comments:  Patient & [facility] staff report that she is sleeping better through the night now the her Bumex has been moved from bedtime to Lunch. Orders:  -     bumetanide (BUMEX) 2 mg tablet; Give 1 (2mg) tablet in AM & 1 (2mg) tablet at lunch daily. 2. Primary insomnia  Comments:  Started on the Melatonin. She has responded well per pt and staff. 3. Moderate episode of recurrent major depressive disorder (HCC)  Comments:  Still having issues with continued saddness & hopelessness. Increased her to Lexapro 15 mg & I will try a smidge of Ativan to calm her nerves.    Orders:  -     LORazepam (ATIVAN) 0.5 mg tablet; Give 1/2 tablet (0.25mg) by mouth every AM for anxiety & nerves. 4. Shortness of breath  Comments:  Chronic, ongoing. She is going to [facility] on 3/11 for the assessment with the Cardiac team to see if she can be in the MV study. Orders:  -     bumetanide (BUMEX) 2 mg tablet; Give 1 (2mg) tablet in AM & 1 (2mg) tablet at lunch daily. 5. Mitral valvular disorder  Comments:  Currently we are on hold with regards to a treatment option for her MVR. She is not surgical here in town but has been referred to [facility] for a trail. Orders:  -     bumetanide (BUMEX) 2 mg tablet; Give 1 (2mg) tablet in AM & 1 (2mg) tablet at lunch daily. 6. Diuretic-induced hypokalemia  Comments:  Last lab on Feb 21st showed she is managing well with potassium orally. Continue this therapy. Orders:  -     bumetanide (BUMEX) 2 mg tablet; Give 1 (2mg) tablet in AM & 1 (2mg) tablet at lunch daily. 7. Urinary frequency  Comments:  Due to being on Bumex 2 mg bid she is urinating 24 hours per day. It has become an issue with her being social or going out of her apartment as she is embarrass  Orders:  -     bumetanide (BUMEX) 2 mg tablet; Give 1 (2mg) tablet in AM & 1 (2mg) tablet at lunch daily. 8. Nocturia more than twice per night  Comments:  we are going to move her Bumex to lunch instead of PM so maybe she can get some control over her need to urinate frequently. Orders:  -     bumetanide (BUMEX) 2 mg tablet; Give 1 (2mg) tablet in AM & 1 (2mg) tablet at lunch daily. 9. Medication management  Comments:  Chronic, ongoing. Each visit requires us to review these meds for appropraiteness. Orders:  -     bumetanide (BUMEX) 2 mg tablet; Give 1 (2mg) tablet in AM & 1 (2mg) tablet at lunch daily. Discontinued Care:     The following treatment modalities have been discontinued by the provider today:   Medications Discontinued During This Encounter   Medication Reason    bumetanide (BUMEX) 2 mg tablet Alternate Therapy    bumetanide (BUMEX) 2 mg tablet Subjective: Allergies   Allergen Reactions    Brilinta [Ticagrelor] Shortness of Breath    Celecoxib Other (comments)    Codeine Hives    Sulfa (Sulfonamide Antibiotics) Unknown (comments) and Hives    Iodine Unknown (comments)    Aricept [Donepezil] Other (comments)     Headaches - noted as intolerance     Darvocet A500 [Propoxyphene N-Acetaminophen] Nausea Only     Prior to Admission medications    Medication Sig Start Date End Date Taking? Authorizing Provider   bumetanide (BUMEX) 2 mg tablet Give 1 (2mg) tablet in AM & 1 (2mg) tablet at lunch daily. 3/7/19  Yes Honorio Oshea NP   LORazepam (ATIVAN) 0.5 mg tablet Give 1/2 tablet (0.25mg) by mouth every AM for anxiety & nerves. 3/7/19  Yes Honorio Oshea NP   lactose-reduced food (BOOST HIGH PROTEIN PO) Take  by mouth two (2) times a day. Yes Provider, Historical   escitalopram oxalate (LEXAPRO) 10 mg tablet Take 1.5 Tabs by mouth daily. 2/28/19  Yes Honorio Oshea NP   melatonin 3 mg tablet Take 1 Tab by mouth nightly. 2/28/19  Yes Honorio Oshea NP   carvedilol (COREG) 3.125 mg tablet Take 1 Tab by mouth two (2) times a day. 12/20/18  Yes Abby Gómez, DYLAN   polyethylene glycol (MIRALAX) 17 gram packet Take 17 g by mouth daily. Yes Provider, Historical   calcium-vitamin D (OYSTER SHELL) 500 mg(1,250mg) -200 unit per tablet Take 1 Tab by mouth two (2) times daily (with meals). Yes Provider, Historical   levothyroxine (SYNTHROID) 25 mcg tablet Take 1 Tab by mouth Daily (before breakfast). Patient taking differently: Take 37.5 mcg by mouth Daily (before breakfast). 12/5/18  Yes Honorio Oshea NP   OTHER Dispense - Portable Oxygen & Supplies due to congestive heart failure, hypoxemia, confusion due to hypoxemia. Long term treatment is required as patient failed in office walk test. Oxygen saturations noted to be at start 89% RA then dropped to 78% with activity.  12/5/18  Yes Honorio Oshea NP   OTHER Dispense Oxygen Concentrator with humidified air and supplies. Dx: I50.42 CHF; Dx: I48.91 Afib uncontrolled; Dx: R06.02 SOB; Dx: R09.02 Hypoxemia. Patient failed walk test in office. Pulse ox at 78% on RA. 12/5/18  Yes Maegan Jeannine, NP   pantoprazole (PROTONIX) 40 mg tablet Take 1 Tab by mouth daily. 12/3/18  Yes Maegan Jeannine, NP   clopidogrel (PLAVIX) 75 mg tab Take 1 Tab by mouth daily. 11/30/18  Yes Maegan Jeannine, NP   apixaban (ELIQUIS) 2.5 mg tablet Take 1 Tab by mouth two (2) times a day. 11/30/18  Yes Maegan Jeannine, NP   simvastatin (ZOCOR) 40 mg tablet Take 40 mg by mouth nightly. 11/26/18  Yes Provider, Historical   nitroglycerin (NITROSTAT) 0.4 mg SL tablet Take 0.4 mg by mouth as needed. 11/26/18  Yes Provider, Historical   PROAIR HFA 90 mcg/actuation inhaler Take 2 Inhalation by inhalation four (4) times daily as needed. 11/26/18  Yes Provider, Historical   potassium chloride (KLOR-CON) 10 mEq tablet Take 1 Tab by mouth daily. Patient taking differently: Take 20 mEq by mouth daily. 11/28/18  Yes Maegan Jeannine, NP   diltiazem CD (CARTIA XT) 180 mg ER capsule Take 180 mg by mouth nightly.    Yes Provider, Historical     Past Medical History:   Diagnosis Date    (HFpEF) heart failure with preserved ejection fraction [facility] 10/17/2018    acute    Allergic rhinitis     Atherosclerotic cardiovascular disease 1999    CHF (congestive heart failure) ([facility] 09/11/2018    Cognitive communication deficit     Edema     Environmental allergies     dizziness-(chronic)    Fatty liver disease, nonalcoholic     GERD (gastroesophageal reflux disease)     H/O heart artery stent 09/2018    HCVD (hypertensive cardiovascular disease) 10/17/2018    Heart attack ([facility] 09/2018    Heart palpitations 2018    infrequent     History of PTCA     Hypercholesterolemia 1999    Hypertension 2010    Liver disease     fatty liver    MI (myocardial infarction) ([facility] 09/2018    Overactive bladder 2018    Peripheral neuropathy     Permanent atrial fibrillation ([facility] 2018    Senile dementia, without behavioral disturbance 2018    Stress incontinence, female 2018    Vertigo     Vitamin D deficiency      Past Surgical History:   Procedure Laterality Date    BREAST SURGERY PROCEDURE UNLISTED      breast cyst Penny Lemus)    CARDIAC SURG PROCEDURE UNLIST  1999    + stress thalassemia; 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  Holter    HX APPENDECTOMY      HX BREAST BIOPSY Left     neg    HX CYST INCISION AND DRAINAGE Right years ago    neg    HX DILATION AND CURETTAGE      x5    HX GYN      D&C (x5), ALLEY/BSO (-Juliann/Zedler), Provera intolerance (bleeding), endometrial Bx annually    HX PTCA  2018    stent distal RCA into PLwith KENNETH x 2    HX ALLEY AND BSO      Juliann/Zedler      Social History     Tobacco Use    Smoking status: Former Smoker     Packs/day: 4.00     Types: Cigarettes     Last attempt to quit: 1955     Years since quittin.2    Smokeless tobacco: Never Used   Substance Use Topics    Alcohol use: No     Alcohol/week: 0.0 - 0.5 oz    Drug use: No      Family History   Problem Relation Age of Onset    Diabetes Mother     Hypertension Mother     Heart Failure Mother         CHF ( @ 80)   Sindy Alzheimer Mother     Cancer Father         lung ( @ 77)   Sindy Alzheimer Father     No Known Problems Son     No Known Problems Son     Ovarian Cancer Sister         hysterectomy    Breast Cancer Sister 28        mastectomy    Cancer Sister         breast and ovarian    Alzheimer Sister     Breast Problems Sister         breast cyst    Cataract Sister     Hypertension Sister     Diabetes Brother      Advance Care Planning 2018   Primary Decision Maker Name -   Primary Decision Maker Phone Number -   Primary Decision Maker Relationship to Patient -   Confirm Advance Directive Yes, on file   Does the patient have other document types -     Test Results:     Office Visit on 02/11/2019   Component Date Value Ref Range Status    WBC 02/12/2019 6.1  3.4 - 10.8 x10E3/uL Final    RBC 02/12/2019 4.01  3.77 - 5.28 x10E6/uL Final    HGB 02/12/2019 11.3  11.1 - 15.9 g/dL Final    HCT 02/12/2019 35.2  34.0 - 46.6 % Final    MCV 02/12/2019 88  79 - 97 fL Final    MCH 02/12/2019 28.2  26.6 - 33.0 pg Final    MCHC 02/12/2019 32.1  31.5 - 35.7 g/dL Final    RDW 02/12/2019 15.9* 12.3 - 15.4 % Final    PLATELET 39/13/9416 315  150 - 379 x10E3/uL Final    NEUTROPHILS 02/12/2019 65  Not Estab. % Final    Lymphocytes 02/12/2019 18  Not Estab. % Final    MONOCYTES 02/12/2019 13  Not Estab. % Final    EOSINOPHILS 02/12/2019 3  Not Estab. % Final    BASOPHILS 02/12/2019 1  Not Estab. % Final    ABS. NEUTROPHILS 02/12/2019 4.0  1.4 - 7.0 x10E3/uL Final    Abs Lymphocytes 02/12/2019 1.1  0.7 - 3.1 x10E3/uL Final    ABS. MONOCYTES 02/12/2019 0.8  0.1 - 0.9 x10E3/uL Final    ABS. EOSINOPHILS 02/12/2019 0.2  0.0 - 0.4 x10E3/uL Final    ABS. BASOPHILS 02/12/2019 0.0  0.0 - 0.2 x10E3/uL Final    IMMATURE GRANULOCYTES 02/12/2019 0  Not Estab. % Final    ABS. IMM.  GRANS. 02/12/2019 0.0  0.0 - 0.1 x10E3/uL Final    Cholesterol, total 02/12/2019 119  100 - 199 mg/dL Final    Triglyceride 02/12/2019 97  0 - 149 mg/dL Final    HDL Cholesterol 02/12/2019 42  >39 mg/dL Final    VLDL, calculated 02/12/2019 19  5 - 40 mg/dL Final    LDL, calculated 02/12/2019 58  0 - 99 mg/dL Final    Glucose 02/12/2019 116* 65 - 99 mg/dL Final    BUN 02/12/2019 26  10 - 36 mg/dL Final    Creatinine 02/12/2019 1.50* 0.57 - 1.00 mg/dL Final    GFR est non-AA 02/12/2019 30* >59 mL/min/1.73 Final    GFR est AA 02/12/2019 35* >59 mL/min/1.73 Final    BUN/Creatinine ratio 02/12/2019 17  12 - 28 Final    Sodium 02/12/2019 143  134 - 144 mmol/L Final    Potassium 02/12/2019 4.2  3.5 - 5.2 mmol/L Final    Chloride 02/12/2019 99  96 - 106 mmol/L Final    CO2 02/12/2019 28  20 - 29 mmol/L Final    Calcium 02/12/2019 9.2  8.7 - 10.3 mg/dL Final    Protein, total 02/12/2019 7.4  6.0 - 8.5 g/dL Final    Albumin 02/12/2019 3.9  3.2 - 4.6 g/dL Final    GLOBULIN, TOTAL 02/12/2019 3.5  1.5 - 4.5 g/dL Final    A-G Ratio 02/12/2019 1.1* 1.2 - 2.2 Final    Bilirubin, total 02/12/2019 0.4  0.0 - 1.2 mg/dL Final    Alk. phosphatase 02/12/2019 92  39 - 117 IU/L Final    AST (SGOT) 02/12/2019 21  0 - 40 IU/L Final    ALT (SGPT) 02/12/2019 12  0 - 32 IU/L Final    TSH 02/12/2019 6.070* 0.450 - 4.500 uIU/mL Final    T4, Total 02/12/2019 7.7  4.5 - 12.0 ug/dL Final    T3 Uptake 02/12/2019 29  24 - 39 % Final    Free Thyroxine Index (FTI) 02/12/2019 2.2  1.2 - 4.9 Final    Vitamin B12 02/12/2019 499  232 - 1,245 pg/mL Final    Folate 02/12/2019 >20.0  >3.0 ng/mL Final    Comment: A serum folate concentration of less than 3.1 ng/mL is  considered to represent clinical deficiency.  VITAMIN D, 25-HYDROXY 02/12/2019 46.5  30.0 - 100.0 ng/mL Final    Comment: Vitamin D deficiency has been defined by the [facility] practice guideline as a  level of serum 25-OH vitamin D less than 20 ng/mL (1,2). The Endocrine Society went on to further define vitamin D  insufficiency as a level between 21 and 29 ng/mL (2). 1. IOM ([place] of Medicine). 2010. Dietary reference     intakes for calcium and D. [facility]: The     [employer]. 2. Shantelle MF, Lora ROE, Gary CARSON, et al.     Evaluation, treatment, and prevention of vitamin D     deficiency: an Endocrine Society clinical practice     guideline. JCEM. 2011 Jul; 96(7):1911-30.  Magnesium 02/12/2019 2.0  1.6 - 2.3 mg/dL Final    Prealbumin 02/12/2019 17  9 - 32 mg/dL Final   Admission on 12/06/2018, Discharged on 12/10/2018   No results displayed because visit has over 200 results.           Objective:     Vitals:    03/05/19 0905   BP: 120/70   Pulse: 96   Resp: 20   SpO2: 98% Weight: 129 lb (58.5 kg)   Height: 5' 1\" (1.549 m)     Wt Readings from Last 3 Encounters:   03/05/19 129 lb (58.5 kg)   02/28/19 126 lb 4.8 oz (57.3 kg)   02/11/19 129 lb (58.5 kg)     BP Readings from Last 3 Encounters:   03/05/19 120/70   02/28/19 105/62   02/11/19 125/70     Review of Systems   Unable to perform ROS: Mental status change     Physical Exam   Constitutional: Vital signs are normal. She appears not lethargic, to not be writhing in pain, not malnourished and not dehydrated. She appears unhealthy. She appears not cachectic. Non-toxic appearance. She does not have a sickly appearance. No distress. Frail, fragile, alert but not oriented, elderly  female. She appears to be putting on healthy weight at this time and not fluid. It will be important to monitor this with her heart failure vs malnutrition. Now resides in [facility]. She remains on continuous nasal cannula oxygen at 2L/min    HENT:   Head: Normocephalic and atraumatic. Right Ear: External ear and ear canal normal. A middle ear effusion is present. Decreased hearing is noted. Left Ear: External ear and ear canal normal. A middle ear effusion is present. Decreased hearing is noted. Nose: Nose normal. No mucosal edema or rhinorrhea. Mouth/Throat: Uvula is midline and oropharynx is clear and moist. Mucous membranes are pale, dry and not cyanotic. No oral lesions. No uvula swelling. No posterior oropharyngeal edema or posterior oropharyngeal erythema. Eyes: Conjunctivae, EOM and lids are normal. Pupils are equal, round, and reactive to light. Lids are everted and swept, no foreign bodies found. Right pupil is round and reactive. Left pupil is reactive. Left pupil required 20 to 30 secs extra to respond. But it finally did respond. Neck: Trachea normal, normal range of motion and full passive range of motion without pain. Neck supple. JVD present. No hepatojugular reflux present.  Carotid bruit is not present. No thyromegaly present. JVD present bilaterally. SOB, with activity is also present. I will need to increase her Bumex to 2 mg at lunch I was trying her on the 1 mg in hopes she could have to urinate less. Cardiovascular: S1 normal, S2 normal, intact distal pulses and normal pulses. An irregularly irregular rhythm present. Frequent extrasystoles are present. Tachycardia present. Exam reveals gallop and distant heart sounds. Exam reveals no friction rub. No murmur heard. 1+ non pitting lower extremity edema present today but she also does not have her compression stockings in place. Pulmonary/Chest: Effort normal and breath sounds normal. No tachypnea. She has no decreased breath sounds. She has no wheezes. On continuous nasal cannula 2 L/min. Abdominal: Soft. Normal appearance. She exhibits distension. She exhibits no fluid wave and no mass. Bowel sounds are hyperactive. There is no hepatosplenomegaly. There is no tenderness. There is no rebound and no CVA tenderness. Continues to struggle with constipation. The Subhash Goo is working but she can not take it every day as the stools get better but the urgency is too much. Musculoskeletal:   Generalized weakness with balance issues and intermittent dizziness. Atrophy in extremities. Loss of tone in extremities. Lymphadenopathy:        Head (right side): No submandibular adenopathy present. Head (left side): No submandibular and no tonsillar adenopathy present. She has no cervical adenopathy. She has no axillary adenopathy. Neurological: She has normal reflexes. She appears not lethargic. She is disoriented. She is not agitated. She displays weakness, atrophy, facial asymmetry, abnormal stance and abnormal speech. A cranial nerve deficit and sensory deficit is present. She exhibits abnormal muscle tone. Coordination and gait abnormal. GCS score is 15. Skin: Skin is warm, dry and intact.  No bruising, no ecchymosis and no rash noted. She is not diaphoretic. No cyanosis. There is pallor. Nails show no clubbing. Psychiatric: Her mood appears not anxious. Her affect is labile. She expresses impulsivity. She exhibits a depressed mood. She is apathetic. She exhibits disordered thought content, abnormal new learning ability and abnormal remote memory. She does not have a flat affect. Patient is debilitated. Nursing note and vitals reviewed. Disclaimer:   Ms. Letha Shin has been advised to call or return to our office if symptoms worsen/change/persist. We as a care team including the patient; discussed expected course/resolution/complications of diagnosis in detail today. Medication risks/benefits/costs/interactions/alternatives discussed Donna Burgos was given an after visit summary which includes diagnoses, current medications, & vitals. Letha Shin expressed understanding with the diagnosis and plan.

## 2019-03-07 NOTE — PATIENT INSTRUCTIONS
Insomnia: Care Instructions  Your Care Instructions    Insomnia is the inability to sleep well. It is a common problem for most people at some time. Insomnia may make it hard for you to get to sleep, stay asleep, or sleep as long as you need to. This can make you tired and grouchy during the day. It can also make you forgetful, less effective at work, and unhappy. Insomnia can be caused by conditions such as depression or anxiety. Pain can also affect your ability to sleep. When these problems are solved, the insomnia usually clears up. But sometimes bad sleep habits can cause insomnia. If insomnia is affecting your work or your enjoyment of life, you can take steps to improve your sleep. Follow-up care is a key part of your treatment and safety. Be sure to make and go to all appointments, and call your doctor if you are having problems. It's also a good idea to know your test results and keep a list of the medicines you take. How can you care for yourself at home? What to avoid  · Do not have drinks with caffeine, such as coffee or black tea, for 8 hours before bed. · Do not smoke or use other types of tobacco near bedtime. Nicotine is a stimulant and can keep you awake. · Avoid drinking alcohol late in the evening, because it can cause you to wake in the middle of the night. · Do not eat a big meal close to bedtime. If you are hungry, eat a light snack. · Do not drink a lot of water close to bedtime, because the need to urinate may wake you up during the night. · Do not read or watch TV in bed. Use the bed only for sleeping and sexual activity. What to try  · Go to bed at the same time every night, and wake up at the same time every morning. Do not take naps during the day. · Keep your bedroom quiet, dark, and cool. · Sleep on a comfortable pillow and mattress. · If watching the clock makes you anxious, turn it facing away from you so you cannot see the time.   · If you worry when you lie down, start a worry book. Well before bedtime, write down your worries, and then set the book and your concerns aside. · Try meditation or other relaxation techniques before you go to bed. · If you cannot fall asleep, get up and go to another room until you feel sleepy. Do something relaxing. Repeat your bedtime routine before you go to bed again. · Make your house quiet and calm about an hour before bedtime. Turn down the lights, turn off the TV, log off the computer, and turn down the volume on music. This can help you relax after a busy day. When should you call for help? Watch closely for changes in your health, and be sure to contact your doctor if:    · Your efforts to improve your sleep do not work.     · Your insomnia gets worse.     · You have been feeling down, depressed, or hopeless or have lost interest in things that you usually enjoy. Where can you learn more? Go to http://piaXer[employer] Advanced Batterycolby.info/. Enter P513 in the search box to learn more about \"Insomnia: Care Instructions. \"  Current as of: June 28, 2018  Content Version: 11.9  © 3948-3882 [employer]. Care instructions adapted under license by [employer] (which disclaims liability or warranty for this information). If you have questions about a medical condition or this instruction, always ask your healthcare professional. [place] [address] any warranty or liability for your use of this information. Mitral Valve Regurgitation: Care Instructions  Your Care Instructions    The mitral valve lets blood flow from the upper to lower areas of the heart. Mitral valve regurgitation occurs when the valve cannot close all the way and blood backs up (regurgitates) into the upper area of the heart. This causes the heart to work harder to pump the extra blood. Mild regurgitation causes few problems. Many people have it for many years without having problems.  But if the regurgitation is severe, it can weaken the heart and lead to heart failure. The causes of mitral valve regurgitation include a heart attack, heart infection (endocarditis), mitral valve prolapse, cardiomyopathy, calcium buildup in the heart, the weight-loss medicine Fen-Phen, and diseases such as lupus and rheumatoid arthritis. Some people are born with the valve problem. Your doctor may just want to watch your health closely if you have mild mitral valve regurgitation. You may take medicine to treat a problem that is causing the regurgitation. Or you may take medicine for other health problems that are caused by mitral regurgitation. For more severe disease, the valve may need to be repaired or replaced. Follow-up care is a key part of your treatment and safety. Be sure to make and go to all appointments, and call your doctor if you are having problems. It's also a good idea to know your test results and keep a list of the medicines you take. How can you care for yourself at home? · Be safe with medicines. Take your medicines exactly as prescribed. Call your doctor if you think you are having a problem with your medicine. You will get more details on the specific medicines your doctor prescribes. · Eat heart-healthy foods such as fruits, vegetables, whole grains, fish, lean meats, and low-fat or nonfat dairy foods. Limit sodium, sugar, and alcohol. · Be active. Ask your doctor what type and level of exercise is safe for you. Walking is a good choice. You also may want to swim, bike, or do other activities. Let your doctor know if your ability to exercise changes. · Do not smoke. If you need help quitting, talk to your doctor about stop-smoking programs and medicines. These can increase your chances of quitting for good. · Stay at a healthy weight. Lose weight if you need to. · Avoid colds and flu. Get a pneumococcal vaccine shot. If you have had one before, ask your doctor if you need another dose.  Get a flu vaccine every year.  · Take care of your teeth and gums. Get regular dental checkups. Good dental health is important because bacteria can spread from infected teeth and gums to the heart valves. When should you call for help? Call 911 anytime you think you may need emergency care. For example, call if:    · You have severe trouble breathing.     · You cough up pink, foamy mucus.     · You have symptoms of a heart attack. These may include:  ? Chest pain or pressure, or a strange feeling in the chest.  ? Sweating. ? Shortness of breath. ? Nausea or vomiting. ? Pain, pressure, or a strange feeling in the back, neck, jaw, or upper belly or in one or both shoulders or arms. ? Lightheadedness or sudden weakness. ? A fast or irregular heartbeat. After you call 911, the  may tell you to chew 1 adult-strength or 2 to 4 low-dose aspirin. Wait for an ambulance. Do not try to drive yourself.    Call your doctor now or seek immediate medical care if:    · You have new or increased shortness of breath.     · You are dizzy or lightheaded, or you feel like you may faint.     · You have sudden weight gain, such as more than 2 to 3 pounds in a day or 5 pounds in a week. (Your doctor may suggest a different range of weight gain.)     · You have increased swelling in your legs, ankles, or feet.     · You are suddenly so tired or weak that you cannot do your usual activities.    Watch closely for changes in your health, and be sure to contact your doctor if you develop new symptoms. Where can you learn more? Go to http://pia-colby.info/. Enter G003 in the search box to learn more about \"Mitral Valve Regurgitation: Care Instructions. \"  Current as of: July 22, 2018  Content Version: 11.9  © 7407-1601 [employer]. Care instructions adapted under license by [employer] (which disclaims liability or warranty for this information).  If you have questions about a medical condition or this instruction, always ask your healthcare professional. [place] [address] any warranty or liability for your use of this information.

## 2019-03-12 ENCOUNTER — PATIENT OUTREACH (OUTPATIENT)
Dept: CARDIOLOGY CLINIC | Age: 84
End: 2019-03-12

## 2019-03-12 NOTE — PROGRESS NOTES
Patient has graduated from the Disease Specific Care Management  program on 3/12/19. Patient's symptoms are stable at this time. Patient/family has the ability to self-manage. Care management goals have been completed at this time. No further nurse navigator follow up scheduled. Goals Addressed                 This Visit's Progress     COMPLETED: Reduce risk of CHF exacerbations and complications. 12/12/18   Working with Lena Hooker to coordinate [place]. Nurse at [facility] states she is \"really swamped right now\" and can not perform med rec with me. Put me on hold to ask if admissions director could assist. She came back and stated that the admissions director says Raghav Flynn got everything they needed from hospital at discharge. \"  NN will fax over weight sheet to have staff fill out and will follow up next week---macey    12/14/18  Working with [facility] on [place]. Was able to speak with Hermelinda, nurse talking care of patient. Med Rec performed. She reports patient has orders for daily weights, however did not have weight for several days. Most recent weight on 12/10/18 is 128 lbs, which is down from discharge weight of 137 lbs. NN encouraged obtaining weight daily and to report to house MD weigh gain greater than 2 lbs/day or 5 lbs/week. She reports that patient is on AHA diet. Hermelinda reports patient saw sangita Agosto within 48 hours of returning to [facility]--she did not have access to exactly when patient was seen however this is standard practice for their patients. Patient does have cardiology appt with Dr Kate Ferraro on 12/20/18. Patient's son is to transport her. NN will follow up with son and facility for updates. ---sonyaw    12/19/18     Spoke to Aracelis Ty at [facility]. She reports that patient is doing well. She participates in PT/OT. Her last weight on 12/17/18 was 131lbs, 3 lbs increase since 12/10/18. NN voiced concern that patient isn't being weighed daily.  She states she will have to weigh the patient daily herself since it is not getting done every day. Patient does not have any leg edema, no SOB, lungs clear. She states that patient will be going to see Dr Phoebe Evans on 12/20/18. Patient continues to maintain low sodium diet. She reports that there are no plans at this time to send patient back to her apt from skilled at this time. NN will follow up with Hermelinda next week for updates. ---rw    12/21/18  Per cardiology office visit notes, patient ordered to have labs drawn, CXR, decrease carvedilol and increase bumex. Maty Jenkins to follow up on new orders. Was transferred to Nurse Supervisor imoji , left message for return call. Noted that MD office also wanted Bumex given BID however chart still reflects daily. Staff Message sent to NP Kameron Buckley. To let her know. NN will follow up on Monday---mkrw    12/26/18  Spoke to [address] [facility] at [facility] is nurse taking care of patient today. She reports that Repeat CXR has not been performed yet---she is putting order in the computer now to have it done today. CMP, MG, and TSH levels drawn on 12/24/18. She reports Mg level 1.7, K level 3.3. Patient is on 10 meq potassium daily. Will send note over to MD office to see if increase may be needed. She is faxing lab results to Dr Phoebe Evans at [phone] today. She will also have CXR results faxed to MD office as well. Patient's weight today is 129.4lbs. On 12/20 at MD office weight 130 lbs 3 oz. Heather did not note any swelling in ankles or feet, however patient did request 02 this morning so she is wearing it at 2L NC. Confirmed that Coreg is now 3.125 mg BID and Bumex is 2 mg BID. Next appt is 1/18/19 with Dr Phoebe Evans. NN will follow up next week for updates from [facility].    01/03/19    Per chart notes, patient saw Dr Phoebe Evans. CXR confirms increase in pleural effusions and requires thoracentesis, which will be performed on Monday 1/7/19.     Spoke to Hermelinda at [facility]. She confirms that they have orders to hold Eliquis on Friday night and to restart Tuesday 1/8/19. Plavix is not to be held. They have been weighing patient per orders. Today's weight is 123 lbs, which is down from 129-130 lbs from last week. Hermelinda also confirms patient is on Zaroxylyn. NN will follow up next week after procedure---mkrw    01/09/19   Per patient chart, patient had successful US guided L thoracentesis on 1/7/19 resulting in 400 ml fluid output. Spoke to nurse taking care of patient today at [facility]--she reports patient is up walking around, she notices that the patient is not coughing like she did prior to procedure. She has resumed her Eliquis. Today's weight is 122.4lbs---stable. She reports she fluctuates between 120-123 lbs per her records. NN will follow up next week for updates--mkrw    01/16/19  Per Hermelinda at [facility], Patient's weight today 127.8. Asked her if there was any changes to her orders that would cause her weight to spike up like that--she said she wasn't aware of medication change, however patient was eating better now and was on a low sodium diet. Asked for a weight range over the last couple days---she reports 123lbs, 122 lbs, 125 lbs, 127 lbs, 129 lbs, 127.8 lbs. Hermelinda said she would notify MD about this. NN will contact cardiology as well via staff message. NN will follow up---mkrw    01/22/19  Per chart notes, patient saw Dr Bowen Abreu 1/18/19, weight noted to be 128.8 lbs at visit. Spoke to [employer] Lauryn, nurse at [facility]. She reports weight is 127 lbs this morning. Patient is confused at times, disoriented to place/time. She requires 02 2L/NC continuously, will desat quickly when removed. Nurse reports that patient will remove the NC and 02 sat has been 79-84%, will recover quickly when NC replaced. Patient must be watched closely because of this.  Nurse also reports that patient stays in her room most of the time, will come out only when encouraged by visitors or staff to walk to dining room. NN will contact cardiology office for plan and follow up with [facility] next week---rw    01/31/19  Spoke with Bernarda Zimmerman at [facility]. She reports there have been no changes in patient condition. She was unable to obtain recent weight from computer at time of call, reports that patient's weight has been stable without significant increases. She confirms that patient will be going for IFRAH tomorrow. NN will follow up next week for patient update---rw    02/15/19  Spoke with Ramila Harp, nurse at [facility]. Patient recently transferred to assisted living from medical unit. She reports she is doing well---recent weight is 129 lbs. Weights in January were fluctuating. Nurse reports patient's appetite has improved and has been putting on nutritional weight. Reports no edema or increased SOB. NN reminded nurse to report additional weight increases of 2lb/day or 5 lbs/week to facility NP. She verbalizes understanding. She states patient is on low sodium diet. Per Ramila Harp, patient ambulates in the halls with walker and is leaving room for activities. Per chart notes, patient had labs drawn 2/12/19. Saw PCP Ilene Mejía. NN will follow up with patient in 2 weeks---rw    03/05/19  NN was informed that patient was out seeing NP To Huber at this time for follow up appt. Per chart notes, labs drawn on 2/12/19 were good---only abnormal is elevated Cr  at 1.5. Cr level on 12/5/18 was 1.1. Bumex was adjusted to 2 mg in AM and 1 mg at lunchtime. Patient voiced concerns of urinating all day and night --this was causing her distress and making her stay in her apartment all the time. On 2/28/19 visit is also noted that patient was referred to see Dr Mauricio Broderick for anxiety/depression symptoms. No chart updates regarding [facility] trial for patient's heart valve and if she is going to participate.      Patient's reported weights are as follows: 2/27 125 lbs, 2/28/19 128.1 lbs and today 3/5/19 is 129 lbs. Weights are stable. NN will follow up for updates next week---macey    03/12/19  Spoke to [place] at [facility] [phone]. Patient has been moved to assisted living about 3 weeks ago. She is participating in activities and also [employer] outside of her room . She is on continuous 02 at 2l/NC. She is on daily weights and remains stable at 129lbs. She reports that they are also working on putting on nutritional weight as well. She has no edema and is being watched by the house NP for CHF s/sx. Nurse reports pt went yesterday to [facility] for a consult for a heart procedure and is not clear on when the date of the actual procedure will be. NN reinforced CHF monitoring and when to contact NP for increase SOB/swelling/weight gains. She verbalizes understanding. Patient has completed 90 day CHF monitoring--goal completed---macey                        Pt has nurse navigator's contact information for any further questions, concerns, or needs.   Patients upcoming visits:    Future Appointments   Date Time Provider Maty Schrader   4/22/2019  1:20 PM Rodolfo Gorman  [address]

## 2019-03-27 ENCOUNTER — HOSPITAL ENCOUNTER (INPATIENT)
Age: 84
LOS: 2 days | Discharge: NURSING, REHAB OR LONG-TERM CARE | DRG: 641 | End: 2019-03-30
Attending: STUDENT IN AN ORGANIZED HEALTH CARE EDUCATION/TRAINING PROGRAM | Admitting: HOSPITALIST
Payer: MEDICARE

## 2019-03-27 ENCOUNTER — APPOINTMENT (OUTPATIENT)
Dept: VASCULAR SURGERY | Age: 84
DRG: 641 | End: 2019-03-27
Attending: HOSPITALIST
Payer: MEDICARE

## 2019-03-27 ENCOUNTER — APPOINTMENT (OUTPATIENT)
Dept: GENERAL RADIOLOGY | Age: 84
DRG: 641 | End: 2019-03-27
Attending: STUDENT IN AN ORGANIZED HEALTH CARE EDUCATION/TRAINING PROGRAM
Payer: MEDICARE

## 2019-03-27 ENCOUNTER — OFFICE VISIT (OUTPATIENT)
Dept: GERIATRIC MEDICINE | Age: 84
End: 2019-03-27

## 2019-03-27 ENCOUNTER — APPOINTMENT (OUTPATIENT)
Dept: ULTRASOUND IMAGING | Age: 84
DRG: 641 | End: 2019-03-27
Attending: HOSPITALIST
Payer: MEDICARE

## 2019-03-27 VITALS
HEIGHT: 61 IN | BODY MASS INDEX: 25.05 KG/M2 | OXYGEN SATURATION: 98 % | SYSTOLIC BLOOD PRESSURE: 114 MMHG | RESPIRATION RATE: 20 BRPM | DIASTOLIC BLOOD PRESSURE: 62 MMHG | WEIGHT: 132.7 LBS | HEART RATE: 78 BPM

## 2019-03-27 DIAGNOSIS — T50.2X5A DIURETIC-INDUCED HYPOKALEMIA: ICD-10-CM

## 2019-03-27 DIAGNOSIS — R35.0 URINARY FREQUENCY: ICD-10-CM

## 2019-03-27 DIAGNOSIS — R09.89 JUGULAR VENOUS DISTENSION (JVD): ICD-10-CM

## 2019-03-27 DIAGNOSIS — I05.9 MITRAL VALVULAR DISORDER: ICD-10-CM

## 2019-03-27 DIAGNOSIS — Z99.81 CHRONIC RESPIRATORY FAILURE WITH HYPOXIA, ON HOME O2 THERAPY (HCC): ICD-10-CM

## 2019-03-27 DIAGNOSIS — R53.81 DEBILITY: ICD-10-CM

## 2019-03-27 DIAGNOSIS — E87.6 DIURETIC-INDUCED HYPOKALEMIA: ICD-10-CM

## 2019-03-27 DIAGNOSIS — I50.23 ACUTE ON CHRONIC SYSTOLIC CONGESTIVE HEART FAILURE (HCC): Primary | ICD-10-CM

## 2019-03-27 DIAGNOSIS — R06.02 SOB (SHORTNESS OF BREATH): ICD-10-CM

## 2019-03-27 DIAGNOSIS — F41.9 ANXIETY: ICD-10-CM

## 2019-03-27 DIAGNOSIS — I48.91 UNCONTROLLED ATRIAL FIBRILLATION (HCC): ICD-10-CM

## 2019-03-27 DIAGNOSIS — R00.0 RAPID OR IRREGULAR HEARTBEAT: ICD-10-CM

## 2019-03-27 DIAGNOSIS — J96.11 CHRONIC RESPIRATORY FAILURE WITH HYPOXIA, ON HOME O2 THERAPY (HCC): ICD-10-CM

## 2019-03-27 DIAGNOSIS — R06.89 DECREASED LUNG SOUNDS: ICD-10-CM

## 2019-03-27 DIAGNOSIS — R06.02 SHORTNESS OF BREATH: Primary | ICD-10-CM

## 2019-03-27 PROBLEM — I50.9 CHF (CONGESTIVE HEART FAILURE) (HCC): Status: ACTIVE | Noted: 2019-03-27

## 2019-03-27 LAB
ALBUMIN SERPL-MCNC: 3.6 G/DL (ref 3.5–5)
ALBUMIN/GLOB SERPL: 0.8 {RATIO} (ref 1.1–2.2)
ALP SERPL-CCNC: 91 U/L (ref 45–117)
ALT SERPL-CCNC: 16 U/L (ref 12–78)
ANION GAP SERPL CALC-SCNC: 4 MMOL/L (ref 5–15)
ARTERIAL PATENCY WRIST A: NO
AST SERPL-CCNC: 21 U/L (ref 15–37)
ATRIAL RATE: 375 BPM
BASE EXCESS BLD CALC-SCNC: 4 MMOL/L
BASOPHILS # BLD: 0 K/UL (ref 0–0.1)
BASOPHILS NFR BLD: 1 % (ref 0–1)
BDY SITE: ABNORMAL
BILIRUB SERPL-MCNC: 0.5 MG/DL (ref 0.2–1)
BNP SERPL-MCNC: 7509 PG/ML
BUN SERPL-MCNC: 32 MG/DL (ref 6–20)
BUN/CREAT SERPL: 21 (ref 12–20)
CALCIUM SERPL-MCNC: 9.4 MG/DL (ref 8.5–10.1)
CALCULATED R AXIS, ECG10: 123 DEGREES
CALCULATED T AXIS, ECG11: -57 DEGREES
CHLORIDE SERPL-SCNC: 104 MMOL/L (ref 97–108)
CO2 SERPL-SCNC: 31 MMOL/L (ref 21–32)
COMMENT, HOLDF: NORMAL
CREAT SERPL-MCNC: 1.49 MG/DL (ref 0.55–1.02)
DIAGNOSIS, 93000: NORMAL
DIFFERENTIAL METHOD BLD: ABNORMAL
EOSINOPHIL # BLD: 0.1 K/UL (ref 0–0.4)
EOSINOPHIL NFR BLD: 2 % (ref 0–7)
ERYTHROCYTE [DISTWIDTH] IN BLOOD BY AUTOMATED COUNT: 15.4 % (ref 11.5–14.5)
GAS FLOW.O2 O2 DELIVERY SYS: ABNORMAL L/MIN
GAS FLOW.O2 SETTING OXYMISER: 3 L/M
GLOBULIN SER CALC-MCNC: 4.4 G/DL (ref 2–4)
GLUCOSE SERPL-MCNC: 90 MG/DL (ref 65–100)
HCO3 BLD-SCNC: 28.3 MMOL/L (ref 22–26)
HCT VFR BLD AUTO: 37.4 % (ref 35–47)
HGB BLD-MCNC: 11.2 G/DL (ref 11.5–16)
IMM GRANULOCYTES # BLD AUTO: 0 K/UL (ref 0–0.04)
IMM GRANULOCYTES NFR BLD AUTO: 0 % (ref 0–0.5)
LYMPHOCYTES # BLD: 1.1 K/UL (ref 0.8–3.5)
LYMPHOCYTES NFR BLD: 13 % (ref 12–49)
MCH RBC QN AUTO: 27.6 PG (ref 26–34)
MCHC RBC AUTO-ENTMCNC: 29.9 G/DL (ref 30–36.5)
MCV RBC AUTO: 92.1 FL (ref 80–99)
MONOCYTES # BLD: 1 K/UL (ref 0–1)
MONOCYTES NFR BLD: 12 % (ref 5–13)
NEUTS SEG # BLD: 5.8 K/UL (ref 1.8–8)
NEUTS SEG NFR BLD: 72 % (ref 32–75)
NRBC # BLD: 0 K/UL (ref 0–0.01)
NRBC BLD-RTO: 0 PER 100 WBC
PCO2 BLD: 41.5 MMHG (ref 35–45)
PH BLD: 7.44 [PH] (ref 7.35–7.45)
PLATELET # BLD AUTO: 259 K/UL (ref 150–400)
PMV BLD AUTO: 9.8 FL (ref 8.9–12.9)
PO2 BLD: 54 MMHG (ref 80–100)
POTASSIUM SERPL-SCNC: 3.7 MMOL/L (ref 3.5–5.1)
PROT SERPL-MCNC: 8 G/DL (ref 6.4–8.2)
Q-T INTERVAL, ECG07: 350 MS
QRS DURATION, ECG06: 86 MS
QTC CALCULATION (BEZET), ECG08: 416 MS
RBC # BLD AUTO: 4.06 M/UL (ref 3.8–5.2)
SAMPLES BEING HELD,HOLD: NORMAL
SAO2 % BLD: 89 % (ref 92–97)
SODIUM SERPL-SCNC: 139 MMOL/L (ref 136–145)
SPECIMEN TYPE: ABNORMAL
TROPONIN I SERPL-MCNC: <0.05 NG/ML
VENTRICULAR RATE, ECG03: 85 BPM
WBC # BLD AUTO: 8.1 K/UL (ref 3.6–11)

## 2019-03-27 PROCEDURE — 99218 HC RM OBSERVATION: CPT

## 2019-03-27 PROCEDURE — 93970 EXTREMITY STUDY: CPT

## 2019-03-27 PROCEDURE — 84484 ASSAY OF TROPONIN QUANT: CPT

## 2019-03-27 PROCEDURE — 99284 EMERGENCY DEPT VISIT MOD MDM: CPT

## 2019-03-27 PROCEDURE — 36600 WITHDRAWAL OF ARTERIAL BLOOD: CPT

## 2019-03-27 PROCEDURE — 74011250636 HC RX REV CODE- 250/636: Performed by: STUDENT IN AN ORGANIZED HEALTH CARE EDUCATION/TRAINING PROGRAM

## 2019-03-27 PROCEDURE — 85025 COMPLETE CBC W/AUTO DIFF WBC: CPT

## 2019-03-27 PROCEDURE — 93005 ELECTROCARDIOGRAM TRACING: CPT

## 2019-03-27 PROCEDURE — 74011000250 HC RX REV CODE- 250: Performed by: HOSPITALIST

## 2019-03-27 PROCEDURE — 80053 COMPREHEN METABOLIC PANEL: CPT

## 2019-03-27 PROCEDURE — 96374 THER/PROPH/DIAG INJ IV PUSH: CPT

## 2019-03-27 PROCEDURE — 83880 ASSAY OF NATRIURETIC PEPTIDE: CPT

## 2019-03-27 PROCEDURE — 74011250637 HC RX REV CODE- 250/637: Performed by: HOSPITALIST

## 2019-03-27 PROCEDURE — 71046 X-RAY EXAM CHEST 2 VIEWS: CPT

## 2019-03-27 PROCEDURE — 82803 BLOOD GASES ANY COMBINATION: CPT

## 2019-03-27 PROCEDURE — 76705 ECHO EXAM OF ABDOMEN: CPT

## 2019-03-27 RX ORDER — ESCITALOPRAM OXALATE 10 MG/1
10 TABLET ORAL DAILY
Status: DISCONTINUED | OUTPATIENT
Start: 2019-03-28 | End: 2019-03-30 | Stop reason: HOSPADM

## 2019-03-27 RX ORDER — SODIUM CHLORIDE 0.9 % (FLUSH) 0.9 %
5-40 SYRINGE (ML) INJECTION AS NEEDED
Status: DISCONTINUED | OUTPATIENT
Start: 2019-03-27 | End: 2019-03-30 | Stop reason: HOSPADM

## 2019-03-27 RX ORDER — BUSPIRONE HYDROCHLORIDE 5 MG/1
5 TABLET ORAL 2 TIMES DAILY
Status: DISCONTINUED | OUTPATIENT
Start: 2019-03-27 | End: 2019-03-30 | Stop reason: HOSPADM

## 2019-03-27 RX ORDER — POLYETHYLENE GLYCOL 3350 17 G/17G
17 POWDER, FOR SOLUTION ORAL DAILY
Status: DISCONTINUED | OUTPATIENT
Start: 2019-03-28 | End: 2019-03-30 | Stop reason: HOSPADM

## 2019-03-27 RX ORDER — LORAZEPAM 0.5 MG/1
0.25 TABLET ORAL DAILY
COMMUNITY
End: 2019-03-27

## 2019-03-27 RX ORDER — BUMETANIDE 0.25 MG/ML
1 INJECTION INTRAMUSCULAR; INTRAVENOUS 2 TIMES DAILY
Status: DISCONTINUED | OUTPATIENT
Start: 2019-03-27 | End: 2019-03-30

## 2019-03-27 RX ORDER — ESCITALOPRAM OXALATE 10 MG/1
10 TABLET ORAL DAILY
COMMUNITY
End: 2019-08-21 | Stop reason: ALTCHOICE

## 2019-03-27 RX ORDER — LEVOTHYROXINE SODIUM 75 UG/1
37.5 TABLET ORAL
Status: DISCONTINUED | OUTPATIENT
Start: 2019-03-28 | End: 2019-03-30 | Stop reason: HOSPADM

## 2019-03-27 RX ORDER — CLOPIDOGREL BISULFATE 75 MG/1
75 TABLET ORAL DAILY
COMMUNITY

## 2019-03-27 RX ORDER — PANTOPRAZOLE SODIUM 40 MG/1
40 TABLET, DELAYED RELEASE ORAL
Status: DISCONTINUED | OUTPATIENT
Start: 2019-03-28 | End: 2019-03-30 | Stop reason: HOSPADM

## 2019-03-27 RX ORDER — BUSPIRONE HYDROCHLORIDE 5 MG/1
5 TABLET ORAL 2 TIMES DAILY
COMMUNITY
End: 2019-08-21 | Stop reason: DRUGHIGH

## 2019-03-27 RX ORDER — FUROSEMIDE 10 MG/ML
40 INJECTION INTRAMUSCULAR; INTRAVENOUS
Status: COMPLETED | OUTPATIENT
Start: 2019-03-27 | End: 2019-03-27

## 2019-03-27 RX ORDER — CLOPIDOGREL BISULFATE 75 MG/1
75 TABLET ORAL DAILY
Status: DISCONTINUED | OUTPATIENT
Start: 2019-03-28 | End: 2019-03-30 | Stop reason: HOSPADM

## 2019-03-27 RX ORDER — LANOLIN ALCOHOL/MO/W.PET/CERES
3 CREAM (GRAM) TOPICAL
Status: DISCONTINUED | OUTPATIENT
Start: 2019-03-27 | End: 2019-03-30 | Stop reason: HOSPADM

## 2019-03-27 RX ORDER — LORAZEPAM 0.5 MG/1
0.25 TABLET ORAL
COMMUNITY
End: 2019-03-27 | Stop reason: ALTCHOICE

## 2019-03-27 RX ORDER — DILTIAZEM HYDROCHLORIDE 180 MG/1
180 CAPSULE, COATED, EXTENDED RELEASE ORAL
Status: DISCONTINUED | OUTPATIENT
Start: 2019-03-27 | End: 2019-03-30 | Stop reason: HOSPADM

## 2019-03-27 RX ORDER — LEVOTHYROXINE SODIUM 25 UG/1
37.5 TABLET ORAL
Qty: 90 TAB | Refills: 1 | Status: SHIPPED | OUTPATIENT
Start: 2019-03-27 | End: 2020-11-06 | Stop reason: SDUPTHER

## 2019-03-27 RX ORDER — LANOLIN ALCOHOL/MO/W.PET/CERES
3 CREAM (GRAM) TOPICAL
COMMUNITY
End: 2019-09-18

## 2019-03-27 RX ORDER — CARVEDILOL 3.12 MG/1
3.12 TABLET ORAL 2 TIMES DAILY
Status: DISCONTINUED | OUTPATIENT
Start: 2019-03-27 | End: 2019-03-30 | Stop reason: HOSPADM

## 2019-03-27 RX ORDER — SODIUM CHLORIDE 0.9 % (FLUSH) 0.9 %
5-40 SYRINGE (ML) INJECTION EVERY 8 HOURS
Status: DISCONTINUED | OUTPATIENT
Start: 2019-03-27 | End: 2019-03-30 | Stop reason: HOSPADM

## 2019-03-27 RX ORDER — NITROGLYCERIN 0.4 MG/1
0.4 TABLET SUBLINGUAL AS NEEDED
Status: DISCONTINUED | OUTPATIENT
Start: 2019-03-27 | End: 2019-03-30 | Stop reason: HOSPADM

## 2019-03-27 RX ORDER — ONDANSETRON 2 MG/ML
4 INJECTION INTRAMUSCULAR; INTRAVENOUS
Status: DISCONTINUED | OUTPATIENT
Start: 2019-03-27 | End: 2019-03-30 | Stop reason: HOSPADM

## 2019-03-27 RX ORDER — ACETAMINOPHEN 325 MG/1
650 TABLET ORAL
Status: DISCONTINUED | OUTPATIENT
Start: 2019-03-27 | End: 2019-03-30 | Stop reason: HOSPADM

## 2019-03-27 RX ORDER — SIMVASTATIN 40 MG/1
40 TABLET, FILM COATED ORAL
Status: DISCONTINUED | OUTPATIENT
Start: 2019-03-27 | End: 2019-03-30 | Stop reason: HOSPADM

## 2019-03-27 RX ORDER — CALCIUM CARBONATE 500(1250)
500 TABLET ORAL
COMMUNITY
End: 2019-07-30 | Stop reason: ALTCHOICE

## 2019-03-27 RX ORDER — BUMETANIDE 2 MG/1
2 TABLET ORAL 2 TIMES DAILY
COMMUNITY
End: 2019-04-22

## 2019-03-27 RX ORDER — POTASSIUM CHLORIDE 750 MG/1
20 TABLET, FILM COATED, EXTENDED RELEASE ORAL DAILY
COMMUNITY
End: 2019-04-22

## 2019-03-27 RX ADMIN — BUMETANIDE 1 MG: 0.25 INJECTION INTRAMUSCULAR; INTRAVENOUS at 20:22

## 2019-03-27 RX ADMIN — SIMVASTATIN 40 MG: 40 TABLET, FILM COATED ORAL at 21:39

## 2019-03-27 RX ADMIN — Medication 3 MG: at 21:39

## 2019-03-27 RX ADMIN — Medication 10 ML: at 21:41

## 2019-03-27 RX ADMIN — FUROSEMIDE 40 MG: 10 INJECTION, SOLUTION INTRAMUSCULAR; INTRAVENOUS at 15:32

## 2019-03-27 RX ADMIN — BUSPIRONE HYDROCHLORIDE 5 MG: 5 TABLET ORAL at 20:22

## 2019-03-27 RX ADMIN — CARVEDILOL 3.12 MG: 3.12 TABLET, FILM COATED ORAL at 20:22

## 2019-03-27 RX ADMIN — APIXABAN 2.5 MG: 2.5 TABLET, FILM COATED ORAL at 20:22

## 2019-03-27 NOTE — ED NOTES
Patient assisted to bedpan; minimal assistance required; MD notified of increased work of breathing and O2; he is at bedside; RT notified of ABG

## 2019-03-27 NOTE — PROGRESS NOTES
Reason for Visit/HPI:   
Corinne Celeste is a 80 y.o. female patient who presents today for Chief Complaint Patient presents with  Medication Evaluation Pt being seen for evaluation of medications.  Fatigue Pt reports feeling fatigued during the day. She has to get up to urinate and have a bowel movement multpile times a day. Follow Up: Follow-up and Dispositions · Return Following your visit to the ER. Diagnosis/Treatment Plan:   
Diagnoses and all orders for this visit: Discussed patient's status with Dr. Prabhu Alcantar, NP with cardiology. Per my assessment they advised that I send her to [facility] ER for evaluation as she could have fluid built up again in her lungs due to the increasing CHF> I attempted to call the ER for provider to provider report and was sent to the [facility] ER and then no one would answer the phone. Patient sent via EMS> 1. Shortness of breath Comments: 
Patient is more dyspnic then her last visit. 2. Rapid or irregular heartbeat 3. Jugular venous distension (JVD) 4. Decreased lung sounds 5. Chronic respiratory failure with hypoxia, on home O2 therapy (HCC) 6. Uncontrolled atrial fibrillation ([address] -     levothyroxine (SYNTHROID) 25 mcg tablet; Take 1.5 Tabs by mouth Daily (before breakfast). Indications: hypothyroidism 7. Urinary frequency 8. Mitral valvular disorder 9. Diuretic-induced hypokalemia Discontinued Care: The following treatment modalities have been discontinued by the provider today:  
Medications Discontinued During This Encounter Medication Reason  LORazepam (ATIVAN) 0.5 mg tablet Therapy Completed  calcium-vitamin D (OYSTER SHELL) 500 mg(1,250mg) -200 unit per tablet Therapy Completed  melatonin 3 mg tablet Therapy Completed  bumetanide (BUMEX) 2 mg tablet Therapy Completed  LORazepam (ATIVAN) 0.5 mg tablet Alternate Therapy  levothyroxine (SYNTHROID) 25 mcg tablet Alternate Therapy Subjective: Allergies Allergen Reactions  Brilinta [Ticagrelor] Shortness of Breath  Celecoxib Other (comments)  Codeine Hives  Sulfa (Sulfonamide Antibiotics) Unknown (comments) and Hives  Iodine Unknown (comments)  Aricept [Donepezil] Other (comments) Headaches - noted as intolerance  Darvocet A500 [Propoxyphene N-Acetaminophen] Nausea Only Prior to Admission medications Medication Sig Start Date End Date Taking? Authorizing Provider  
levothyroxine (SYNTHROID) 25 mcg tablet Take 1.5 Tabs by mouth Daily (before breakfast). Indications: hypothyroidism 3/27/19  Yes Yamila Loaiza, DYLAN  
calcium carbonate (OS-ADAM) 500 mg calcium (1,250 mg) tablet Take 500 mg by mouth. Provider, Historical  
bumetanide (BUMEX) 2 mg tablet Take 2 mg by mouth. Provider, Historical  
melatonin 3 mg tablet Take 3 mg by mouth. Provider, Historical  
lactose-reduced food (BOOST HIGH PROTEIN PO) Take  by mouth two (2) times a day. Provider, Historical  
escitalopram oxalate (LEXAPRO) 10 mg tablet Take 1.5 Tabs by mouth daily. 2/28/19   Yamila Loaiza, DYLAN  
carvedilol (COREG) 3.125 mg tablet Take 1 Tab by mouth two (2) times a day. 12/20/18   Meredith Gómez NP  
polyethylene glycol (MIRALAX) 17 gram packet Take 17 g by mouth daily. Provider, Historical  
OTHER Dispense - Portable Oxygen & Supplies due to congestive heart failure, hypoxemia, confusion due to hypoxemia. Long term treatment is required as patient failed in office walk test. Oxygen saturations noted to be at start 89% RA then dropped to 78% with activity. 12/5/18   Yamila Loaiza NP  
OTHER Dispense Oxygen Concentrator with humidified air and supplies. Dx: I50.42 CHF; Dx: I48.91 Afib uncontrolled; Dx: R06.02 SOB; Dx: R09.02 Hypoxemia. Patient failed walk test in office.  Pulse ox at 78% on RA. 12/5/18   Yamila Loaiza NP  
 pantoprazole (PROTONIX) 40 mg tablet Take 1 Tab by mouth daily. 12/3/18   Bowen Abreu NP  
clopidogrel (PLAVIX) 75 mg tab Take 1 Tab by mouth daily. 11/30/18   Bowen Abreu NP  
apixaban (ELIQUIS) 2.5 mg tablet Take 1 Tab by mouth two (2) times a day. 11/30/18   Bowen Abreu NP  
simvastatin (ZOCOR) 40 mg tablet Take 40 mg by mouth nightly. 11/26/18   Provider, Historical  
nitroglycerin (NITROSTAT) 0.4 mg SL tablet Take 0.4 mg by mouth as needed. 11/26/18   Provider, Historical  
PROAIR HFA 90 mcg/actuation inhaler Take 2 Inhalation by inhalation four (4) times daily as needed. 11/26/18   Provider, Historical  
potassium chloride (KLOR-CON) 10 mEq tablet Take 1 Tab by mouth daily. Patient taking differently: Take 20 mEq by mouth daily. 11/28/18   Bowen Abreu NP  
diltiazem CD (CARTIA XT) 180 mg ER capsule Take 180 mg by mouth nightly. Provider, Historical  
 
Past Medical History:  
Diagnosis Date  (HFpEF) heart failure with preserved ejection fraction ([address] 10/17/2018  
 acute  Allergic rhinitis  Atherosclerotic cardiovascular disease 1999  
 CHF (congestive heart failure) ([address] 09/11/2018  Cognitive communication deficit  Edema  Environmental allergies   
 dizziness-(chronic)  Fatty liver disease, nonalcoholic  GERD (gastroesophageal reflux disease)  H/O heart artery stent 09/2018  HCVD (hypertensive cardiovascular disease) 10/17/2018  Heart attack ([address] 09/2018  Heart palpitations 2018 infrequent  History of PTCA  Hypercholesterolemia 1999  Hypertension 2010  Liver disease   
 fatty liver  MI (myocardial infarction) ([address] 09/2018  Overactive bladder 08/09/2018  Peripheral neuropathy  Permanent atrial fibrillation ([address] 11/05/2018  Senile dementia, without behavioral disturbance 11/05/2018  Stress incontinence, female 08/09/2018  Vertigo  Vitamin D deficiency Past Surgical History: Procedure Laterality Date  BREAST SURGERY PROCEDURE UNLISTED    
 breast cyst Efren Allen)  CARDIAC SURG PROCEDURE UNLIST  1999  
 + stress thalassemia; 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 Holter  HX APPENDECTOMY  HX BREAST BIOPSY Left   
 neg  HX CYST INCISION AND DRAINAGE Right years ago  
 neg  HX DILATION AND CURETTAGE    
 x5  
 HX GYN    
 D&C (x5), ALLEY/BSO (-Juliann/dle), Provera intolerance (bleeding), endometrial Bx annually  HX PTCA  2018  
 stent distal RCA into PLwith KENNETH x 2  
 HX ALLEY AND BSO   Juliann/Zedler Social History Tobacco Use  Smoking status: Former Smoker Packs/day: 4.00 Types: Cigarettes Last attempt to quit: 1955 Years since quittin.2  Smokeless tobacco: Never Used Substance Use Topics  Alcohol use: No  
  Alcohol/week: 0.0 - 0.5 oz  Drug use: No  
  
Family History Problem Relation Age of Onset  Diabetes Mother  Hypertension Mother  Heart Failure Mother CHF ( @ 80)  Alzheimer Mother  Cancer Father   
     lung ( @ 77)  Alzheimer Father  No Known Problems Son  No Known Problems Son   
 Ovarian Cancer Sister   
     hysterectomy  Breast Cancer Sister 28  
     mastectomy  Cancer Sister   
     breast and ovarian  Alzheimer Sister  Breast Problems Sister   
     breast cyst  
 Cataract Sister  Hypertension Sister  Diabetes Brother Advance Care Planning 2018 Primary Decision Maker Name -  
Primary Decision Maker Phone Number -  
Primary Decision Maker Relationship to Patient -  
Confirm Advance Directive Yes, on file Does the patient have other document types - Test Results:  
 
Office Visit on 2019 Component Date Value Ref Range Status  WBC 2019 6.1  3.4 - 10.8 x10E3/uL Final  
 RBC 2019 4.01  3.77 - 5.28 x10E6/uL Final  
 HGB 2019 11.3  11.1 - 15.9 g/dL Final  
  HCT 02/12/2019 35.2  34.0 - 46.6 % Final  
 MCV 02/12/2019 88  79 - 97 fL Final  
 MCH 02/12/2019 28.2  26.6 - 33.0 pg Final  
 MCHC 02/12/2019 32.1  31.5 - 35.7 g/dL Final  
 RDW 02/12/2019 15.9* 12.3 - 15.4 % Final  
 PLATELET 80/19/1472 422  150 - 379 x10E3/uL Final  
 NEUTROPHILS 02/12/2019 65  Not Estab. % Final  
 Lymphocytes 02/12/2019 18  Not Estab. % Final  
 MONOCYTES 02/12/2019 13  Not Estab. % Final  
 EOSINOPHILS 02/12/2019 3  Not Estab. % Final  
 BASOPHILS 02/12/2019 1  Not Estab. % Final  
 ABS. NEUTROPHILS 02/12/2019 4.0  1.4 - 7.0 x10E3/uL Final  
 Abs Lymphocytes 02/12/2019 1.1  0.7 - 3.1 x10E3/uL Final  
 ABS. MONOCYTES 02/12/2019 0.8  0.1 - 0.9 x10E3/uL Final  
 ABS. EOSINOPHILS 02/12/2019 0.2  0.0 - 0.4 x10E3/uL Final  
 ABS. BASOPHILS 02/12/2019 0.0  0.0 - 0.2 x10E3/uL Final  
 IMMATURE GRANULOCYTES 02/12/2019 0  Not Estab. % Final  
 ABS. IMM.  GRANS. 02/12/2019 0.0  0.0 - 0.1 x10E3/uL Final  
 Cholesterol, total 02/12/2019 119  100 - 199 mg/dL Final  
 Triglyceride 02/12/2019 97  0 - 149 mg/dL Final  
 HDL Cholesterol 02/12/2019 42  >39 mg/dL Final  
 VLDL, calculated 02/12/2019 19  5 - 40 mg/dL Final  
 LDL, calculated 02/12/2019 58  0 - 99 mg/dL Final  
 Glucose 02/12/2019 116* 65 - 99 mg/dL Final  
 BUN 02/12/2019 26  10 - 36 mg/dL Final  
 Creatinine 02/12/2019 1.50* 0.57 - 1.00 mg/dL Final  
 GFR est non-AA 02/12/2019 30* >59 mL/min/1.73 Final  
 GFR est AA 02/12/2019 35* >59 mL/min/1.73 Final  
 BUN/Creatinine ratio 02/12/2019 17  12 - 28 Final  
 Sodium 02/12/2019 143  134 - 144 mmol/L Final  
 Potassium 02/12/2019 4.2  3.5 - 5.2 mmol/L Final  
 Chloride 02/12/2019 99  96 - 106 mmol/L Final  
 CO2 02/12/2019 28  20 - 29 mmol/L Final  
 Calcium 02/12/2019 9.2  8.7 - 10.3 mg/dL Final  
 Protein, total 02/12/2019 7.4  6.0 - 8.5 g/dL Final  
 Albumin 02/12/2019 3.9  3.2 - 4.6 g/dL Final  
 GLOBULIN, TOTAL 02/12/2019 3.5  1.5 - 4.5 g/dL Final  
  A-G Ratio 02/12/2019 1.1* 1.2 - 2.2 Final  
 Bilirubin, total 02/12/2019 0.4  0.0 - 1.2 mg/dL Final  
 Alk. phosphatase 02/12/2019 92  39 - 117 IU/L Final  
 AST (SGOT) 02/12/2019 21  0 - 40 IU/L Final  
 ALT (SGPT) 02/12/2019 12  0 - 32 IU/L Final  
 TSH 02/12/2019 6.070* 0.450 - 4.500 uIU/mL Final  
 T4, Total 02/12/2019 7.7  4.5 - 12.0 ug/dL Final  
 T3 Uptake 02/12/2019 29  24 - 39 % Final  
 Free Thyroxine Index (FTI) 02/12/2019 2.2  1.2 - 4.9 Final  
 Vitamin B12 02/12/2019 499  232 - 1,245 pg/mL Final  
 Folate 02/12/2019 >20.0  >3.0 ng/mL Final  
 Comment: A serum folate concentration of less than 3.1 ng/mL is 
considered to represent clinical deficiency.  VITAMIN D, 25-HYDROXY 02/12/2019 46.5  30.0 - 100.0 ng/mL Final  
 Comment: Vitamin D deficiency has been defined by the [facility] practice guideline as a 
level of serum 25-OH vitamin D less than 20 ng/mL (1,2). The Endocrine Society went on to further define vitamin D 
insufficiency as a level between 21 and 29 ng/mL (2). 1. IOM ([place] of Medicine). 2010. Dietary reference 
   intakes for calcium and D. [address] [facility]: The 
   [employer]. 2. Shantelle MF, Lora ROE, Gary CARSON, et al. 
   Evaluation, treatment, and prevention of vitamin D 
   deficiency: an Endocrine Society clinical practice 
   guideline. JCEM. 2011 Jul; 96(7):1911-30.  Magnesium 02/12/2019 2.0  1.6 - 2.3 mg/dL Final  
 Prealbumin 02/12/2019 17  9 - 32 mg/dL Final  
 
 
Objective:  
 
Vitals:  
 03/27/19 1109 BP: 114/62 Pulse: 78 Resp: 20 SpO2: 98% Weight: 132 lb 11.2 oz (60.2 kg) Height: 5' 1\" (1.549 m) Wt Readings from Last 3 Encounters:  
03/27/19 132 lb 11.2 oz (60.2 kg) 03/05/19 129 lb (58.5 kg) 02/28/19 126 lb 4.8 oz (57.3 kg) BP Readings from Last 3 Encounters:  
03/27/19 114/62  
03/05/19 120/70  
02/28/19 105/62 Review of Systems Unable to perform ROS: Mental status change Physical Exam  
Constitutional: Vital signs are normal. She appears lethargic, malnourished and dehydrated. She appears to not be writhing in pain. She appears unhealthy. She appears cachectic. Non-toxic appearance. She does not have a sickly appearance. No distress. Frail, fragile, alert but not oriented, elderly  female. 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 Now resides in [facility]. She is on continuous nasal cannula due to advancing heart failure. She is not a candidature for routine treatment or surgery she is in a study at [facility]. HENT:  
Head: Normocephalic and atraumatic. Right Ear: External ear and ear canal normal. A middle ear effusion is present. Decreased hearing is noted. Left Ear: External ear and ear canal normal. A middle ear effusion is present. Decreased hearing is noted. Nose: Nose normal. No mucosal edema or rhinorrhea. Mouth/Throat: Uvula is midline and oropharynx is clear and moist. Mucous membranes are pale, dry and not cyanotic. No oral lesions. No uvula swelling. No posterior oropharyngeal edema or posterior oropharyngeal erythema. Eyes: Pupils are equal, round, and reactive to light. Conjunctivae, EOM and lids are normal. Lids are everted and swept, no foreign bodies found. Right pupil is round and reactive. Left pupil is reactive. Left pupil required 20 to 30 secs extra to respond. But it finally did respond. Neck: Trachea normal, normal range of motion and full passive range of motion without pain. JVD present. No hepatojugular reflux present. Carotid bruit is not present. No thyromegaly present. JVD present bilaterally. SOB, hypoxic. Cardiovascular: S1 normal, S2 normal, intact distal pulses and normal pulses. An irregularly irregular rhythm present. Frequent extrasystoles are present. Tachycardia present. PMI is displaced.  Exam reveals gallop and distant heart sounds. Exam reveals no friction rub. No murmur heard. No lower extremity edema present today but she also does not have her compression stockings in place. Pulmonary/Chest: Accessory muscle usage present. No tachypnea. She is in respiratory distress. She has decreased breath sounds in the right upper field, the right middle field, the right lower field, the left upper field, the left middle field and the left lower field. She has no wheezes. On continuous nasal cannula 2 L/min. Today her lungs sounds are absent. She is tachypnic, using increased amounts of accessory muscles. Abdominal: Soft. Normal appearance. She exhibits distension. She exhibits no fluid wave and no mass. Bowel sounds are hyperactive. There is no hepatosplenomegaly. There is no tenderness. There is no rebound and no CVA tenderness. Continues to struggle with constipation. The [place] is working but she can not take it every day as the stools get better but the urgency is too much. Musculoskeletal:  
Generalized weakness with balance issues and intermittent dizziness. Atrophy in extremities. Loss of tone in extremities. Lymphadenopathy:  
     Head (right side): No submandibular adenopathy present. Head (left side): No submandibular and no tonsillar adenopathy present. She has no cervical adenopathy. She has no axillary adenopathy. Neurological: She has normal reflexes. She appears lethargic. She is disoriented. She is not agitated. She displays weakness, atrophy, facial asymmetry, abnormal stance and abnormal speech. A cranial nerve deficit and sensory deficit is present. She exhibits abnormal muscle tone. Coordination and gait abnormal. GCS score is 15. Skin: Skin is warm, dry and intact. No bruising, no ecchymosis and no rash noted. She is not diaphoretic. There is cyanosis. There is pallor. Nails show clubbing. Psychiatric: Her mood appears not anxious. Her affect is labile.  She expresses impulsivity. She exhibits a depressed mood. She is apathetic. She exhibits disordered thought content, abnormal new learning ability and abnormal remote memory. She has a flat affect. Patient is debilitated. Nursing note and vitals reviewed. Disclaimer: Ms. Jessy Spencer has been advised to call or return to our office if symptoms worsen/change/persist. We as a care team including the patient; discussed expected course/resolution/complications of diagnosis in detail today. Medication risks/benefits/costs/interactions/alternatives discussed Donna Hawley was given an after visit summary which includes diagnoses, current medications, & vitals. Jessy Spencer expressed understanding with the diagnosis and plan.

## 2019-03-27 NOTE — ED TRIAGE NOTES
Patient presents from [facility] via EMS with complaints of SOB. Pt reports that her SOB has been going on for a \"while\", but she could not specify exactly how long it has been. EMS reports that the SOB started after lunch Pt has history of CHF, AFIB, and dementia Patient arrives on 4 L NC per EMS room air sat was in the 80s when they arrived on scene. RA sats at 86% on arrival, patient placed on 2 L nc at this time

## 2019-03-27 NOTE — PROGRESS NOTES
1830 patient received to room 433 B . Alert but disoriented to time and situation. Knows that she is in hospital. O2 N/C at 5 liters. SOB. Sats 90-91%. Dr Mayelin Dash paged. Patient constantly stating that she has to go to the bathroom. Bladder scan done with results of 210 of urine in bladder.

## 2019-03-27 NOTE — ROUTINE PROCESS
TRANSFER - OUT REPORT: 
 
Verbal report given to ELMA Hernandez(name) on Franchesca Nelson  being transferred to [facility](unit) for routine progression of care Report consisted of patients Situation, Background, Assessment and  
Recommendations(SBAR). Information from the following report(s) SBAR and Kardex was reviewed with the receiving nurse. Lines:  
Peripheral IV 03/27/19 Left Antecubital (Active) Site Assessment Clean, dry, & intact 3/27/2019  1:08 PM  
Phlebitis Assessment 0 3/27/2019  1:08 PM  
Infiltration Assessment 0 3/27/2019  1:08 PM  
Dressing Status Clean, dry, & intact 3/27/2019  1:08 PM  
  
 
Opportunity for questions and clarification was provided. Patient transported with: 
 [employer]

## 2019-03-27 NOTE — PROGRESS NOTES
Admission Medication Reconciliation: 
Information obtained from: patient, medical record, family Significant PMH/Disease States:  
Past Medical History:  
Diagnosis Date (HFpEF) heart failure with preserved ejection fraction ([facility] 10/17/2018  
 acute Allergic rhinitis Atherosclerotic cardiovascular disease 1999 CHF (congestive heart failure) ([facility] 09/11/2018 Cognitive communication deficit Edema Environmental allergies   
 dizziness-(chronic) Fatty liver disease, nonalcoholic GERD (gastroesophageal reflux disease) H/O heart artery stent 09/2018 HCVD (hypertensive cardiovascular disease) 10/17/2018 Heart [facility] ([facility] 09/2018 Heart palpitations 2018 infrequent History of PTCA Hypercholesterolemia 1999 Hypertension 2010 Liver disease   
 fatty liver MI (myocardial infarction) ([facility] 09/2018 Overactive bladder 08/09/2018 Peripheral neuropathy Permanent atrial fibrillation ([facility] 11/05/2018 Senile dementia, without behavioral disturbance 11/05/2018 Stress incontinence, female 08/09/2018 Vertigo Vitamin D deficiency Chief Complaint for this Admission:  shortness of breath Allergies:  Brilinta [ticagrelor]; Celecoxib; Codeine; Sulfa (sulfonamide antibiotics); Iodine; Aricept [donepezil]; and Darvocet a500 [propoxyphene n-acetaminophen] Prior to Admission Medications:  
Prior to Admission Medications Prescriptions Last Dose Informant Patient Reported? Taking? PROAIR HFA 90 mcg/actuation inhaler   Yes Yes Sig: Take 2 Inhalation by inhalation four (4) times daily as needed. apixaban (ELIQUIS) 2.5 mg tablet   No No  
Sig: Take 1 Tab by mouth two (2) times a day. bumetanide (BUMEX) 2 mg tablet   Yes No  
Sig: Take 2 mg by mouth two (2) times a day. busPIRone (BUSPAR) 5 mg tablet   Yes Yes Sig: Take 5 mg by mouth two (2) times a day. Indications: Repeated Episodes of Anxiety calcium carbonate (OS-ADAM) 500 mg calcium (1,250 mg) tablet   Yes No  
Sig: Take 500 mg by mouth. carvedilol (COREG) 3.125 mg tablet   No No  
Sig: Take 1 Tab by mouth two (2) times a day. clopidogrel (PLAVIX) 75 mg tab   Yes Yes Sig: Take 75 mg by mouth daily. Indications: treatment to prevent a heart attack  
diltiazem CD (CARTIA XT) 180 mg ER capsule   Yes No  
Sig: Take 180 mg by mouth nightly. escitalopram oxalate (LEXAPRO) 10 mg tablet   Yes Yes Sig: Take 10 mg by mouth daily. Indications: Repeated Episodes of Anxiety, major depressive disorder  
levothyroxine (SYNTHROID) 25 mcg tablet   No No  
Sig: Take 1.5 Tabs by mouth Daily (before breakfast). Indications: hypothyroidism  
melatonin 3 mg tablet   Yes No  
Sig: Take 3 mg by mouth nightly. nitroglycerin (NITROSTAT) 0.4 mg SL tablet   Yes No  
Sig: Take 0.4 mg by mouth as needed. pantoprazole (PROTONIX) 40 mg tablet   No No  
Sig: Take 1 Tab by mouth daily. Patient taking differently: Take 40 mg by mouth daily. Indications: inflammation of the esophagus with erosion, gastroesophageal reflux disease  
polyethylene glycol (MIRALAX) 17 gram packet   Yes Yes Sig: Take 17 g by mouth daily. potassium chloride SR (KLOR-CON 10) 10 mEq tablet   Yes Yes Sig: Take 20 mEq by mouth daily. simvastatin (ZOCOR) 40 mg tablet   Yes Yes Sig: Take 40 mg by mouth nightly. Facility-Administered Medications: None Comments/Recommendations: Medication review done with medical record from [facility] and family member. Added buspirone. Removed lorazepam. 
Changed potassium from 10meq. Changed escitalopram from 15mg. Per patient, she arrived after breakfast and has taken her AM medications including apixaban. Allergies reviewed.

## 2019-03-27 NOTE — PROGRESS NOTES
Date of previous inpatient admission/ ED visit? 12/6/18-12/10/18 Bilateral Pleural Effusions What brought the patient back to ED? Referred by PCP for SOB Did patient decline recommended services during last admission/ ED visit (if yes, what)? No 
 
Has patient seen a provider since their last inpatient admission/ED visit (if yes, when)? Yes. Seen by PCP Rakan Anne today CM Interventions: 
From previous inpatient admission/ED visit: Assessment From current inpatient admission/ED visit: Assessment EMR reviewed. History significant for fatty liver disease, hypercholesterolemia, GERD, vertigo, HTN, peripheral neuropathy, atherosclerosis, MI, CHF, and dementia. Patient presents to the ED w/ SOB from [facility]. Met w/patient and family at bedside.  is alert and introduce's Delene Bustard as her \" \" then acknowledged daughter-n-law. Technician arrived to take patient off unit. CM met w/ Delene Bustard and DNL informed patient has been at [facility] for 4 years and moved into [facility] in September 2018 and has noted decline. Ms. Payton Dandy has been to [facility] for skilled care (last admission 12/2018). AMD and DDNR on file. FYI note for Palliative Consult. Providers include [facility]). DME is primarily RW and also utilizes w/c. No transitional care need verbalized at this time by family. Case Management will continue to follow. Care Management Interventions PCP Verified by CM: Yes Last Visit to PCP: 03/27/19 Palliative Care Criteria Met (RRAT>21 & CHF Dx)?: Yes Transition of Care Consult (CM Consult): Other([facility]/ TRST) MyChart Signup: No 
Discharge Durable Medical Equipment: No 
Health Maintenance Reviewed: Yes Physical Therapy Consult: No 
Occupational Therapy Consult: No 
Speech Therapy Consult: No 
Current Support Network: [address] Confirm Follow Up Transport: (TBD) Plan discussed with Pt/Family/Caregiver: No 
 [employer] Information Provided?: No 
Discharge Location Discharge Placement: (TBD)

## 2019-03-27 NOTE — ED PROVIDER NOTES
80 y.o. female with past medical history significant for fatty liver dz, hypercholesterolemia, GERD, vertigo, HTN, peripheral neuropathy, atherosclerosis, MI, CHF, dementia, who presents from EMS with chief complaint of SOB. Pt has sudden onset of SOB that worsened from her baseline today at 12:00 PM. Pt initially yielded 80% on RA and was placed on 3 L O2 by staff, which improved her O2 sat to the 90s. En route, EMS increased her O2 to 4 L, and measured her O2 sat at 93%; , /68. Accompanying sx includes leg edema. Denies CP. Pt is not on O2 at baseline. There are no other acute medical concerns at this time. PCP: Huong Colon NP Note written by Marcia Quinn, as dictated by Daniela Silver MD 1:05 PM 
 
The history is provided by the patient. No  was used. Past Medical History:  
Diagnosis Date  (HFpEF) heart failure with preserved ejection fraction ([address] 10/17/2018  
 acute  Allergic rhinitis  Atherosclerotic cardiovascular disease 1999  
 CHF (congestive heart failure) ([address] 09/11/2018  Cognitive communication deficit  Edema  Environmental allergies   
 dizziness-(chronic)  Fatty liver disease, nonalcoholic  GERD (gastroesophageal reflux disease)  H/O heart artery stent 09/2018  HCVD (hypertensive cardiovascular disease) 10/17/2018  Heart attack ([address] 09/2018  Heart palpitations 2018 infrequent  History of PTCA  Hypercholesterolemia 1999  Hypertension 2010  Liver disease   
 fatty liver  MI (myocardial infarction) ([address] 09/2018  Overactive bladder 08/09/2018  Peripheral neuropathy  Permanent atrial fibrillation ([address] 11/05/2018  Senile dementia, without behavioral disturbance 11/05/2018  Stress incontinence, female 08/09/2018  Vertigo  Vitamin D deficiency Past Surgical History:  
Procedure Laterality Date  BREAST SURGERY PROCEDURE UNLISTED    
 breast cyst Isael Odom)  CARDIAC SURG PROCEDURE UNLIST  1999  
 + stress thalassemia; 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 Holter  HX APPENDECTOMY  HX BREAST BIOPSY Left   
 neg  HX CYST INCISION AND DRAINAGE Right years ago  
 neg  HX DILATION AND CURETTAGE    
 x5  
 HX GYN    
 D&C (x5), ALLEY/BSO (-Juliann/Zedler), Provera intolerance (bleeding), endometrial Bx annually  HX PTCA  2018  
 stent distal RCA into PLwith KENNETH x 2  
 HX ALLEY AND BSO   Juliann/Zedler Family History:  
Problem Relation Age of Onset  Diabetes Mother  Hypertension Mother  Heart Failure Mother CHF ( @ 80)  Alzheimer Mother  Cancer Father   
     lung ( @ 77)  Alzheimer Father  No Known Problems Son  No Known Problems Son   
 Ovarian Cancer Sister   
     hysterectomy  Breast Cancer Sister 28  
     mastectomy  Cancer Sister   
     breast and ovarian  Alzheimer Sister  Breast Problems Sister   
     breast cyst  
 Cataract Sister  Hypertension Sister  Diabetes Brother Social History Socioeconomic History  Marital status:  Spouse name: Not on file  Number of children: Not on file  Years of education: Not on file  Highest education level: Not on file Occupational History  Not on file Social Needs  Financial resource strain: Not on file  Food insecurity:  
  Worry: Not on file Inability: Not on file  Transportation needs:  
  Medical: Not on file Non-medical: Not on file Tobacco Use  Smoking status: Former Smoker Packs/day: 4.00 Types: Cigarettes Last attempt to quit: 1955 Years since quittin.2  Smokeless tobacco: Never Used Substance and Sexual Activity  Alcohol use: No  
  Alcohol/week: 0.0 - 0.5 oz  Drug use: No  
 Sexual activity: Never Lifestyle  Physical activity:  
  Days per week: Not on file Minutes per session: Not on file  Stress: Not on file Relationships  Social connections:  
  Talks on phone: Not on file Gets together: Not on file Attends Nondenominational service: Not on file Active member of club or organization: Not on file Attends meetings of clubs or organizations: Not on file Relationship status: Not on file  Intimate partner violence:  
  Fear of current or ex partner: Not on file Emotionally abused: Not on file Physically abused: Not on file Forced sexual activity: Not on file Other Topics Concern  Not on file Social History Narrative ** Merged History Encounter ** ALLERGIES: Brilinta [ticagrelor]; Celecoxib; Codeine; Sulfa (sulfonamide antibiotics); Iodine; Aricept [donepezil]; and Darvocet a500 [propoxyphene n-acetaminophen] Review of Systems Constitutional: Negative for activity change, diaphoresis, fatigue and fever. HENT: Negative for congestion and sore throat. Eyes: Negative for photophobia and visual disturbance. Respiratory: Positive for shortness of breath. Negative for chest tightness. Cardiovascular: Positive for leg swelling. Negative for chest pain and palpitations. Gastrointestinal: Negative for abdominal pain, blood in stool, constipation, diarrhea, nausea and vomiting. Genitourinary: Negative for difficulty urinating, dysuria, flank pain, frequency and hematuria. Musculoskeletal: Negative for back pain. Neurological: Negative for dizziness, syncope, numbness and headaches. All other systems reviewed and are negative. Vitals:  
 03/27/19 1314 BP: 131/74 Pulse: 81 Resp: 22 Temp: 97.6 °F (36.4 °C) SpO2: 94% Physical Exam  
Constitutional: She is oriented to person, place, and time. She appears well-developed and well-nourished. No distress. HENT:  
Head: Normocephalic and atraumatic. Nose: Nose normal.  
Mouth/Throat: Oropharynx is clear and moist. No oropharyngeal exudate. Eyes: Conjunctivae and EOM are normal. Right eye exhibits no discharge. Left eye exhibits no discharge. No scleral icterus. Neck: Normal range of motion. Neck supple. No JVD present. No tracheal deviation present. No thyromegaly present. Cardiovascular: Normal rate, regular rhythm, normal heart sounds and intact distal pulses. Exam reveals no gallop and no friction rub. No murmur heard. Pulmonary/Chest: Effort normal and breath sounds normal. No stridor. No respiratory distress. She has no wheezes. She has no rales. She exhibits no tenderness. dyspneic at rest  
Abdominal: Bowel sounds are normal. She exhibits no distension and no mass. There is no tenderness. There is no rebound. Musculoskeletal: Normal range of motion. She exhibits no edema or tenderness. Lymphadenopathy:  
  She has no cervical adenopathy. Neurological: She is alert and oriented to person, place, and time. No cranial nerve deficit. Coordination normal.  
Skin: Skin is warm and dry. No rash noted. She is not diaphoretic. No erythema. No pallor. Psychiatric: She has a normal mood and affect. Her behavior is normal. Judgment and thought content normal.  
Nursing note and vitals reviewed. Note written by Marcia Rodríguez, as dictated by Bennie Huff MD 1:05 PM 
 
MDM Number of Diagnoses or Management Options Acute on chronic systolic congestive heart failure ([facility]): Amount and/or Complexity of Data Reviewed Clinical lab tests: ordered and reviewed Tests in the radiology section of CPT®: reviewed and ordered Risk of Complications, Morbidity, and/or Mortality Presenting problems: moderate Diagnostic procedures: moderate Management options: moderate Critical Care Total time providing critical care: 30-74 minutes (Total critical care time spent exclusive of procedures:  35 min) Patient Progress Patient progress: stable Procedures PROGRESS NOTE: 
2:25 PM 
 Brain texted Dr. Autumn Rosa regarding admission.

## 2019-03-27 NOTE — PROGRESS NOTES
ADVISED PATIENT OF THE FOLLOWING HEALTH MAINTAINCE DUE There are no preventive care reminders to display for this patient. Chief Complaint Patient presents with  Medication Evaluation Pt being seen for evaluation of medications.  Fatigue Pt reports feeling fatigued during the day. She has to get up to urinate and have a bowel movement multpile times a day. 1. Have you been to the ER, urgent care clinic since your last visit? Hospitalized since your last visit? No 
 
2. Have you seen or consulted any other health care providers outside of the [address] [place] [address] since your last visit? Include any DEXA scan, mammography  or colon screening. No 
 
3. Do you have an Advance Directive on file? yes 4. Do you have a DNR on file? DNR Patient is accompanied by self I have received verbal consent from Katharina Coulter to discuss any/all medical information while they are present in the room. Advance Care Planning 12/6/2018 Primary Decision Maker Name -  
Primary Decision Maker Phone Number -  
Primary Decision Maker Relationship to Patient -  
Confirm Advance Directive Yes, on file Does the patient have other document types - [employer] [facility] [place], [address] [address] Phone: [phone] Fax: [phone] Patient reminded during visit to bring all medication bottles, OTC medications to all appointments.

## 2019-03-27 NOTE — PROGRESS NOTES
TRANSFER - IN REPORT: 
 
Verbal report received from Meeta(name) on Luh Gaming  being received from ED(unit) for routine progression of care Report consisted of patients Situation, Background, Assessment and  
Recommendations(SBAR). Information from the following report(s) SBAR, ED Summary, Procedure Summary, Intake/Output, MAR, Recent Results and Cardiac Rhythm SR was reviewed with the receiving nurse. Opportunity for questions and clarification was provided. Assessment completed upon patients arrival to unit and care assumed.

## 2019-03-27 NOTE — CONSULTS
Palliative medicine    Consult received, will see pt tmrw after Dr Christine Patino evaluates- see that she has seen pt before.

## 2019-03-27 NOTE — PROGRESS NOTES
[facility][id number] Chava Dejesus MD

## 2019-03-28 ENCOUNTER — APPOINTMENT (OUTPATIENT)
Dept: ULTRASOUND IMAGING | Age: 84
DRG: 641 | End: 2019-03-28
Attending: HOSPITALIST
Payer: MEDICARE

## 2019-03-28 LAB
ALBUMIN SERPL-MCNC: 3.4 G/DL (ref 3.5–5)
ALBUMIN/GLOB SERPL: 0.8 {RATIO} (ref 1.1–2.2)
ALP SERPL-CCNC: 85 U/L (ref 45–117)
ALT SERPL-CCNC: 16 U/L (ref 12–78)
ANION GAP SERPL CALC-SCNC: 6 MMOL/L (ref 5–15)
AST SERPL-CCNC: 19 U/L (ref 15–37)
BASOPHILS # BLD: 0 K/UL (ref 0–0.1)
BASOPHILS NFR BLD: 1 % (ref 0–1)
BILIRUB SERPL-MCNC: 0.7 MG/DL (ref 0.2–1)
BUN SERPL-MCNC: 25 MG/DL (ref 6–20)
BUN/CREAT SERPL: 20 (ref 12–20)
CALCIUM SERPL-MCNC: 9.2 MG/DL (ref 8.5–10.1)
CHLORIDE SERPL-SCNC: 102 MMOL/L (ref 97–108)
CO2 SERPL-SCNC: 31 MMOL/L (ref 21–32)
CREAT SERPL-MCNC: 1.22 MG/DL (ref 0.55–1.02)
DIFFERENTIAL METHOD BLD: ABNORMAL
EOSINOPHIL # BLD: 0.1 K/UL (ref 0–0.4)
EOSINOPHIL NFR BLD: 1 % (ref 0–7)
ERYTHROCYTE [DISTWIDTH] IN BLOOD BY AUTOMATED COUNT: 15.2 % (ref 11.5–14.5)
GLOBULIN SER CALC-MCNC: 4.2 G/DL (ref 2–4)
GLUCOSE SERPL-MCNC: 116 MG/DL (ref 65–100)
HCT VFR BLD AUTO: 36.1 % (ref 35–47)
HGB BLD-MCNC: 10.9 G/DL (ref 11.5–16)
IMM GRANULOCYTES # BLD AUTO: 0 K/UL (ref 0–0.04)
IMM GRANULOCYTES NFR BLD AUTO: 0 % (ref 0–0.5)
LYMPHOCYTES # BLD: 1 K/UL (ref 0.8–3.5)
LYMPHOCYTES NFR BLD: 12 % (ref 12–49)
MCH RBC QN AUTO: 27.4 PG (ref 26–34)
MCHC RBC AUTO-ENTMCNC: 30.2 G/DL (ref 30–36.5)
MCV RBC AUTO: 90.7 FL (ref 80–99)
MONOCYTES # BLD: 0.8 K/UL (ref 0–1)
MONOCYTES NFR BLD: 10 % (ref 5–13)
NEUTS SEG # BLD: 6.3 K/UL (ref 1.8–8)
NEUTS SEG NFR BLD: 76 % (ref 32–75)
NRBC # BLD: 0 K/UL (ref 0–0.01)
NRBC BLD-RTO: 0 PER 100 WBC
PLATELET # BLD AUTO: 250 K/UL (ref 150–400)
PMV BLD AUTO: 9.8 FL (ref 8.9–12.9)
POTASSIUM SERPL-SCNC: 3 MMOL/L (ref 3.5–5.1)
PROT SERPL-MCNC: 7.6 G/DL (ref 6.4–8.2)
RBC # BLD AUTO: 3.98 M/UL (ref 3.8–5.2)
SODIUM SERPL-SCNC: 139 MMOL/L (ref 136–145)
T3FREE SERPL-MCNC: 2.5 PG/ML (ref 2.2–4)
TSH SERPL DL<=0.05 MIU/L-ACNC: 5.43 UIU/ML (ref 0.36–3.74)
WBC # BLD AUTO: 8.2 K/UL (ref 3.6–11)

## 2019-03-28 PROCEDURE — 97535 SELF CARE MNGMENT TRAINING: CPT

## 2019-03-28 PROCEDURE — 84481 FREE ASSAY (FT-3): CPT

## 2019-03-28 PROCEDURE — 74011636637 HC RX REV CODE- 636/637: Performed by: HOSPITALIST

## 2019-03-28 PROCEDURE — 74011250637 HC RX REV CODE- 250/637: Performed by: HOSPITALIST

## 2019-03-28 PROCEDURE — 80053 COMPREHEN METABOLIC PANEL: CPT

## 2019-03-28 PROCEDURE — 97530 THERAPEUTIC ACTIVITIES: CPT

## 2019-03-28 PROCEDURE — 65270000029 HC RM PRIVATE

## 2019-03-28 PROCEDURE — 77030012939 HC DRSG HYDRCOIL BMS -A

## 2019-03-28 PROCEDURE — 84443 ASSAY THYROID STIM HORMONE: CPT

## 2019-03-28 PROCEDURE — 74011250636 HC RX REV CODE- 250/636: Performed by: HOSPITALIST

## 2019-03-28 PROCEDURE — 97165 OT EVAL LOW COMPLEX 30 MIN: CPT

## 2019-03-28 PROCEDURE — 85025 COMPLETE CBC W/AUTO DIFF WBC: CPT

## 2019-03-28 PROCEDURE — 77030020186 HC BOOT HL PROTCT SAGE -B

## 2019-03-28 PROCEDURE — 97161 PT EVAL LOW COMPLEX 20 MIN: CPT

## 2019-03-28 PROCEDURE — 65660000000 HC RM CCU STEPDOWN

## 2019-03-28 PROCEDURE — 36415 COLL VENOUS BLD VENIPUNCTURE: CPT

## 2019-03-28 PROCEDURE — 99218 HC RM OBSERVATION: CPT

## 2019-03-28 PROCEDURE — 74011000250 HC RX REV CODE- 250: Performed by: HOSPITALIST

## 2019-03-28 RX ORDER — PREDNISONE 10 MG/1
40 TABLET ORAL
Status: DISCONTINUED | OUTPATIENT
Start: 2019-03-28 | End: 2019-03-30 | Stop reason: HOSPADM

## 2019-03-28 RX ORDER — POTASSIUM CHLORIDE 750 MG/1
40 TABLET, FILM COATED, EXTENDED RELEASE ORAL DAILY
Status: DISCONTINUED | OUTPATIENT
Start: 2019-03-28 | End: 2019-03-30 | Stop reason: HOSPADM

## 2019-03-28 RX ORDER — POTASSIUM CHLORIDE 14.9 MG/ML
10 INJECTION INTRAVENOUS
Status: COMPLETED | OUTPATIENT
Start: 2019-03-28 | End: 2019-03-28

## 2019-03-28 RX ADMIN — BUMETANIDE 1 MG: 0.25 INJECTION INTRAMUSCULAR; INTRAVENOUS at 08:25

## 2019-03-28 RX ADMIN — POTASSIUM CHLORIDE 10 MEQ: 200 INJECTION, SOLUTION INTRAVENOUS at 14:00

## 2019-03-28 RX ADMIN — POTASSIUM CHLORIDE 10 MEQ: 200 INJECTION, SOLUTION INTRAVENOUS at 13:20

## 2019-03-28 RX ADMIN — POTASSIUM CHLORIDE 10 MEQ: 200 INJECTION, SOLUTION INTRAVENOUS at 13:00

## 2019-03-28 RX ADMIN — CARVEDILOL 3.12 MG: 3.12 TABLET, FILM COATED ORAL at 16:59

## 2019-03-28 RX ADMIN — PREDNISONE 40 MG: 20 TABLET ORAL at 13:22

## 2019-03-28 RX ADMIN — APIXABAN 2.5 MG: 2.5 TABLET, FILM COATED ORAL at 18:00

## 2019-03-28 RX ADMIN — LEVOTHYROXINE SODIUM 37.5 MCG: 75 TABLET ORAL at 06:51

## 2019-03-28 RX ADMIN — CLOPIDOGREL BISULFATE 75 MG: 75 TABLET, FILM COATED ORAL at 08:25

## 2019-03-28 RX ADMIN — Medication 10 ML: at 06:51

## 2019-03-28 RX ADMIN — Medication 10 ML: at 21:39

## 2019-03-28 RX ADMIN — SIMVASTATIN 40 MG: 40 TABLET, FILM COATED ORAL at 21:21

## 2019-03-28 RX ADMIN — DILTIAZEM HYDROCHLORIDE 180 MG: 180 CAPSULE, COATED, EXTENDED RELEASE ORAL at 21:21

## 2019-03-28 RX ADMIN — APIXABAN 2.5 MG: 2.5 TABLET, FILM COATED ORAL at 08:25

## 2019-03-28 RX ADMIN — BUMETANIDE 1 MG: 0.25 INJECTION INTRAMUSCULAR; INTRAVENOUS at 16:54

## 2019-03-28 RX ADMIN — ESCITALOPRAM OXALATE 10 MG: 10 TABLET ORAL at 08:25

## 2019-03-28 RX ADMIN — PANTOPRAZOLE SODIUM 40 MG: 40 TABLET, DELAYED RELEASE ORAL at 00:00

## 2019-03-28 RX ADMIN — CARVEDILOL 3.12 MG: 3.12 TABLET, FILM COATED ORAL at 08:25

## 2019-03-28 RX ADMIN — POLYETHYLENE GLYCOL 3350 17 G: 17 POWDER, FOR SOLUTION ORAL at 08:25

## 2019-03-28 RX ADMIN — POTASSIUM CHLORIDE 10 MEQ: 200 INJECTION, SOLUTION INTRAVENOUS at 12:00

## 2019-03-28 RX ADMIN — BUSPIRONE HYDROCHLORIDE 5 MG: 5 TABLET ORAL at 08:25

## 2019-03-28 RX ADMIN — BUSPIRONE HYDROCHLORIDE 5 MG: 5 TABLET ORAL at 16:56

## 2019-03-28 RX ADMIN — Medication 3 MG: at 21:21

## 2019-03-28 RX ADMIN — POTASSIUM CHLORIDE 40 MEQ: 750 TABLET, EXTENDED RELEASE ORAL at 13:19

## 2019-03-28 NOTE — PROGRESS NOTES
Physical Therapy 3/28/2019 Orders received, chart reviewed, and patient evaluated by acute physical therapy. Recommend patient to discharge to: [place] Discharge Recommendation: Healthcare at [facility] for skilled care with transition into higher level of care [facility] Care Unit with increased assistance) If first recommendation is not feasible: 
Increased physical assistance in [place] for bathing/showering, medication management, incontinence care, supplemental oxygen management, supervised ambulation to/from dining rader for meals, and HHPT services - patient endorses several falls. Recommend with nursing patient to complete as able in order to maintain strength, endurance and independence: OOB to chair 3x/day with RW, gait belt, A x 1 (CHAIR ALARM) and ambulating with RW, gait belt, A x 1. Thank you for your assistance. Full evaluation to follow.   
Hemant Segura, PT, DPT

## 2019-03-28 NOTE — PROGRESS NOTES
Spoke with Dr Lacie Montiel. Made aware that radiology wanted to hold all the anticoags for the procedure to be done tomorrow. Dr Lacie Montiel also made aware that patient has been weaned from 5l to 3l NC. He stated that he woud prob cancel the thoracentesis and to continue with the anticoags

## 2019-03-28 NOTE — PROGRESS NOTES
CM noted observation status. CM met with patient to follow up on discharge plans. Patient is in agreement with returning to [facility] when applicable and verbalizes understanding of possibly having to go to Healthcare Unit at discharge. CM provided patient with observation letters, patient politely declined signing the observation letters per personal reasons. CM to follow patient for updates. CM noted patients status upgraded to Inpatient. Nalini Gomez [phone] at [facility] to inform them of patients admission. Left message awaiting a response. CHERRY Major/CRM

## 2019-03-28 NOTE — PROGRESS NOTES
Problem: Mobility Impaired (Adult and Pediatric) Goal: *Acute Goals and Plan of Care (Insert Text) Description Physical Therapy Goals Initiated 3/28/2019 1. Patient will move from supine to sit and sit to supine, scoot up and down and roll side to side in bed with modified independence within 7 day(s). 2.  Patient will transfer from bed to chair and chair to bed with supervision/set-up using the least restrictive device within 7 day(s). 3.  Patient will perform sit to stand with supervision/set-up within 7 day(s). 4.  Patient will ambulate with supervision/set-up for 100 feet with the least restrictive device within 7 day(s). 5.  Patient will attempt a 6 MWT for her diagnosis of heart failure within 48 hours of discharge. Outcome: Progressing Towards Goal 
 
PHYSICAL THERAPY EVALUATION Patient: Franchesca Nelson (56 y.o. female) Date: 3/28/2019 Primary Diagnosis: SOB (shortness of breath) [R06.02] CHF (congestive heart failure) ([facility] [I50.9] SOB (shortness of breath) [R06.02] Precautions:  DNR, Fall, Bed Alarm ASSESSMENT : 
Based on the objective data described below, the patient presents with impaired cardiopulmonary tolerance (currently needing 5 L/min O2 via nasal cannula for O2 sats > 90%), HIGH fall-risk, short-term memory loss, impaired safety awareness/insight into deficits, distal LE edema, urine incontinence, impaired dynamic standing balance, A & O x 2, and needing minimal assist/close standby assist and 1-step cuing (cannot follow 2-step commands) for all functional tasks/mobility with a rolling walker. Patient participated in repetitive sit <> stand transfers from EOB (x 5) for functional strengthening and reinforcement of hand placement for safety, box-stepping 1 ft in each direction x 10 times, and seated LE ROM/strengthening exercises.   
 
Ideal Discharge Recommendation: Healthcare at [facility] for skilled care with transition into higher level of care [facility] Care Unit with increased assistance) If first recommendation is not feasible: 
Increased physical assistance in retirement for bathing/showering, medication management, incontinence care, supplemental oxygen management, supervised ambulation to/from dining rader for meals, and HHPT services - patient endorses several falls. Recommend with nursing patient to complete as able in order to maintain strength, endurance and independence: OOB to chair 3x/day with RW, gait belt, A x 1 (CHAIR ALARM) and ambulating with RW, gait belt, portable oxygen, A x 1. Thank you for your assistance. Patient will benefit from skilled intervention to address the above impairments. Patient?s rehabilitation potential is considered to be Fair Factors which may influence rehabilitation potential include:  
? None noted ? Mental ability/status ? Medical condition ? Home/family situation and support systems ? Safety awareness 
? Pain tolerance/management 
? Other: PLAN : 
Recommendations and Planned Interventions: 
?           Bed Mobility Training             ? Neuromuscular Re-Education ? Transfer Training                   ? Orthotic/Prosthetic Training 
? Gait Training                         ? Modalities ? Therapeutic Exercises           ? Edema Management/Control ? Therapeutic Activities            ? Patient and Family Training/Education ? Other (comment): Frequency/Duration: Patient will be followed by physical therapy 3 times a week to address goals. Discharge Recommendations: Skilled services in Healthcare at [facility] Further Equipment Recommendations for Discharge: See above SUBJECTIVE:  
Patient stated ? I am really really bored. ? OBJECTIVE DATA SUMMARY:  
HISTORY:   
Past Medical History:  
Diagnosis Date (HFpEF) heart failure with preserved ejection fraction ([address] 10/17/2018  
 acute Allergic rhinitis Atherosclerotic cardiovascular disease 1999 CHF (congestive heart failure) ([facility] 09/11/2018 Cognitive communication deficit Edema Environmental allergies   
 dizziness-(chronic) Fatty liver disease, nonalcoholic GERD (gastroesophageal reflux disease) H/O heart artery stent 09/2018 HCVD (hypertensive cardiovascular disease) 10/17/2018 Heart attack ([address] 09/2018 Heart palpitations 2018 infrequent History of PTCA Hypercholesterolemia 1999 Hypertension 2010 Liver disease   
 fatty liver MI (myocardial infarction) ([address] 09/2018 Overactive bladder 08/09/2018 Peripheral neuropathy Permanent atrial fibrillation ([facility] 11/05/2018 Senile dementia, without behavioral disturbance 11/05/2018 Stress incontinence, female 08/09/2018 Vertigo Vitamin D deficiency Past Surgical History:  
Procedure Laterality Date BREAST SURGERY PROCEDURE UNLISTED    
 breast cyst Lieutenant Pulliam) CARDIAC SURG PROCEDURE UNLIST  8/1999  
 + stress thalassemia; neg. cath., (4/02) neg. Holter HX APPENDECTOMY HX BREAST BIOPSY Left 1998  
 neg HX CYST INCISION AND DRAINAGE Right years ago  
 neg HX DILATION AND CURETTAGE    
 x5  
 HX GYN    
 D&C (x5), ALLEY/BSO (12/01-Juliann/Zedler), Provera intolerance (bleeding), endometrial Bx annually HX PTCA  09/11/2018  
 stent distal RCA into PLwith KENNETH x 2 HX ALLEY AND BSO  12/01 Juliann/Zedler Prior Level of Function/Home Situation: Patient is a poor historian. Resides at [facility] (assisted-living). Rolling walker to dining rader for all meals. Personal factors and/or comorbidities impacting plan of care: History of dementia. CHF. Home Situation Home Environment: Assisted living Care Facility Name: [facility] One/Two Story Residence: One story Support Systems: Assisted living, Family member(s) Patient Expects to be Discharged to[de-identified] Unknown Current DME Used/Available at Home: Walker, rolling, Grab bars, Wheelchair EXAMINATION/PRESENTATION/DECISION MAKING:  
Critical Behavior: 
Neurologic State: Alert, Confused Orientation Level: Oriented to person, Oriented to place, Disoriented to situation, Disoriented to time Cognition: Impaired decision making, Memory loss Hearing: Auditory Auditory Impairment: None Skin:  Fragile; R thigh ecchymosis Edema: Distal B LE edema Range Of Motion: 
AROM: Within functional limits Strength:   
Strength: Generally decreased, functional 
Tone & Sensation:  
Tone: Normal 
Sensation: Intact Coordination: 
Coordination: Generally decreased, functional 
Functional Mobility: 
Bed Mobility: 
Supine to Sit: Additional time;Stand-by assistance(With HOB elevated) Sit to Supine: Additional time;Stand-by assistance Scooting: Additional time;Stand-by assistance Transfers: 
Sit to Stand: Assist x1;Minimum assistance(For light steadying upon standing) Stand to Sit: Assist x1;Minimum assistance(+ Safety cues) Balance:  
Sitting: Intact Standing: Impaired; With support Standing - Static: Fair;Constant support Standing - Dynamic : Fair Ambulation/Gait Training: 
Distance (ft): (Box-steppin ft each direction x 10) Assistive Device: Gait belt;Walker, rolling Ambulation - Level of Assistance: Contact guard assistance Gait Description (WDL): Exceptions to [facility] Gait Abnormalities: Decreased step clearance Base of Support: Narrowed Speed/Татьяна: Delayed Step Length: Right shortened;Left shortened Therapeutic Exercises:  
Seated: LAQs x 8 each Marches x 8 each Resisted hip abduction x 6 Resisted/pillow hip adduction x 6 Functional Measure: 
Five times sit to stand: 
 
Five Times Sit to Stand: 21 Seconds Normative averages: 
Clients younger than 61years old  £  10 seconds = Normal  
 Clients older than 61years old £ 14.2 seconds = Normal  
Change of ³ 2.3 seconds shows a significant clinical improvement Eleanor HOLLY. Reference values for the five repetition sit to stand test: a descriptive metaanalysis of data from elders. Percept Mot Skills 2006; 103(1):215-222. Based on the above components, the patient evaluation is determined to be of the following complexity level: LOW Pain: 
Pain Scale 1: Numeric (0 - 10) Pain Intensity 1: 0 Activity Tolerance: 
Please refer to the flowsheet for vital signs taken during this treatment. After treatment:  
?         Patient left in no apparent distress sitting up in chair ? Patient left in no apparent distress in bed 
? Call bell left within reach ? Nursing notified ? Caregiver present ? Bed alarm activated COMMUNICATION/EDUCATION:  
The patient?s plan of care was discussed with: Occupational Therapist and Registered Nurse. ?         Fall prevention education was provided and the patient/caregiver indicated understanding. ? Patient/family have participated as able in goal setting and plan of care. ?         Patient/family agree to work toward stated goals and plan of care. ?         Patient understands intent and goals of therapy, but is neutral about his/her participation. ? Patient is unable to participate in goal setting and plan of care. Thank you for this referral. 
Brendan Nelson, PT, DPT Time Calculation: 22 mins

## 2019-03-28 NOTE — PHYSICIAN ADVISORY
Letter of Status Determination:  
Recommend hospitalization status upgraded from OBSERVATION  to INPATIENT  Status Pt Name:  Dayanna Foreman MR#  
BLAIR # [id number] / 
[id number] Payor: Darrell Keyes / Plan: [address] / Product Type: Medicare /   
QUIANA#  [id number] Room and Hospital  433/02  @ [facility]  
Hospitalization date  3/27/2019  1:03 PM  
Current Attending Physician  Emerson Mckenzie MD  
Principal diagnosis  SOB (shortness of breath) [R06.02] CHF (congestive heart failure) ([facility] [address] [address] [I50.9] Clinicals  80 y.o. y.o  female hospitalized with above diagnosis The pt presented with symptoms and signs of Acute respiratory failure with hypoxia possibly due to volume overload and significant valvular heart disease. It is also noted that she was found to have bilateral pleural effusions and other findings on her chest XRay . [place] criteria Does  NOT  apply STATUS DETERMINATION  This patient is at high risk of adverse events and deterioration based on documented clinical data, comorbid conditions and current acute care course. Ms. Dayanna Foreman is expected to meet Inpatient Admission status criteria in accordance with CMS regulation Section 43 .3. Specifically, due to medical necessity the patient's stay is expected to exceed Two Midnights. It is our recommendation that this patient's hospitalization status should be upgraded from  OBSERVATION to INPATIENT status. The final decision of the patient's hospitalization status depends on the attending physician's judgment. Additional comments Payor: Darrell Keyes / Plan: [address] / Product Type: Medicare /   
  
 
Yessenia Xie MD MPH FACP Cell: [phone] Physician Advisor [address] [address] [address] [facility]  
   
Cell  [phone]   
 
 
[phone] Matteo Wyatt

## 2019-03-28 NOTE — CONSULTS
Palliative Medicine Consult  Garcia: [phone] ([phone])    Patient Name: Marguerite Alvarez  YOB: 1927    Date of Initial Consult: 3/28/19  Reason for Consult: Care decisions   Requesting Provider: Ga Vega   Primary Care Physician: Mayo Husain NP     SUMMARY:   Marguerite Alvarez is a 80 y.o. with a past history of CAD s/p STEMI 9/2018 w/ stents, severe M, who was admitted on 3/27/2019 from PCP office w/ worsening SOB. Volume overloaded, being diuresed. Has been evaluated at [facility] and plans for procedure for MR in April 2019. Pt lives at [facility] and follows w/ her PCP and Cardiologist on regular basis- notes reviewed in [facility]. Has DURAN, but could walk w/ walker, has had incr memory problems and depressive sx. However overall has had general decline since her MI in the fall. Has DDNR on file. Current medical issues leading to Palliative Medicine involvement include: care decisions. Social: Pt lives in [facility]. Has [facility] on AMD on file, although this document does not have 2 witnesses) and Lauren Treviño. PALLIATIVE DIAGNOSES:   1. Shortness of breath   2. Debility- due to weakness and SOB  3. Grief over loss of function, depression   4. Goals of care  5. Memory impairment        PLAN:   1. Meet w/ pt who is alert and conversational although seems a bit confused about medical details. Shares that life has been difficult lately, but cannot tell me since when- but admits to feeling sadder, less functional and distressed due to worsening SOB and because she urinates all night long and cannot get good sleep. Supportive listening provided- pt still has charlene in her life, when she sees her friends and family. 2. Pt trusts her PCP greatly, says that Meng Graft is \"on top of everything\" and indeed she seems very proactive in sx management. 3. Plan is for mitral valve surgery at [facility] next month. The goal is to help function and sx.  Dr Sushil Acosta sees her frequently and knows her functional status when not having acute sx requiring hospitalization. 4. Goals clear, has DDNR on file. Pt shares that if the surgery does not work, then she would not want others and perhaps would be open to more comfort measures in future if in/out of hospital continues. Does not like being in the hospital, does not like having thoracenteses. However the hope is that she will feel and do better afterwards. 5. Discussed w/ Dr Gabrielle Roberson. Available to discuss w/ family if needed. 6. Initial consult note routed to primary continuity provider and/or primary health care team members  7. Communicated plan of care with: Palliative IDT, [facility] Team     GOALS OF CARE / TREATMENT PREFERENCES:     GOALS OF CARE:  Patient/Health Care Proxy Stated Goals: Other (comment)(To have valve surgery and feel better)    TREATMENT PREFERENCES:   Code Status: DNR -DDNR on file    Advance Care Planning:  [] The [facility] Interdisciplinary Team has updated the ACP Navigator with [place] and Patient Capacity      Primary Decision Maker: Karla Rodrigue [facility] - [phone]    Advance Care Planning 12/6/2018   Primary Decision Maker Name -   Primary Decision [facility] Phone Number -   Primary Decision Maker Relationship to Patient -   Confirm Advance Directive Yes, on file   Does the patient have other document types -       Medical Interventions: Limited additional interventions             Other:    As far as possible, the palliative care team has discussed with patient / health care proxy about goals of care / treatment preferences for patient. HISTORY:     History obtained from: Pt, chart, staff     CHIEF COMPLAINT: SOB    HPI/SUBJECTIVE:    The patient is:   [x] Verbal and participatory  [] Non-participatory due to:     Pt in NAD. Able to give me a hx, although gets a bit confused on talking about the valve surgery. +SOB when moving around.  Has purewick in place, does not like urinating so much at home. Denies pain. Clinical Pain Assessment (nonverbal scale for severity on nonverbal patients):   Clinical Pain Assessment  Severity: 0          Duration: for how long has pt been experiencing pain (e.g., 2 days, 1 month, years)  Frequency: how often pain is an issue (e.g., several times per day, once every few days, constant)     FUNCTIONAL ASSESSMENT:     Palliative Performance Scale (PPS):  PPS: 30       PSYCHOSOCIAL/SPIRITUAL SCREENING:     Palliative IDT has assessed this patient for cultural preferences / practices and a referral made as appropriate to needs (Cultural Services, Patient Advocacy, Ethics, etc.)    Any spiritual / Quaker concerns:  [] Yes /  [x] No    Caregiver Burnout:  [] Yes /  [x] No /  [] No Caregiver Present      Anticipatory grief assessment:   [x] Normal  / [] Maladaptive       ESAS Anxiety: Anxiety: 2    ESAS Depression: Depression: 2        REVIEW OF SYSTEMS:     Positive and pertinent negative findings in ROS are noted above in HPI. The following systems were [x] reviewed / [] unable to be reviewed as noted in HPI  Other findings are noted below. Systems: constitutional, ears/nose/mouth/throat, respiratory, gastrointestinal, genitourinary, musculoskeletal, integumentary, neurologic, psychiatric, endocrine. Positive findings noted below. Modified ESAS Completed by: provider   Fatigue: 5 Drowsiness: 0   Depression: 2 Pain: 0   Anxiety: 2 Nausea: 0   Anorexia: 3 Dyspnea: 3                    PHYSICAL EXAM:     From RN flowsheet:  Wt Readings from Last 3 Encounters:   03/28/19 131 lb 2.8 oz (59.5 kg)   03/27/19 132 lb 11.2 oz (60.2 kg)   03/05/19 129 lb (58.5 kg)     Blood pressure (!) 105/39, pulse 94, temperature 97.6 °F (36.4 °C), resp. rate 18, weight 131 lb 2.8 oz (59.5 kg), last menstrual period 01/01/2000, SpO2 90 %.     Pain Scale 1: Numeric (0 - 10)  Pain Intensity 1: 0                   Constitutional: awake, alert, oriented but gets dates confused   Eyes: pupils equal, anicteric  ENMT: no nasal discharge, moist mucous membranes  Respiratory: breathing not labored at rest   Gastrointestinal: soft   Musculoskeletal: no deformity  Skin: warm, dry  Neurologic: following commands, moving all extremities  Psychiatric: full affect, no hallucinations       HISTORY:     Active Problems:    SOB (shortness of breath) (3/27/2019)      CHF (congestive heart failure) ([facility] (3/27/2019)      Past Medical History:   Diagnosis Date    (HFpEF) heart failure with preserved ejection fraction ([facility] 10/17/2018    acute    Allergic rhinitis     Atherosclerotic cardiovascular disease 1999    CHF (congestive heart failure) ([facility] 09/11/2018    Cognitive communication deficit     Edema     Environmental allergies     dizziness-(chronic)    Fatty liver disease, nonalcoholic     GERD (gastroesophageal reflux disease)     H/O heart artery stent 09/2018    HCVD (hypertensive cardiovascular disease) 10/17/2018    Heart attack ([facility] 09/2018    Heart palpitations 2018    infrequent     History of PTCA     Hypercholesterolemia 1999    Hypertension 2010    Liver disease     fatty liver    MI (myocardial infarction) ([facility] [facility] 09/2018    Overactive bladder 08/09/2018    Peripheral neuropathy     Permanent atrial fibrillation ([facility] 11/05/2018    Senile dementia, without behavioral disturbance 11/05/2018    Stress incontinence, female 08/09/2018    Vertigo     Vitamin D deficiency       Past Surgical History:   Procedure Laterality Date    BREAST SURGERY PROCEDURE UNLISTED      breast cyst Baljit Lainez)    CARDIAC SURG PROCEDURE UNLIST  8/1999    + stress thalassemia; neg. cath., (4/02) neg.  Holter    HX APPENDECTOMY      HX BREAST BIOPSY Left 1998    neg    HX CYST INCISION AND DRAINAGE Right years ago    neg    HX DILATION AND CURETTAGE      x5    HX GYN      D&C (x5), ALLEY/BSO (12/01-Juliann/Jerome), Provera intolerance (bleeding), endometrial Bx annually    HX PTCA  09/11/2018 stent distal RCA into PLwith KENNETH x 2    HX ALLEY AND BSO      Juliann/Renatodler      Family History   Problem Relation Age of Onset    Diabetes Mother     Hypertension Mother     Heart Failure Mother         CHF ( @ 80)   [place] Alzheimer Mother     Cancer Father         lung ( @ 77)   [place] Alzheimer Father     No Known Problems Son     No Known Problems Son     Ovarian Cancer Sister         hysterectomy    Breast Cancer Sister 28        mastectomy    Cancer Sister         breast and ovarian    Alzheimer Sister     Breast Problems Sister         breast cyst    Cataract Sister     Hypertension Sister     Diabetes Brother       History reviewed, no pertinent family history.   Social History     Tobacco Use    Smoking status: Former Smoker     Packs/day: 4.00     Types: Cigarettes     Last attempt to quit: 1955     Years since quittin.2    Smokeless tobacco: Never Used   Substance Use Topics    Alcohol use: No     Alcohol/week: 0.0 - 0.5 oz     Allergies   Allergen Reactions    Brilinta [Ticagrelor] Shortness of Breath    Celecoxib Other (comments)    Codeine Hives    Sulfa (Sulfonamide Antibiotics) Unknown (comments) and Hives    Iodine Unknown (comments)    Aricept [Donepezil] Other (comments)     Headaches - noted as intolerance     Darvocet A500 [Propoxyphene N-Acetaminophen] Nausea Only      Current Facility-Administered Medications   Medication Dose Route Frequency    predniSONE (DELTASONE) tablet 40 mg  40 mg Oral DAILY WITH BREAKFAST    potassium chloride SR (KLOR-CON 10) tablet 40 mEq  40 mEq Oral DAILY    sodium chloride (NS) flush 5-40 mL  5-40 mL IntraVENous Q8H    sodium chloride (NS) flush 5-40 mL  5-40 mL IntraVENous PRN    acetaminophen (TYLENOL) tablet 650 mg  650 mg Oral Q4H PRN    ondansetron (ZOFRAN) injection 4 mg  4 mg IntraVENous Q4H PRN    dilTIAZem CD (CARDIZEM CD) capsule 180 mg  180 mg Oral QHS    simvastatin (ZOCOR) tablet 40 mg  40 mg Oral QHS    nitroglycerin (NITROSTAT) tablet 0.4 mg  0.4 mg SubLINGual PRN    apixaban (ELIQUIS) tablet 2.5 mg  2.5 mg Oral BID    pantoprazole (PROTONIX) tablet 40 mg  40 mg Oral ACB    polyethylene glycol (MIRALAX) packet 17 g  17 g Oral DAILY    carvedilol (COREG) tablet 3.125 mg  3.125 mg Oral BID    melatonin tablet 3 mg  3 mg Oral QHS    levothyroxine (SYNTHROID) tablet 37.5 mcg  37.5 mcg Oral ACB    escitalopram oxalate (LEXAPRO) tablet 10 mg  10 mg Oral DAILY    busPIRone (BUSPAR) tablet 5 mg  5 mg Oral BID    clopidogrel (PLAVIX) tablet 75 mg  75 mg Oral DAILY    bumetanide (BUMEX) injection 1 mg  1 mg IntraVENous BID          LAB AND IMAGING FINDINGS:     Lab Results   Component Value Date/Time    WBC 8.2 03/28/2019 03:16 AM    HGB 10.9 (L) 03/28/2019 03:16 AM    PLATELET 293 01/71/5066 03:16 AM     Lab Results   Component Value Date/Time    Sodium 139 03/28/2019 03:16 AM    Potassium 3.0 (L) 03/28/2019 03:16 AM    Chloride 102 03/28/2019 03:16 AM    CO2 31 03/28/2019 03:16 AM    BUN 25 (H) 03/28/2019 03:16 AM    Creatinine 1.22 (H) 03/28/2019 03:16 AM    Calcium 9.2 03/28/2019 03:16 AM    Magnesium 2.0 02/12/2019 01:38 PM    Phosphorus 3.5 11/28/2018 01:39 PM      Lab Results   Component Value Date/Time    AST (SGOT) 19 03/28/2019 03:16 AM    Alk.  phosphatase 85 03/28/2019 03:16 AM    Protein, total 7.6 03/28/2019 03:16 AM    Albumin 3.4 (L) 03/28/2019 03:16 AM    Globulin 4.2 (H) 03/28/2019 03:16 AM     Lab Results   Component Value Date/Time    INR 1.3 (H) 12/07/2018 02:46 AM    Prothrombin time 12.7 (H) 12/07/2018 02:46 AM    aPTT 29.4 12/07/2018 02:46 AM      No results found for: IRON, FE, TIBC, IBCT, PSAT, FERR   No results found for: PH, PCO2, PO2  No components found for: GLPOC   No results found for: CPK, CKMB             Total time:  50 min   Counseling / coordination time, spent as noted above: 35 min   > 50% counseling / coordination?: yes    Prolonged service was provided for  []30 min   []75 min in face to face time in the presence of the patient, spent as noted above. Time Start:   Time End:   Note: this can only be billed with 01725 (initial) or 74611 (follow up). If multiple start / stop times, list each separately.

## 2019-03-28 NOTE — PROGRESS NOTES
Hospitalist Progress Note Nicholas Rizzo MD 
Answering service: [phone] or [phone] from in house phone Date of Service:  3/28/2019 NAME:  Shagufta Johnson :  1927 MRN:  [id number] Admission Summary: This is a 79-year-old  female with a past medical history of hypercholesterolemia, GERD, hypertension, peripheral neuropathy, coronary artery disease/MI last September, CHF, dementia. She comes over here because of shortness of breath. Please note that the whole history is mainly per the patient's family members including the son and daughter-in-law who are there in her HPI. The patient just mentioned that she is in the hospital because she was having shortness of breath. Interval history / Subjective:  
Pt is anxious , pt is wheezing Assessment & Plan: 1. Her shortness of breath is most likely to volume overload vs asthmatic bronchitis 
- cont diuresis consulted cardiology -  The patient had an echo done in 2019 with an ejection fraction of 56-60% with moderately dilated left atrial cavity and severe mitral valve regurgitation. ' 
- get ECHO  
- diuresis  
- steroid 40 mg daily 2. Paroxysmal atrial fibrillation. The patient is on Eliquis. We will continue her Coreg as well as Cardizem. 3.  Severe mitral regurgitation as mentioned above, the patient is already scheduled at [facility] for  to have the procedure. Will do thoracentesis she got x 2 last nov- dec time 4. Chronic diarrhea. The patient is quite frustrated with having chronic diarrhea as well as urinary incontinence that has affected her social life quite significantly. I noted that in her primary care physician's notes as well. - Overall in last 6 months, the patient has quite significant deterioration in which she has lost more than about 30 pounds.   I would consult the palliative care in care decisions. May do w/u for malignancy with ascitics and bilateral plural effusion Check UA 6. Slight abdominal distention. USG -Small volume of ascites. Bilateral pleural effusions. May need drainage of both 7.  Bilateral pedal edema. I will get a venous Doppler on the patient as well. 8. Hypokalemia - replete 9. Dementia - supportive care 
  
Code status: DNR 
DVT prophylaxis:Eliquis Care Plan discussed with: Patient/Family and Nurse Disposition: TBD d/w son over phone Hospital Problems  Date Reviewed: 3/7/2019 Codes Class Noted POA  
 SOB (shortness of breath) ICD-10-CM: R06.02 
ICD-9-CM: 786.05  3/27/2019 Unknown CHF (congestive heart failure) (HCC) ICD-10-CM: I50.9 ICD-9-CM: 428.0  3/27/2019 Unknown Review of Systems: A comprehensive review of systems was negative. Xr Chest Pa Lat Result Date: 3/27/2019 IMPRESSION: Small to moderate pleural effusions and bibasilar opacities that may represent atelectasis, edema, or infection. [facility] Result Date: 3/27/2019 IMPRESSION:  Small volume of ascites. Bilateral pleural effusions. Vital Signs:  
 Last 24hrs VS reviewed since prior progress note. Most recent are: 
Visit Vitals /69 Pulse 96 Temp 97.7 °F (36.5 °C) Resp 20 Wt 59.5 kg (131 lb 2.8 oz) SpO2 96% BMI 24.79 kg/m² Intake/Output Summary (Last 24 hours) at 3/28/2019 4129 Last data filed at 3/27/2019 1636 Gross per 24 hour Intake  Output 400 ml Net -400 ml Physical Examination:  
 
 
     
Constitutional:  No acute distress, cooperative, pleasant   
ENT:  Oral mucous moist, oropharynx benign. Neck supple, Resp:  CTA bilaterally. No wheezing/rhonchi/rales. No accessory muscle use CV:  Regular rhythm, normal rate, no murmurs, gallops, rubs GI:  Soft, non distended, non tender. normoactive bowel sounds, no hepatosplenomegaly Musculoskeletal:  No edema, warm, 2+ pulses throughout Neurologic:  Moves all extremities. AAOx1/2, CN II-XII reviewed Data Review:  
 I personally reviewed  Image and labs Labs:  
 
Recent Labs  
  03/28/19 
0316 03/27/19 
1319 WBC 8.2 8.1 HGB 10.9* 11.2* HCT 36.1 37.4  259 Recent Labs  
  03/28/19 
0316 03/27/19 
1319  139  
K 3.0* 3.7  104 CO2 31 31 BUN 25* 32* CREA 1.22* 1.49* * 90  
CA 9.2 9.4 Recent Labs  
  03/28/19 
0316 03/27/19 
1319 SGOT 19 21 ALT 16 16 AP 85 91 TBILI 0.7 0.5 TP 7.6 8.0 ALB 3.4* 3.6 GLOB 4.2* 4.4* No results for input(s): INR, PTP, APTT in the last 72 hours. No lab exists for component: INREXT No results for input(s): FE, TIBC, PSAT, FERR in the last 72 hours. Lab Results Component Value Date/Time Folate >20.0 02/12/2019 01:38 PM  
  
No results for input(s): PH, PCO2, PO2 in the last 72 hours. Recent Labs  
  03/27/19 
1319 TROIQ <0.05 Lab Results Component Value Date/Time Cholesterol, total 119 02/12/2019 01:38 PM  
 HDL Cholesterol 42 02/12/2019 01:38 PM  
 LDL, calculated 58 02/12/2019 01:38 PM  
 Triglyceride 97 02/12/2019 01:38 PM  
 CHOL/HDL Ratio 3.8 12/17/2009 08:35 AM  
 
No results found for: Ion Jerod Lab Results Component Value Date/Time  Color YELLOW/STRAW 12/06/2018 02:53 PM  
 Appearance CLEAR 12/06/2018 02:53 PM  
 Specific gravity 1.011 12/06/2018 02:53 PM  
 pH (UA) 5.5 12/06/2018 02:53 PM  
 Protein NEGATIVE  12/06/2018 02:53 PM  
 Glucose NEGATIVE  12/06/2018 02:53 PM  
 Ketone NEGATIVE  12/06/2018 02:53 PM  
 Bilirubin NEGATIVE  12/06/2018 02:53 PM  
 Urobilinogen 0.2 12/06/2018 02:53 PM  
 Nitrites NEGATIVE  12/06/2018 02:53 PM  
 Leukocyte Esterase NEGATIVE  12/06/2018 02:53 PM  
 Epithelial cells FEW 12/06/2018 02:53 PM  
 Bacteria NEGATIVE  12/06/2018 02:53 PM  
 WBC 0-4 12/06/2018 02:53 PM  
 RBC 0-5 12/06/2018 02:53 PM  
 
 
 
 Medications Reviewed:  
 
Current Facility-Administered Medications Medication Dose Route Frequency  sodium chloride (NS) flush 5-40 mL  5-40 mL IntraVENous Q8H  
 sodium chloride (NS) flush 5-40 mL  5-40 mL IntraVENous PRN  
 acetaminophen (TYLENOL) tablet 650 mg  650 mg Oral Q4H PRN  
 ondansetron (ZOFRAN) injection 4 mg  4 mg IntraVENous Q4H PRN  
 dilTIAZem CD (CARDIZEM CD) capsule 180 mg  180 mg Oral QHS  simvastatin (ZOCOR) tablet 40 mg  40 mg Oral QHS  nitroglycerin (NITROSTAT) tablet 0.4 mg  0.4 mg SubLINGual PRN  
 apixaban (ELIQUIS) tablet 2.5 mg  2.5 mg Oral BID  pantoprazole (PROTONIX) tablet 40 mg  40 mg Oral ACB  polyethylene glycol (MIRALAX) packet 17 g  17 g Oral DAILY  carvedilol (COREG) tablet 3.125 mg  3.125 mg Oral BID  melatonin tablet 3 mg  3 mg Oral QHS  levothyroxine (SYNTHROID) tablet 37.5 mcg  37.5 mcg Oral ACB  escitalopram oxalate (LEXAPRO) tablet 10 mg  10 mg Oral DAILY  busPIRone (BUSPAR) tablet 5 mg  5 mg Oral BID  clopidogrel (PLAVIX) tablet 75 mg  75 mg Oral DAILY  bumetanide (BUMEX) injection 1 mg  1 mg IntraVENous BID  
 
______________________________________________________________________ EXPECTED LENGTH OF STAY: - - - 
ACTUAL LENGTH OF STAY:          0 Iglesia Millan MD

## 2019-03-28 NOTE — H&P
[address]  HISTORY AND PHYSICAL Name:  Kelli Manley 
MR#:  [id number] :  [id number] ACCOUNT #:  [de-identified] ADMIT DATE:  2019 TIME:  04:40 p.m. ADMITTING PHYSICIAN:  Vicki Andrea MD 
 
PRIMARY CARE PHYSICIAN:  Viktor Garcia NP 
 
PRIMARY CARDIOLOGIST:  Cornell Meng MD 
 
CHIEF COMPLAINT:  Shortness of breath. HISTORY OF PRESENT ILLNESS:  This is a 80-year-old  female with a past medical history of hypercholesterolemia, GERD, hypertension, peripheral neuropathy, coronary artery disease/MI last September, CHF, dementia. She comes over here because of shortness of breath. Please note that the whole history is mainly per the patient's family members including the son and daughter-in-law who are there in her HPI. The patient just mentioned that she is in the hospital because she was having shortness of breath. It seems that the patient had coronary artery disease/MI in last September. Since that time, the patient had some gradual decline in which the son claims that she has been having worsening shortness of breath to the point now that she is short of breath even while lying down. Also, please note that she has MR, for which she had been evaluated by [facility], and she is already scheduled to have a procedure done on . Today at around noon, the patient was short of breath and she was found to have pulse ox of 80% on room air and that is why she was brought over here. The patient says that she does not have any chest pain, no headache, dizziness, nausea or vomiting. No changes in bowel movements. At baseline, the patient is not on any supplemental oxygen. She usually has some pedal edema, no chest pain. She also has diarrhea at baseline. REVIEW OF SYSTEMS:  Pertinent positive as above. Rest of review of systems were done, they were all negative except for above. PAST MEDICAL HISTORY: 
1. Chronic diastolic CHF. 2.  GERD. 3. MI. 4.  Hypercholesterolemia. 5.  Hypertension. 6.  Fatty liver disease. 7.  Urinary incontinence. 8.  Atrial fibrillation. FAMILY HISTORY:  Significant for diabetes, hypertension, CHF, Alzheimer disease. PAST SURGICAL HISTORY: 1.   breast biopsy. 2.  D and C. 
 
SOCIAL HISTORY:  The patient is a former smoker, quit in 1543, nonalcoholic, nondrug abuser. Currently lives at [facility] at assisted living site. Baseline functionality, the patient before her MI in September was quite mobile and used to do all her activities by herself including driving. Currently, the patient is at [facility] and walks by herself. ALLERGIES:  THE PATIENT IS ALLERGIC TO BRILINTA AND CELECOXIB. HOME MEDICATIONS:  She is on following medications, 
1. Eliquis 2.5 mg p.o. b.i.d. 2.  Bumex 2 mg p.o. b.i.d. 3.  BuSpar 5 mg p.o. b.i.d. 4.  Coreg 3.125 mg p.o. b.i.d. 
5.  Plavix 75 mg p.o. daily. 6.  Cardizem 180 mg p.o. at bedtime. 7.  Lexapro 10 mg p.o. daily. 8.  Synthroid 25 mcg tablet, 1-1/2 tablets daily. 9.  Melatonin 10 mg p.o. at bedtime. 10.  Protonix 40 mg p.o. daily. 11.  MiraLax 17 g p.o. daily. 12.  Klor-Con 20 mEq p.o. daily. 13.  Simvastatin 40 mg p.o. at bedtime. PHYSICAL EXAMINATION: 
VITAL SIGNS:  At the time the patient was seen, the patient's vital signs were as follows, blood pressure 126/63, pulse rate 98 afebrile, respiratory rate 29, saturating 94% on 3.5 liters of nasal cannula. GENERAL:  Not in acute distress, comfortably lying in bed. HEENT:  Head:  Normocephalic, atraumatic. Eyes:  PERRLA, EOMI. Ears:  Bilaterally hearing normal.  No growth. Nose:  No polyps or bleeding. Mouth:  Dry mucous membrane. Decent oral hygiene. NECK:  Supple. No JVD. No thyromegaly. RESPIRATORY:  Bilaterally posterior basal coarse breath sounds. Otherwise good air entry. CARDIOVASCULAR:  Irregularly irregular rhythm. Systolic ejection murmur heard at the apex. GI:  Bowel sounds present, soft, slightly distended. NEUROLOGIC:  Cranial nerves II through XII intact. Motor 5/5 bilaterally upper limbs and lower limbs. Sensory normal. 
PSYCHIATRIC:  Mood and affect, appropriate. Alert and oriented x3. EXTREMITIES:  Bilateral lower extremities:  Left lower extremity, 2+ pedal edema till knees. Right lower extremity, 1+ pedal edema. SKIN:  No rashes. No ulcers. LABORATORY AND RADIOLOGICAL RESULTS:  CBC:  WBC 8.1, hemoglobin 11.2, hematocrit 37, and platelet count of 887. Sodium 139, potassium 3.7, chloride 104, bicarb 31, BUN 32, creatinine 1.49. Troponin x1 is negative. ProBNP is 7509. ABG was done which showed a pH of 7.4, pCO2 of 41, pO2 of 54. X-ray of the chest was done which shows mild to moderate pleural effusion, bibasilar opacities,  atelectasis, edema, or infection. EKG shows atrial fibrillation with PVCs. No ST-T wave changes suggestive of ischemia. ASSESSMENT AND PLAN:  This is a 61-year-old female with past medical history of hypertension, hypercholesterolemia, coronary artery disease/myocardial infarction, congestive heart disease, dementia. She comes over here with shortness of breath. 1.  Her shortness of breath is most likely to volume overload. I will admit the patient under observation. We will put the patient on IV diuresis with strict intake and outputs, daily weights. I will also consult Dr. Jasmina Malone in taking care of the patient. The patient had an echo done in February 2019 with an ejection fraction of 56-60% with moderately dilated left atrial cavity and severe mitral valve regurgitation. I will get another echo on the patient that needs to be followed, that will let us know if her cardiac function is deteriorated. 2.  Paroxysmal atrial fibrillation. The patient is on Eliquis. We will continue that for now. We will continue her Coreg as well as Cardizem. 3.  Severe mitral regurgitation as mentioned above, the patient is already scheduled at [facility] for 04/09 to have the procedure. 4.  Chronic diarrhea. The patient is quite frustrated with having chronic diarrhea as well as urinary incontinence that has affected her social life quite significantly. I noted that in her primary care physician's notes as well. Overall in last 6 months, the patient has quite significant deterioration in which she has lost more than about 30 pounds. I would consult the palliative care in care decisions. 5.  Currently, the patient is DNR. 6.  Slight abdominal distention. I will get an abdominal ultrasound which needs to be followed. 7.  Bilateral pedal edema. I will get a venous Doppler on the patient as well. I spent about 50 minutes in taking care of the patient. Phyllis Morales MD 
 
 
[id number] 
D:  03/27/2019 16:59 
T:  03/27/2019 20:43 JOB #:  [id number]

## 2019-03-28 NOTE — PROGRESS NOTES
Spiritual Care Assessment/Progress Note [address] 
 
 
NAME: Lang Gallegos      MRN: [id number] AGE: 80 y.o. SEX: female Moravian Affiliation: Hindu  
Language: English  
 
3/28/2019 Spiritual Assessment begun in [address] [facility] [address] through conversation with: 
  
    [x]Patient        [] Family    [] Friend(s) Reason for Consult: Palliative Care, Initial/Spiritual Assessment Spiritual beliefs: (Please include comment if needed) [x] Identifies with a amari tradition:    [address] 
   [] Supported by a amari community:        
   [] Claims no spiritual orientation:       
   [] Seeking spiritual identity:            
   [] Adheres to an individual form of spirituality:       
   [] Not able to assess:                   
 
    
Identified resources for coping:  
   [x] Prayer                           
   [] Music                  [] Guided Imagery 
   [] Family/friends                 [] Pet visits [] Devotional reading                         [] Unknown 
   [] Other:                                          
 
 
Interventions offered during this visit: (See comments for more details) Patient Interventions: Affirmation of emotions/emotional suffering, Catharsis/review of pertinent events in supportive environment, Initial/Spiritual assessment, patient floor, Prayer (assurance of) Plan of Care: 
 
 [x] Support spiritual and/or cultural needs  
 [] Support AMD and/or advance care planning process    
 [] Support grieving process 
 [] Coordinate Rites and/or Rituals  
 [] Coordination with community clergy [] No spiritual needs identified at this time 
 [] Detailed Plan of Care below (See Comments)  [] Make referral to Music Therapy 
[] Make referral to Pet Therapy    
[] Make referral to Addiction services 
[] Make referral to [facility] 
[x] Make referral to Spiritual Care Partner 
[] No future visits requested [] Follow up visits as needed Comments: Visited Ms Fariba Grullon in room 433/02 for initial Palliative Care spiritual assessment. Ms Fariba Grullon was sitting up in bed preparing to eat breakfast; no visitors was present. Provided active listening as patient shared she didn't know how she was doing. She shared that she was so used to working and being active and that was no longer the case so she just felt that things just weren't right. She expressed no specific concerns but did ask that  come back to see her because it let her know there was still life outside. Acknowledged patient's concerns and provided words of support.  will request visits by [facility] to help alleviate patient's feelings of isolation. Ms Fariba Grullon shared that she was a longtime member of [employer]. Assured her of prayers on her behalf and of 24-hour  availability for support. : Rev. Neli Ghotra. Tawanna Horta; [facility], to contact [phone] call: [phone]

## 2019-03-28 NOTE — PROGRESS NOTES
Problem: Self Care Deficits Care Plan (Adult) Goal: *Acute Goals and Plan of Care (Insert Text) Description Occupational Therapy Goals Initiated 3/28/2019 1. Patient will perform grooming with supervision/set-up standing at sink within 7 day(s). 2.  Patient will perform upper body dressing with supervision/set-up within 7 day(s). 3.  Patient will perform lower body dressing with supervision/set-up within 7 day(s). 4.  Patient will perform toilet transfers with supervision/set-up within 7 day(s). 5.  Patient will perform all aspects of toileting with supervision/set-up within 7 day(s). 6.  Patient will participate in upper extremity therapeutic exercise/activities with supervision/set-up for 15 minutes within 7 day(s). Outcome: Progressing Towards Goal 
 
OCCUPATIONAL THERAPY EVALUATION Patient: Toño Valdivia (89 y.o. female) Date: 3/28/2019 Primary Diagnosis: SOB (shortness of breath) [R06.02] CHF (congestive heart failure) ([facility] [I50.9] SOB (shortness of breath) [R06.02] Precautions:   DNR, Fall, Bed Alarm ASSESSMENT : 
Based on the objective data described below, the patient presents with Supervision and Minimum assistance PLOF. Today presents with Minimum assistance upper body ADLs, Maximum assistance lower body ADLs, and Minimum assistance assist functional mobility. The following are barriers to ADL independence while in acute care:  
- Cognitive and/or behavioral: orientation, attention to task, safety awareness, insight into deficits and dementia history - Medical condition: functional endurance and standing balance   
- Other:    
 
Patient will benefit from skilled acute intervention to address the above impairments. Patient?s rehabilitation potential is considered to be Good Discharge recommendations: Rehab at skilled nursing facility (SNF)- [facility] If above is not an option then recommend: Home health (to increase independence and safety) with HHOT and 24/7 supervision Barriers to discharging home, in addition to above listed impairments: lives alone 
family availability to assist 
home environment unsafe due to decreased awareness and safety concerns . Equipment recommendations for successful discharge (if) home: tbd PLAN : 
Recommendations and Planned Interventions: self care training, therapeutic exercise, therapeutic activities and cognitive retraining Frequency/Duration: Patient will be followed by occupational therapy 4 times a week to address goals. SUBJECTIVE:  
Patient stated ? I think that I am at [facility]. ? when asked which hospital and provided two options. After 10 minutes the patient still said she was at HD OBJECTIVE DATA SUMMARY:  
HISTORY:  
Past Medical History:  
Diagnosis Date (HFpEF) heart failure with preserved ejection fraction ([address] 10/17/2018  
 acute Allergic rhinitis Atherosclerotic cardiovascular disease 1999 CHF (congestive heart failure) ([address] 09/11/2018 Cognitive communication deficit Edema Environmental allergies   
 dizziness-(chronic) Fatty liver disease, nonalcoholic GERD (gastroesophageal reflux disease) H/O heart artery stent 09/2018 HCVD (hypertensive cardiovascular disease) 10/17/2018 Heart attack ([address] 09/2018 Heart palpitations 2018 infrequent History of PTCA Hypercholesterolemia 1999 Hypertension 2010 Liver disease   
 fatty liver MI (myocardial infarction) ([address] 09/2018 Overactive bladder 08/09/2018 Peripheral neuropathy Permanent atrial fibrillation ([address] 11/05/2018 Senile dementia, without behavioral disturbance 11/05/2018 Stress incontinence, female 08/09/2018 Vertigo Vitamin D deficiency Past Surgical History:  
Procedure Laterality Date BREAST SURGERY PROCEDURE UNLISTED    
 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 cath., (4/02) neg. Holter HX APPENDECTOMY HX BREAST BIOPSY Left 1998  
 neg HX CYST INCISION AND DRAINAGE Right years ago  
 neg HX DILATION AND CURETTAGE    
 x5  
 HX GYN    
 D&C (x5), ALLEY/BSO (12/01-Juliann/Zedler), Provera intolerance (bleeding), endometrial Bx annually HX PTCA  09/11/2018  
 stent distal RCA into PLwith KENNETH x 2 HX ALLEY AND BSO  12/01 Juliann/Zedler Prior Level of Function/Environment/Context: Pt is a poor historian. Son is present but unsure of PLOF. Pt lives at [facility] and does get some level of assistance with bathing and other ADLs and IADL help. The patient eats all meals in dining rader. Other details are unknown at this time. Expanded or extensive additional review of patient history:  
 
Home Situation Home Environment: Assisted living Care Facility Name: [facility] One/Two Story Residence: One story Support Systems: Assisted living, Family member(s) Patient Expects to be Discharged to[de-identified] Unknown Current DME Used/Available at Home: Walker, rolling, Grab bars, Wheelchair Hand dominance: Right EXAMINATION OF PERFORMANCE DEFICITS: 
Cognitive/Behavioral Status: 
Neurologic State: Alert;Confused Orientation Level: Oriented to person;Oriented to place Cognition: Decreased attention/concentration; Follows commands Perception: Appears intact Perseveration: No perseveration noted Safety/Judgement: Decreased awareness of environment Skin: visible areas intact Edema: none noted Hearing: Auditory Auditory Impairment: None Vision/Perceptual:   
    
    
    
  
    
Acuity: Within Defined Limits Range of Motion: 
AROM: Within functional limits Strength: 
Strength: Generally decreased, functional 
  
  
  
  
Coordination: 
Coordination: Generally decreased, functional 
Fine Motor Skills-Upper: Left Intact; Right Intact Gross Motor Skills-Upper: Right Intact; Left Intact Tone & Sensation: Tone: Normal 
Sensation: Intact Balance: 
Sitting: Intact Standing: Impaired Standing - Static: Fair Standing - Dynamic : Fair Functional Mobility and Transfers for ADLs: 
Bed Mobility: 
Rolling: Minimum assistance Supine to Sit: Additional time;Stand-by assistance(With HOB elevated) Sit to Supine: Additional time;Contact guard assistance Scooting: Additional time Transfers: 
Sit to Stand: Minimum assistance;Assist x1 Stand to Sit: Contact guard assistance Toilet Transfer : Minimum assistance ADL Assessment: 
Feeding: Setup Oral Facial Hygiene/Grooming: Setup Bathing: Moderate assistance Upper Body Dressing: Minimum assistance Lower Body Dressing: Maximum assistance Toileting: Moderate assistance ADL Intervention and task modifications: Toileting: Mod A. Pt is eager to assist in clothing management, decent balance with 1 UE off Rw, assistance needed to doff and don brief in standing. LB Dressing: Max A: decreased ability to reach to feet to help manage distal clothing. Decent standing with 2 UE on Rw, min A for balance for 1 UE on Rw. Discussion of safety in hospital, calling before getting up and not attempting things independently. Cognitive Retraining Safety/Judgement: Decreased awareness of environment Therapeutic Exercise: 
Standing balance with 1 UE off Rw: min A for support, 2 UE off Rw, min A for posterior lean Functional Measure: 
Barthel Index: 
Bathin Bladder: 0 Bowels: 0 Groomin Dressin Feedin Mobility: 10 Stairs: 0 Toilet Use: 5 Transfer (Bed to Chair and Back): 10 Total: 35/100 Percentage of impairment  
0% 1-19% 20-39% 40-59% 60-79% 80-99% 100% Barthel Score 0-100 100 99-80 79-60 59-40 20-39 1-19 
 0 The Barthel ADL Index: Guidelines 1. The index should be used as a record of what a patient does, not as a record of what a patient could do. 2. The main aim is to establish degree of independence from any help, physical or verbal, however minor and for whatever reason. 3. The need for supervision renders the patient not independent. 4. A patient's performance should be established using the best available evidence. Asking the patient, friends/relatives and nurses are the usual sources, but direct observation and common sense are also important. However direct testing is not needed. 5. Usually the patient's performance over the preceding 24-48 hours is important, but occasionally longer periods will be relevant. 6. Middle categories imply that the patient supplies over 50 per cent of the effort. 7. Use of aids to be independent is allowed. Clifton Gonzalez., Barthel, D.W. (6110). Functional evaluation: the Barthel Index. [facility] (14)2. HAJA Mckeon, Sunny Sexton., Ellie Nettles., [place], [address] [place] [address] Ave (1999). Measuring the change indisability after inpatient rehabilitation; comparison of the responsiveness of the Barthel Index and Functional [place] Measure. Journal of Neurology, Neurosurgery, and Psychiatry, 66(4), 511-594. Mony Tomlinson, N.J.A, CORIN Ledesma, & Selvin Billingsley, M.A. (2004.) Assessment of post-stroke quality of life in cost-effectiveness studies: The usefulness of the Barthel Index and the EuroQoL-5D. [facility], 13, 365-99 Occupational Therapy Evaluation Charge Determination History Examination Decision-Making MEDIUM Complexity : Expanded review of history including physical, cognitive and psychosocial  history  LOW Complexity : 1-3 performance deficits relating to physical, cognitive , or psychosocial skils that result in activity limitations and / or participation restrictions  LOW Complexity : No comorbidities that affect functional and no verbal or physical assistance needed to complete eval tasks Based on the above components, the patient evaluation is determined to be of the following complexity level: LOW Pain: No pain reported Activity Tolerance:  
Fair On 5 L without significant desaturation After treatment patient left:  
Supine in bed Bed alarm/tab alert on 
RN notified COMMUNICATION/EDUCATION:  
The patient?s plan of care was discussed with: Physical Therapist and Registered Nurse. Home safety education was provided and the patient/caregiver indicated understanding., Patient/family have participated as able in goal setting and plan of care. and Patient/family agree to work toward stated goals and plan of care. This patient?s plan of care is appropriate for delegation to [facility]. Thank you for this referral. 
Lucila Novak Time Calculation: 49 mins

## 2019-03-29 ENCOUNTER — DOCUMENTATION ONLY (OUTPATIENT)
Dept: CASE MANAGEMENT | Age: 84
End: 2019-03-29

## 2019-03-29 ENCOUNTER — PATIENT OUTREACH (OUTPATIENT)
Dept: CASE MANAGEMENT | Age: 84
End: 2019-03-29

## 2019-03-29 PROBLEM — I50.9 CHF (CONGESTIVE HEART FAILURE) (HCC): Status: RESOLVED | Noted: 2019-03-27 | Resolved: 2019-03-29

## 2019-03-29 PROBLEM — R06.02 SOB (SHORTNESS OF BREATH): Status: RESOLVED | Noted: 2019-03-27 | Resolved: 2019-03-29

## 2019-03-29 PROCEDURE — 74011000250 HC RX REV CODE- 250: Performed by: HOSPITALIST

## 2019-03-29 PROCEDURE — 97535 SELF CARE MNGMENT TRAINING: CPT | Performed by: OCCUPATIONAL THERAPIST

## 2019-03-29 PROCEDURE — 74011250637 HC RX REV CODE- 250/637: Performed by: HOSPITALIST

## 2019-03-29 PROCEDURE — 65660000000 HC RM CCU STEPDOWN

## 2019-03-29 PROCEDURE — 65270000029 HC RM PRIVATE

## 2019-03-29 PROCEDURE — 74011636637 HC RX REV CODE- 636/637: Performed by: HOSPITALIST

## 2019-03-29 PROCEDURE — 77010033678 HC OXYGEN DAILY

## 2019-03-29 PROCEDURE — 94760 N-INVAS EAR/PLS OXIMETRY 1: CPT

## 2019-03-29 RX ORDER — PREDNISONE 20 MG/1
40 TABLET ORAL
Qty: 6 TAB | Refills: 0 | Status: SHIPPED
Start: 2019-03-30 | End: 2019-04-02

## 2019-03-29 RX ADMIN — BUMETANIDE 1 MG: 0.25 INJECTION INTRAMUSCULAR; INTRAVENOUS at 18:13

## 2019-03-29 RX ADMIN — Medication 5 ML: at 21:22

## 2019-03-29 RX ADMIN — BUMETANIDE 1 MG: 0.25 INJECTION INTRAMUSCULAR; INTRAVENOUS at 09:05

## 2019-03-29 RX ADMIN — ESCITALOPRAM OXALATE 10 MG: 10 TABLET ORAL at 09:03

## 2019-03-29 RX ADMIN — LEVOTHYROXINE SODIUM 37.5 MCG: 75 TABLET ORAL at 07:24

## 2019-03-29 RX ADMIN — POLYETHYLENE GLYCOL 3350 17 G: 17 POWDER, FOR SOLUTION ORAL at 09:03

## 2019-03-29 RX ADMIN — Medication 10 ML: at 07:24

## 2019-03-29 RX ADMIN — APIXABAN 2.5 MG: 2.5 TABLET, FILM COATED ORAL at 10:25

## 2019-03-29 RX ADMIN — PREDNISONE 40 MG: 20 TABLET ORAL at 07:24

## 2019-03-29 RX ADMIN — BUSPIRONE HYDROCHLORIDE 5 MG: 5 TABLET ORAL at 09:03

## 2019-03-29 RX ADMIN — POTASSIUM CHLORIDE 40 MEQ: 750 TABLET, EXTENDED RELEASE ORAL at 09:02

## 2019-03-29 RX ADMIN — CARVEDILOL 3.12 MG: 3.12 TABLET, FILM COATED ORAL at 09:03

## 2019-03-29 RX ADMIN — CLOPIDOGREL BISULFATE 75 MG: 75 TABLET, FILM COATED ORAL at 10:25

## 2019-03-29 RX ADMIN — PANTOPRAZOLE SODIUM 40 MG: 40 TABLET, DELAYED RELEASE ORAL at 07:30

## 2019-03-29 RX ADMIN — BUSPIRONE HYDROCHLORIDE 5 MG: 5 TABLET ORAL at 18:13

## 2019-03-29 RX ADMIN — CARVEDILOL 3.12 MG: 3.12 TABLET, FILM COATED ORAL at 18:13

## 2019-03-29 RX ADMIN — DILTIAZEM HYDROCHLORIDE 180 MG: 180 CAPSULE, COATED, EXTENDED RELEASE ORAL at 21:20

## 2019-03-29 RX ADMIN — SIMVASTATIN 40 MG: 40 TABLET, FILM COATED ORAL at 21:20

## 2019-03-29 RX ADMIN — Medication 10 ML: at 18:13

## 2019-03-29 RX ADMIN — APIXABAN 2.5 MG: 2.5 TABLET, FILM COATED ORAL at 18:13

## 2019-03-29 NOTE — PROGRESS NOTES
CM reviewed chart and noted PT's recommendation for patient to return to [facility] for skilled nursing in Healthcare Unit. CM discussed with patient and son, both are in agreement. CM sent referral to [facility] via [employer]. Patient was accepted. Discharge for today was cancelled due to patient is not medically stable to return to [facility] at [facility]. CM discussed plans with Dr. Anni Merritt and the plan is for patient to return to [facility] in Healthcare Unit on tomorrow 3/30. CM spoke with patient's son. He is in agreement and will transport the patient to [facility] at discharge. The son will also bring walker and oxygen tank from home to assist with transport. CM will continue to follow patient for transition of care. CM received called from Maty Ingram at [facility] who advised they are prepared for patient to return on tomorrow to [facility] to [employer]. Maty Ingram provided Call Report #[id number] [facility]#[id number]  Patient will be in Room 330. CHERRY Acevedo/CRM

## 2019-03-29 NOTE — CONSULTS
[facility] Progress Note    3/29/2019 7:53 AM  Admit Date: 3/27/2019  Admit Diagnosis: SOB (shortness of breath) [R06.02]  CHF (congestive heart failure) (HCC) [I50.9]  SOB (shortness of breath) [R06.02]    Admitted with recurrent effusion, dyspnea, volume overload, likely related to effusion and severe MR. Awaiting procedure at [facility] in April. Have notified valve team of readmission. Assessment/Plan     1. Severe MR - pending finalization of timing for possible MV clipping/repair  2. Large left pleural effusion on TTE, moderate on CXR - plan for thoracentesis  3.  AI   4. CAD s/p Inferior STEMI 9/11/18 with PTCA/stents to distal RCA (KENNETH x 2)  -continue plavix, coreg, statin  -ECG with inferior, anteroseptal TWI  5. Hypotension - hx of HTN coreg pta  6. Hx of HFpEF - NYHA Class IV on admission, NYHA Class III today, BP controlled, continue bumex   7. AF - rate controlled on coreg 6.25mg BID and diltiazem 180mg daily, cotn Eliquis for [facility] [place] [facility]  8. Dyslipidemia - LDL 56 on simvastatin 40mg daily   9. Hypokalemia - replete to 4  10. DNR status in place      Echo 12/6/18 - (report not available), severe MR, LVEF 50%, AI  Inferior STEMI 9/11/18 with PTCA/stents to distal RCA (KENNETH x 2)  Echo 4/18 - LVEF 55 %, no WMA,mod dilated LA, MAC with mild MR, AV sclerosis with mod AI, mild TR, PASP 30mmHg, mild PI     Soc Hx: no tobacco use x 10 years, no etoh use   Fam Hx: mother passed at age 80 from heart problems    Subjective:     Nico Teixeira continues with dyspnea at rest.  Denies any chest pain or palpitations. No dizziness. Understands plan for diuresis and evaluation of valve disease.  Wants to know if her procedures can be any sooner as she is tired of coming in and out of the hospital.     Objective:      Physical Exam:  Visit Vitals  BP 91/51 (BP 1 Location: Right arm, BP Patient Position: At rest)   Pulse 93   Temp 97.6 °F (36.4 °C)   Resp 18   Wt 132 lb 0.9 oz (59.9 kg)   LMP 01/01/2000   SpO2 94%   BMI 24.95 kg/m²     General Appearance:  Elderly, pale, NAD   Ears/Nose/Mouth/Throat:   Hearing grossly normal.         Neck: Supple. Chest:   Bibasilar crackles    Cardiovascular:  Irregular rate and rhythm, S1, S2 normal, 3/6 VANGIE apex   Abdomen:   Soft, non-tender, bowel sounds are active. Extremities: 1+ edema bilaterally. Skin: Warm and dry. Telemetry: AFIB    Data Review:   Labs:    No results found for this or any previous visit (from the past 24 hour(s)).         Current Facility-Administered Medications   Medication Dose Route Frequency    predniSONE (DELTASONE) tablet 40 mg  40 mg Oral DAILY WITH BREAKFAST    potassium chloride SR (KLOR-CON 10) tablet 40 mEq  40 mEq Oral DAILY    sodium chloride (NS) flush 5-40 mL  5-40 mL IntraVENous Q8H    sodium chloride (NS) flush 5-40 mL  5-40 mL IntraVENous PRN    acetaminophen (TYLENOL) tablet 650 mg  650 mg Oral Q4H PRN    ondansetron (ZOFRAN) injection 4 mg  4 mg IntraVENous Q4H PRN    dilTIAZem CD (CARDIZEM CD) capsule 180 mg  180 mg Oral QHS    simvastatin (ZOCOR) tablet 40 mg  40 mg Oral QHS    nitroglycerin (NITROSTAT) tablet 0.4 mg  0.4 mg SubLINGual PRN    apixaban (ELIQUIS) tablet 2.5 mg  2.5 mg Oral BID    pantoprazole (PROTONIX) tablet 40 mg  40 mg Oral ACB    polyethylene glycol (MIRALAX) packet 17 g  17 g Oral DAILY    carvedilol (COREG) tablet 3.125 mg  3.125 mg Oral BID    melatonin tablet 3 mg  3 mg Oral QHS    levothyroxine (SYNTHROID) tablet 37.5 mcg  37.5 mcg Oral ACB    escitalopram oxalate (LEXAPRO) tablet 10 mg  10 mg Oral DAILY    busPIRone (BUSPAR) tablet 5 mg  5 mg Oral BID    clopidogrel (PLAVIX) tablet 75 mg  75 mg Oral DAILY    bumetanide (BUMEX) injection 1 mg  1 mg IntraVENous BID       Jamal Iniguez MD  [facility] [address] [place] [address] 13, [address] [address]  [place] [facility] [address]  [phone]

## 2019-03-29 NOTE — PROGRESS NOTES
TRANSFER - OUT REPORT: 
 
Verbal [facility] to Catal[facility](name[facility]  [facility] (unit) for routine progression of care Report consisted of patients Situation, Background, Assessment and  
Recommendations(SBAR). Information from the following report(s) SBAR, Procedure Summary, Intake/Output, MAR, Med Rec Status and Cardiac Rhythm AFIB was reviewed with the receiving nurse. Lines:  
Peripheral IV 03/27/19 Left Antecubital (Active) Site Assessment Clean, dry, & intact 3/29/2019  7:51 AM  
Phlebitis Assessment 0 3/29/2019  7:51 AM  
Infiltration Assessment 0 3/29/2019  7:51 AM  
Dressing Status Clean, dry, & intact 3/29/2019  7:51 AM  
Dressing Type Tape;Transparent 3/29/2019  7:51 AM  
Hub Color/Line Status Positional;Patent 3/29/2019  7:51 AM  
Action Taken Open ports on tubing capped 3/29/2019  7:51 AM  
Alcohol Cap Used Yes 3/29/2019  7:51 AM  
  
 
Opportunity for questions and clarification was provided. Patient transported with: 
 [employer]

## 2019-03-29 NOTE — PROGRESS NOTES
Problem: Self Care Deficits Care Plan (Adult) Goal: *Acute Goals and Plan of Care (Insert Text) Description Occupational Therapy Goals Initiated 3/28/2019 1. Patient will perform grooming with supervision/set-up standing at sink within 7 day(s). 2.  Patient will perform upper body dressing with supervision/set-up within 7 day(s). 3.  Patient will perform lower body dressing with supervision/set-up within 7 day(s). 4.  Patient will perform toilet transfers with supervision/set-up within 7 day(s). 5.  Patient will perform all aspects of toileting with supervision/set-up within 7 day(s). 6.  Patient will participate in upper extremity therapeutic exercise/activities with supervision/set-up for 15 minutes within 7 day(s). Outcome: Progressing Towards Goal 
 OCCUPATIONAL THERAPY TREATMENT Patient: Breezy Kwong (79 y.o. female) Date: 3/29/2019 Diagnosis: SOB (shortness of breath) [R06.02] [facility] [I50.9] SOB (shortness of breath) [R06.02] <principal problem not specified> Precautions: DNR, Fall, Bed Alarm Chart, occupational therapy assessment, plan of care, and goals were reviewed. ASSESSMENT: 
Patient with confusion and perseverated on being at a train station. Agreeable to out of bed, increased cues for command following due to confusion. Bed mobility with supervision, LE dressing with min assist. Static standing initially with max assist due to having feet together, max cues to widen base of support. Stand pivot with min hand held assist. Noted plan for discharge tomorrow to rehab. Progression toward goals: 
?       Improving appropriately and progressing toward goals ? Improving slowly and progressing toward goals ? Not making progress toward goals and plan of care will be adjusted PLAN: 
Patient continues to benefit from skilled intervention to address the above impairments. Continue treatment per established plan of care. Discharge Recommendations:  [employer] Further Equipment Recommendations for Discharge: SUBJECTIVE:  
Patient stated ? we-re at the train station. ? OBJECTIVE DATA SUMMARY:  
Cognitive/Behavioral Status: 
Neurologic State: Alert;Confused Orientation Level: Disoriented to place; Disoriented to situation;Disoriented to time;Oriented to person Cognition: Memory loss; Follows commands Functional Mobility and Transfers for ADLs: 
Bed Mobility: 
Supine to Sit: Supervision Sit to Supine: Supervision Scooting: Supervision Transfers: 
Sit to Stand: Minimum assistance;Assist x1 Functional Transfers Toilet Transfer : Minimum assistance;Assist x1;Additional time; Adaptive equipment Balance: 
Sitting: Intact; Without support Standing: Impaired; With support Standing - Static: Constant support;Good(max cues to widen base of support) Standing - Dynamic : Fair ADL Intervention: 
  
On 3.5 liters on arrival and sats > or = 95%. Re-oriented throughout as she perseverated on being at a train station. Lower Body Dressing Assistance Dressing Assistance: Minimum assistance Slip on Shoes with Back: Minimum assistance(assist at heel) Leg Crossed Method Used: No 
 
Toileting Toileting Assistance: Moderate assistance;Maximum assistance Bladder Hygiene: Moderate assistance Clothing Management: Moderate assistance Cognitive Retraining Orientation Retraining: Place;Situation Attention to Task: Distractibility Pain: No complaint of or sign of pain Activity Tolerance:  
Fair, limited by confusion Please refer to the flowsheet for vital signs taken during this treatment. After treatment:  
? Patient left in no apparent distress sitting up in chair ? Patient left in no apparent distress in bed 
? Call bell left within reach ? Nursing notified ? Caregiver present ? Bed alarm activated COMMUNICATION/COLLABORATION:  
 The patient?s plan of care was discussed with: Registered Nurse and student nurse Emma Ross OTR/L Time Calculation: 23 mins

## 2019-03-29 NOTE — DISCHARGE SUMMARY
Discharge Summary       PATIENT ID: Dianne Omer  MRN: [id number]   YOB: 1927    DATE OF ADMISSION: 3/27/2019  1:03 PM    DATE OF DISCHARGE: 3/29/17   PRIMARY CARE PROVIDER: Nicolás Villalpando NP     ATTENDING PHYSICIAN: Yajaira Hadley  DISCHARGING PROVIDER: Valdemar Escoto MD    To contact this individual call [phone] and ask the  to page. If unavailable ask to be transferred the Adult Hospitalist Department. CONSULTATIONS: IP CONSULT TO CARDIOLOGY  IP CONSULT TO PALLIATIVE CARE - PROVIDER    PROCEDURES/SURGERIES: * No surgery found *    ADMITTING DIAGNOSES & HOSPITAL COURSE:   This is a 71-year-old  female with a past medical history of hypercholesterolemia, GERD, hypertension, peripheral neuropathy, coronary artery disease/MI last September, CHF, dementia.  She comes over here because of shortness of breath.  Please note that the whole history is mainly per the patient's family members including the son and daughter-in-law who are there in her HPI.  The patient just mentioned that she is in the hospital because she was having shortness of breath.     Interval history / Subjective:   Pt is anxious , pt is wheezing      Assessment & Plan:      1.  Her shortness of breath is most likely to volume overload vs asthmatic bronchitis  - cont diuresis with bumax   -  February 2019 with an ejection fraction of 56-60% with moderately dilated left atrial cavity and severe mitral valve regurgitation. '  - steroid 40 mg daily  x 2 doses      2.  Paroxysmal atrial fibrillation.  The patient is on Ashley Ya will continue her Coreg as well as Cardizem.     3.  Severe mitral regurgitation as mentioned above, the patient is already scheduled at [facility] for 04/09 to have the procedure.  Will do thoracentesis she got x 2 last nov- dec time   Since she responded to diuresis no thoracentesis done this time     4.  Chronic diarrhea.  The patient is quite frustrated with having chronic diarrhea as well as urinary incontinence that has affected her social life quite significantly. Alexswati Jesus noted that in her primary care physician's notes as well.    - Overall in last 6 months, the patient has quite significant deterioration in which she has lost more than about 30 pounds.  I would consult the palliative care in care decisions. May do w/u for malignancy with ascitics and bilateral plural effusion     6.  Slight abdominal distention. USG -Small volume of ascites. Bilateral pleural effusions. May need drainage of both      7.  Bilateral pedal edema.  I will get a venous Doppler on the patient as well.     8. Hypokalemia - replete     9. Dementia - supportive care          DISCHARGE DIAGNOSES / PLAN:      1. D/c ras jiménez d/w son Mr Bess Colindres:     PENDING TEST RESULTS:   At the time of discharge the following test results are still pending:     FOLLOW UP APPOINTMENTS:    Follow-up Information     Follow up With Specialties Details Why Contact Info    Bg Hidalgo NP Nurse Practitioner In 2 weeks pl [place] [facility] [address] [address]  [phone]               DIET: Cardiac Diet    ACTIVITY: Activity as tolerated    WOUND CARE:     EQUIPMENT needed:       DISCHARGE MEDICATIONS:  Current Discharge Medication List      START taking these medications    Details   predniSONE (DELTASONE) 20 mg tablet Take 40 mg by mouth daily (with breakfast) for 3 days. Qty: 6 Tab, Refills: 0         CONTINUE these medications which have NOT CHANGED    Details   escitalopram oxalate (LEXAPRO) 10 mg tablet Take 10 mg by mouth daily. Indications: Repeated Episodes of Anxiety, major depressive disorder      busPIRone (BUSPAR) 5 mg tablet Take 5 mg by mouth two (2) times a day. Indications: Repeated Episodes of Anxiety      potassium chloride SR (KLOR-CON 10) 10 mEq tablet Take 20 mEq by mouth daily.       clopidogrel (PLAVIX) 75 mg tab Take 75 mg by mouth daily. Indications: treatment to prevent a heart attack      polyethylene glycol (MIRALAX) 17 gram packet Take 17 g by mouth daily. simvastatin (ZOCOR) 40 mg tablet Take 40 mg by mouth nightly. PROAIR HFA 90 mcg/actuation inhaler Take 2 Inhalation by inhalation four (4) times daily as needed. calcium carbonate (OS-ADAM) 500 mg calcium (1,250 mg) tablet Take 500 mg by mouth. bumetanide (BUMEX) 2 mg tablet Take 2 mg by mouth two (2) times a day. melatonin 3 mg tablet Take 3 mg by mouth nightly. levothyroxine (SYNTHROID) 25 mcg tablet Take 1.5 Tabs by mouth Daily (before breakfast). Indications: hypothyroidism  Qty: 80 Tab, Refills: 1    Associated Diagnoses: Uncontrolled atrial fibrillation (HCC)      carvedilol (COREG) 3.125 mg tablet Take 1 Tab by mouth two (2) times a day. Qty: 180 Tab, Refills: 1      pantoprazole (PROTONIX) 40 mg tablet Take 1 Tab by mouth daily. Qty: 30 Tab, Refills: 0    Associated Diagnoses: Esophageal dysphagia; Esophagitis with gastritis      apixaban (ELIQUIS) 2.5 mg tablet Take 1 Tab by mouth two (2) times a day. Qty: 30 Tab, Refills: 0    Comments: [facility] staff will  Saturday for patient. Associated Diagnoses: Paroxysmal atrial fibrillation ([facility] Pulmonary heart disease (HCC); ST elevation myocardial infarction involving left anterior descending (LAD) coronary artery ([facility])      nitroglycerin (NITROSTAT) 0.4 mg SL tablet Take 0.4 mg by mouth as needed. diltiazem CD (CARTIA XT) 180 mg ER capsule Take 180 mg by mouth nightly. STOP taking these medications       potassium chloride (KLOR-CON) 10 mEq tablet Comments:   Reason for Stopping:                 NOTIFY YOUR PHYSICIAN FOR ANY OF THE FOLLOWING:   Fever over 101 degrees for 24 hours. Chest pain, shortness of breath, fever, chills, nausea, vomiting, diarrhea, change in mentation, falling, weakness, bleeding. Severe pain or pain not relieved by medications.   Or, any other signs or symptoms that you may have questions about. DISPOSITION:    Home With:   OT  PT  HH  RN      x Long term SNF/Inpatient Rehab    Independent/assisted living    Hospice    Other:       PATIENT CONDITION AT DISCHARGE:     Functional status   x Poor     Deconditioned     Independent      Cognition     Lucid    x Forgetful    x Dementia      Catheters/lines (plus indication)    [place]     PICC     PEG    x None      Code status     Full code    x DNR      PHYSICAL EXAMINATION AT DISCHARGE:  Constitutional:  No acute distress, cooperative, pleasant    ENT:  Oral mucous moist, oropharynx benign. Neck supple,    Resp: decrease breathe sound both bases   CV:  Regular rhythm, normal rate, no murmurs, gallops, rubs    GI:  Soft, non distended, non tender. normoactive bowel sounds, no hepatosplenomegaly     Musculoskeletal:  No edema, warm, 2+ pulses throughout    Neurologic:  Moves all extremities.   AAOx1/2, CN II-XII reviewed                                         CHRONIC MEDICAL DIAGNOSES:  Problem List as of 3/29/2019 Date Reviewed: 3/29/2019          Codes Class Noted - Resolved    (HFpEF) heart failure with preserved ejection fraction ([facility] ICD-10-CM: I50.30  ICD-9-CM: 428.9  12/10/2018 - Present        CAD (coronary artery disease) ICD-10-CM: I25.10  ICD-9-CM: 414.00  12/10/2018 - Present        Hypothyroidism (Chronic) ICD-10-CM: E03.9  ICD-9-CM: 244.9  12/10/2018 - Present        AI (aortic insufficiency) ICD-10-CM: I35.1  ICD-9-CM: 424.1  12/6/2018 - Present        Paroxysmal atrial fibrillation ([facility] ICD-10-CM: I48.0  ICD-9-CM: 427.31  11/28/2018 - Present        Pulmonary heart disease ([facility]  ICD-10-CM: I27.9  ICD-9-CM: 416.9  11/28/2018 - Present        Diuretic-induced hypokalemia ICD-10-CM: E87.6, T50.2X5A  ICD-9-CM: 276.8, E944.4  11/28/2018 - Present        Moderate protein-calorie malnutrition ([facility]  ICD-10-CM: E44.0  ICD-9-CM: 263.0  11/28/2018 - Present        Atherosclerotic cardiovascular disease ICD-10-CM: I25.10  ICD-9-CM: 429.2  Unknown - Present        Fatty liver disease, nonalcoholic (Chronic) DPY-38-ER: K76.0  ICD-9-CM: 571.8  6/5/2017 - Present        Kidney insufficiency (Chronic) ICD-10-CM: N28.9  ICD-9-CM: 593.9  6/5/2017 - Present        Osteoporosis ICD-10-CM: M81.0  ICD-9-CM: 733.00  11/16/2016 - Present        Nuclear sclerosis ICD-10-CM: H25.10  ICD-9-CM: 366.16  7/25/2016 - Present        Ocular hypertension ICD-10-CM: H40.059  ICD-9-CM: 365.04  2/1/2016 - Present        HTN (hypertension) ICD-10-CM: I10  ICD-9-CM: 401.9  11/8/2011 - Present        Hyperlipidemia ICD-10-CM: E78.5  ICD-9-CM: 272.4  11/8/2011 - Present        GERD (gastroesophageal reflux disease) ICD-10-CM: K21.9  ICD-9-CM: 530.81  11/8/2011 - Present        Anxiety ICD-10-CM: F41.9  ICD-9-CM: 300.00  11/8/2011 - Present        Vertigo ICD-10-CM: R42  ICD-9-CM: 780.4  11/8/2011 - Present        RESOLVED: SOB (shortness of breath) ICD-10-CM: R06.02  ICD-9-CM: 786.05  3/27/2019 - 3/29/2019        RESOLVED: CHF (congestive heart failure) ([facility] ICD-10-CM: I50.9  ICD-9-CM: 428.0  3/27/2019 - 3/29/2019        RESOLVED: Acute respiratory failure with hypoxemia ([facility] ICD-10-CM: J96.01  ICD-9-CM: 518.81  12/10/2018 - 2/11/2019        RESOLVED: Bilateral pleural effusion ICD-10-CM: J90  ICD-9-CM: 511.9  12/6/2018 - 2/11/2019        RESOLVED: SOB (shortness of breath) ICD-10-CM: R06.02  ICD-9-CM: 786.05  12/6/2018 - 12/10/2018        RESOLVED: Age-related nuclear cataract, bilateral ICD-10-CM: H25.13  ICD-9-CM: 366.16  10/25/2017 - 11/28/2018        RESOLVED: Dry eyes ICD-10-CM: D40.076  ICD-9-CM: 375.15  6/19/2017 - 11/28/2018        RESOLVED: Nuclear cataract ICD-10-CM: [id number]  ICD-9-CM: 366.16  10/6/2016 - 2/11/2019        RESOLVED: Absence of bladder continence ICD-10-CM: R32  ICD-9-CM: 788.30  4/14/2015 - 11/28/2018              Greater than 30  minutes were spent with the patient on counseling and coordination of care    Signed: Dulce Boateng MD  3/29/2019  10:35 AM

## 2019-03-29 NOTE — PROGRESS NOTES
[facility] met with patient today to provide an introduction to self, the [address] at [facility]. Bundled Payment Care Program: 
 
Patient was provided a copy of the [facility] letter. his visit. Patient confirms she lives in [place] [address] at [facility]. Per chart documentation patient will be returning to Healthcare and will have cardiac surgery in April 2019. Hug At Home Program: 
 
Demonstration of Hug at [facility] demonstration deferred as patient will be returning to healthcare center at [facility].

## 2019-03-29 NOTE — PROGRESS NOTES
Pharmacist Discharge Medication Reconciliation Discharging Provider: Dr. Abhishek Carroll Significant PMH:  
Past Medical History:  
Diagnosis Date (HFpEF) heart failure with preserved ejection fraction ([facility] 10/17/2018  
 acute Allergic rhinitis Atherosclerotic cardiovascular disease 1999 CHF (congestive heart failure) ([facility] 09/11/2018 Cognitive communication deficit Edema Environmental allergies   
 dizziness-(chronic) Fatty liver disease, nonalcoholic GERD (gastroesophageal reflux disease) H/O heart artery stent 09/2018 HCVD (hypertensive cardiovascular disease) 10/17/2018 Heart attack ([facility] 09/2018 Heart palpitations 2018 infrequent History of PTCA Hypercholesterolemia 1999 Hypertension 2010 Liver disease   
 fatty liver MI (myocardial infarction) ([facility] 09/2018 Overactive bladder 08/09/2018 Peripheral neuropathy Permanent atrial fibrillation ([facility] 11/05/2018 Senile dementia, without behavioral disturbance 11/05/2018 Stress incontinence, female 08/09/2018 Vertigo Vitamin D deficiency Chief Complaint for this Admission: Chief Complaint Patient presents with Shortness of Breath Allergies: Brilinta [ticagrelor]; Celecoxib; Codeine; Sulfa (sulfonamide antibiotics); Iodine; Aricept [donepezil]; and Darvocet a500 [propoxyphene n-acetaminophen] Discharge Medications:  
Current Discharge Medication List  
  
 
START taking these medications Details  
predniSONE (DELTASONE) 20 mg tablet Take 40 mg by mouth daily (with breakfast) for 3 days. Qty: 6 Tab, Refills: 0 CONTINUE these medications which have NOT CHANGED Details  
escitalopram oxalate (LEXAPRO) 10 mg tablet Take 10 mg by mouth daily. Indications: Repeated Episodes of Anxiety, major depressive disorder  
  
busPIRone (BUSPAR) 5 mg tablet Take 5 mg by mouth two (2) times a day. Indications: Repeated Episodes of Anxiety potassium chloride SR (KLOR-CON 10) 10 mEq tablet Take 20 mEq by mouth daily. clopidogrel (PLAVIX) 75 mg tab Take 75 mg by mouth daily. Indications: treatment to prevent a heart attack  
  
polyethylene glycol (MIRALAX) 17 gram packet Take 17 g by mouth daily. simvastatin (ZOCOR) 40 mg tablet Take 40 mg by mouth nightly. PROAIR HFA 90 mcg/actuation inhaler Take 2 Inhalation by inhalation four (4) times daily as needed. calcium carbonate (OS-ADAM) 500 mg calcium (1,250 mg) tablet Take 500 mg by mouth. bumetanide (BUMEX) 2 mg tablet Take 2 mg by mouth two (2) times a day. melatonin 3 mg tablet Take 3 mg by mouth nightly. levothyroxine (SYNTHROID) 25 mcg tablet Take 1.5 Tabs by mouth Daily (before breakfast). Indications: hypothyroidism 
Qty: 90 Tab, Refills: 1 Associated Diagnoses: Uncontrolled atrial fibrillation (HCC)  
  
carvedilol (COREG) 3.125 mg tablet Take 1 Tab by mouth two (2) times a day. Qty: 180 Tab, Refills: 1  
  
pantoprazole (PROTONIX) 40 mg tablet Take 1 Tab by mouth daily. Qty: 30 Tab, Refills: 0 Associated Diagnoses: Esophageal dysphagia; Esophagitis with gastritis  
  
apixaban (ELIQUIS) 2.5 mg tablet Take 1 Tab by mouth two (2) times a day. Qty: 30 Tab, Refills: 0 Comments: [facility] staff will  Saturday for patient. Associated Diagnoses: Paroxysmal atrial fibrillation ([facility] Pulmonary heart disease (HCC); ST elevation myocardial infarction involving left anterior descending (LAD) coronary artery ([facility])  
  
nitroglycerin (NITROSTAT) 0.4 mg SL tablet Take 0.4 mg by mouth as needed. diltiazem CD (CARTIA XT) 180 mg ER capsule Take 180 mg by mouth nightly.   
  
  
 
STOP taking these medications  
  
 potassium chloride (KLOR-CON) 10 mEq tablet Comments:  
Reason for Stopping:   
   
  
 
 
The patient's chart, MAR and AVS were reviewed by Beryle Meyers, EUGENIAD.

## 2019-03-29 NOTE — PROGRESS NOTES
Care plan:  
 
 
Problem: Knowledge Deficit Goal: *Participate in the learning process Outcome: Progressing Towards Goal 
-dementia, pleasant, redirect Problem: Tissue Perfusion - Cardiopulmonary, Altered Goal: *Absence of hypoxia Outcome: Progressing Towards Goal 
- 3LNC, monitor will take off Problem: Falls - Risk of 
Goal: *Absence of Falls Description Document Onelia Dunn Fall Risk and appropriate interventions in the flowsheet. Outcome: Progressing Towards Goal 
- SBA, dementia, bed alarm, redirect Problem: Discharge Planning Goal: *Knowledge of discharge instructions Outcome: Progressing Towards Goal 
- return to [facility] 
-4/9 [facility] procedure mitral regurg

## 2019-03-29 NOTE — PROGRESS NOTES
Transitional Care Team: Initial HUG Note    Date of Assessment: 03/29/19  Time of Assessment:  11:36 AM    Assessment & Plan   Currently lives in [address] [facility] her memory loss, is this the safest option for her? Spoke to son and he is aware and willing to assess for more needs. Interested in learning more about palliative once she is back at [facility]. Recent thyroid panel--TSH improved over previous but still elevated at 5.43 with free T3 of 2.5--sufficient for hx of PAF? [address] [facility] email to PCP, Jeanie Brooks NP re: above     Primary Diagnosis:   Shortness of breath due to volume overload vs asthmatic bronchitis  Paroxysmal atrial fibrillation  Severe mitral regurgitation    Advance Directive: on file; has DDNR also from 2/2019--signed by PCP at [facility] . Medication reconciliation was performed by Sylwia Crooks, TashD and also reviewed by [facility]'S NP. Discharge Needs: TBD, son is aware that AL might not be safest level of care, but will evaluate more after stay in [facility]. and procedure at [facility]. G NP returned with G calender/follow up appointments/ Ambulatory Nurse Navigation information. Spoke to son, Jeffery Gil, to explain the [facility] program and he filled me in. They are going to [facility] on Friday 4/5 for pre-op and then she is having a MitraClip on 4/9 with Dr. Jesica Aaron. He understands our concerns that AL may not continue to be a safe option for her, but is hopeful that she can get back to some of the things she enjoys at [facility] if the procedure keeps her from needing as much diuretic and she can be more continent. He is amenable to all advice and has been seeking this from the staff at [facility] and watching her carefully. Has noticed decline over the last few months. Is possibly interested in palliative care once she is back at [facility].   I advised that our HF NNs who usually call our patients, probably won't be calling her but maybe the facility and may be available to him as well. Munira Gonzalez is a 80 y.o. female inpatient at [facility] admitted with shortness of breath on  3/28/19. Chart reviewed by Breanna Holman NP and [facility] Understanding of Goals) program introduced to patient/family. The Transitional Care Team bridges the gaps in care and education surrounding discharge from the acute care facility. The objective is to empower the patient and family in taking a proactive role in the task of preventing readmission within the first thirty days after discharge from the acute care setting. The team is also involved in the efforts to reduce readmission to the acute care setting after stabilization and discharge from the acute care environment either to the skilled nursing facilities or community. The TC Team will follow patient from a distance while inpatient as well as be available for further transition disposition as needed. The [place] TEAM will continue to offer support during the 30- 90 day discharge from acute care setting.   Notified Ambulatory HF Nurse Navigators Dede Gleason, RN and Sary Mace RN,     Past Medical History:   Diagnosis Date    (HFpEF) heart failure with preserved ejection fraction ([address] 10/17/2018    acute    Allergic rhinitis     Atherosclerotic cardiovascular disease 1999    CHF (congestive heart failure) ([address] 09/11/2018    Cognitive communication deficit     Edema     Environmental allergies     dizziness-(chronic)    Fatty liver disease, nonalcoholic     GERD (gastroesophageal reflux disease)     H/O heart artery stent 09/2018    HCVD (hypertensive cardiovascular disease) 10/17/2018    Heart attack ([address] 09/2018    Heart palpitations 2018    infrequent     History of PTCA     Hypercholesterolemia 1999    Hypertension 2010    Liver disease     fatty liver    MI (myocardial infarction) ([address] 09/2018    Overactive bladder 08/09/2018    Peripheral neuropathy     Permanent atrial fibrillation ([address] 11/05/2018    Senile dementia, without behavioral disturbance 11/05/2018    Stress incontinence, female 08/09/2018    Vertigo     Vitamin D deficiency        Advance Care Planning 12/6/2018   Primary Decision Maker Name -   Primary Decision Maker Phone Number -   Primary Decision Maker Relationship to Patient -   Confirm Advance Directive Yes, on file   Does the patient have other document types -

## 2019-03-29 NOTE — PROGRESS NOTES
Hospitalist Progress Note Sherren Flasher, MD 
Answering service: [phone] OR [phone] from in house phone Date of Service:  3/29/2019 NAME:  Dayanna Foreman :  [id number] MRN:  [id number] This is a 79-year-old  female with a past medical history of hypercholesterolemia, GERD, hypertension, peripheral neuropathy, coronary artery disease/MI last September, CHF, dementia.  She comes over here because of shortness of breath.  Please note that the whole history is mainly per the patient's family members including the son and daughter-in-law who are there in her HPI.  The patient just mentioned that she is in the hospital because she was having shortness of breath. 
  
Interval history / Subjective: Pt breathing better now , d/w Son  
  
Assessment & Plan:  
  
1.  Her shortness of breath is most likely to volume overload vs asthmatic bronchitis 
- cont diuresis with bumax -  2019 with an ejection fraction of 56-60% with moderately dilated left atrial cavity and severe mitral valve regurgitation.  ' 
- steroid 40 mg daily  x 2 doses  
  
2.  Paroxysmal atrial fibrillation.  The patient is on Cayden Shorts will continue her Coreg as well as Cardizem. 
  
3.  Severe mitral regurgitation as mentioned above, the patient is already scheduled at [facility] for  to have the procedure. Will do thoracentesis she got x 2 last nov- dec time  
Since she responded to diuresis no thoracentesis done this time 
  
4.  Chronic diarrhea.  The patient is quite frustrated with having chronic diarrhea as well as urinary incontinence that has affected her social life quite significantly. Mary Chris noted that in her primary care physician's notes as well.   
- Overall in last 6 months, the patient has quite significant deterioration in which she has lost more than about 30 pounds.  I would consult the palliative care in care decisions. May do w/u for malignancy with ascitics and bilateral plural effusion 
  
6.  Slight abdominal distention. USG -Small volume of ascites. Bilateral pleural effusions.  
May need drainage of both  
  
7.  Bilateral pedal edema.  I will get a venous Doppler on the patient as well. 
  
8. Hypokalemia - replete 
  
9. Dementia - supportive care 10. DVT - SCD Code - DNR  
D/c tomorrow to [facility] d/w CM and RN Hospital Problems  Date Reviewed: 3/29/2019 None Review of Systems: A comprehensive review of systems was negative. Xr Chest Pa Lat Result Date: 3/27/2019 IMPRESSION: Small to moderate pleural effusions and bibasilar opacities that may represent atelectasis, edema, or infection. [facility] Result Date: 3/27/2019 IMPRESSION:  Small volume of ascites. Bilateral pleural effusions. Vital Signs:  
 Last 24hrs VS reviewed since prior progress note. Most recent are: 
Visit Vitals BP 91/51 (BP 1 Location: Right arm, BP Patient Position: At rest) Pulse 93 Temp 97.6 °F (36.4 °C) Resp 18 Wt 59.9 kg (132 lb 0.9 oz) SpO2 94% BMI 24.95 kg/m² Intake/Output Summary (Last 24 hours) at 3/29/2019 1205 Last data filed at 3/29/2019 1005 Gross per 24 hour Intake 240 ml Output 450 ml Net -210 ml Physical Examination:  
 
 
     
Constitutional:  No acute distress, cooperative, pleasant   
ENT:  Oral mucous moist, oropharynx benign. Neck supple, Resp:  CTA bilaterally. No wheezing/rhonchi/rales. No accessory muscle use CV:  Regular rhythm, normal rate, no murmurs, gallops, rubs GI:  Soft, non distended, non tender. normoactive bowel sounds, no hepatosplenomegaly Musculoskeletal:  No edema, warm, 2+ pulses throughout Neurologic:  Moves all extremities. AAOx1/2, CN II-XII reviewed Data Review:  
 I personally reviewed  Image and labs Labs:  
 
Recent Labs  
  03/28/19 2170 03/27/19 
1319 WBC 8.2 8.1 HGB 10.9* 11.2* HCT 36.1 37.4  259 Recent Labs  
  03/28/19 
0316 03/27/19 
1319  139  
K 3.0* 3.7  104 CO2 31 31 BUN 25* 32* CREA 1.22* 1.49* * 90  
CA 9.2 9.4 Recent Labs  
  03/28/19 
0316 03/27/19 
1319 SGOT 19 21 ALT 16 16 AP 85 91 TBILI 0.7 0.5 TP 7.6 8.0 ALB 3.4* 3.6 GLOB 4.2* 4.4* No results for input(s): INR, PTP, APTT in the last 72 hours. No lab exists for component: INREXT, INREXT No results for input(s): FE, TIBC, PSAT, FERR in the last 72 hours. Lab Results Component Value Date/Time Folate >20.0 02/12/2019 01:38 PM  
  
No results for input(s): PH, PCO2, PO2 in the last 72 hours. Recent Labs  
  03/27/19 
1319 TROIQ <0.05 Lab Results Component Value Date/Time Cholesterol, total 119 02/12/2019 01:38 PM  
 HDL Cholesterol 42 02/12/2019 01:38 PM  
 LDL, calculated 58 02/12/2019 01:38 PM  
 Triglyceride 97 02/12/2019 01:38 PM  
 CHOL/HDL Ratio 3.8 12/17/2009 08:35 AM  
 
No results found for: [facility] Lab Results Component Value Date/Time Color YELLOW/STRAW 12/06/2018 02:53 PM  
 Appearance CLEAR 12/06/2018 02:53 PM  
 Specific gravity 1.011 12/06/2018 02:53 PM  
 pH (UA) 5.5 12/06/2018 02:53 PM  
 Protein NEGATIVE  12/06/2018 02:53 PM  
 Glucose NEGATIVE  12/06/2018 02:53 PM  
 Ketone NEGATIVE  12/06/2018 02:53 PM  
 Bilirubin NEGATIVE  12/06/2018 02:53 PM  
 Urobilinogen 0.2 12/06/2018 02:53 PM  
 Nitrites NEGATIVE  12/06/2018 02:53 PM  
 Leukocyte Esterase NEGATIVE  12/06/2018 02:53 PM  
 Epithelial cells FEW 12/06/2018 02:53 PM  
 Bacteria NEGATIVE  12/06/2018 02:53 PM  
 WBC 0-4 12/06/2018 02:53 PM  
 RBC 0-5 12/06/2018 02:53 PM  
 
 
 
Medications Reviewed:  
 
Current Facility-Administered Medications Medication Dose Route Frequency  predniSONE (DELTASONE) tablet 40 mg  40 mg Oral DAILY WITH BREAKFAST  potassium chloride SR (KLOR-CON 10) tablet 40 mEq  40 mEq Oral DAILY  sodium chloride (NS) flush 5-40 mL  5-40 mL IntraVENous Q8H  
 sodium chloride (NS) flush 5-40 mL  5-40 mL IntraVENous PRN  
 acetaminophen (TYLENOL) tablet 650 mg  650 mg Oral Q4H PRN  
 ondansetron (ZOFRAN) injection 4 mg  4 mg IntraVENous Q4H PRN  
 dilTIAZem CD (CARDIZEM CD) capsule 180 mg  180 mg Oral QHS  simvastatin (ZOCOR) tablet 40 mg  40 mg Oral QHS  nitroglycerin (NITROSTAT) tablet 0.4 mg  0.4 mg SubLINGual PRN  
 apixaban (ELIQUIS) tablet 2.5 mg  2.5 mg Oral BID  pantoprazole (PROTONIX) tablet 40 mg  40 mg Oral ACB  polyethylene glycol (MIRALAX) packet 17 g  17 g Oral DAILY  carvedilol (COREG) tablet 3.125 mg  3.125 mg Oral BID  melatonin tablet 3 mg  3 mg Oral QHS  levothyroxine (SYNTHROID) tablet 37.5 mcg  37.5 mcg Oral ACB  escitalopram oxalate (LEXAPRO) tablet 10 mg  10 mg Oral DAILY  busPIRone (BUSPAR) tablet 5 mg  5 mg Oral BID  clopidogrel (PLAVIX) tablet 75 mg  75 mg Oral DAILY  bumetanide (BUMEX) injection 1 mg  1 mg IntraVENous BID  
 
______________________________________________________________________ EXPECTED LENGTH OF STAY: 2d 14h ACTUAL LENGTH OF STAY:          1 Luther Monae MD

## 2019-03-29 NOTE — DISCHARGE INSTRUCTIONS
Discharge Instructions       PATIENT ID: Sheryl Montes  MRN: [id number]   YOB: 1927    DATE OF ADMISSION: 3/27/2019  1:03 PM    DATE OF DISCHARGE: 3/29/2019    PRIMARY CARE PROVIDER: Bg Hidalgo NP     ATTENDING PHYSICIAN: James Cadena MD  DISCHARGING PROVIDER: Yakov Iniguez MD    To contact this individual call [phone] and ask the  to page. If unavailable ask to be transferred the Adult Hospitalist Department. DISCHARGE DIAGNOSES CHF with bilateral effusion    CONSULTATIONS: IP CONSULT TO CARDIOLOGY  IP CONSULT TO PALLIATIVE CARE - PROVIDER    PROCEDURES/SURGERIES: * No surgery found *    PENDING TEST RESULTS:   At the time of discharge the following test results are still pending:     FOLLOW UP APPOINTMENTS:   Follow-up Information     Follow up With Specialties Details Why Contact Info    Bg Hidalgo NP Nurse Practitioner In 2 weeks pl f/u at [facility] [address]  [phone]             ADDITIONAL CARE RECOMMENDATIONS:     DIET: Cardiac Diet    ACTIVITY: Activity as tolerated    WOUND CARE:     EQUIPMENT needed:       DISCHARGE MEDICATIONS:   See Medication Reconciliation Form    · It is important that you take the medication exactly as they are prescribed. · Keep your medication in the bottles provided by the pharmacist and keep a list of the medication names, dosages, and times to be taken in your wallet. · Do not take other medications without consulting your doctor. NOTIFY YOUR PHYSICIAN FOR ANY OF THE FOLLOWING:   Fever over 101 degrees for 24 hours. Chest pain, shortness of breath, fever, chills, nausea, vomiting, diarrhea, change in mentation, falling, weakness, bleeding. Severe pain or pain not relieved by medications. Or, any other signs or symptoms that you may have questions about.       DISPOSITION:  x  Home With:   OT  PT  HH  RN       SNF/Inpatient Rehab/LTAC    Independent/assisted living    Hospice    Other:     CDMP Checked:   Yes x     PROBLEM LIST Updated:  Yes x       Signed:   Shady Zuniga MD  3/29/2019  10:34 AM        DISCHARGE SUMMARY from Nurse    PATIENT INSTRUCTIONS:    After general anesthesia or intravenous sedation, for 24 hours or while taking prescription Narcotics:  · Limit your activities  · Do not drive and operate hazardous machinery  · Do not make important personal or business decisions  · Do  not drink alcoholic beverages  · If you have not urinated within 8 hours after discharge, please contact your surgeon on call. Report the following to your surgeon:  · Excessive pain, swelling, redness or odor of or around the surgical area  · Temperature over 100.5  · Nausea and vomiting lasting longer than 4 hours or if unable to take medications  · Any signs of decreased circulation or nerve impairment to extremity: change in color, persistent  numbness, tingling, coldness or increase pain  · Any questions    What to do at Home:  *  Please give a list of your current medications to your Primary Care Provider. *  Please update this list whenever your medications are discontinued, doses are      changed, or new medications (including over-the-counter products) are added. *  Please carry medication information at all times in case of emergency situations. These are general instructions for a healthy lifestyle:    No smoking/ No tobacco products/ Avoid exposure to second hand smoke  Surgeon General's Warning:  Quitting smoking now greatly reduces serious risk to your health. Obesity, smoking, and sedentary lifestyle greatly increases your risk for illness    A healthy diet, regular physical exercise & weight monitoring are important for maintaining a healthy lifestyle    You may be retaining fluid if you have a history of heart failure or if you experience any of the following symptoms: You need to weigh yourself every morning and record this weight.   Weight gain of 3 pounds or more overnight or 5 pounds in a week, increased swelling in your hands or feet or shortness of breath while lying flat in bed. Please call Dr. Feliciano Woods office at [phone] as soon as you notice any of these symptoms; do not wait until your next office visit. Recognize signs and symptoms of STROKE:    F-face looks uneven    A-arms unable to move or move unevenly    S-speech slurred or non-existent    T-time-call 911 as soon as signs and symptoms begin-DO NOT go       Back to bed or wait to see if you get better-TIME IS BRAIN. Warning Signs of HEART ATTACK     Call 911 if you have these symptoms:   Chest discomfort. Most heart attacks involve discomfort in the center of the chest that lasts more than a few minutes, or that goes away and comes back. It can feel like uncomfortable pressure, squeezing, fullness, or pain.  Discomfort in other areas of the upper body. Symptoms can include pain or discomfort in one or both arms, the back, neck, jaw, or stomach.  Shortness of breath with or without chest discomfort.  Other signs may include breaking out in a cold sweat, nausea, or lightheadedness. Don't wait more than five minutes to call 911 - MINUTES MATTER! Fast action can save your life. Calling 911 is almost always the fastest way to get lifesaving treatment. Emergency Medical Services staff can begin treatment when they arrive -- up to an hour sooner than if someone gets to the hospital by car. The discharge information has been reviewed with the patient and son. The son verbalized understanding. Discharge medications reviewed with the patient and son and appropriate educational materials and side effects teaching were provided.

## 2019-03-30 VITALS
DIASTOLIC BLOOD PRESSURE: 66 MMHG | BODY MASS INDEX: 23.81 KG/M2 | RESPIRATION RATE: 16 BRPM | TEMPERATURE: 98.1 F | WEIGHT: 126 LBS | SYSTOLIC BLOOD PRESSURE: 101 MMHG | HEART RATE: 77 BPM | OXYGEN SATURATION: 99 %

## 2019-03-30 PROCEDURE — 74011250637 HC RX REV CODE- 250/637: Performed by: HOSPITALIST

## 2019-03-30 PROCEDURE — 74011250637 HC RX REV CODE- 250/637: Performed by: INTERNAL MEDICINE

## 2019-03-30 PROCEDURE — 74011636637 HC RX REV CODE- 636/637: Performed by: HOSPITALIST

## 2019-03-30 PROCEDURE — 77010033678 HC OXYGEN DAILY

## 2019-03-30 RX ORDER — POTASSIUM CHLORIDE 750 MG/1
40 TABLET, EXTENDED RELEASE ORAL DAILY
Status: DISCONTINUED | OUTPATIENT
Start: 2019-03-30 | End: 2019-03-30 | Stop reason: SDUPTHER

## 2019-03-30 RX ORDER — BUMETANIDE 1 MG/1
1 TABLET ORAL 2 TIMES DAILY
Status: DISCONTINUED | OUTPATIENT
Start: 2019-03-30 | End: 2019-03-30 | Stop reason: HOSPADM

## 2019-03-30 RX ADMIN — BUMETANIDE 1 MG: 1 TABLET ORAL at 08:02

## 2019-03-30 RX ADMIN — CARVEDILOL 3.12 MG: 3.12 TABLET, FILM COATED ORAL at 08:02

## 2019-03-30 RX ADMIN — APIXABAN 2.5 MG: 2.5 TABLET, FILM COATED ORAL at 08:02

## 2019-03-30 RX ADMIN — LEVOTHYROXINE SODIUM 37.5 MCG: 75 TABLET ORAL at 07:04

## 2019-03-30 RX ADMIN — BUSPIRONE HYDROCHLORIDE 5 MG: 5 TABLET ORAL at 08:02

## 2019-03-30 RX ADMIN — POLYETHYLENE GLYCOL 3350 17 G: 17 POWDER, FOR SOLUTION ORAL at 08:02

## 2019-03-30 RX ADMIN — PANTOPRAZOLE SODIUM 40 MG: 40 TABLET, DELAYED RELEASE ORAL at 07:05

## 2019-03-30 RX ADMIN — CLOPIDOGREL BISULFATE 75 MG: 75 TABLET, FILM COATED ORAL at 08:02

## 2019-03-30 RX ADMIN — PREDNISONE 40 MG: 20 TABLET ORAL at 07:04

## 2019-03-30 RX ADMIN — POTASSIUM CHLORIDE 40 MEQ: 750 TABLET, EXTENDED RELEASE ORAL at 08:02

## 2019-03-30 RX ADMIN — ESCITALOPRAM OXALATE 10 MG: 10 TABLET ORAL at 08:02

## 2019-03-30 NOTE — PROGRESS NOTES
[facility] Progress Note 3/30/2019 7:53 AM 
Admit Date: 3/27/2019 Admit Diagnosis: SOB (shortness of breath) [R06.02] CHF (congestive heart failure) ([address] [I50.9] SOB (shortness of breath) [R06.02] Admitted with recurrent effusion, dyspnea, volume overload, likely related to effusion and severe MR. Awaiting procedure at [facility] in April. Have notified valve team of readmission. Feeling well today, appears to be laying flat resting in NAD compensated. Volume status is tenuous. But seems stable to go home as she is awaiting procedure at [facility]. High risk for readmission but well enough to leave. Replete K today Assessment/Plan 1. Severe MR - pending finalization of timing for possible MV clipping/repair 2. Large left pleural effusion on TTE, moderate on CXR - plan for thoracentesis 3.  AI  
4. CAD s/p Inferior STEMI 9/11/18 with PTCA/stents to distal RCA (KENNETH x 2) -continue plavix, coreg, statin 
-ECG with inferior, anteroseptal TWI 5. Hypotension - hx of HTN coreg pta, low normal now 6. Hx of HFpEF - NYHA Class IV on admission, NYHA Class III today, BP controlled, continue bumex 7. AF - rate controlled on coreg 3.125mg BID and diltiazem 180mg daily, cotn Eliquis for [address] [place] [address] 8. Dyslipidemia - LDL 56 on simvastatin 40mg daily 9. Hypokalemia - replete to 4 
10. DNR status in place  
  
Echo 12/6/18 - (report not available), severe MR, LVEF 50%, AI Inferior STEMI 9/11/18 with PTCA/stents to distal RCA (KENNETH x 2) Echo 4/18 - LVEF 55 %, no WMA,mod dilated LA, MAC with mild MR, AV sclerosis with mod AI, mild TR, PASP 30mmHg, mild PI 
  
Soc Hx: no tobacco use x 10 years, no etoh use Fam Hx: mother passed at age 80 from heart problems Subjective:  
 
Oksana Choudhary continues with dyspnea at rest.  Denies any chest pain or palpitations. No dizziness. Understands plan for diuresis and evaluation of valve disease.  Wants to know if her procedures can be any sooner as she is tired of coming in and out of the hospital.  
 
Objective:  
  
Physical Exam: 
Visit Vitals /71 (BP 1 Location: Right arm, BP Patient Position: At rest) Pulse 83 Temp 98.1 °F (36.7 °C) Resp 16 Wt 126 lb (57.2 kg) LMP 01/01/2000 SpO2 99% BMI 23.81 kg/m² General Appearance:  Elderly, pale, NAD Ears/Nose/Mouth/Throat:   Hearing grossly normal. 
  
    Neck: Supple. Chest:   Bibasilar crackles Cardiovascular:  Irregular rate and rhythm, S1, S2 normal, 3/6 VANGIE apex Abdomen:   Soft, non-tender, bowel sounds are active. Extremities: 1+ edema bilaterally. Skin: Warm and dry. Telemetry: AFIB Data Review:  
Labs:   
No results found for this or any previous visit (from the past 24 hour(s)). Current Facility-Administered Medications Medication Dose Route Frequency  predniSONE (DELTASONE) tablet 40 mg  40 mg Oral DAILY WITH BREAKFAST  potassium chloride SR (KLOR-CON 10) tablet 40 mEq  40 mEq Oral DAILY  sodium chloride (NS) flush 5-40 mL  5-40 mL IntraVENous Q8H  
 sodium chloride (NS) flush 5-40 mL  5-40 mL IntraVENous PRN  
 acetaminophen (TYLENOL) tablet 650 mg  650 mg Oral Q4H PRN  
 ondansetron (ZOFRAN) injection 4 mg  4 mg IntraVENous Q4H PRN  
 dilTIAZem CD (CARDIZEM CD) capsule 180 mg  180 mg Oral QHS  simvastatin (ZOCOR) tablet 40 mg  40 mg Oral QHS  nitroglycerin (NITROSTAT) tablet 0.4 mg  0.4 mg SubLINGual PRN  
 apixaban (ELIQUIS) tablet 2.5 mg  2.5 mg Oral BID  pantoprazole (PROTONIX) tablet 40 mg  40 mg Oral ACB  polyethylene glycol (MIRALAX) packet 17 g  17 g Oral DAILY  carvedilol (COREG) tablet 3.125 mg  3.125 mg Oral BID  melatonin tablet 3 mg  3 mg Oral QHS  levothyroxine (SYNTHROID) tablet 37.5 mcg  37.5 mcg Oral ACB  escitalopram oxalate (LEXAPRO) tablet 10 mg  10 mg Oral DAILY  busPIRone (BUSPAR) tablet 5 mg  5 mg Oral BID  clopidogrel (PLAVIX) tablet 75 mg  75 mg Oral DAILY  bumetanide (BUMEX) injection 1 mg  1 mg IntraVENous BID Dana Pugh MD 
[facility] [address] [place] [address], [address] [facility] 
[phone]

## 2019-03-30 NOTE — PROGRESS NOTES
Primary Nurse Dorita Medina and Jennyfer RN performed a dual skin assessment on this patient No impairment noted Dre score is 19.

## 2019-03-30 NOTE — PROGRESS NOTES
Bedside and Verbal shift change report given to ELMA Burger (oncoming nurse) by Esther Humphrey RN (offgoing nurse). Report included the following information SBAR, Kardex, Intake/Output and MAR.

## 2019-03-30 NOTE — PROGRESS NOTES
Bedside and Verbal shift change report given to Oseas Werner RN (oncoming nurse) by Nanette Griffin RN (offgoing nurse). Report included the following information SBAR, Kardex, Intake/Output, MAR and Recent Results.

## 2019-04-01 ENCOUNTER — PATIENT OUTREACH (OUTPATIENT)
Dept: CASE MANAGEMENT | Age: 84
End: 2019-04-01

## 2019-04-01 NOTE — PROGRESS NOTES
Hospital Discharge Follow-Up      Date/Time:  2019 2:32 PM    Patient was admitted to [facility] on May 27,  and discharged on 3/30/19  for CHF with bilateral effusion . The physician discharge summary was available at the time of outreach. Patient was contacted within 1 business days of discharge. Pt was d/c to [facility] - rehab care - [facility] - [phone] - Ale Jenkins NP - (however Scott Grater is off until  - Dr. Umair Lund is the provider overseeing care. Pt is on daily weights. Pt has follow up at [facility] for evaluation of FMR -     Top Challenges reviewed with the provider   Assessment/Plan      1. Severe MR - pending finalization of timing for possible MV clipping/repair  2. Large left pleural effusion on TTE, moderate on CXR - plan for thoracentesis  3.  AI   4. CAD s/p Inferior STEMI 18 with PTCA/stents to distal RCA (KENNETH x 2)  -continue plavix, coreg, statin  -ECG with inferior, anteroseptal TWI  5. Hypotension - hx of HTN coreg pta, low normal now  6.  Hx of HFpEF - NYHA Class IV on admission, NYHA Class III today, BP controlled, continue bumex   7.  AF - rate controlled on coreg 3.125mg BID and diltiazem 180mg daily, cotn Eliquis for OAC  8.  Dyslipidemia - LDL 56 on simvastatin 40mg daily   9.  Hypokalemia - replete to 4  10. DNR status in place        Method of communication with provider :chart routing, staff message, phone    Inpatient RRAT score: 32  Was this a readmission? yes   Patient stated reason for the readmission: CHF    Nurse Navigator (NN) contacted the caregiver by telephone to perform post hospital discharge assessment. Verified name and  with caregiver as identifiers. Provided introduction to self, and explanation of the Nurse Navigator role. Reviewed discharge instructions and red flags with caregiver who verbalized understanding. Caregiver given an opportunity to ask questions and does not have any further questions or concerns at this time.  The caregiver agrees to contact the PCP office for questions related to their healthcare. NN provided contact information for future reference. Disease Specific:   CHF/ COPD/ Mitral valve disease    Summary of patient's top problems:  1. COPD  2. CHF  3. Severe mitral valve disease  Pending transfer to [facility] and evaluation for mitral valve procedure. - Dr. Arias Eddy Failure Note    Do you have a Scale:    yes   How often do you weigh:  Daily at facility requested. Daily Weight (document daily weights in flowsheets):   Decrease   Zone:(Pt Reported)  green     EF: 55-60% (result) on 2/19/19 (date)   Type of HF:   HFpEF   · Estimated left ventricular ejection fraction is 56 - 60%. · Left atrial cavity size is moderately dilated. Left atrial appendage velocity is normal (greater than 40 cm/sec). · Right ventricular cavity size is mildly dilated. Right ventricular global systolic function is mildly reduced. · Right atrial cavity size is mildly dilated. · Severe mitral valve regurgitation. Moderately decreased posterior leaflet mobility of the mitral valve. · Mild tricuspid valve regurgitation is present. Mild pulmonary hypertension is present. Cardiac Device present: none     Heart Failure Medications: Betablocker, Diuretic, Potassium, Anticoagulant     Home Health orders at discharge: none - pt d/c to [facility] unit    Durable Medical Equipment ordered/company: none  Durable Medical Equipment received: n/a    Barriers to care? ineffective coping, lack of knowledge about disease, stages of grief, utilization of services    Advance Care Planning:   Does patient have an Advance Directive:  reviewed and current     Medication(s):     Medication reconciliation was performed with caregiver, who verbalizes understanding of administration of home medications. There were no barriers to obtaining medications identified at this time.     Referral to Pharm D needed: no     Current Outpatient Medications   Medication Sig    predniSONE (DELTASONE) 20 mg tablet Take 40 mg by mouth daily (with breakfast) for 3 days.  calcium carbonate (OS-ADAM) 500 mg calcium (1,250 mg) tablet Take 500 mg by mouth.  bumetanide (BUMEX) 2 mg tablet Take 2 mg by mouth two (2) times a day.  melatonin 3 mg tablet Take 3 mg by mouth nightly.  levothyroxine (SYNTHROID) 25 mcg tablet Take 1.5 Tabs by mouth Daily (before breakfast). Indications: hypothyroidism    escitalopram oxalate (LEXAPRO) 10 mg tablet Take 10 mg by mouth daily. Indications: Repeated Episodes of Anxiety, major depressive disorder    busPIRone (BUSPAR) 5 mg tablet Take 5 mg by mouth two (2) times a day. Indications: Repeated Episodes of Anxiety    potassium chloride SR (KLOR-CON 10) 10 mEq tablet Take 20 mEq by mouth daily.  clopidogrel (PLAVIX) 75 mg tab Take 75 mg by mouth daily. Indications: treatment to prevent a heart attack    carvedilol (COREG) 3.125 mg tablet Take 1 Tab by mouth two (2) times a day.  polyethylene glycol (MIRALAX) 17 gram packet Take 17 g by mouth daily.  pantoprazole (PROTONIX) 40 mg tablet Take 1 Tab by mouth daily. (Patient taking differently: Take 40 mg by mouth daily. Indications: inflammation of the esophagus with erosion, gastroesophageal reflux disease)    apixaban (ELIQUIS) 2.5 mg tablet Take 1 Tab by mouth two (2) times a day.  simvastatin (ZOCOR) 40 mg tablet Take 40 mg by mouth nightly.  nitroglycerin (NITROSTAT) 0.4 mg SL tablet Take 0.4 mg by mouth as needed.  PROAIR HFA 90 mcg/actuation inhaler Take 2 Inhalation by inhalation four (4) times daily as needed.  diltiazem CD (CARTIA XT) 180 mg ER capsule Take 180 mg by mouth nightly. No current facility-administered medications for this visit. There are no discontinued medications.     [facility] follow up appointment(s):   Future Appointments   Date Time Provider Maty Schrader   4/22/2019  1:20 PM MD Papo Bowers [facility]      Non-[facility] follow up appointment(s): [facility] clinic evaluation 4/5 - for procedure 4/9 - Dr. Shannan Dale - mitral valve procedure  [facility]:  n/a       Goals     None

## 2019-04-02 ENCOUNTER — PATIENT OUTREACH (OUTPATIENT)
Dept: FAMILY MEDICINE CLINIC | Age: 84
End: 2019-04-02

## 2019-04-08 ENCOUNTER — PATIENT OUTREACH (OUTPATIENT)
Dept: CARDIOLOGY CLINIC | Age: 84
End: 2019-04-08

## 2019-04-08 NOTE — PROGRESS NOTES
Goals  Attends follow-up appointments as directed. 04/08/19 Patient discharged to [address] [place] [address]  from [facility]. Plan post discharge is to have patient go to [facility] on 4/5 for preop tests and then return on 4/9/19 to have Mitraclip placed by Dr Chris Patel. NN called the healthcare section -- an answering machine picked up. AKI LM on  to ask for return call/update---will follow up later in week if no return call received---macey

## 2019-04-15 ENCOUNTER — PATIENT OUTREACH (OUTPATIENT)
Dept: CARDIOLOGY CLINIC | Age: 84
End: 2019-04-15

## 2019-04-16 DIAGNOSIS — I25.10 CORONARY ARTERY DISEASE INVOLVING NATIVE HEART WITHOUT ANGINA PECTORIS, UNSPECIFIED VESSEL OR LESION TYPE: ICD-10-CM

## 2019-04-16 DIAGNOSIS — I10 ESSENTIAL HYPERTENSION: ICD-10-CM

## 2019-04-16 DIAGNOSIS — I50.42 CHRONIC COMBINED SYSTOLIC AND DIASTOLIC CONGESTIVE HEART FAILURE (HCC): ICD-10-CM

## 2019-04-16 DIAGNOSIS — I34.0 MITRAL VALVE INSUFFICIENCY, UNSPECIFIED ETIOLOGY: Primary | ICD-10-CM

## 2019-04-16 DIAGNOSIS — Z98.890 S/P MITRAL VALVE REPAIR: ICD-10-CM

## 2019-04-19 ENCOUNTER — PATIENT OUTREACH (OUTPATIENT)
Dept: CARDIOLOGY CLINIC | Age: 84
End: 2019-04-19

## 2019-04-19 NOTE — PROGRESS NOTES
Goals  Attends follow-up appointments as directed. 04/08/19 Patient discharged to [facility]  from [address] [place] [address]. Plan post discharge is to have patient go to [facility] on 4/5 for preop tests and then return on 4/9/19 to have Mitraclip placed by Dr Lola Brewster. NN called the healthcare section -- an answering machine picked up. NN LM on  to ask for return call/update---will follow up later in week if no return call received---mkrw 04/15/19 NN attempted to contact Blayne Calabrese  transferred call--no one answered phone after several rings. NN will send staff message to Mar Daniels NP to attempt to find out if patient remains at [facility]. Will follow up next week---mkrw 04/16/19 [address] [place] [address] to see if patient admitted-- states pt not listed. NN received staff message from Ruben Stallings NP--she does not care for the pt's in Healthcare, gave NN number for Nurse Director Eddie Phillip [phone]. NN CLEM on  asking for update---mkrw 04/19/19 NN unable to contact [facility]. NN also reattempted to contact Eddie Phillip again at [phone]---. NN will call again next week for updates---mkrw

## 2019-04-22 ENCOUNTER — OFFICE VISIT (OUTPATIENT)
Dept: CARDIOLOGY CLINIC | Age: 84
End: 2019-04-22

## 2019-04-22 VITALS
HEART RATE: 80 BPM | SYSTOLIC BLOOD PRESSURE: 140 MMHG | DIASTOLIC BLOOD PRESSURE: 80 MMHG | WEIGHT: 118.2 LBS | HEIGHT: 61 IN | RESPIRATION RATE: 16 BRPM | OXYGEN SATURATION: 99 % | BODY MASS INDEX: 22.31 KG/M2

## 2019-04-22 DIAGNOSIS — I25.10 CORONARY ARTERY DISEASE INVOLVING NATIVE CORONARY ARTERY OF NATIVE HEART WITHOUT ANGINA PECTORIS: ICD-10-CM

## 2019-04-22 DIAGNOSIS — R09.02 HYPOXIA: ICD-10-CM

## 2019-04-22 DIAGNOSIS — R60.9 SWELLING: ICD-10-CM

## 2019-04-22 DIAGNOSIS — I25.10 CORONARY ARTERY DISEASE INVOLVING NATIVE HEART WITHOUT ANGINA PECTORIS, UNSPECIFIED VESSEL OR LESION TYPE: ICD-10-CM

## 2019-04-22 DIAGNOSIS — R06.02 SHORTNESS OF BREATH: ICD-10-CM

## 2019-04-22 DIAGNOSIS — I35.1 AORTIC VALVE INSUFFICIENCY, ETIOLOGY OF CARDIAC VALVE DISEASE UNSPECIFIED: ICD-10-CM

## 2019-04-22 DIAGNOSIS — I34.0 MITRAL VALVE INSUFFICIENCY, UNSPECIFIED ETIOLOGY: ICD-10-CM

## 2019-04-22 DIAGNOSIS — I34.81 MITRAL ANNULAR CALCIFICATION: ICD-10-CM

## 2019-04-22 DIAGNOSIS — I48.0 PAROXYSMAL ATRIAL FIBRILLATION (HCC): Primary | ICD-10-CM

## 2019-04-22 DIAGNOSIS — E78.2 MIXED HYPERLIPIDEMIA: ICD-10-CM

## 2019-04-22 DIAGNOSIS — I10 ESSENTIAL HYPERTENSION: ICD-10-CM

## 2019-04-22 DIAGNOSIS — I51.7 LAE (LEFT ATRIAL ENLARGEMENT): ICD-10-CM

## 2019-04-22 DIAGNOSIS — I50.32 CHRONIC DIASTOLIC CONGESTIVE HEART FAILURE (HCC): ICD-10-CM

## 2019-04-22 DIAGNOSIS — I34.0 NON-RHEUMATIC MITRAL REGURGITATION: ICD-10-CM

## 2019-04-22 RX ORDER — BUMETANIDE 0.5 MG/1
1 TABLET ORAL DAILY
COMMUNITY
End: 2019-07-29 | Stop reason: ALTCHOICE

## 2019-04-22 RX ORDER — OMEPRAZOLE 20 MG/1
20 CAPSULE, DELAYED RELEASE ORAL DAILY
COMMUNITY
End: 2019-06-03 | Stop reason: SDUPTHER

## 2019-04-22 RX ORDER — DEXTROMETHORPHAN HYDROBROMIDE, GUAIFENESIN 5; 100 MG/5ML; MG/5ML
650 LIQUID ORAL
COMMUNITY
End: 2019-07-30 | Stop reason: ALTCHOICE

## 2019-04-22 NOTE — Clinical Note
Celsa Ram! We saw this uriel lady today and she looks great after finally getting her MV clip. She is walking with a walker quickly and easily and without dyspnea and can probably come off her O2. We had her walk in the office today and sats were stable after her walk- we turned her down to 2L from 4L and rechecked again after 10min, still doing great. I think she can probably wean down further and come off, but have asked her to see you within a week to continue the wean. I don't want her to wean all at once, Id like her to continue it gradually. Please take a resting sat and an ambulatory one, turn her down to 1L and repeat. If stable give her another few days to a week  and try again to take it all the way off.  Thanks!! Abilio Kilpatrick

## 2019-04-22 NOTE — PROGRESS NOTES
[facility]  [phone]    HPI: Alejandra Norris, a 80y.o. year-old who presents for follow up regarding her severe MR and diastolic heart failure. MV clip was done 4/9/19 and she says she feels the same  Son thinks she is doing better, less short of breath, more mobile, more active, she looks better, moving and walking faster, looks more energetic, color is better to me  She denies any PND or orthopnea  She is on 4 lpm of oxygen continuously and would like to come off it if possible  O2 Sats 97% on 4 lpm with ambulation today, will reduce her to 2 lpm continuously and ask her to follow up with Nikolay Echeverria NP for reassessment and possible discontinuation of her oxygen   O2 Sats 98% on 2 lpm at rest in the office 5 minutes after reduction in her lpm  Plans to move from [facility] to [place] in 1 week   She has chronic dizziness and feeling unsteady for years  No syncope   Having more headaches than usual, no elevated blood pressure readings that she or her son are aware of  Denies any chest pain or palpitations   Has mild LE edema - not wearing compression stockings  Looks great today, walking well with her rollator in the rader  Patient's son is with her today, she is a poor historian    Assessment/Plan:  1. CAD s/p Inferior STEMI 9/11/18 with PTCA/stents to distal RCA (KENNETH x 2)  -symptoms preceding STEMI were palpitations, nausea, dizziness, diaphoresis, maybe some chest heaviness, troponin > 50  -continue plavix, coreg, statin  2. Severe MR - s/p MV clip 4/9/19, has follow up TTE and appt with Dr. Yamile Aguero 5/13/19  3. Large left pleural effusion- resolved, now on bumex 0.5mg daily   4. AI - moderate by TTE, will reassess with TTE 5/13/19  5. Hypotension - hx of HTN, well controlled on coreg and diltiazem   6.   HFpEF - BP well controlled, continue bumex 0.5mg daily  -advised her to begin wearing compression stockings daily  -weaned her down to 2 lpm oxygen today and advised her to follow up with Michael Humphries NP in 2 weeks to continue working on weaning off oxygen post MV clip  -plan to recheck O2 level with walking within a week and wean off after that, anticipate she will be able to  -she will follow up here again in 3 months   7. AF - rate controlled on coreg and diltiazem, continue Eliquis 2.5mg BID  8. Dyslipidemia - at goal on simvastatin 40mg daily   9. DNR status in place    Echo 12/6/18 - LVEF 50 %, no WMA, grade 3 dd, LA severely dilated, MAC with eccentric severe regurgitation that was posteriorly directed, AV sclerosis without stenosis and mod AI, mild TR, PASP mildly increased, dilated IVC, large right pleural effusion, large left pleural effusion.   Head CT 9/18 normal  Inferior STEMI 9/11/18, Cardiac Cath 9/11/18  Thrombectomy of RCA, PTCA/stent of the distal RCA into the PLB with a 2.75 x 12 and 3.0 x 15 mm KENNETH (resolute stent), complicated case Ao 951/77, LVEDP 27, no gradient across AV valve, RCA dominant, totally occluded in distal segment, LM normal, LAD transapical vessel and small caliber normal.  LCX normal.  LVEF 50%, 2+ MR  Echo 4/18 - LVEF 55 %, no WMA,mod dilated LA, MAC with mild MR, AV sclerosis with mod AI, mild TR, PASP 30mmHg, mild PI    Soc Hx: no tobacco use x 10 years, no etoh use   Fam Hx: mother passed at age 80 from heart problems    She  has a past medical history of (HFpEF) heart failure with preserved ejection fraction ([address] (10/17/2018), Allergic rhinitis, Atherosclerotic cardiovascular disease (1999), CHF (congestive heart failure) ([address] (09/11/2018), Cognitive communication deficit, Edema, Environmental allergies, Fatty liver disease, nonalcoholic, GERD (gastroesophageal reflux disease), H/O heart artery stent (09/2018), HCVD (hypertensive cardiovascular disease) (10/17/2018), Heart attack ([address] (09/2018), Heart palpitations (2018), History of PTCA, Hypercholesterolemia (1999), Hypertension (2010), Liver disease, MI (myocardial infarction) ([facility] (09/2018), Overactive bladder (08/09/2018), Peripheral neuropathy, Permanent atrial fibrillation ([facility] (11/05/2018), Senile dementia, without behavioral disturbance (11/05/2018), Stress incontinence, female (08/09/2018), Vertigo, and Vitamin D deficiency. Cardiovascular ROS: no chest pain, positive for dyspnea   Respiratory ROS: positive for shortness of breath  Neurological ROS: no TIA or stroke symptoms  All other systems negative except as above. PE  Vitals:    04/22/19 1254   BP: 140/80   Pulse: 80   Resp: 16   SpO2: 99%   Weight: 118 lb 3.2 oz (53.6 kg)   Height: 5' 1\" (1.549 m)    Body mass index is 22.33 kg/m².    General appearance - alert, well appearing, and in no distress  Mental status - affect appropriate to mood  Eyes - sclera anicteric, moist mucous membranes  Neck - supple  Lymphatics - not assessed  Chest - CTA bilaterally     Heart - regular rate and irregular rhythm, normal S1, S2, 2/6 VANGIE   Abdomen - soft, nontender, nondistended  Back exam - full range of motion  Neurological - no focal deficit  Musculoskeletal - normal strength  Extremities - peripheral pulses normal, 1+ LE edema bilaterally   Skin- normal coloration  no rashes    Recent Labs:  Lab Results   Component Value Date/Time    Cholesterol, total 119 02/12/2019 01:38 PM    HDL Cholesterol 42 02/12/2019 01:38 PM    LDL, calculated 58 02/12/2019 01:38 PM    Triglyceride 97 02/12/2019 01:38 PM    CHOL/HDL Ratio 3.8 12/17/2009 08:35 AM     Lab Results   Component Value Date/Time    Creatinine 1.22 (H) 03/28/2019 03:16 AM     Lab Results   Component Value Date/Time    BUN 25 (H) 03/28/2019 03:16 AM     Lab Results   Component Value Date/Time    Potassium 3.0 (L) 03/28/2019 03:16 AM     Lab Results   Component Value Date/Time    Hemoglobin A1c 6.4 (H) 12/06/2018 02:03 PM     Lab Results   Component Value Date/Time    HGB 10.9 (L) 03/28/2019 03:16 AM     Lab Results   Component Value Date/Time    PLATELET 730 63/99/7550 03:16 AM Reviewed:  Past Medical History:   Diagnosis Date    (HFpEF) heart failure with preserved ejection fraction ([facility] 10/17/2018    acute    Allergic rhinitis     Atherosclerotic cardiovascular disease     CHF (congestive heart failure) ([facility] 2018    Cognitive communication deficit     Edema     Environmental allergies     dizziness-(chronic)    Fatty liver disease, nonalcoholic     GERD (gastroesophageal reflux disease)     H/O heart artery stent 2018    HCVD (hypertensive cardiovascular disease) 10/17/2018    Heart attack ([facility] 2018    Heart palpitations 2018    infrequent     History of PTCA     Hypercholesterolemia     Hypertension     Liver disease     fatty liver    MI (myocardial infarction) ([facility] 2018    Overactive bladder 2018    Peripheral neuropathy     Permanent atrial fibrillation ([facility] 2018    Senile dementia, without behavioral disturbance 2018    Stress incontinence, female 2018    Vertigo     Vitamin D deficiency      Social History     Tobacco Use   Smoking Status Former Smoker    Packs/day: 4.00    Types: Cigarettes    Last attempt to quit: 1955    Years since quittin.3   Smokeless Tobacco Never Used     Social History     Substance and Sexual Activity   Alcohol Use No    Alcohol/week: 0.0 - 0.5 oz     Allergies   Allergen Reactions    Brilinta [Ticagrelor] Shortness of Breath    Celecoxib Other (comments)    Codeine Hives    Sulfa (Sulfonamide Antibiotics) Unknown (comments) and Hives    Iodine Unknown (comments)    Aricept [Donepezil] Other (comments)     Headaches - noted as intolerance     Darvocet A500 [Propoxyphene N-Acetaminophen] Nausea Only       Current Outpatient Medications   Medication Sig    bumetanide (BUMEX) 0.5 mg tablet Take  by mouth daily.  omeprazole (PRILOSEC) 20 mg capsule Take 20 mg by mouth daily.     acetaminophen (TYLENOL ARTHRITIS PAIN) 650 mg TbER Take 650 mg by mouth every six (6) hours as needed.  calcium carbonate (OS-ADAM) 500 mg calcium (1,250 mg) tablet Take 500 mg by mouth.  melatonin 3 mg tablet Take 3 mg by mouth nightly.  levothyroxine (SYNTHROID) 25 mcg tablet Take 1.5 Tabs by mouth Daily (before breakfast). Indications: hypothyroidism    escitalopram oxalate (LEXAPRO) 10 mg tablet Take 10 mg by mouth daily. Indications: Repeated Episodes of Anxiety, major depressive disorder    busPIRone (BUSPAR) 5 mg tablet Take 5 mg by mouth two (2) times a day. Indications: Repeated Episodes of Anxiety    clopidogrel (PLAVIX) 75 mg tab Take 75 mg by mouth daily. Indications: treatment to prevent a heart attack    carvedilol (COREG) 3.125 mg tablet Take 1 Tab by mouth two (2) times a day.  polyethylene glycol (MIRALAX) 17 gram packet Take 17 g by mouth daily.  apixaban (ELIQUIS) 2.5 mg tablet Take 1 Tab by mouth two (2) times a day.  simvastatin (ZOCOR) 40 mg tablet Take 40 mg by mouth nightly.  nitroglycerin (NITROSTAT) 0.4 mg SL tablet Take 0.4 mg by mouth as needed.  PROAIR HFA 90 mcg/actuation inhaler Take 2 Inhalation by inhalation four (4) times daily as needed.  diltiazem CD (CARTIA XT) 180 mg ER capsule Take 180 mg by mouth nightly. No current facility-administered medications for this visit. Bushra Junior MD  [facility] [address], [address] [facility] [address]  [facility]  [phone]  [address] [place] [address]  [phone]    HPI: Nancie Haynes, a 80y.o. year-old who presents for follow up regarding her severe MR and diastolic heart failure.     She is in healthcare now at [facility] says they plan to move her to assisted living but I suggested they hold off on transfer out of healthcare until we make sure she is stable  She reports that her stamina is better  Says she gets dyspnea with walking depending on how fast she is walking  Denies PND and sleeps on 2 pillows  Weight down 2 lbs from the hospital, also reports poor appetite, we talked about eating 3 meals/day  Says she is sleeping well   Denies any chest pain, pressure, tightness  Reports rare palpitations which only last a few seconds and without any high risk features  Says she has very little dizziness now, no syncope  BP low today - 100/60  O2 Sats 90% with ambulation   Says she coughs all the time but it is not productive, no fevers or chills  Son reports she is coughing much less now than she was prior to admission  Patient's son is with her today, she is a poor historian    **After last office visit labs received dated 12/24/18  BUN 15, Cr 0.97, K 3.3, Mg 1.7    Assessment/Plan:  1. CAD s/p Inferior STEMI 9/11/18 with PTCA/stents to distal RCA (KENNETH x 2)  -symptoms preceding STEMI were palpitations, nausea, dizziness, diaphoresis, maybe some chest heaviness, troponin > 50  -continue plavix, coreg, statin  2. Severe MR - seen by valve team during admission, no plans for valve repair, etc at this time   -will repeat TTE in 1 month to reassess severity of MR and follow up with Dr. Elana Sands at that time   3. Large left pleural effusion on TTE, moderate on CXR on admission - will repeat CXR now to reassess, continue bumex  4. AI - moderate by TTE   5. Hypotension - hx of HTN, advised her to reduce her coreg to 3.125mg BID and continue other medications for now, will need to watch BP closely  6. HFpEF - BP controlled, continue bumex, will check BMP and magnesium level prior to her return appointment in 1 month  7. AF - rate controlled on coreg 6.25mg BID and diltiazem 180mg daily, reducing coreg dose as above for hypotension, continue Eliquis 2.5mg BID for anticoagulation  8. Dyslipidemia - LDL 56 on simvastatin 40mg daily   -Lipids 9/18 - , TG 81, , HDL 50  9. CHEMA post cardiac cath - now creatinine normal  10.   DNR status in place    Echo 12/6/18 - LVEF 50 %, no WMA, grade 3 dd, LA severely dilated, MAC with eccentric severe regurgitation that was posteriorly directed, AV sclerosis without stenosis and mod AI, mild TR, PASP mildly increased, dilated IVC, large right pleural effusion, large left pleural effusion. Head CT 9/18 normal  Inferior STEMI 9/11/18, Cardiac Cath 9/11/18  Thrombectomy of RCA, PTCA/stent of the distal RCA into the PLB with a 2.75 x 12 and 3.0 x 15 mm KENNETH (resolute stent), complicated case Ao 157/55, LVEDP 27, no gradient across AV valve, RCA dominant, totally occluded in distal segment, LM normal, LAD transapical vessel and small caliber normal.  LCX normal.  LVEF 50%, 2+ MR  Echo 4/18 - LVEF 55 %, no WMA,mod dilated LA, MAC with mild MR, AV sclerosis with mod AI, mild TR, PASP 30mmHg, mild PI    Soc Hx: no tobacco use x 10 years, no etoh use   Fam Hx: mother passed at age 80 from heart problems    She  has a past medical history of (HFpEF) heart failure with preserved ejection fraction ([address] (10/17/2018), Allergic rhinitis, Atherosclerotic cardiovascular disease (1999), CHF (congestive heart failure) ([address] (09/11/2018), Cognitive communication deficit, Edema, Environmental allergies, Fatty liver disease, nonalcoholic, GERD (gastroesophageal reflux disease), H/O heart artery stent (09/2018), HCVD (hypertensive cardiovascular disease) (10/17/2018), Heart attack ([address] (09/2018), Heart palpitations (2018), History of PTCA, Hypercholesterolemia (1999), Hypertension (2010), Liver disease, MI (myocardial infarction) ([address] (09/2018), Overactive bladder (08/09/2018), Peripheral neuropathy, Permanent atrial fibrillation ([address] (11/05/2018), Senile dementia, without behavioral disturbance (11/05/2018), Stress incontinence, female (08/09/2018), Vertigo, and Vitamin D deficiency. Cardiovascular ROS: no chest pain, positive for dyspnea   Respiratory ROS: positive for cough, shortness of breath  Neurological ROS: no TIA or stroke symptoms  All other systems negative except as above. PE  Vitals:    04/22/19 1254   BP: 140/80   Pulse: 80   Resp: 16   SpO2: 99%   Weight: 118 lb 3.2 oz (53.6 kg)   Height: 5' 1\" (1.549 m)    Body mass index is 22.33 kg/m².    General appearance - alert, well appearing, and in no distress  Mental status - affect appropriate to mood  Eyes - sclera anicteric, moist mucous membranes  Neck - supple  Lymphatics - not assessed  Chest - diminished base on left side, right lung clear    Heart - normal rate, irregular rhythm, normal S1, S2, no murmurs, rubs, clicks or gallops  Abdomen - soft, nontender, nondistended  Back exam - full range of motion  Neurological - no focal deficit  Musculoskeletal - normal strength  Extremities - peripheral pulses normal, trace LE edema  Skin - normal coloration  no rashes    Recent Labs:  Lab Results   Component Value Date/Time    Cholesterol, total 119 02/12/2019 01:38 PM    HDL Cholesterol 42 02/12/2019 01:38 PM    LDL, calculated 58 02/12/2019 01:38 PM    Triglyceride 97 02/12/2019 01:38 PM    CHOL/HDL Ratio 3.8 12/17/2009 08:35 AM     Lab Results   Component Value Date/Time    Creatinine 1.22 (H) 03/28/2019 03:16 AM     Lab Results   Component Value Date/Time    BUN 25 (H) 03/28/2019 03:16 AM     Lab Results   Component Value Date/Time    Potassium 3.0 (L) 03/28/2019 03:16 AM     Lab Results   Component Value Date/Time    Hemoglobin A1c 6.4 (H) 12/06/2018 02:03 PM     Lab Results   Component Value Date/Time    HGB 10.9 (L) 03/28/2019 03:16 AM     Lab Results   Component Value Date/Time    PLATELET 242 15/07/7317 03:16 AM       Reviewed:  Past Medical History:   Diagnosis Date    (HFpEF) [facility] failure with preserved [facility] fraction [facility] 10/17/2018    acute    Allergic rhinitis     Atherosclerotic cardiovascular disease 1999    CH[facility]estive [facility] failure) [facility] 09/11/2018    Cognitive communication deficit     Edema     Environmental allergies     dizziness-(chronic)    Fatty liver disease, nonalcoholic     GERD (gastroesophageal reflux disease)     H/O heart artery stent 2018    HCVD (hypertensive cardiovascular disease) 10/17/2018    Heart attack ([facility] 2018    Heart palpitations 2018    infrequent     History of PTCA     Hypercholesterolemia 1999    Hypertension     Liver disease     fatty liver    MI (myocardial infarction) ([facility] 2018    Overactive bladder 2018    Peripheral neuropathy     Permanent atrial fibrillation ([facility] 2018    Senile dementia, without behavioral disturbance 2018    Stress incontinence, female 2018    Vertigo     Vitamin D deficiency      Social History     Tobacco Use   Smoking Status Former Smoker    Packs/day: 4.00    Types: Cigarettes    Last attempt to quit: 1955    Years since quittin.3   Smokeless Tobacco Never Used     Social History     Substance and Sexual Activity   Alcohol Use No    Alcohol/week: 0.0 - 0.5 oz     Allergies   Allergen Reactions    Brilinta [Ticagrelor] Shortness of Breath    Celecoxib Other (comments)    Codeine Hives    Sulfa (Sulfonamide Antibiotics) Unknown (comments) and Hives    Iodine Unknown (comments)    Aricept [Donepezil] Other (comments)     Headaches - noted as intolerance     Darvocet A500 [Propoxyphene N-Acetaminophen] Nausea Only       Current Outpatient Medications   Medication Sig    bumetanide (BUMEX) 0.5 mg tablet Take  by mouth daily.  omeprazole (PRILOSEC) 20 mg capsule Take 20 mg by mouth daily.  acetaminophen (TYLENOL ARTHRITIS PAIN) 650 mg TbER Take 650 mg by mouth every six (6) hours as needed.  calcium carbonate (OS-ADAM) 500 mg calcium (1,250 mg) tablet Take 500 mg by mouth.  melatonin 3 mg tablet Take 3 mg by mouth nightly.  levothyroxine (SYNTHROID) 25 mcg tablet Take 1.5 Tabs by mouth Daily (before breakfast). Indications: hypothyroidism    escitalopram oxalate (LEXAPRO) 10 mg tablet Take 10 mg by mouth daily.  Indications: Repeated Episodes of Anxiety, major depressive disorder    busPIRone (BUSPAR) 5 mg tablet Take 5 mg by mouth two (2) times a day. Indications: Repeated Episodes of Anxiety    clopidogrel (PLAVIX) 75 mg tab Take 75 mg by mouth daily. Indications: treatment to prevent a heart attack    carvedilol (COREG) 3.125 mg tablet Take 1 Tab by mouth two (2) times a day.  polyethylene glycol (MIRALAX) 17 gram packet Take 17 g by mouth daily.  apixaban (ELIQUIS) 2.5 mg tablet Take 1 Tab by mouth two (2) times a day.  simvastatin (ZOCOR) 40 mg tablet Take 40 mg by mouth nightly.  nitroglycerin (NITROSTAT) 0.4 mg SL tablet Take 0.4 mg by mouth as needed.  PROAIR HFA 90 mcg/actuation inhaler Take 2 Inhalation by inhalation four (4) times daily as needed.  diltiazem CD (CARTIA XT) 180 mg ER capsule Take 180 mg by mouth nightly. No current facility-administered medications for this visit.         Javi Martin MD  [facility] [address] [place] [address], [address] [place] [address] [address]  [facility]  [phone]

## 2019-04-23 ENCOUNTER — PATIENT OUTREACH (OUTPATIENT)
Dept: CARDIOLOGY CLINIC | Age: 84
End: 2019-04-23

## 2019-04-23 NOTE — PROGRESS NOTES
Goals  Attends follow-up appointments as directed. 04/08/19 Patient discharged to [facility]  from [facility]. Plan post discharge is to have patient go to [facility] on 4/5 for preop tests and then return on 4/9/19 to have Mitraclip placed by Dr Baljit Quintana. NN called the healthcare section -- an answering machine picked up. NN LM on  to ask for return call/update---will follow up later in week if no return call received---mkrw 04/15/19 NN attempted to contact [facility] Sky Nixon .  transferred call--no one answered phone after several rings. NN will send staff message to Benedicto Zurita NP to attempt to find out if patient remains at [facility]. Will follow up next week---mkrw 04/16/19 [facility] to see if patient admitted-- states pt not listed. NN received staff message from Cain Dumont NP--she does not care for the pt's in Healthcare, gave NN number for Nurse Director Dmitry Wiley [phone]. NN LM on  asking for update---mkrw 04/19/19 NN unable to contact [facility]. NN also reattempted to contact Dmitry Wiley again at [phone]---. NN will call again next week for updates---mkrw 04/23/19 NN has not received any return calls from [place]. Was able to see office visit pt had on 4/22 with NP at cards office. Patient did have the MV clip procedure on 4/9/19---has a f/u IFRAH with Dr Baljit Quintana on 5/13/19. Pt continues to be in the healthcare section of [facility] and should be returning to [place] next week. Current weight recorded is 118 lbs. Pt reported to have mild leg edema and was counseled to wear her compression stockings. Patient has been wearing 02 at 4l/NC continuous, was weaned down to 2 l/nc and informed to see Dorina Alarcon NP in a couple of weeks to evaluate continued 02 use. NN will follow up next week to complete INDIA---mkrw

## 2019-04-29 ENCOUNTER — PATIENT OUTREACH (OUTPATIENT)
Dept: CARDIOLOGY CLINIC | Age: 84
End: 2019-04-29

## 2019-04-29 NOTE — PROGRESS NOTES
Patient has graduated from the Transitions of Care Coordination  program on 4/29/19. Patient's symptoms are stable at this time. Patient/family has the ability to self-manage. Care management goals have been completed at this time. No further nurse navigator follow up scheduled. Goals Addressed This Visit's Progress  COMPLETED: Attends follow-up appointments as directed. 04/08/19 Patient discharged to [address] [place] [address]  from [facility]. Plan post discharge is to have patient go to [facility] on 4/5 for preop tests and then return on 4/9/19 to have Mitraclip placed by Dr Chela Arenas. NN called the healthcare section -- an answering machine picked up. NN LM on  to ask for return call/update---will follow up later in week if no return call received---[facility] 04/15/19 NN attempted to contact Blayne Calabrese  transferred call--no one answered phone after several rings. NN will send staff message to [place] DYLAN Benedict to attempt to find out if patient remains at [facility]. Will follow up next week---[facility] 04/16/19 [address] [facility] to see if patient admitted-- states pt not listed. NN received staff message from Angie Schuler NP--she does not care for the pt's in Healthcare, gave NN number for Nurse Director Derek Kunz [phone]. NN CLEM on  asking for update---[facility] 04/19/19 NN unable to contact [address] [place] [address]. NN also reattempted to contact Derek Kunz again at [phone]---LM. NN will call again next week for updates---[facility] 04/23/19 NN has not received any return calls from [place]. Was able to see office visit pt had on 4/22 with NP at [facility] office. Patient did have the MV clip procedure on 4/9/19---has a f/u IFRAH with Dr Chela Arenas on 5/13/19. Pt continues to be in the healthcare section of [facility] and should be returning to [place] next week. Current weight recorded is 118 lbs.  Pt reported to have mild leg edema and was counseled to wear her compression stockings. Patient has been wearing 02 at 4l/NC continuous, was weaned down to 2 l/nc and informed to see Barry Infante NP in a couple of weeks to evaluate continued 02 use. NN will follow up next week to complete INDIA---mkrw 04/29/19 Unable to reach staff at [facility], phone rings, no answer. INDIA episode completed---mkrw Pt has nurse navigator's contact information for any further questions, concerns, or needs. Patients upcoming visits:   
Future Appointments Date Time Provider Maty Schrader 5/13/2019  1:00 PM ECHO LAB 2 [facility] [address] [facility]. [facility]  
5/13/2019  2:30 PM Jeanne Bergman MD [facility] TING DELAROSA  
7/29/2019  3:00 PM Ashlyn Andrea  [address] [place] [address]

## 2019-05-13 ENCOUNTER — OFFICE VISIT (OUTPATIENT)
Dept: CARDIOLOGY CLINIC | Age: 84
End: 2019-05-13

## 2019-05-13 ENCOUNTER — HOSPITAL ENCOUNTER (OUTPATIENT)
Dept: NON INVASIVE DIAGNOSTICS | Age: 84
Discharge: HOME | End: 2019-05-13
Attending: NURSE PRACTITIONER
Payer: MEDICARE

## 2019-05-13 VITALS
BODY MASS INDEX: 22.28 KG/M2 | TEMPERATURE: 97.4 F | HEART RATE: 113 BPM | SYSTOLIC BLOOD PRESSURE: 128 MMHG | OXYGEN SATURATION: 98 % | HEIGHT: 61 IN | DIASTOLIC BLOOD PRESSURE: 60 MMHG | RESPIRATION RATE: 24 BRPM | WEIGHT: 118 LBS

## 2019-05-13 VITALS
BODY MASS INDEX: 22.28 KG/M2 | DIASTOLIC BLOOD PRESSURE: 60 MMHG | WEIGHT: 118 LBS | SYSTOLIC BLOOD PRESSURE: 128 MMHG | HEIGHT: 61 IN

## 2019-05-13 DIAGNOSIS — I25.10 CORONARY ARTERY DISEASE INVOLVING NATIVE HEART WITHOUT ANGINA PECTORIS, UNSPECIFIED VESSEL OR LESION TYPE: ICD-10-CM

## 2019-05-13 DIAGNOSIS — Z98.890 S/P MITRAL VALVE REPAIR: ICD-10-CM

## 2019-05-13 DIAGNOSIS — I34.0 MITRAL VALVE INSUFFICIENCY, UNSPECIFIED ETIOLOGY: ICD-10-CM

## 2019-05-13 DIAGNOSIS — I34.0 MITRAL VALVE INSUFFICIENCY, UNSPECIFIED ETIOLOGY: Primary | ICD-10-CM

## 2019-05-13 DIAGNOSIS — I10 ESSENTIAL HYPERTENSION: ICD-10-CM

## 2019-05-13 DIAGNOSIS — I50.42 CHRONIC COMBINED SYSTOLIC AND DIASTOLIC CONGESTIVE HEART FAILURE (HCC): ICD-10-CM

## 2019-05-13 DIAGNOSIS — E78.2 MIXED HYPERLIPIDEMIA: ICD-10-CM

## 2019-05-13 DIAGNOSIS — I48.0 PAROXYSMAL ATRIAL FIBRILLATION (HCC): ICD-10-CM

## 2019-05-13 LAB
ECHO AO ROOT DIAM: 3.05 CM
ECHO AV PEAK GRADIENT: 5.8 MMHG
ECHO AV PEAK VELOCITY: 120.67 CM/S
ECHO AV REGURGITANT PHT: 507.8 CM
ECHO LA AREA 4C: 22.6 CM2
ECHO LA MAJOR AXIS: 5.03 CM
ECHO LA TO AORTIC ROOT RATIO: 1.65
ECHO LA VOL 2C: 89.54 ML (ref 22–52)
ECHO LA VOL 4C: 71.16 ML (ref 22–52)
ECHO LA VOL BP: 85.93 ML (ref 22–52)
ECHO LA VOL/BSA BIPLANE: 56.92 ML/M2 (ref 16–28)
ECHO LA VOLUME INDEX A2C: 59.31 ML/M2 (ref 16–28)
ECHO LA VOLUME INDEX A4C: 47.14 ML/M2 (ref 16–28)
ECHO LV E' LATERAL VELOCITY: 5.89 CM/S
ECHO LV E' SEPTAL VELOCITY: 2.94 CM/S
ECHO LV INTERNAL DIMENSION DIASTOLIC: 4.33 CM (ref 3.9–5.3)
ECHO LV INTERNAL DIMENSION SYSTOLIC: 2.95 CM
ECHO LV IVSD: 0.75 CM (ref 0.6–0.9)
ECHO LV MASS 2D: 102.5 G (ref 67–162)
ECHO LV MASS INDEX 2D: 67.9 G/M2 (ref 43–95)
ECHO LV POSTERIOR WALL DIASTOLIC: 0.7 CM (ref 0.6–0.9)
ECHO MV MAX VELOCITY: 143.81 CM/S
ECHO MV MEAN GRADIENT: 2.4 MMHG
ECHO MV PEAK GRADIENT: 8.3 MMHG
ECHO MV VTI: 28.96 CM
ECHO PV MAX VELOCITY: 63.3 CM/S
ECHO PV PEAK GRADIENT: 1.6 MMHG
ECHO RV INTERNAL DIMENSION: 3.76 CM
ECHO RV TAPSE: 0.84 CM (ref 1.5–2)
ECHO TV REGURGITANT MAX VELOCITY: 288.77 CM/S
ECHO TV REGURGITANT PEAK GRADIENT: 33.4 MMHG
PISA AR MAX VEL: 410.6 CM/S

## 2019-05-13 PROCEDURE — 93306 TTE W/DOPPLER COMPLETE: CPT

## 2019-05-13 RX ORDER — CARVEDILOL 6.25 MG/1
6.25 TABLET ORAL 2 TIMES DAILY
Qty: 60 TAB | Refills: 4 | Status: SHIPPED | OUTPATIENT
Start: 2019-05-13 | End: 2019-08-20

## 2019-05-13 NOTE — PROGRESS NOTES
Patient: Joann Bauer   Age: 80 y.o. Patient Care Team:  Edwin Perkins NP as PCP - General  Lukas Muse MD as Physician (Orthopedic Surgery)  Marica Alpers, MD (Ophthalmology)  Theresa Campos MD as Consulting Provider (Cardiology)  Ismael Hoff MD as Physician (Gynecology)  Jd Dick MD as Physician (Neurology)  Florian Caruso MD (Pulmonary Disease)  Nathalia Mack NP (Nurse Practitioner)  Ladi Torre MD (Cardiothoracic Surgery)  Susanna Melo RN as Ambulatory Care Navigator    PCP: Edwin Perkins NP    Cardiologist: Rafa Sanchez    Diagnosis/Reason for Consultation: The primary encounter diagnosis was Mitral valve insufficiency, unspecified etiology. Diagnoses of S/P mitral valve repair, Paroxysmal atrial fibrillation (HCC), Essential hypertension, and Mixed hyperlipidemia were also pertinent to this visit. Problem List:   Patient Active Problem List   Diagnosis Code    HTN (hypertension) I10    Hyperlipidemia E78.5    GERD (gastroesophageal reflux disease) K21.9    Anxiety F41.9    Vertigo R42    Osteoporosis M81.0    Ocular hypertension H40.059    Nuclear sclerosis H25.10    Fatty liver disease, nonalcoholic R34.6    Kidney insufficiency N28.9    Atherosclerotic cardiovascular disease I25.10    Paroxysmal atrial fibrillation (HCC) I48.0    Pulmonary heart disease (HCC)  I27.9    Diuretic-induced hypokalemia E87.6, T50.2X5A    Moderate protein-calorie malnutrition (HCC)  E44.0    AI (aortic insufficiency) I35.1    (HFpEF) heart failure with preserved ejection fraction (HCC) I50.30    CAD (coronary artery disease) I25.10    Hypothyroidism E03.9         HPI: 80 y.o.  female s/p TMVr (2 MitraClips on the A2/P2 mitral leaflet scallops with reduction of mitral regurgitation from severe to trivial) for severe and symptomatic MR on 4/9/2019 at [facility] by Dr. Sawyer Rangel. She was discharged on POD# 2.  She is here today for her 27 day post-op evaluation. Ms. Martha Araya continues to live in [address] [place] [address]. She has PT/OT daily and walks to the dining rader three times/day. She denies any SOB/DURAN, CP, chest tightness, lightheadedness or palpitations with these activities. She does still have occasional lightheadedness with standing. She feels her strength and stamina is slowly improving and her son speaks of her walking a fair distance to lunch yesterday (they went out to [place] for Mother's Day) which is the first time she's been 'out' since her MI in Sept 2018! She further denies any syncope, falls, or PND but may still have some mild orthopnea. She continues w/ 2L NC but apparently PT has been trying her with less or none w/ some sessions and all are hopeful she may eventually be able to wean off entirely. Her son also reports an improved appetite and a slow steady wt loss with her current diuretic regimen.     Ms. Martha Araya is accompanied by her son, a local  for the [facility], today. He is thrilled with her progress. PMHx of MR, HTN, CAD/STEMI s/p stents 9/2018, CVA, GERD, renal insufficiency, HFpEF, HLD, peripheral neuropathy, afib, hypothyroid, vertigo, and dementia    NYHA Classification: I   Class I (Mild): No limitation of physical activity. Ordinary physical activity does not cause undue fatigue, palpitation, or dyspnea.     Angina Classification: 0   Class 0: No symptoms    Past Medical History:   Diagnosis Date    (HFpEF) heart failure with preserved ejection fraction ([facility] 10/17/2018    acute    Allergic rhinitis     Atherosclerotic cardiovascular disease 1999    CHF (congestive heart failure) ([facility] 09/11/2018    Cognitive communication deficit     Edema     Environmental allergies     dizziness-(chronic)    Fatty liver disease, nonalcoholic     GERD (gastroesophageal reflux disease)     H/O heart artery stent 09/2018    HCVD (hypertensive cardiovascular disease) 10/17/2018    Heart attack ([facility] 2018    Heart palpitations 2018    infrequent     History of PTCA     Hypercholesterolemia     Hypertension     Liver disease     fatty liver    MI [facility]myocardial infarction) [facility] 2018    Overactive bladder 2018    Peripheral neuropathy     Permanent atrial fibrillation ([facility] 2018    Senile dementia, without behavioral disturbance 2018    Stress incontinence, female 2018    Vertigo     Vitamin D deficiency        Past Surgical History:   Procedure Laterality Date    BREAST SURGERY PROCEDURE UNLISTED      breast cyst [facility] [place])    CARDIAC SURG PROCEDURE UNLIST  1999    + stress thalassemia; 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  Holter    HX APPENDECTOMY      HX BREAST BIOPSY Left     neg    HX CYST INCISION AND DRAINAGE Right years ago    neg    HX DILATION AND CURETTAGE      x5    HX GYN      D&C (x5), ALLEY/BSO (-Juliann/Jerome), Provera intolerance (bleeding), endometrial Bx annually    HX PTCA  2018    stent distal RCA into PLwith KENNETH x 2    HX ALLEY AND BSO      Juliann/Alonzor      Social History     Tobacco Use    Smoking status: Former Smoker     Packs/day: 4.00     Types: Cigarettes     Last attempt to quit: 1955     Years since quittin.4    Smokeless tobacco: Never Used   Substance Use Topics    Alcohol use: No     Alcohol/week: 0.0 - 0.5 oz      Family History   Problem Relation Age of Onset    Diabetes Mother     Hypertension Mother     Heart Failure Mother         CHF ( @ 80)   [facility] Alzheimer Mother     Cancer Father         lung ( @ 77)   [facility] Alzheimer Father     No Known Problems Son     No Known Problems Son     Ovarian Cancer Sister         hysterectomy    Breast Cancer Sister 28        mastectomy    Cancer Sister         breast and ovarian    Alzheimer Sister     Breast Problems Sister         breast cyst    Cataract Sister     Hypertension Sister     Diabetes Brother      Prior to Admission medications Medication Sig Start Date End Date Taking? Authorizing Provider   carvedilol (COREG) 6.25 mg tablet Take 1 Tab by mouth two (2) times a day. 5/13/19  Yes Arpit Ashraf NP   bumetanide (BUMEX) 0.5 mg tablet Take  by mouth daily. Yes Provider, Historical   omeprazole (PRILOSEC) 20 mg capsule Take 20 mg by mouth daily. Yes Provider, Historical   acetaminophen (TYLENOL ARTHRITIS PAIN) 650 mg TbER Take 650 mg by mouth every six (6) hours as needed. Yes Provider, Historical   calcium carbonate (OS-ADAM) 500 mg calcium (1,250 mg) tablet Take 500 mg by mouth. Yes Provider, Historical   melatonin 3 mg tablet Take 3 mg by mouth nightly. Yes Provider, Historical   levothyroxine (SYNTHROID) 25 mcg tablet Take 1.5 Tabs by mouth Daily (before breakfast). Indications: hypothyroidism 3/27/19  Yes Chasity Woodson NP   escitalopram oxalate (LEXAPRO) 10 mg tablet Take 10 mg by mouth daily. Indications: Repeated Episodes of Anxiety, major depressive disorder   Yes Provider, Historical   busPIRone (BUSPAR) 5 mg tablet Take 5 mg by mouth two (2) times a day. Indications: Repeated Episodes of Anxiety   Yes Provider, Historical   clopidogrel (PLAVIX) 75 mg tab Take 75 mg by mouth daily. Indications: treatment to prevent a heart attack   Yes Provider, Historical   polyethylene glycol (MIRALAX) 17 gram packet Take 17 g by mouth daily. Yes Provider, Historical   apixaban (ELIQUIS) 2.5 mg tablet Take 1 Tab by mouth two (2) times a day. 11/30/18  Yes Chasity Woodson NP   simvastatin (ZOCOR) 40 mg tablet Take 40 mg by mouth nightly. 11/26/18  Yes Provider, Historical   nitroglycerin (NITROSTAT) 0.4 mg SL tablet Take 0.4 mg by mouth as needed. 11/26/18  Yes Provider, Historical   PROAIR HFA 90 mcg/actuation inhaler Take 2 Inhalation by inhalation four (4) times daily as needed. 11/26/18  Yes Provider, Historical   diltiazem CD (CARTIA XT) 180 mg ER capsule Take 180 mg by mouth nightly.    Yes Provider, Historical Allergies   Allergen Reactions    Brilinta [Ticagrelor] Shortness of Breath    Celecoxib Other (comments)    Codeine Hives    Sulfa (Sulfonamide Antibiotics) Unknown (comments) and Hives    Iodine Unknown (comments)    Aricept [Donepezil] Other (comments)     Headaches - noted as intolerance     Darvocet A500 [Propoxyphene N-Acetaminophen] Nausea Only       Current Medications:   Current Outpatient Medications   Medication Sig Dispense Refill    carvedilol (COREG) 6.25 mg tablet Take 1 Tab by mouth two (2) times a day. 60 Tab 4    bumetanide (BUMEX) 0.5 mg tablet Take  by mouth daily.  omeprazole (PRILOSEC) 20 mg capsule Take 20 mg by mouth daily.  acetaminophen (TYLENOL ARTHRITIS PAIN) 650 mg TbER Take 650 mg by mouth every six (6) hours as needed.  calcium carbonate (OS-ADAM) 500 mg calcium (1,250 mg) tablet Take 500 mg by mouth.  melatonin 3 mg tablet Take 3 mg by mouth nightly.  levothyroxine (SYNTHROID) 25 mcg tablet Take 1.5 Tabs by mouth Daily (before breakfast). Indications: hypothyroidism 90 Tab 1    escitalopram oxalate (LEXAPRO) 10 mg tablet Take 10 mg by mouth daily. Indications: Repeated Episodes of Anxiety, major depressive disorder      busPIRone (BUSPAR) 5 mg tablet Take 5 mg by mouth two (2) times a day. Indications: Repeated Episodes of Anxiety      clopidogrel (PLAVIX) 75 mg tab Take 75 mg by mouth daily. Indications: treatment to prevent a heart attack      polyethylene glycol (MIRALAX) 17 gram packet Take 17 g by mouth daily.  apixaban (ELIQUIS) 2.5 mg tablet Take 1 Tab by mouth two (2) times a day. 30 Tab 0    simvastatin (ZOCOR) 40 mg tablet Take 40 mg by mouth nightly.  nitroglycerin (NITROSTAT) 0.4 mg SL tablet Take 0.4 mg by mouth as needed.  PROAIR HFA 90 mcg/actuation inhaler Take 2 Inhalation by inhalation four (4) times daily as needed.  diltiazem CD (CARTIA XT) 180 mg ER capsule Take 180 mg by mouth nightly.          Vitals: Blood pressure 128/60, pulse (!) 113, temperature 97.4 °F (36.3 °C), temperature source Oral, resp. rate 24, height 5' 1\" (1.549 m), weight 118 lb (53.5 kg), last menstrual period 01/01/2000, SpO2 98 %. Pre 6 min walk VS: , RR 16, 118/60, 98% sat on 2L NC    Allergies: is allergic to brilinta [ticagrelor]; celecoxib; codeine; sulfa (sulfonamide antibiotics); iodine; aricept [donepezil]; and darvocet a500 [propoxyphene n-acetaminophen]. Review of Systems: Pertinent Positives per HPI   [x]Total of 13 systems reviewed as follows:  Constitutional: Negative fever, negative chills  Eyes:               Negative for amauroses fugax  ENT:                Negative sore throat,oral absecess  Endocrine        Negative for thyroid goiter; DM  Respiratory:     Negative chronic cough,sputum production  Cards:              Negative for palpitations, varicosities, claudication  GI:                   Negative for dysphagia, bleeding, nausea, vomiting, diarrhea, and abdominal pain  Genitourinary: Negative for frequency, dysuria  Integument:     Negative for rash and pruritus  Hematologic:   Negative for easy bruising; bleeding dyscarsia  Musculoskel:   Negative for muscle weakness inhibiting ambulation  Neurological:   Negative for stroke, TIA, syncope  Behavl/Psych: Negative for feelings of anxiety, depression    Cardiovascular Testing:   TTE today:  Read not available at time of note    Physical Exam:  General: Well nourished well groomed elderly woman appearing stated age accompanied by her son  Neuro: A&OX3. HUERTA. PERRL. Steady assisted gait with rollator  Head:Normocephalic. Atraumatic. Symmetrical  Neck: Trachea Midline  Resp: CTA B. No Adv BS/cough/sputum/tachypnea with seated conversation  CV: S1S2 Irregular. No M/R/G/JVD/carotid bruits. Pink/warm/dry extremities. Mild LE peripheral edema  GI:Benign ab. Soft. NT/ND.  Active BS  : Voids  Integ: No obvious s/s of infection or breakdown  Musculo/Skeletal: FROM in all major joints. Modest muscle tone    Clinic Evaluation:   KCCQ-12: scanned into EMR    6 minute walk test: PreTest HR/02 sat:  See VS this visit - completed 6 min w/ rollator and w/o difficulty             Post Test HR/02 sat: 113 / 98% at on 2L NC             Distance Walked: 669 ft 8 inches    Assessment/Plan:   1. MR s/p TMVr (2 MitraClips on the A2/P2 mitral leaflet scallops with reduction of mitral regurgitation from severe to trivial) for severe and symptomatic MR on 4/9/2019 at [facility] by Dr. Kelsy Harvey - Doing really well. No ASA currently but Clopidogrel for KENNETH in 2018. Will need ASA 81mg daily for valve patency if ever stops clopidogrel. Saw Dr. Ailyn Perez on 4/22/2019 and has next f/u with her on 7/29/2019. RTC at one yr anniversary for TTE and eval per TVT Registry Guidelines. 2. HFpEF - BB/Loop diuretic per cards   3. CAD s/p Inferior STEMI 9/11/18 with PTCA/stents to distal RCA (KENNETH x 2) - Clopidogrel/BB/statin per cards  4. HLD - statin per cards  5. Afib - Rate not well controlled today on current BB/CCB. To increase BB to 6.25mg BID. Anticoagulated w/ apixaban 2.5mg BID per cards  6. HTN - BB per cards  7. Hypothyroid -  Synthroid per PCP  8. Respiratory insufficiency - d/t CHF. On 2L NC ATC. pulmicort, duonebs, and bumex. Previously on metolazone but not now. Trial of weaning oxygen with PT - hopeful continued cardiac improvement and eventual wean.   9. GERD - PPI per PCP

## 2019-06-03 ENCOUNTER — OFFICE VISIT (OUTPATIENT)
Dept: GERIATRIC MEDICINE | Age: 84
End: 2019-06-03

## 2019-06-03 VITALS
OXYGEN SATURATION: 98 % | DIASTOLIC BLOOD PRESSURE: 58 MMHG | BODY MASS INDEX: 22.84 KG/M2 | SYSTOLIC BLOOD PRESSURE: 118 MMHG | WEIGHT: 121 LBS | RESPIRATION RATE: 18 BRPM | HEART RATE: 82 BPM | HEIGHT: 61 IN

## 2019-06-03 DIAGNOSIS — I21.A9 OTHER TYPE OF MYOCARDIAL INFARCTION (HCC): ICD-10-CM

## 2019-06-03 DIAGNOSIS — E03.9 ACQUIRED HYPOTHYROIDISM: Chronic | ICD-10-CM

## 2019-06-03 DIAGNOSIS — M81.0 AGE-RELATED OSTEOPOROSIS WITHOUT CURRENT PATHOLOGICAL FRACTURE: ICD-10-CM

## 2019-06-03 DIAGNOSIS — Z95.818 STATUS POST IMPLANTATION OF MITRAL VALVE LEAFLET CLIP: ICD-10-CM

## 2019-06-03 DIAGNOSIS — R68.89 OTHER GENERAL SYMPTOMS AND SIGNS: ICD-10-CM

## 2019-06-03 DIAGNOSIS — I50.32 CHRONIC HEART FAILURE WITH PRESERVED EJECTION FRACTION (HCC): Primary | ICD-10-CM

## 2019-06-03 DIAGNOSIS — Z98.890 STATUS POST IMPLANTATION OF MITRAL VALVE LEAFLET CLIP: ICD-10-CM

## 2019-06-03 DIAGNOSIS — E78.2 MIXED HYPERLIPIDEMIA: ICD-10-CM

## 2019-06-03 DIAGNOSIS — E87.6 DIURETIC-INDUCED HYPOKALEMIA: ICD-10-CM

## 2019-06-03 DIAGNOSIS — T50.2X5A DIURETIC-INDUCED HYPOKALEMIA: ICD-10-CM

## 2019-06-03 DIAGNOSIS — Z66 DNR (DO NOT RESUSCITATE): ICD-10-CM

## 2019-06-03 RX ORDER — CALCIUM CARBONATE/VITAMIN D3 500 MG-10
TABLET ORAL
COMMUNITY

## 2019-06-03 RX ORDER — OMEPRAZOLE 20 MG/1
20 CAPSULE, DELAYED RELEASE ORAL DAILY
COMMUNITY

## 2019-06-03 RX ORDER — PANTOPRAZOLE SODIUM 20 MG/1
TABLET, DELAYED RELEASE ORAL
COMMUNITY
Start: 2019-03-30 | End: 2019-07-30 | Stop reason: ALTCHOICE

## 2019-06-03 RX ORDER — POTASSIUM CHLORIDE 20 MEQ/1
10 TABLET, EXTENDED RELEASE ORAL DAILY
COMMUNITY
Start: 2019-03-30 | End: 2019-07-30 | Stop reason: ALTCHOICE

## 2019-06-03 NOTE — PROGRESS NOTES
ADVISED PATIENT OF THE FOLLOWING HEALTH MAINTAINCE DUE  There are no preventive care reminders to display for this patient. Chief Complaint   Patient presents with    Transitions Of Care     Pt moved from [facility] to [address] [place] [address]. She is doing very well. 1. Have you been to the ER, urgent care clinic since your last visit? Hospitalized since your last visit? No    2. Have you seen or consulted any other health care providers outside of the [facility] since your last visit? Include any DEXA scan, mammography  or colon screening. No    3. Do you have an Advance Directive on file? yes    4. Do you have a DNR on file? DNR    Patient is accompanied by self I have received verbal consent from Nuvia Hina to discuss any/all medical information while they are present in the room. Advance Care Planning 12/6/2018   Primary Decision Maker Name -   Primary Decision Maker Phone Number -   Primary Decision Maker Relationship to Patient -   Confirm Advance Directive Yes, on file   Does the patient have other document types -         [facility], [address] [place] [address]  Phone: [phone] Fax: [phone]    Patient reminded during visit to bring all medication bottles, OTC medications to all appointments.      Nuvia Santamaria presents for lab draw ordered by Roberto Arora RN MSN [id number]-AG-NP    The following labs were drawn and sent to lab by Stacey Matthews LPN:    CBC, JCZ1L and Vitamin D, Vitamin b12 folate, Thyroid panel, Magnesium, lipid panel,     The following tubes were sent:    Lavender  ( 2) and Tiger (2)    Patient tolerated procedure well, blood obtained via venipuncture to left antecubital

## 2019-06-03 NOTE — LETTER
6/4/2019 2:27 PM 
 
Patient: Marguerite Alvarez YOB: 1927 Date of Visit: 6/3/2019 Dear Loann Severance 
[address] [facility] [address] [address] VIA Facsimile: [phone] Randal Pereira [facility] 15. Nurses VIA Facsimile: [phone] 
 : This letter is to advise you of a recent visit Ms. Pamela Martinez had with Jose Pepe NP for: Chief Complaint Patient presents with  Transitions Of Care Pt moved from [facility]. [facility] to [address] [place] [address]. She is doing very well. Below are the relevant portions of my assessment and plan of care. If you have questions, please do not hesitate to call me. I look forward to continuing to follow Ms. Yoko Foy along with you.  
 
 
 
Sincerely, 
Jose Pepe NP

## 2019-06-04 LAB
25(OH)D3+25(OH)D2 SERPL-MCNC: 46.4 NG/ML (ref 30–100)
ALBUMIN SERPL-MCNC: 4.4 G/DL (ref 3.2–4.6)
ALBUMIN/GLOB SERPL: 1.6 {RATIO} (ref 1.2–2.2)
ALP SERPL-CCNC: 130 IU/L (ref 39–117)
ALT SERPL-CCNC: 19 IU/L (ref 0–32)
AST SERPL-CCNC: 26 IU/L (ref 0–40)
BASOPHILS # BLD AUTO: 0 X10E3/UL (ref 0–0.2)
BASOPHILS NFR BLD AUTO: 0 %
BILIRUB SERPL-MCNC: 0.4 MG/DL (ref 0–1.2)
BUN SERPL-MCNC: 28 MG/DL (ref 10–36)
BUN/CREAT SERPL: 24 (ref 12–28)
CALCIUM SERPL-MCNC: 9.6 MG/DL (ref 8.7–10.3)
CHLORIDE SERPL-SCNC: 100 MMOL/L (ref 96–106)
CHOLEST SERPL-MCNC: 153 MG/DL (ref 100–199)
CO2 SERPL-SCNC: 27 MMOL/L (ref 20–29)
CREAT SERPL-MCNC: 1.16 MG/DL (ref 0.57–1)
EOSINOPHIL # BLD AUTO: 0.2 X10E3/UL (ref 0–0.4)
EOSINOPHIL NFR BLD AUTO: 3 %
ERYTHROCYTE [DISTWIDTH] IN BLOOD BY AUTOMATED COUNT: 18.2 % (ref 12.3–15.4)
EST. AVERAGE GLUCOSE BLD GHB EST-MCNC: 126 MG/DL
FOLATE SERPL-MCNC: >20 NG/ML
FT4I SERPL CALC-MCNC: 1.7 (ref 1.2–4.9)
GLOBULIN SER CALC-MCNC: 2.7 G/DL (ref 1.5–4.5)
GLUCOSE SERPL-MCNC: 70 MG/DL (ref 65–99)
HBA1C MFR BLD: 6 % (ref 4.8–5.6)
HCT VFR BLD AUTO: 33.7 % (ref 34–46.6)
HDLC SERPL-MCNC: 59 MG/DL
HGB BLD-MCNC: 10.9 G/DL (ref 11.1–15.9)
IMM GRANULOCYTES # BLD AUTO: 0 X10E3/UL (ref 0–0.1)
IMM GRANULOCYTES NFR BLD AUTO: 0 %
LDLC SERPL CALC-MCNC: 76 MG/DL (ref 0–99)
LYMPHOCYTES # BLD AUTO: 1.2 X10E3/UL (ref 0.7–3.1)
LYMPHOCYTES NFR BLD AUTO: 21 %
MAGNESIUM SERPL-MCNC: 2.1 MG/DL (ref 1.6–2.3)
MCH RBC QN AUTO: 28.2 PG (ref 26.6–33)
MCHC RBC AUTO-ENTMCNC: 32.3 G/DL (ref 31.5–35.7)
MCV RBC AUTO: 87 FL (ref 79–97)
MONOCYTES # BLD AUTO: 0.8 X10E3/UL (ref 0.1–0.9)
MONOCYTES NFR BLD AUTO: 13 %
NEUTROPHILS # BLD AUTO: 3.6 X10E3/UL (ref 1.4–7)
NEUTROPHILS NFR BLD AUTO: 63 %
PLATELET # BLD AUTO: 276 X10E3/UL (ref 150–450)
POTASSIUM SERPL-SCNC: 4.4 MMOL/L (ref 3.5–5.2)
PROT SERPL-MCNC: 7.1 G/DL (ref 6–8.5)
RBC # BLD AUTO: 3.87 X10E6/UL (ref 3.77–5.28)
SODIUM SERPL-SCNC: 141 MMOL/L (ref 134–144)
T3RU NFR SERPL: 25 % (ref 24–39)
T4 SERPL-MCNC: 6.6 UG/DL (ref 4.5–12)
TRIGL SERPL-MCNC: 90 MG/DL (ref 0–149)
TSH SERPL DL<=0.005 MIU/L-ACNC: 2.47 UIU/ML (ref 0.45–4.5)
VIT B12 SERPL-MCNC: 522 PG/ML (ref 232–1245)
VLDLC SERPL CALC-MCNC: 18 MG/DL (ref 5–40)
WBC # BLD AUTO: 5.8 X10E3/UL (ref 3.4–10.8)

## 2019-06-04 NOTE — PROGRESS NOTES
Reason for Visit/HPI:    Sheryl Montes is a 80 y.o. female patient who presents today for :  Chief Complaint   Patient presents with    Transitions Of Care     Pt moved from [facility] to [address] [place] [address]. She is doing very well. Follow Up: Follow-up and Dispositions    · Return in about 3 months (around 9/3/2019) for with the NP for follow up to your treatment plan. Diagnosis/Treatment Plan:    Diagnoses and all orders for this visit:  Ms. Ana Burton is here following her stay in healthcare for acute rehabilitation following a leaflet valve clipping for heart failure following an MI last Sept 2018. She is doing remarkable. She is off oxygen as she was on it continuously and now does not require it at all for activity or functioning. She is bright. She is not confused or cognitively impaired anymore. Other than her weight loss she is back to the [place] prior to her MI last fall. She is happy and feels great. She states \"I feel like living now\". I have reviewed her medications and they are all appropriate for her medical conditions. We will do some maintance labs to see how she is doing with her kidney's and liver function.      1. Chronic heart failure with preserved ejection fraction (HCC)  -     CBC WITH AUTOMATED DIFF  -     COLLECTION VENOUS BLOOD,VENIPUNCTURE  -     HEMOGLOBIN A1C WITH EAG  -     LIPID PANEL  -     MAGNESIUM  -     METABOLIC PANEL, COMPREHENSIVE  -     THYROID PANEL W/TSH  -     VITAMIN B12 & FOLATE  -     VITAMIN D, 25 HYDROXY    2. Status post implantation of mitral valve leaflet clip  -     CBC WITH AUTOMATED DIFF  -     COLLECTION VENOUS BLOOD,VENIPUNCTURE  -     HEMOGLOBIN A1C WITH EAG  -     LIPID PANEL  -     MAGNESIUM  -     METABOLIC PANEL, COMPREHENSIVE  -     THYROID PANEL W/TSH  -     VITAMIN B12 & FOLATE  -     VITAMIN D, 25 HYDROXY    3. DNR (do not resuscitate)  -     DO NOT RESUSCITATE  -     CBC WITH AUTOMATED DIFF  -     COLLECTION VENOUS BLOOD,VENIPUNCTURE  - HEMOGLOBIN A1C WITH EAG  -     LIPID PANEL  -     MAGNESIUM  -     METABOLIC PANEL, COMPREHENSIVE  -     THYROID PANEL W/TSH  -     VITAMIN B12 & FOLATE  -     VITAMIN D, 25 HYDROXY    4. Acquired hypothyroidism  -     CBC WITH AUTOMATED DIFF  -     COLLECTION VENOUS BLOOD,VENIPUNCTURE  -     HEMOGLOBIN A1C WITH EAG  -     LIPID PANEL  -     MAGNESIUM  -     METABOLIC PANEL, COMPREHENSIVE  -     THYROID PANEL W/TSH  -     VITAMIN B12 & FOLATE  -     VITAMIN D, 25 HYDROXY    5. Diuretic-induced hypokalemia  -     CBC WITH AUTOMATED DIFF  -     COLLECTION VENOUS BLOOD,VENIPUNCTURE  -     HEMOGLOBIN A1C WITH EAG  -     LIPID PANEL  -     MAGNESIUM  -     METABOLIC PANEL, COMPREHENSIVE  -     THYROID PANEL W/TSH  -     VITAMIN B12 & FOLATE  -     VITAMIN D, 25 HYDROXY    6. Age-related osteoporosis without current pathological fracture  -     CBC WITH AUTOMATED DIFF  -     COLLECTION VENOUS BLOOD,VENIPUNCTURE  -     HEMOGLOBIN A1C WITH EAG  -     LIPID PANEL  -     MAGNESIUM  -     METABOLIC PANEL, COMPREHENSIVE  -     THYROID PANEL W/TSH  -     VITAMIN B12 & FOLATE  -     VITAMIN D, 25 HYDROXY    7. Mixed hyperlipidemia  -     CBC WITH AUTOMATED DIFF  -     COLLECTION VENOUS BLOOD,VENIPUNCTURE  -     HEMOGLOBIN A1C WITH EAG  -     LIPID PANEL  -     MAGNESIUM  -     METABOLIC PANEL, COMPREHENSIVE  -     THYROID PANEL W/TSH  -     VITAMIN B12 & FOLATE  -     VITAMIN D, 25 HYDROXY    8. Other type of myocardial infarction ([facility] [address]   -     HEMOGLOBIN A1C WITH EAG    9. Other general symptoms and signs   -     VITAMIN B12 & FOLATE      Discontinued Care: The following treatment modalities have been discontinued by the provider today:   Medications Discontinued During This Encounter   Medication Reason    omeprazole (PRILOSEC) 20 mg capsule Duplicate Order     Subjective:      Allergies   Allergen Reactions    Brilinta [Ticagrelor] Shortness of Breath    Celecoxib Other (comments)    Codeine Hives    Sulfa (Sulfonamide Antibiotics) Unknown (comments) and Hives    Iodine Unknown (comments)     Other reaction(s): Other (See Comments)  This was on outside records, pt denies that she has iodine allergy. Able to tolerate seafood, shellfish w/o reaction.  Aricept [Donepezil] Other (comments)     Headaches - noted as intolerance     Darvocet A500 [Propoxyphene N-Acetaminophen] Nausea Only     Prior to Admission medications    Medication Sig Start Date End Date Taking? Authorizing Provider   potassium chloride (K-DUR, KLOR-CON) 20 mEq tablet Take 10 mEq by mouth daily. 3/30/19  Yes Provider, Historical   omeprazole (PRILOSEC) 20 mg capsule Take 20 mg by mouth daily. Yes Provider, Historical   Calcium-Cholecalciferol, D3, 500 mg(1,250mg) -400 unit tab Take  by mouth. Yes Provider, Historical   carvedilol (COREG) 6.25 mg tablet Take 1 Tab by mouth two (2) times a day. 5/13/19  Yes Aleksandra Ashraf NP   bumetanide (BUMEX) 0.5 mg tablet Take 1 mg by mouth daily. Yes Provider, Historical   acetaminophen (TYLENOL ARTHRITIS PAIN) 650 mg TbER Take 650 mg by mouth every six (6) hours as needed. Yes Provider, Historical   melatonin 3 mg tablet Take 3 mg by mouth nightly. Yes Provider, Historical   levothyroxine (SYNTHROID) 25 mcg tablet Take 1.5 Tabs by mouth Daily (before breakfast). Indications: hypothyroidism 3/27/19  Yes Maria Dolores Major NP   escitalopram oxalate (LEXAPRO) 10 mg tablet Take 10 mg by mouth daily. Indications: Repeated Episodes of Anxiety, major depressive disorder   Yes Provider, Historical   busPIRone (BUSPAR) 5 mg tablet Take 5 mg by mouth two (2) times a day. Indications: Repeated Episodes of Anxiety   Yes Provider, Historical   clopidogrel (PLAVIX) 75 mg tab Take 75 mg by mouth daily. Indications: treatment to prevent a heart attack   Yes Provider, Historical   polyethylene glycol (MIRALAX) 17 gram packet Take 17 g by mouth daily.    Yes Provider, Historical   apixaban (ELIQUIS) 2.5 mg tablet Take 1 Tab by mouth two (2) times a day. 11/30/18  Yes Ashley Freeman NP   simvastatin (ZOCOR) 40 mg tablet Take 40 mg by mouth nightly. 11/26/18  Yes Provider, Historical   nitroglycerin (NITROSTAT) 0.4 mg SL tablet Take 0.4 mg by mouth as needed. 11/26/18  Yes Provider, Historical   PROAIR HFA 90 mcg/actuation inhaler Take 2 Inhalation by inhalation four (4) times daily as needed. 11/26/18  Yes Provider, Historical   diltiazem CD (CARTIA XT) 180 mg ER capsule Take 180 mg by mouth nightly. Yes Provider, Historical   pantoprazole (PROTONIX) 20 mg tablet  3/30/19   Provider, Historical   calcium carbonate (OS-ADAM) 500 mg calcium (1,250 mg) tablet Take 500 mg by mouth. Provider, Historical     Past Medical History:   Diagnosis Date    (HFpEF) heart failure with preserved ejection fraction ([facility] [address] 10/17/2018    acute    Allergic rhinitis     Atherosclerotic cardiovascular disease 1999    CHF (congestive heart failure) ([facility] [address] 09/11/2018    Cognitive communication deficit     Edema     Environmental allergies     dizziness-(chronic)    Fatty liver disease, nonalcoholic     GERD (gastroesophageal reflux disease)     H/O heart artery stent 09/2018    HCVD (hypertensive cardiovascular disease) 10/17/2018    Heart attack ([address] 09/2018    Heart palpitations 2018    infrequent     History of PTCA     Hypercholesterolemia 1999    Hypertension 2010    Liver disease     fatty liver    MI (myocardial infarction) ([facility] [address] 09/2018    Overactive bladder 08/09/2018    Peripheral neuropathy     Permanent atrial fibrillation ([facility] [address] 11/05/2018    Senile dementia, without behavioral disturbance 11/05/2018    Stress incontinence, female 08/09/2018    Vertigo     Vitamin D deficiency      Past Surgical History:   Procedure Laterality Date    BREAST SURGERY PROCEDURE UNLISTED      breast cyst Art Savin)    CARDIAC SURG PROCEDURE UNLIST  8/1999    + stress thalassemia; neg. cath., (4/02) neg.  Holter    HX APPENDECTOMY  HX BREAST BIOPSY Left     neg    HX CYST INCISION AND DRAINAGE Right years ago    neg    HX DILATION AND CURETTAGE      x5    HX GYN      D&C (x5), ALLEY/BSO (-), Provera intolerance (bleeding), endometrial Bx annually    HX PTCA  2018    stent distal RCA into PLwith KENNETH x 2    HX ALLEY AND BSO      Juliann/umerr      Social History     Tobacco Use    Smoking status: Former Smoker     Packs/day: 4.00     Types: Cigarettes     Last attempt to quit: 1955     Years since quittin.4    Smokeless tobacco: Never Used   Substance Use Topics    Alcohol use: No     Alcohol/week: 0.0 - 0.5 oz    Drug use: No      Family History   Problem Relation Age of Onset    Diabetes Mother     Hypertension Mother     Heart Failure Mother         CHF ( @ 80)   [facility] Alzheimer Mother     Cancer Father         lung ( @ 77)   [facility] Alzheimer Father     No Known Problems Son     No Known Problems Son     Ovarian Cancer Sister         hysterectomy    Breast Cancer Sister 28        mastectomy    Cancer Sister         breast and ovarian    Alzheimer Sister     Breast Problems Sister         breast cyst    Cataract Sister     Hypertension Sister     Diabetes Brother      Advance Care Planning 2018   Primary Decision Maker Name -   Primary Decision Maker Phone Number -   Primary Decision Maker Relationship to Patient -   Confirm Advance Directive Yes, on file   Does the patient have other document types -     Test Results:     Office Visit on 2019   Component Date Value Ref Range Status    WBC 2019 5.8  3.4 - 10.8 x10E3/uL Final    RBC 2019 3.87  3.77 - 5.28 x10E6/uL Final    HGB 2019 10.9* 11.1 - 15.9 g/dL Final    HCT 2019 33.7* 34.0 - 46.6 % Final    MCV 2019 87  79 - 97 fL Final    MCH 2019 28.2  26.6 - 33.0 pg Final    MCHC 2019 32.3  31.5 - 35.7 g/dL Final    RDW 2019 18.2* 12.3 - 15.4 % Final    PLATELET 06/03/2019 276  150 - 450 x10E3/uL Final    NEUTROPHILS 06/03/2019 63  Not Estab. % Final    Lymphocytes 06/03/2019 21  Not Estab. % Final    MONOCYTES 06/03/2019 13  Not Estab. % Final    EOSINOPHILS 06/03/2019 3  Not Estab. % Final    BASOPHILS 06/03/2019 0  Not Estab. % Final    ABS. NEUTROPHILS 06/03/2019 3.6  1.4 - 7.0 x10E3/uL Final    Abs Lymphocytes 06/03/2019 1.2  0.7 - 3.1 x10E3/uL Final    ABS. MONOCYTES 06/03/2019 0.8  0.1 - 0.9 x10E3/uL Final    ABS. EOSINOPHILS 06/03/2019 0.2  0.0 - 0.4 x10E3/uL Final    ABS. BASOPHILS 06/03/2019 0.0  0.0 - 0.2 x10E3/uL Final    IMMATURE GRANULOCYTES 06/03/2019 0  Not Estab. % Final    ABS. IMM.  GRANS. 06/03/2019 0.0  0.0 - 0.1 x10E3/uL Final    Hemoglobin A1c 06/03/2019 6.0* 4.8 - 5.6 % Final    Comment:          Prediabetes: 5.7 - 6.4           Diabetes: >6.4           Glycemic control for adults with diabetes: <7.0      Estimated average glucose 06/03/2019 126  mg/dL Final    Cholesterol, total 06/03/2019 153  100 - 199 mg/dL Final    Triglyceride 06/03/2019 90  0 - 149 mg/dL Final    HDL Cholesterol 06/03/2019 59  >39 mg/dL Final    VLDL, calculated 06/03/2019 18  5 - 40 mg/dL Final    LDL, calculated 06/03/2019 76  0 - 99 mg/dL Final    Magnesium 06/03/2019 2.1  1.6 - 2.3 mg/dL Final    Glucose 06/03/2019 70  65 - 99 mg/dL Final    BUN 06/03/2019 28  10 - 36 mg/dL Final    Creatinine 06/03/2019 1.16* 0.57 - 1.00 mg/dL Final    GFR est non-AA 06/03/2019 41* >59 mL/min/1.73 Final    GFR est AA 06/03/2019 48* >59 mL/min/1.73 Final    BUN/Creatinine ratio 06/03/2019 24  12 - 28 Final    Sodium 06/03/2019 141  134 - 144 mmol/L Final    Potassium 06/03/2019 4.4  3.5 - 5.2 mmol/L Final    Chloride 06/03/2019 100  96 - 106 mmol/L Final    CO2 06/03/2019 27  20 - 29 mmol/L Final    Calcium 06/03/2019 9.6  8.7 - 10.3 mg/dL Final    Protein, total 06/03/2019 7.1  6.0 - 8.5 g/dL Final    Albumin 06/03/2019 4.4  3.2 - 4.6 g/dL Final    GLOBULIN, TOTAL 06/03/2019 2.7  1.5 - 4.5 g/dL Final    A-G Ratio 06/03/2019 1.6  1.2 - 2.2 Final    Bilirubin, total 06/03/2019 0.4  0.0 - 1.2 mg/dL Final    Alk. phosphatase 06/03/2019 130* 39 - 117 IU/L Final    AST (SGOT) 06/03/2019 26  0 - 40 IU/L Final    ALT (SGPT) 06/03/2019 19  0 - 32 IU/L Final    TSH 06/03/2019 2.470  0.450 - 4.500 uIU/mL Final    T4, Total 06/03/2019 6.6  4.5 - 12.0 ug/dL Final    T3 Uptake 06/03/2019 25  24 - 39 % Final    Free Thyroxine Index (FTI) 06/03/2019 1.7  1.2 - 4.9 Final    Vitamin B12 06/03/2019 522  232 - 1,245 pg/mL Final    Folate 06/03/2019 >20.0  >3.0 ng/mL Final    Comment: A serum folate concentration of less than 3.1 ng/mL is  considered to represent clinical deficiency.  VITAMIN D, 25-HYDROXY 06/03/2019 46.4  30.0 - 100.0 ng/mL Final    Comment: Vitamin D deficiency has been defined by the [address] practice guideline as a  level of serum 25-OH vitamin D less than 20 ng/mL (1,2). The Endocrine Society went on to further define vitamin D  insufficiency as a level between 21 and 29 ng/mL (2). 1. IOM ([place] of Medicine). 2010. Dietary reference     intakes for calcium and D. [facility]: The     [employer]. 2. Shantelle MF, Lora NC, Gary CARSON, et al.     Evaluation, treatment, and prevention of vitamin D     deficiency: an Endocrine Society clinical practice     guideline. JCEM. 2011 Jul; 96(7):1911-30.      Hospital Outpatient Visit on 05/13/2019   Component Date Value Ref Range Status    Aortic Valve Systolic Peak Velocity 58/60/0402 120.67  cm/s Final    AoV PG 05/13/2019 5.8  mmHg Final    LVIDd 05/13/2019 4.33  3.9 - 5.3 cm Final    LVPWd 05/13/2019 0.70  0.6 - 0.9 cm Final    LVIDs 05/13/2019 2.95  cm Final    IVSd 05/13/2019 0.75  0.6 - 0.9 cm Final    MV Mean Gradient 05/13/2019 2.4  mmHg Final    Mitral Valve Annulus Velocity Time* 05/13/2019 28.96  cm Final    Left Atrium to Aortic Root Ratio 05/13/2019 1.65   Final    RVIDd 05/13/2019 3.76  cm Final    LA Vol 4C 05/13/2019 71.16* 22 - 52 mL Final    LA Vol 2C 05/13/2019 89.54* 22 - 52 mL Final    LA Area 4C 05/13/2019 22.6  cm2 Final    LV Mass AL 05/13/2019 102.5  67 - 162 g Final    LV Mass AL Index 05/13/2019 67.9  43 - 95 g/m2 Final    MV Peak Gradient 05/13/2019 8.3  mmHg Final    Left Atrium Major Axis 05/13/2019 5.03  cm Final    Triscuspid Valve Regurgitation Pea* 05/13/2019 33.4  mmHg Final    Aortic Regurgitant Pressure Half-t* 05/13/2019 507.8  cm Final    Pulmonic Valve Max Velocity 05/13/2019 63.30  cm/s Final    Mitral Valve Max Velocity 05/13/2019 143.81  cm/s Final    TR Max Velocity 05/13/2019 288.77  cm/s Final    LA Vol Index 05/13/2019 59.31  16 - 28 ml/m2 Final    LA Vol Index 05/13/2019 47.14  16 - 28 ml/m2 Final    AR Max Jozef 05/13/2019 410.60  cm/s Final    PV peak gradient 05/13/2019 1.6  mmHg Final    LA Volume 05/13/2019 85.93* 22 - 52 mL Final    LV E' Lateral Velocity 05/13/2019 5.89  cm/s Final    LV E' Septal Velocity 05/13/2019 2.94  cm/s Final    Tapse 05/13/2019 0.84* 1.5 - 2.0 cm Final    Ao Root D 05/13/2019 3.05  cm Final    LA Vol Index 05/13/2019 56.92  16 - 28 ml/m2 Final   Admission on 03/27/2019, Discharged on 03/30/2019   Component Date Value Ref Range Status    Ventricular Rate 03/27/2019 85  BPM Final    Atrial Rate 03/27/2019 375  BPM Final    QRS Duration 03/27/2019 86  ms Final    Q-T Interval 03/27/2019 350  ms Final    QTC Calculation (Bezet) 03/27/2019 416  ms Final    Calculated R Axis 03/27/2019 123  degrees Final    Calculated T Axis 03/27/2019 -57  degrees Final    Diagnosis 03/27/2019    Final                    Value:Atrial fibrillation with premature ventricular or aberrantly conducted   complexes  Right ventricular hypertrophy with repolarization abnormality  T wave abnormality, consider inferior ischemia  When compared with ECG of 06-DEC-2018 13:57,  QRS axis shifted right  QT has shortened  Confirmed by Basil Caldwell MD, Deejay Pierre ([id number]) on 3/27/2019 8:32:18 PM      SAMPLES BEING HELD 03/27/2019 1RED 1BLUE 1LAV 1PST   Final    COMMENT 03/27/2019 Add-on orders for these samples will be processed based on acceptable specimen integrity and analyte stability, which may vary by analyte. Final    WBC 03/27/2019 8.1  3.6 - 11.0 K/uL Final    RBC 03/27/2019 4.06  3.80 - 5.20 M/uL Final    HGB 03/27/2019 11.2* 11.5 - 16.0 g/dL Final    HCT 03/27/2019 37.4  35.0 - 47.0 % Final    MCV 03/27/2019 92.1  80.0 - 99.0 FL Final    MCH 03/27/2019 27.6  26.0 - 34.0 PG Final    MCHC 03/27/2019 29.9* 30.0 - 36.5 g/dL Final    RDW 03/27/2019 15.4* 11.5 - 14.5 % Final    PLATELET 17/71/3522 262  150 - 400 K/uL Final    MPV 03/27/2019 9.8  8.9 - 12.9 FL Final    NRBC 03/27/2019 0.0  0  WBC Final    ABSOLUTE NRBC 03/27/2019 0.00  0.00 - 0.01 K/uL Final    NEUTROPHILS 03/27/2019 72  32 - 75 % Final    LYMPHOCYTES 03/27/2019 13  12 - 49 % Final    MONOCYTES 03/27/2019 12  5 - 13 % Final    EOSINOPHILS 03/27/2019 2  0 - 7 % Final    BASOPHILS 03/27/2019 1  0 - 1 % Final    IMMATURE GRANULOCYTES 03/27/2019 0  0.0 - 0.5 % Final    ABS. NEUTROPHILS 03/27/2019 5.8  1.8 - 8.0 K/UL Final    ABS. LYMPHOCYTES 03/27/2019 1.1  0.8 - 3.5 K/UL Final    ABS. MONOCYTES 03/27/2019 1.0  0.0 - 1.0 K/UL Final    ABS. EOSINOPHILS 03/27/2019 0.1  0.0 - 0.4 K/UL Final    ABS. BASOPHILS 03/27/2019 0.0  0.0 - 0.1 K/UL Final    ABS. IMM.  GRANS. 03/27/2019 0.0  0.00 - 0.04 K/UL Final    DF 03/27/2019 AUTOMATED    Final    Sodium 03/27/2019 139  136 - 145 mmol/L Final    Potassium 03/27/2019 3.7  3.5 - 5.1 mmol/L Final    Chloride 03/27/2019 104  97 - 108 mmol/L Final    CO2 03/27/2019 31  21 - 32 mmol/L Final    Anion gap 03/27/2019 4* 5 - 15 mmol/L Final    Glucose 03/27/2019 90  65 - 100 mg/dL Final    BUN 03/27/2019 32* 6 - 20 MG/DL Final    Creatinine 03/27/2019 1.49* 0.55 - 1.02 MG/DL Final    BUN/Creatinine ratio 03/27/2019 21* 12 - 20   Final    GFR est AA 03/27/2019 40* >60 ml/min/1.73m2 Final    GFR est non-AA 03/27/2019 33* >60 ml/min/1.73m2 Final    Comment: Estimated GFR is calculated using the IDMS-traceable Modification of Diet in Renal Disease (MDRD) Study equation, reported for both  Americans (GFRAA) and non- Americans (GFRNA), and normalized to 1.73m2 body surface area. The physician must decide which value applies to the patient. The MDRD study equation should only be used in individuals age 25 or older. It has not been validated for the following: pregnant women, patients with serious comorbid conditions, or on certain medications, or persons with extremes of body size, muscle mass, or nutritional status.  Calcium 03/27/2019 9.4  8.5 - 10.1 MG/DL Final    Bilirubin, total 03/27/2019 0.5  0.2 - 1.0 MG/DL Final    ALT (SGPT) 03/27/2019 16  12 - 78 U/L Final    AST (SGOT) 03/27/2019 21  15 - 37 U/L Final    Alk.  phosphatase 03/27/2019 91  45 - 117 U/L Final    Protein, total 03/27/2019 8.0  6.4 - 8.2 g/dL Final    Albumin 03/27/2019 3.6  3.5 - 5.0 g/dL Final    Globulin 03/27/2019 4.4* 2.0 - 4.0 g/dL Final    A-G Ratio 03/27/2019 0.8* 1.1 - 2.2   Final    NT pro-BNP 03/27/2019 7,509* <450 PG/ML Final    Troponin-I, Qt. 03/27/2019 <0.05  <0.05 ng/mL Final    pH (POC) 03/27/2019 7.442  7.35 - 7.45   Final    pCO2 (POC) 03/27/2019 41.5  35.0 - 45.0 MMHG Final    pO2 (POC) 03/27/2019 54* 80 - 100 MMHG Final    HCO3 (POC) 03/27/2019 28.3* 22 - 26 MMOL/L Final    sO2 (POC) 03/27/2019 89* 92 - 97 % Final    Base excess (POC) 03/27/2019 4  mmol/L Final    Site 03/27/2019 RIGHT BRACHIAL    Final    Device: 03/27/2019 NASAL CANNULA    Final    Flow rate (POC) 03/27/2019 3  L/M Final    Allens test (POC) 03/27/2019 NO    Final    Specimen type (POC) 03/27/2019 ARTERIAL    Final    Sodium 03/28/2019 139 136 - 145 mmol/L Final    Potassium 03/28/2019 3.0* 3.5 - 5.1 mmol/L Final    Chloride 03/28/2019 102  97 - 108 mmol/L Final    CO2 03/28/2019 31  21 - 32 mmol/L Final    Anion gap 03/28/2019 6  5 - 15 mmol/L Final    Glucose 03/28/2019 116* 65 - 100 mg/dL Final    BUN 03/28/2019 25* 6 - 20 MG/DL Final    Creatinine 03/28/2019 1.22* 0.55 - 1.02 MG/DL Final    BUN/Creatinine ratio 03/28/2019 20  12 - 20   Final    GFR est AA 03/28/2019 50* >60 ml/min/1.73m2 Final    GFR est non-AA 03/28/2019 41* >60 ml/min/1.73m2 Final    Calcium 03/28/2019 9.2  8.5 - 10.1 MG/DL Final    Bilirubin, total 03/28/2019 0.7  0.2 - 1.0 MG/DL Final    ALT (SGPT) 03/28/2019 16  12 - 78 U/L Final    AST (SGOT) 03/28/2019 19  15 - 37 U/L Final    Alk. phosphatase 03/28/2019 85  45 - 117 U/L Final    Protein, total 03/28/2019 7.6  6.4 - 8.2 g/dL Final    Albumin 03/28/2019 3.4* 3.5 - 5.0 g/dL Final    Globulin 03/28/2019 4.2* 2.0 - 4.0 g/dL Final    A-G Ratio 03/28/2019 0.8* 1.1 - 2.2   Final    WBC 03/28/2019 8.2  3.6 - 11.0 K/uL Final    RBC 03/28/2019 3.98  3.80 - 5.20 M/uL Final    HGB 03/28/2019 10.9* 11.5 - 16.0 g/dL Final    HCT 03/28/2019 36.1  35.0 - 47.0 % Final    MCV 03/28/2019 90.7  80.0 - 99.0 FL Final    MCH 03/28/2019 27.4  26.0 - 34.0 PG Final    MCHC 03/28/2019 30.2  30.0 - 36.5 g/dL Final    RDW 03/28/2019 15.2* 11.5 - 14.5 % Final    PLATELET 30/10/9371 090  150 - 400 K/uL Final    MPV 03/28/2019 9.8  8.9 - 12.9 FL Final    NRBC 03/28/2019 0.0  0  WBC Final    ABSOLUTE NRBC 03/28/2019 0.00  0.00 - 0.01 K/uL Final    NEUTROPHILS 03/28/2019 76* 32 - 75 % Final    LYMPHOCYTES 03/28/2019 12  12 - 49 % Final    MONOCYTES 03/28/2019 10  5 - 13 % Final    EOSINOPHILS 03/28/2019 1  0 - 7 % Final    BASOPHILS 03/28/2019 1  0 - 1 % Final    IMMATURE GRANULOCYTES 03/28/2019 0  0.0 - 0.5 % Final    ABS. NEUTROPHILS 03/28/2019 6.3  1.8 - 8.0 K/UL Final    ABS.  LYMPHOCYTES 03/28/2019 1.0  0.8 - 3.5 K/UL Final    ABS. MONOCYTES 03/28/2019 0.8  0.0 - 1.0 K/UL Final    ABS. EOSINOPHILS 03/28/2019 0.1  0.0 - 0.4 K/UL Final    ABS. BASOPHILS 03/28/2019 0.0  0.0 - 0.1 K/UL Final    ABS. IMM. GRANS. 03/28/2019 0.0  0.00 - 0.04 K/UL Final    DF 03/28/2019 AUTOMATED    Final    TSH 03/28/2019 5.43* 0.36 - 3.74 uIU/mL Final    Comment: (NOTE)  Due to TSH heterogeneity, both structurally and degree of   glycosylation, monoclonal antibodies used in the TSH assay may not   accurately quantitate TSH. Therefore, this result should be   correlated with clinical findings as well as with other assessments   of thyroid function, e.g., free T4, free T3.      Free Triiodothyronine (T3) 03/28/2019 2.5  2.2 - 4.0 pg/mL Final     Objective:     Vitals:    06/03/19 1057   BP: 118/58   Pulse: 82   Resp: 18   SpO2: 98%   Weight: 121 lb (54.9 kg)   Height: 5' 1\" (1.549 m)     Wt Readings from Last 3 Encounters:   06/03/19 121 lb (54.9 kg)   05/13/19 118 lb (53.5 kg)   05/13/19 118 lb (53.5 kg)     BP Readings from Last 3 Encounters:   06/03/19 118/58   05/13/19 128/60   05/13/19 128/60     Review of Systems   Constitutional: Positive for fatigue. Negative for activity change, appetite change, chills and fever. HENT: Negative for congestion, dental problem, hearing loss, postnasal drip, sinus pressure, sneezing, sore throat and trouble swallowing. Eyes: Negative for discharge, redness and visual disturbance. Respiratory: Negative for apnea, cough, chest tightness, shortness of breath and wheezing. Cardiovascular: Negative for chest pain, palpitations and leg swelling. Gastrointestinal: Negative for abdominal distention, abdominal pain, blood in stool, constipation, diarrhea, nausea and vomiting. Endocrine: Negative for polydipsia, polyphagia and polyuria. Genitourinary: Negative for flank pain, frequency and urgency. Musculoskeletal: Negative for arthralgias, gait problem, joint swelling and neck pain. Skin: Negative for color change, pallor, rash and wound. Allergic/Immunologic: Negative for environmental allergies and food allergies. Neurological: Negative for dizziness, tremors, weakness, light-headedness, numbness and headaches. Hematological: Negative for adenopathy. Psychiatric/Behavioral: Negative for agitation, confusion and sleep disturbance. The patient is not nervous/anxious. Physical Exam   Constitutional: She is oriented to person, place, and time. Vital signs are normal. She appears not lethargic, to not be writhing in pain, not malnourished and not dehydrated. She appears healthy. She appears not cachectic. Non-toxic appearance. She does not have a sickly appearance. No distress. Frail, fragile, alert but not oriented, elderly  female. 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 Now resides in [facility]. She is on continuous nasal cannula due to advancing heart failure. She is not a candidature for routine treatment or surgery she is in a study at [facility]. HENT:   Head: Normocephalic and atraumatic. Right Ear: External ear and ear canal normal. No middle ear effusion. Decreased hearing is noted. Left Ear: External ear and ear canal normal.  No middle ear effusion. Decreased hearing is noted. Nose: Nose normal. No mucosal edema or rhinorrhea. Mouth/Throat: Uvula is midline and oropharynx is clear and moist. Mucous membranes are pale, dry and not cyanotic. No oral lesions. No uvula swelling. No posterior oropharyngeal edema or posterior oropharyngeal erythema. Eyes: Pupils are equal, round, and reactive to light. Conjunctivae, EOM and lids are normal. Lids are everted and swept, no foreign bodies found. Right pupil is round and reactive. Left pupil is reactive. Left pupil required 20 to 30 secs extra to respond. But it finally did respond. Neck: Trachea normal, normal range of motion and full passive range of motion without pain. Neck supple. No hepatojugular reflux and no JVD present. Carotid bruit is not present. No thyromegaly present. Cardiovascular: Normal rate, regular rhythm, S1 normal, S2 normal, normal heart sounds, intact distal pulses and normal pulses. Extrasystoles are present. Exam reveals no gallop, no distant heart sounds and no friction rub. No murmur heard. Pulmonary/Chest: Effort normal and breath sounds normal. No accessory muscle usage. No tachypnea. No respiratory distress. She has no decreased breath sounds. She has no wheezes. Abdominal: Soft. Normal appearance and bowel sounds are normal. She exhibits no fluid wave and no mass. There is no hepatosplenomegaly. There is no tenderness. There is no rebound and no CVA tenderness. Continues to struggle with constipation. The [employer] is working but she can not take it every day as the stools get better but the urgency is too much. Musculoskeletal: Normal range of motion. Lymphadenopathy:        Head (right side): No submandibular adenopathy present. Head (left side): No submandibular and no tonsillar adenopathy present. She has no cervical adenopathy. She has no axillary adenopathy. Neurological: She is alert and oriented to person, place, and time. She has normal motor skills, normal sensation, normal strength, normal reflexes and intact cranial nerves. She appears not lethargic. She is not agitated and not disoriented. She displays no weakness, no atrophy, facial symmetry, normal stance and normal speech. No cranial nerve deficit or sensory deficit. Gait normal. Coordination and gait normal. GCS score is 15. Skin: Skin is warm, dry and intact. No bruising, no ecchymosis and no rash noted. She is not diaphoretic. No cyanosis. No pallor. Nails show no clubbing. Psychiatric: Mood, memory, affect and judgment normal. Her mood appears not anxious. She does not express impulsivity. She does not exhibit a depressed mood.  She exhibits ordered thought content, normal new learning ability and normal remote memory. Nursing note and vitals reviewed. Disclaimer:   Ms. Shagufta Johnson has been advised to call or return to our office if symptoms worsen/change/persist. We as a care team including the patient; discussed expected course/resolution/complications of diagnosis in detail today. Medication risks/benefits/costs/interactions/alternatives discussed Donna Roadrte was given an after visit summary which includes diagnoses, current medications, & vitals. Shagufta Johnson expressed understanding with the diagnosis and plan.

## 2019-06-04 NOTE — PATIENT INSTRUCTIONS
Learning About ARBs  Introduction    ARBs (angiotensin II receptor blockers) block a hormone that makes blood vessels narrow. As a result, the blood vessels relax and widen. This lowers blood pressure. ARBs also put more water and salt into the urine. This also lowers blood pressure. ARBs can treat:  · High blood pressure. · Coronary artery disease. · Heart failure. They also may be used to help your kidneys when you have diabetes. Examples  ARBs include:  · Candesartan (Atacand). · Irbesartan (Avapro). · Losartan (Cozaar). This is not a complete list of all ARBs. Possible side effects  Side effects may include:  · Low blood pressure. You may feel dizzy and weak. · Diarrhea. · High potassium levels. You may have other side effects or reactions not listed here. Check the information that comes with your medicine. What to know about taking this medicine  · ARBs may be used if you had a cough when you tried to take an ACE inhibitor. ARBs are less likely to cause a cough. · You may need regular blood tests. · Take your medicines exactly as prescribed. Call your doctor if you think you are having a problem with your medicine. · Tell your doctor or pharmacist all the medicines you take. This includes over-the-counter medicines, vitamins, herbal products, and supplements. Taking some medicines together can cause problems. · You should not take ARBs if you are pregnant or planning to become pregnant. Where can you learn more? Go to http://pia-colby.info/. Enter K212 in the search box to learn more about \"Learning About ARBs. \"  Current as of: July 22, 2018  Content Version: 11.9  © 9692-0680 [employer]. Care instructions adapted under license by [employer] (which disclaims liability or warranty for this information).  If you have questions about a medical condition or this instruction, always ask your healthcare professional. Jerod Bueno disclaims any warranty or liability for your use of this information. Heart Failure: Care Instructions  Your Care Instructions    Heart failure occurs when your heart does not pump as much blood as the body needs. Failure does not mean that the heart has stopped pumping but rather that it is not pumping as well as it should. Over time, this causes fluid buildup in your lungs and other parts of your body. Fluid buildup can cause shortness of breath, fatigue, swollen ankles, and other problems. By taking medicines regularly, reducing sodium (salt) in your diet, checking your weight every day, and making lifestyle changes, you can feel better and live longer. Follow-up care is a key part of your treatment and safety. Be sure to make and go to all appointments, and call your doctor if you are having problems. It's also a good idea to know your test results and keep a list of the medicines you take. How can you care for yourself at home? Medicines    · Be safe with medicines. Take your medicines exactly as prescribed. Call your doctor if you think you are having a problem with your medicine.     · Do not take any vitamins, over-the-counter medicine, or herbal products without talking to your doctor first. Tejinder Irons not take ibuprofen (Advil or Motrin) and naproxen (Aleve) without talking to your doctor first. They could make your heart failure worse.     · You may be taking some of the following medicine. ? Beta-blockers can slow heart rate, decrease blood pressure, and improve your condition. Taking a beta-blocker may lower your chance of needing to be hospitalized. ? Angiotensin-converting enzyme inhibitors (ACEIs) reduce the heart's workload, lower blood pressure, and reduce swelling. Taking an ACEI may lower your chance of needing to be hospitalized again. ? Angiotensin II receptor blockers (ARBs) work like ACEIs. Your doctor may prescribe them instead of ACEIs.   ? Diuretics, also called water pills, reduce swelling. ? Potassium supplements replace this important mineral, which is sometimes lost with diuretics. ? Aspirin and other blood thinners prevent blood clots, which can cause a stroke or heart attack.    You will get more details on the specific medicines your doctor prescribes. Diet    · Your doctor may suggest that you limit sodium to 2,000 milligrams (mg) a day or less. That is less than 1 teaspoon of salt a day, including all the salt you eat in cooking or in packaged foods. People get most of their sodium from processed foods. Fast food and restaurant meals also tend to be very high in sodium.     · Ask your doctor how much liquid you can drink each day. You may have to limit liquids.    Weight    · Weigh yourself without clothing at the same time each day. Record your weight. Call your doctor if you have a sudden weight gain, such as more than 2 to 3 pounds in a day or 5 pounds in a week. (Your doctor may suggest a different range of weight gain.) A sudden weight gain may mean that your heart failure is getting worse.    Activity level    · Start light exercise (if your doctor says it is okay). Even if you can only do a small amount, exercise will help you get stronger, have more energy, and manage your weight and your stress. Walking is an easy way to get exercise. Start out by walking a little more than you did before. Bit by bit, increase the amount you walk.     · When you exercise, watch for signs that your heart is working too hard. You are pushing yourself too hard if you cannot talk while you are exercising. If you become short of breath or dizzy or have chest pain, stop, sit down, and rest.     · If you feel \"wiped out\" the day after you exercise, walk slower or for a shorter distance until you can work up to a better pace.     · Get enough rest at night. Sleeping with 1 or 2 pillows under your upper body and head may help you breathe easier.    Lifestyle changes    · Do not smoke.  Smoking can make a heart condition worse. If you need help quitting, talk to your doctor about stop-smoking programs and medicines. These can increase your chances of quitting for good. Quitting smoking may be the most important step you can take to protect your heart.     · Limit alcohol to 2 drinks a day for men and 1 drink a day for women. Too much alcohol can cause health problems.     · Avoid getting sick from colds and the flu. Get a pneumococcal vaccine shot. If you have had one before, ask your doctor whether you need another dose. Get a flu shot each year. If you must be around people with colds or the flu, wash your hands often. When should you call for help? Call 911 if you have symptoms of sudden heart failure such as:    · You have severe trouble breathing.     · You cough up pink, foamy mucus.     · You have a new irregular or rapid heartbeat.    Call your doctor now or seek immediate medical care if:    · You have new or increased shortness of breath.     · You are dizzy or lightheaded, or you feel like you may faint.     · You have sudden weight gain, such as more than 2 to 3 pounds in a day or 5 pounds in a week. (Your doctor may suggest a different range of weight gain.)     · You have increased swelling in your legs, ankles, or feet.     · You are suddenly so tired or weak that you cannot do your usual activities.    Watch closely for changes in your health, and be sure to contact your doctor if you develop new symptoms. Where can you learn more? Go to http://pia-colby.info/. Enter V110 in the search box to learn more about \"Heart Failure: Care Instructions. \"  Current as of: July 22, 2018  Content Version: 11.9  © 9116-8515 [email or web address]. Care instructions adapted under license by [employer] (which disclaims liability or warranty for this information).  If you have questions about a medical condition or this instruction, always ask your healthcare professional. [address] any warranty or liability for your use of this information.

## 2019-06-05 NOTE — PROGRESS NOTES
Lab Result Review  Reviewed results 06/05/19 by: Benedicto Zurita NP   CBC - stable. CMP- stable. HGA1C- stable. Lipid Panel- stable. Thyroid Profile- stable. Vitamin D- stable. Vitamin B 12 & Folate- stable. RECOMMENDATIONS INCLUDE:   Repeat liver function testing in 1 month.

## 2019-07-29 ENCOUNTER — OFFICE VISIT (OUTPATIENT)
Dept: CARDIOLOGY CLINIC | Age: 84
End: 2019-07-29

## 2019-07-29 VITALS
OXYGEN SATURATION: 98 % | WEIGHT: 125.4 LBS | DIASTOLIC BLOOD PRESSURE: 60 MMHG | RESPIRATION RATE: 16 BRPM | HEART RATE: 56 BPM | BODY MASS INDEX: 23.68 KG/M2 | SYSTOLIC BLOOD PRESSURE: 110 MMHG | HEIGHT: 61 IN

## 2019-07-29 DIAGNOSIS — R06.02 SHORTNESS OF BREATH: ICD-10-CM

## 2019-07-29 DIAGNOSIS — I34.81 MITRAL ANNULAR CALCIFICATION: ICD-10-CM

## 2019-07-29 DIAGNOSIS — I48.0 PAROXYSMAL ATRIAL FIBRILLATION (HCC): Primary | ICD-10-CM

## 2019-07-29 DIAGNOSIS — I50.32 CHRONIC DIASTOLIC CONGESTIVE HEART FAILURE (HCC): ICD-10-CM

## 2019-07-29 DIAGNOSIS — I34.0 MITRAL VALVE INSUFFICIENCY, UNSPECIFIED ETIOLOGY: ICD-10-CM

## 2019-07-29 DIAGNOSIS — I25.10 CORONARY ARTERY DISEASE INVOLVING NATIVE HEART WITHOUT ANGINA PECTORIS, UNSPECIFIED VESSEL OR LESION TYPE: ICD-10-CM

## 2019-07-29 DIAGNOSIS — I25.10 ATHEROSCLEROTIC CARDIOVASCULAR DISEASE: ICD-10-CM

## 2019-07-29 DIAGNOSIS — Z98.890 S/P MITRAL VALVE REPAIR: ICD-10-CM

## 2019-07-29 DIAGNOSIS — K76.0 FATTY LIVER DISEASE, NONALCOHOLIC: ICD-10-CM

## 2019-07-29 DIAGNOSIS — I50.32 CHRONIC HEART FAILURE WITH PRESERVED EJECTION FRACTION (HCC): ICD-10-CM

## 2019-07-29 DIAGNOSIS — I35.1 AORTIC VALVE INSUFFICIENCY, ETIOLOGY OF CARDIAC VALVE DISEASE UNSPECIFIED: ICD-10-CM

## 2019-07-29 RX ORDER — BUMETANIDE 0.5 MG/1
0.5 TABLET ORAL DAILY
Qty: 90 TAB | Refills: 3 | Status: SHIPPED | OUTPATIENT
Start: 2019-07-29 | End: 2019-09-18 | Stop reason: ALTCHOICE

## 2019-07-29 RX ORDER — POTASSIUM CHLORIDE 750 MG/1
10 TABLET, FILM COATED, EXTENDED RELEASE ORAL DAILY
COMMUNITY
End: 2019-08-21 | Stop reason: ALTCHOICE

## 2019-07-29 RX ORDER — BUMETANIDE 1 MG/1
TABLET ORAL DAILY
COMMUNITY
End: 2019-07-30 | Stop reason: ALTCHOICE

## 2019-07-29 RX ORDER — ACETAMINOPHEN 325 MG/1
325 TABLET ORAL
COMMUNITY

## 2019-07-29 NOTE — Clinical Note
Wow she is doing great! We may be able to cut her bumex further and then stop her potassium as well. No swelling today. If BP still running on the low side I would cut the coreg to 3.125mg BID next. Im going to space her out to 4 mos here if you can see her in 4-6 weeks to check on her BP for furhter down titration of meds.  Thanks!! Dinesh Garcia

## 2019-07-29 NOTE — PROGRESS NOTES
[facility]  [phone]    HPI: Cresencio Casanova, titus 80y.o. year-old who presents for follow up regarding her severe MR and diastolic heart failure. Feeling much better, but having some low Bp at times. It is low today. She is staying bust and feeling well. Hr low in the 40s one day. WIll cut the Bumex to .5mg daily     Then if doing well, May cut the coreg to 3 if still with low bp and heart rate. One change at a time. MV clip was done 4/9/19 and she looks much better. Really getting back to her old self \"wants to go shopping! \" and color better, breathing better, stamina better, etc.   Son thinks she is doing better, less short of breath, more mobile, more active, she looks better, moving and walking faster, looks more energetic, color is better to me  She denies any PND or orthopnea  Off her O2. Back in [place] and doing well. She has chronic dizziness and feeling unsteady for years  No syncope   No headaches, no chest pain no dyspnea now. Pt's son is with her today. Assessment/Plan:  1. CAD s/p Inferior STEMI 9/11/18 with PTCA/stents to distal RCA (KENNETH x 2)  -symptoms preceding STEMI were palpitations, nausea, dizziness, diaphoresis, maybe some chest heaviness, troponin > 50  -continue plavix, coreg, statin  2. Severe MR - s/p MV clip 4/9/19, doing great now  3. Large left pleural effusion- resolved, now on bumex, cut to 0.5mg daily   4. AI- better on most recent echo  5. Hypotension - hx of HTN, well controlled on coreg and diltiazem   6. HFpEF - BP well controlled, continue bumex 0.5mg daily, may be able to cut it down to . 25mg daily  -compression stockings daily  7. AF - rate controlled on coreg and diltiazem, continue Eliquis 2.5mg BID, no bleeding, etc.   8.  Dyslipidemia - at goal on simvastatin 40mg daily   9. DNR status in place  10.  Dyslipidemia- cont statin    Echo 12/6/18 - LVEF 50 %, no WMA, grade 3 dd, LA severely dilated, MAC with eccentric severe regurgitation that was posteriorly directed, AV sclerosis without stenosis and mod AI, mild TR, PASP mildly increased, dilated IVC, large right pleural effusion, large left pleural effusion. Head CT 9/18 normal  Inferior STEMI 9/11/18, Cardiac Cath 9/11/18  Thrombectomy of RCA, PTCA/stent of the distal RCA into the PLB with a 2.75 x 12 and 3.0 x 15 mm KENNETH (resolute stent), complicated case Ao 919/53, LVEDP 27, no gradient across AV valve, RCA dominant, totally occluded in distal segment, LM normal, LAD transapical vessel and small caliber normal.  LCX normal.  LVEF 50%, 2+ MR  Echo 4/18 - LVEF 55 %, no WMA,mod dilated LA, MAC with mild MR, AV sclerosis with mod AI, mild TR, PASP 30mmHg, mild PI    Soc Hx: no tobacco use x 10 years, no etoh use   Fam Hx: mother passed at age 80 from heart problems    She  has a past medical history of (HFpEF) heart failure with preserved ejection fraction ([address] (10/17/2018), Allergic rhinitis, Atherosclerotic cardiovascular disease (1999), CHF (congestive heart failure) ([address] (09/11/2018), Cognitive communication deficit, Edema, Environmental allergies, Fatty liver disease, nonalcoholic, GERD (gastroesophageal reflux disease), H/O heart artery stent (09/2018), HCVD (hypertensive cardiovascular disease) (10/17/2018), Heart attack ([address] (09/2018), Heart palpitations (2018), History of PTCA, Hypercholesterolemia (1999), Hypertension (2010), Liver disease, MI (myocardial infarction) ([address] (09/2018), Overactive bladder (08/09/2018), Peripheral neuropathy, Permanent atrial fibrillation ([address] (11/05/2018), Senile dementia, without behavioral disturbance (11/05/2018), Stress incontinence, female (08/09/2018), Vertigo, and Vitamin D deficiency. Cardiovascular ROS: no chest pain, positive for dyspnea   Respiratory ROS: positive for shortness of breath  Neurological ROS: no TIA or stroke symptoms  All other systems negative except as above.      PE  Vitals:    07/29/19 1448   BP: 110/60   Pulse: (!) 56   Resp: 16   SpO2: 98%   Weight: 125 lb 6.4 oz (56.9 kg)   Height: 5' 1\" (1.549 m)    Body mass index is 23.69 kg/m².    General appearance - alert, well appearing, and in no distress  Mental status - affect appropriate to mood  Eyes - sclera anicteric, moist mucous membranes  Neck - supple  Lymphatics - not assessed  Chest - CTA bilaterally     Heart - regular rate and irregular rhythm, normal S1, S2, 2/6 VANGIE   Abdomen - soft, nontender, nondistended  Back exam - full range of motion  Neurological - no focal deficit  Musculoskeletal - normal strength  Extremities - peripheral pulses normal, 1+ LE edema bilaterally   Skin- normal coloration  no rashes    Recent Labs:  Lab Results   Component Value Date/Time    Cholesterol, total 153 06/03/2019 12:02 PM    HDL Cholesterol 59 06/03/2019 12:02 PM    LDL, calculated 76 06/03/2019 12:02 PM    Triglyceride 90 06/03/2019 12:02 PM    CHOL/HDL Ratio 3.8 12/17/2009 08:35 AM     Lab Results   Component Value Date/Time    Creatinine 1.16 (H) 06/03/2019 12:02 PM     Lab Results   Component Value Date/Time    BUN 28 06/03/2019 12:02 PM     Lab Results   Component Value Date/Time    Potassium 4.4 06/03/2019 12:02 PM     Lab Results   Component Value Date/Time    Hemoglobin A1c 6.0 (H) 06/03/2019 12:02 PM     Lab Results   Component Value Date/Time    HGB 10.9 (L) 06/03/2019 12:02 PM     Lab Results   Component Value Date/Time    PLATELET 570 97/06/0402 12:02 PM       Reviewed:  Past Medical History:   Diagnosis Date    (HFpEF) heart failure with preserved ejection fraction ([facility] 10/17/2018    acute    Allergic rhinitis     Atherosclerotic cardiovascular disease 1999    [facility] 09/11/2018    Cognitive communication deficit     Edema     Environmental allergies     dizziness-(chronic)    Fatty liver disease, nonalcoholic     GERD (gastroesophageal reflux disease)     H/O heart artery stent 09/2018    HCVD (hypertensive cardiovascular disease) 10/17/2018    Heart attack ([facility] 2018    Heart palpitations 2018    infrequent     History of PTCA     Hypercholesterolemia 1999    Hypertension     Liver disease     fatty liver    MI (myocardial infarction) ([facility] 2018    Overactive bladder 2018    Peripheral neuropathy     Permanent atrial fibrillation ([facility] 2018    Senile dementia, without behavioral disturbance 2018    Stress incontinence, female 2018    Vertigo     Vitamin D deficiency      Social History     Tobacco Use   Smoking Status Former Smoker    Packs/day: 4.00    Types: Cigarettes    Last attempt to quit: 1955    Years since quittin.6   Smokeless Tobacco Never Used     Social History     Substance and Sexual Activity   Alcohol Use No    Alcohol/week: 0.0 - 0.8 standard drinks     Allergies   Allergen Reactions    Brilinta [Ticagrelor] Shortness of Breath    Celecoxib Other (comments)    Codeine Hives    Sulfa (Sulfonamide Antibiotics) Unknown (comments) and Hives    Iodine Unknown (comments)     Other reaction(s): Other (See Comments)  This was on outside records, pt denies that she has iodine allergy. Able to tolerate seafood, shellfish w/o reaction.  Aricept [Donepezil] Other (comments)     Headaches - noted as intolerance     Darvocet A500 [Propoxyphene N-Acetaminophen] Nausea Only       Current Outpatient Medications   Medication Sig    bumetanide (BUMEX) 1 mg tablet Take  by mouth daily.  potassium chloride SR (KLOR-CON 10) 10 mEq tablet Take 10 mEq by mouth daily.  acetaminophen (TYLENOL) 325 mg tablet Take 325 mg by mouth every six (6) hours as needed for Pain.  omeprazole (PRILOSEC) 20 mg capsule Take 20 mg by mouth daily.  Calcium-Cholecalciferol, D3, 500 mg(1,250mg) -400 unit tab Take  by mouth.  carvedilol (COREG) 6.25 mg tablet Take 1 Tab by mouth two (2) times a day.  melatonin 3 mg tablet Take 3 mg by mouth nightly.     levothyroxine (SYNTHROID) 25 mcg tablet Take 1.5 Tabs by mouth Daily (before breakfast). Indications: hypothyroidism    escitalopram oxalate (LEXAPRO) 10 mg tablet Take 10 mg by mouth daily. Indications: Repeated Episodes of Anxiety, major depressive disorder    busPIRone (BUSPAR) 5 mg tablet Take 5 mg by mouth two (2) times a day. Indications: Repeated Episodes of Anxiety    clopidogrel (PLAVIX) 75 mg tab Take 75 mg by mouth daily. Indications: treatment to prevent a heart attack    polyethylene glycol (MIRALAX) 17 gram packet Take 17 g by mouth daily.  apixaban (ELIQUIS) 2.5 mg tablet Take 1 Tab by mouth two (2) times a day.  simvastatin (ZOCOR) 40 mg tablet Take 40 mg by mouth nightly.  nitroglycerin (NITROSTAT) 0.4 mg SL tablet Take 0.4 mg by mouth as needed.  PROAIR HFA 90 mcg/actuation inhaler Take 2 Inhalation by inhalation four (4) times daily as needed.  diltiazem CD (CARTIA XT) 180 mg ER capsule Take 180 mg by mouth nightly.  pantoprazole (PROTONIX) 20 mg tablet     potassium chloride (K-DUR, KLOR-CON) 20 mEq tablet Take 10 mEq by mouth daily.  bumetanide (BUMEX) 0.5 mg tablet Take 1 mg by mouth daily.  acetaminophen (TYLENOL ARTHRITIS PAIN) 650 mg TbER Take 650 mg by mouth every six (6) hours as needed.  calcium carbonate (OS-ADAM) 500 mg calcium (1,250 mg) tablet Take 500 mg by mouth. No current facility-administered medications for this visit. Munira Belcher MD  [facility] [address] [facility] [address], [address] [facility] [address]  [place]  [phone]  [address]   [phone]    HPI: Analilia Fraser, a 80y.o. year-old who presents for follow up regarding her severe MR and diastolic heart failure.     She is in healthcare now at [facility] says they plan to move her to assisted living but I suggested they hold off on transfer out of healthcare until we make sure she is stable  She reports that her stamina is better  Says she gets dyspnea with walking depending on how fast she is walking  Denies PND and sleeps on 2 pillows  Weight down 2 lbs from the hospital, also reports poor appetite, we talked about eating 3 meals/day  Says she is sleeping well   Denies any chest pain, pressure, tightness  Reports rare palpitations which only last a few seconds and without any high risk features  Says she has very little dizziness now, no syncope  BP low today - 100/60  O2 Sats 90% with ambulation   Says she coughs all the time but it is not productive, no fevers or chills  Son reports she is coughing much less now than she was prior to admission  Patient's son is with her today, she is a poor historian    **After last office visit labs received dated 12/24/18  BUN 15, Cr 0.97, K 3.3, Mg 1.7    Assessment/Plan:  1. CAD s/p Inferior STEMI 9/11/18 with PTCA/stents to distal RCA (KENNETH x 2)  -symptoms preceding STEMI were palpitations, nausea, dizziness, diaphoresis, maybe some chest heaviness, troponin > 50  -continue plavix, coreg, statin  2. Severe MR - seen by valve team during admission, no plans for valve repair, etc at this time   -will repeat TTE in 1 month to reassess severity of MR and follow up with Dr. Debbie Castillo at that time   3. Large left pleural effusion on TTE, moderate on CXR on admission - will repeat CXR now to reassess, continue bumex  4. AI - moderate by TTE   5. Hypotension - hx of HTN, advised her to reduce her coreg to 3.125mg BID and continue other medications for now, will need to watch BP closely  6. HFpEF - BP controlled, continue bumex, will check BMP and magnesium level prior to her return appointment in 1 month  7. AF - rate controlled on coreg 6.25mg BID and diltiazem 180mg daily, reducing coreg dose as above for hypotension, continue Eliquis 2.5mg BID for anticoagulation  8. Dyslipidemia - LDL 56 on simvastatin 40mg daily   -Lipids 9/18 - , TG 81, , HDL 50  9.   CHEMA post cardiac cath - now creatinine normal  10. DNR status in place    Echo 12/6/18 - LVEF 50 %, no WMA, grade 3 dd, LA severely dilated, MAC with eccentric severe regurgitation that was posteriorly directed, AV sclerosis without stenosis and mod AI, mild TR, PASP mildly increased, dilated IVC, large right pleural effusion, large left pleural effusion. Head CT 9/18 normal  Inferior STEMI 9/11/18, Cardiac Cath 9/11/18  Thrombectomy of RCA, PTCA/stent of the distal RCA into the PLB with a 2.75 x 12 and 3.0 x 15 mm KENNETH (resolute stent), complicated case Ao 031/55, LVEDP 27, no gradient across AV valve, RCA dominant, totally occluded in distal segment, LM normal, LAD transapical vessel and small caliber normal.  LCX normal.  LVEF 50%, 2+ MR  Echo 4/18 - LVEF 55 %, no WMA,mod dilated LA, MAC with mild MR, AV sclerosis with mod AI, mild TR, PASP 30mmHg, mild PI    Soc Hx: no tobacco use x 10 years, no etoh use   Fam Hx: mother passed at age 80 from heart problems    She  has a past medical history of (HFpEF) heart failure with preserved ejection fraction ([address] (10/17/2018), Allergic rhinitis, Atherosclerotic cardiovascular disease (1999), CHF (congestive heart failure) ([address] (09/11/2018), Cognitive communication deficit, Edema, Environmental allergies, Fatty liver disease, nonalcoholic, GERD (gastroesophageal reflux disease), H/O heart artery stent (09/2018), HCVD (hypertensive cardiovascular disease) (10/17/2018), Heart attack ([address] (09/2018), Heart palpitations (2018), History of PTCA, Hypercholesterolemia (1999), Hypertension (2010), Liver disease, MI (myocardial infarction) ([address] (09/2018), Overactive bladder (08/09/2018), Peripheral neuropathy, Permanent atrial fibrillation ([address] (11/05/2018), Senile dementia, without behavioral disturbance (11/05/2018), Stress incontinence, female (08/09/2018), Vertigo, and Vitamin D deficiency.     Cardiovascular ROS: no chest pain, positive for dyspnea   Respiratory ROS: positive for cough, shortness of breath  Neurological ROS: no TIA or stroke symptoms  All other systems negative except as above. PE  Vitals:    07/29/19 1448   BP: 110/60   Pulse: (!) 56   Resp: 16   SpO2: 98%   Weight: 125 lb 6.4 oz (56.9 kg)   Height: 5' 1\" (1.549 m)    Body mass index is 23.69 kg/m².    General appearance - alert, well appearing, and in no distress  Mental status - affect appropriate to mood  Eyes - sclera anicteric, moist mucous membranes  Neck - supple  Lymphatics - not assessed  Chest - diminished base on left side, right lung clear    Heart - normal rate, irregular rhythm, normal S1, S2, no murmurs, rubs, clicks or gallops  Abdomen - soft, nontender, nondistended  Back exam - full range of motion  Neurological - no focal deficit  Musculoskeletal - normal strength  Extremities - peripheral pulses normal, trace LE edema  Skin - normal coloration  no rashes    Recent Labs:  Lab Results   Component Value Date/Time    Cholesterol, total 153 06/03/2019 12:02 PM    HDL Cholesterol 59 06/03/2019 12:02 PM    LDL, calculated 76 06/03/2019 12:02 PM    Triglyceride 90 06/03/2019 12:02 PM    CHOL/HDL Ratio 3.8 12/17/2009 08:35 AM     Lab Results   Component Value Date/Time    Creatinine 1.16 (H) 06/03/2019 12:02 PM     Lab Results   Component Value Date/Time    BUN 28 06/03/2019 12:02 PM     Lab Results   Component Value Date/Time    Potassium 4.4 06/03/2019 12:02 PM     Lab Results   Component Value Date/Time    Hemoglobin A1c 6.0 (H) 06/03/2019 12:02 PM     Lab Results   Component Value Date/Time    HGB 10.9 (L) 06/03/2019 12:02 PM     Lab Results   Component Value Date/Time    PLATELET 195 09/46/4370 12:02 PM       Reviewed:  Past Medical History:   Diagnosis Date    (HFpEF) heart failure with preserved ejection fraction [facility] 10/17/2018    acute    Allergic rhinitis     Atherosclerotic cardiovascular disease 1999    CHF (congestive heart failure) ([facility] 09/11/2018    Cognitive communication deficit     Edema     Environmental allergies     dizziness-(chronic)    Fatty liver disease, nonalcoholic     GERD (gastroesophageal reflux disease)     H/O heart artery stent 2018    HCVD (hypertensive cardiovascular disease) 10/17/2018    Heart attack ([facility] 2018    Heart palpitations 2018    infrequent     History of PTCA     Hypercholesterolemia 1999    Hypertension 2010    Liver disease     fatty liver    MI (myocardial infarction) ([facility] 2018    Overactive bladder 2018    Peripheral neuropathy     Permanent atrial fibrillation ([facility] 2018    Senile dementia, without behavioral disturbance 2018    Stress incontinence, female 2018    Vertigo     Vitamin D deficiency      Social History     Tobacco Use   Smoking Status Former Smoker    Packs/day: 4.00    Types: Cigarettes    Last attempt to quit: 1955    Years since quittin.6   Smokeless Tobacco Never Used     Social History     Substance and Sexual Activity   Alcohol Use No    Alcohol/week: 0.0 - 0.8 standard drinks     Allergies   Allergen Reactions    Brilinta [Ticagrelor] Shortness of Breath    Celecoxib Other (comments)    Codeine Hives    Sulfa (Sulfonamide Antibiotics) Unknown (comments) and Hives    Iodine Unknown (comments)     Other reaction(s): Other (See Comments)  This was on outside records, pt denies that she has iodine allergy. Able to tolerate seafood, shellfish w/o reaction.  Aricept [Donepezil] Other (comments)     Headaches - noted as intolerance     Darvocet A500 [Propoxyphene N-Acetaminophen] Nausea Only       Current Outpatient Medications   Medication Sig    bumetanide (BUMEX) 1 mg tablet Take  by mouth daily.  potassium chloride SR (KLOR-CON 10) 10 mEq tablet Take 10 mEq by mouth daily.  acetaminophen (TYLENOL) 325 mg tablet Take 325 mg by mouth every six (6) hours as needed for Pain.  omeprazole (PRILOSEC) 20 mg capsule Take 20 mg by mouth daily.     Calcium-Cholecalciferol, D3, 500 mg(1,250mg) -400 unit tab Take  by mouth.  carvedilol (COREG) 6.25 mg tablet Take 1 Tab by mouth two (2) times a day.  melatonin 3 mg tablet Take 3 mg by mouth nightly.  levothyroxine (SYNTHROID) 25 mcg tablet Take 1.5 Tabs by mouth Daily (before breakfast). Indications: hypothyroidism    escitalopram oxalate (LEXAPRO) 10 mg tablet Take 10 mg by mouth daily. Indications: Repeated Episodes of Anxiety, major depressive disorder    busPIRone (BUSPAR) 5 mg tablet Take 5 mg by mouth two (2) times a day. Indications: Repeated Episodes of Anxiety    clopidogrel (PLAVIX) 75 mg tab Take 75 mg by mouth daily. Indications: treatment to prevent a heart attack    polyethylene glycol (MIRALAX) 17 gram packet Take 17 g by mouth daily.  apixaban (ELIQUIS) 2.5 mg tablet Take 1 Tab by mouth two (2) times a day.  simvastatin (ZOCOR) 40 mg tablet Take 40 mg by mouth nightly.  nitroglycerin (NITROSTAT) 0.4 mg SL tablet Take 0.4 mg by mouth as needed.  PROAIR HFA 90 mcg/actuation inhaler Take 2 Inhalation by inhalation four (4) times daily as needed.  diltiazem CD (CARTIA XT) 180 mg ER capsule Take 180 mg by mouth nightly.  pantoprazole (PROTONIX) 20 mg tablet     potassium chloride (K-DUR, KLOR-CON) 20 mEq tablet Take 10 mEq by mouth daily.  bumetanide (BUMEX) 0.5 mg tablet Take 1 mg by mouth daily.  acetaminophen (TYLENOL ARTHRITIS PAIN) 650 mg TbER Take 650 mg by mouth every six (6) hours as needed.  calcium carbonate (OS-ADAM) 500 mg calcium (1,250 mg) tablet Take 500 mg by mouth. No current facility-administered medications for this visit.         Frederic Rivera MD  [facility] [address] [facility] [address], [address] [facility] [address]  [address] [facility]  [phone]

## 2019-07-31 ENCOUNTER — OFFICE VISIT (OUTPATIENT)
Dept: GERIATRIC MEDICINE | Age: 84
End: 2019-07-31

## 2019-07-31 DIAGNOSIS — L81.9 BLEEDING PIGMENTED SKIN LESION: ICD-10-CM

## 2019-07-31 DIAGNOSIS — I95.2 HYPOTENSION DUE TO DRUGS: ICD-10-CM

## 2019-07-31 DIAGNOSIS — I48.0 PAROXYSMAL ATRIAL FIBRILLATION (HCC): ICD-10-CM

## 2019-07-31 DIAGNOSIS — R35.81 NOCTURNAL POLYURIA: ICD-10-CM

## 2019-07-31 DIAGNOSIS — E03.9 ACQUIRED HYPOTHYROIDISM: ICD-10-CM

## 2019-07-31 DIAGNOSIS — L98.9 SKIN LESION OF LEFT UPPER EXTREMITY: Primary | ICD-10-CM

## 2019-07-31 DIAGNOSIS — I50.32 CHRONIC DIASTOLIC CONGESTIVE HEART FAILURE (HCC): ICD-10-CM

## 2019-07-31 DIAGNOSIS — L98.8 SKIN MACULE: ICD-10-CM

## 2019-07-31 DIAGNOSIS — Z79.899 MEDICATION MANAGEMENT: ICD-10-CM

## 2019-07-31 DIAGNOSIS — R00.1 BRADYCARDIA WITH 41-50 BEATS PER MINUTE: ICD-10-CM

## 2019-07-31 DIAGNOSIS — L50.8 ACUTE URTICARIA: ICD-10-CM

## 2019-07-31 PROBLEM — I48.19 PERSISTENT ATRIAL FIBRILLATION (HCC): Status: ACTIVE | Noted: 2019-02-27

## 2019-07-31 PROBLEM — I48.19 PERSISTENT ATRIAL FIBRILLATION (HCC): Status: RESOLVED | Noted: 2019-02-27 | Resolved: 2019-07-31

## 2019-07-31 PROBLEM — N18.9 CHRONIC KIDNEY DISEASE: Status: ACTIVE | Noted: 2019-03-19

## 2019-07-31 PROBLEM — N28.9 KIDNEY INSUFFICIENCY: Chronic | Status: RESOLVED | Noted: 2017-06-05 | Resolved: 2019-07-31

## 2019-07-31 PROBLEM — I34.0 NON-RHEUMATIC MITRAL REGURGITATION: Status: ACTIVE | Noted: 2019-02-25

## 2019-07-31 PROBLEM — I25.10 CAD (CORONARY ARTERY DISEASE): Status: RESOLVED | Noted: 2018-12-10 | Resolved: 2019-07-31

## 2019-07-31 PROBLEM — I25.10 CORONARY ARTERY DISEASE INVOLVING NATIVE CORONARY ARTERY OF NATIVE HEART WITHOUT ANGINA PECTORIS: Status: ACTIVE | Noted: 2019-03-19

## 2019-07-31 PROBLEM — E44.0 MODERATE PROTEIN-CALORIE MALNUTRITION (HCC): Status: RESOLVED | Noted: 2018-11-28 | Resolved: 2019-07-31

## 2019-07-31 PROBLEM — I34.0 NON-RHEUMATIC MITRAL REGURGITATION: Status: RESOLVED | Noted: 2019-02-25 | Resolved: 2019-07-31

## 2019-08-05 ENCOUNTER — OFFICE VISIT (OUTPATIENT)
Dept: GERIATRIC MEDICINE | Age: 84
End: 2019-08-05

## 2019-08-05 ENCOUNTER — HOSPITAL ENCOUNTER (OUTPATIENT)
Dept: LAB | Age: 84
Discharge: HOME | End: 2019-08-05

## 2019-08-05 VITALS
OXYGEN SATURATION: 94 % | BODY MASS INDEX: 23.41 KG/M2 | HEART RATE: 54 BPM | DIASTOLIC BLOOD PRESSURE: 58 MMHG | WEIGHT: 124 LBS | SYSTOLIC BLOOD PRESSURE: 120 MMHG | RESPIRATION RATE: 16 BRPM | HEIGHT: 61 IN

## 2019-08-05 VITALS
BODY MASS INDEX: 23.05 KG/M2 | HEART RATE: 42 BPM | DIASTOLIC BLOOD PRESSURE: 50 MMHG | HEIGHT: 61 IN | OXYGEN SATURATION: 98 % | WEIGHT: 122.1 LBS | SYSTOLIC BLOOD PRESSURE: 100 MMHG | RESPIRATION RATE: 16 BRPM | TEMPERATURE: 98.4 F

## 2019-08-05 DIAGNOSIS — R00.1 BRADYCARDIA WITH 41-50 BEATS PER MINUTE: ICD-10-CM

## 2019-08-05 DIAGNOSIS — L81.9 BLEEDING PIGMENTED SKIN LESION: ICD-10-CM

## 2019-08-05 DIAGNOSIS — Z79.899 MEDICATION MANAGEMENT: ICD-10-CM

## 2019-08-05 DIAGNOSIS — R35.81 NOCTURNAL POLYURIA: ICD-10-CM

## 2019-08-05 DIAGNOSIS — L98.9 SKIN LESION OF LEFT UPPER EXTREMITY: Primary | ICD-10-CM

## 2019-08-05 DIAGNOSIS — L98.8 SKIN MACULE: ICD-10-CM

## 2019-08-05 DIAGNOSIS — I50.32 CHRONIC DIASTOLIC CONGESTIVE HEART FAILURE (HCC): ICD-10-CM

## 2019-08-05 RX ORDER — SODIUM BICARBONATE 42 MG/ML
INJECTION, SOLUTION INTRAVENOUS
Qty: 1 ML | Refills: 0
Start: 2019-08-05 | End: 2019-08-20 | Stop reason: ALTCHOICE

## 2019-08-05 NOTE — PROGRESS NOTES
Reason for Visit/HPI:    Finis Dandy is a 80 y.o. female patient who presents today for:  Chief Complaint   Patient presents with    Lesion     Pt here for lesion removal on left upper arm. Diagnosis/Treatment Plan:    Diagnoses and all orders for this visit:    1. Skin lesion of left upper extremity  Comments:  see pictures. Orders:  -     sodium bicarbonate, 4.2%, 4.2 % injection; 1:1 dosing with Lidocaine to buffer  -     THER/PROPH/DIAG INJECTION, SUBCUT/IM  -     VERIFY CONSENT HAS BEEN OBTAINED  -     [place] INCISIONAL BIOPSY SKIN SINGLE LESION  -     lidocaine HCl/epinephrine (LIDOCAINE-EPINEPHRINE) 1.5 %-1:200,000 soln; Injection of solution to local SQ or Intradermal Anesthesia. Indications: administration of local anesthetic drug  -     SURGICAL PATHOLOGY; Future    2. Bleeding pigmented skin lesion  -     sodium bicarbonate, 4.2%, 4.2 % injection; 1:1 dosing with Lidocaine to buffer  -     THER/PROPH/DIAG INJECTION, SUBCUT/IM  -     VERIFY CONSENT HAS BEEN OBTAINED  -     [place] INCISIONAL BIOPSY SKIN SINGLE LESION  -     lidocaine HCl/epinephrine (LIDOCAINE-EPINEPHRINE) 1.5 %-1:200,000 soln; Injection of solution to local SQ or Intradermal Anesthesia. Indications: administration of local anesthetic drug  -     SURGICAL PATHOLOGY; Future    3. Skin macule  -     sodium bicarbonate, 4.2%, 4.2 % injection; 1:1 dosing with Lidocaine to buffer  -     THER/PROPH/DIAG INJECTION, SUBCUT/IM  -     VERIFY CONSENT HAS BEEN OBTAINED  -     [place] INCISIONAL BIOPSY SKIN SINGLE LESION  -     lidocaine HCl/epinephrine (LIDOCAINE-EPINEPHRINE) 1.5 %-1:200,000 soln; Injection of solution to local SQ or Intradermal Anesthesia. Indications: administration of local anesthetic drug  -     SURGICAL PATHOLOGY; Future    4. Chronic diastolic congestive heart failure ([address]    5. Nocturnal polyuria    6. Bradycardia with 41-50 beats per minute    7. Medication management    8.  Lives in assisted living facility      Skin lesion of left upper extremity [L98.9]     Follow Up: Follow-up and Dispositions    · Return in about 1 week (around 8/12/2019) for suture removal .         Subjective: Allergies   Allergen Reactions    Brilinta [Ticagrelor] Shortness of Breath    Celecoxib Other (comments)    Codeine Hives    Sulfa (Sulfonamide Antibiotics) Unknown (comments) and Hives    Iodine Unknown (comments)     Other reaction(s): Other (See Comments)  This was on outside records, pt denies that she has iodine allergy. Able to tolerate seafood, shellfish w/o reaction.  Aricept [Donepezil] Other (comments)     Headaches - noted as intolerance     Darvocet A500 [Propoxyphene N-Acetaminophen] Nausea Only     Prior to Admission medications    Medication Sig Start Date End Date Taking? Authorizing Provider   sodium bicarbonate, 4.2%, 4.2 % injection 1:1 dosing with Lidocaine to buffer 8/5/19  Yes Suha Asa, NP   lidocaine HCl/epinephrine (LIDOCAINE-EPINEPHRINE) 1.5 %-1:200,000 soln Injection of solution to local SQ or Intradermal Anesthesia. Indications: administration of local anesthetic drug 8/5/19  Yes Suha Asa, NP   acetaminophen (TYLENOL) 325 mg tablet Take 325 mg by mouth every six (6) hours as needed for Pain. Yes Provider, Historical   bumetanide (BUMEX) 0.5 mg tablet Take 1 Tab by mouth daily. 7/29/19  Yes Héctor Malave MD   omeprazole (PRILOSEC) 20 mg capsule Take 20 mg by mouth daily. Yes Provider, Historical   Calcium-Cholecalciferol, D3, 500 mg(1,250mg) -400 unit tab Take  by mouth. Yes Provider, Historical   carvedilol (COREG) 6.25 mg tablet Take 1 Tab by mouth two (2) times a day. 5/13/19  Yes Alice Ashraf NP   melatonin 3 mg tablet Take 3 mg by mouth nightly. Yes Provider, Historical   levothyroxine (SYNTHROID) 25 mcg tablet Take 1.5 Tabs by mouth Daily (before breakfast).  Indications: hypothyroidism 3/27/19  Yes Suha Asa, NP   escitalopram oxalate (Dolores Orchard) 10 mg tablet Take 10 mg by mouth daily. Indications: Repeated Episodes of Anxiety, major depressive disorder   Yes Provider, Historical   busPIRone (BUSPAR) 5 mg tablet Take 5 mg by mouth two (2) times a day. Indications: Repeated Episodes of Anxiety   Yes Provider, Historical   clopidogrel (PLAVIX) 75 mg tab Take 75 mg by mouth daily. Indications: treatment to prevent a heart attack   Yes Provider, Historical   polyethylene glycol (MIRALAX) 17 gram packet Take 17 g by mouth daily. Yes Provider, Historical   apixaban (ELIQUIS) 2.5 mg tablet Take 1 Tab by mouth two (2) times a day. 11/30/18  Yes Annelise Nielsen, DYLAN   simvastatin (ZOCOR) 40 mg tablet Take 40 mg by mouth nightly. 11/26/18  Yes Provider, Historical   nitroglycerin (NITROSTAT) 0.4 mg SL tablet Take 0.4 mg by mouth as needed. 11/26/18  Yes Provider, Historical   diltiazem CD (CARTIA XT) 180 mg ER capsule Take 180 mg by mouth nightly. Yes Provider, Historical   potassium chloride SR (KLOR-CON 10) 10 mEq tablet Take 10 mEq by mouth daily. Provider, Historical   PROAIR HFA 90 mcg/actuation inhaler Take 2 Inhalation by inhalation four (4) times daily as needed.  11/26/18   Provider, Historical     Past Medical History:   Diagnosis Date    (HFpEF) heart failure with preserved ejection fraction ([facility] [address] 10/17/2018    acute    Allergic rhinitis     Atherosclerotic cardiovascular disease 1999    CHF (congestive heart failure) ([address] 09/11/2018    Cognitive communication deficit     Edema     Environmental allergies     dizziness-(chronic)    Fatty liver disease, nonalcoholic     GERD (gastroesophageal reflux disease)     H/O heart artery stent 09/2018    HCVD (hypertensive cardiovascular disease) 10/17/2018    Heart attack ([address] 09/2018    Heart palpitations 2018    infrequent     History of PTCA     Hypercholesterolemia 1999    Hypertension 2010    Liver disease     fatty liver    MI (myocardial infarction) ([address] 09/2018    Overactive bladder 2018    Peripheral neuropathy     Permanent atrial fibrillation ([facility] [address] 2018    Senile dementia, without behavioral disturbance 2018    Stress incontinence, female 2018    Vertigo     Vitamin D deficiency      Past Surgical History:   Procedure Laterality Date    BREAST SURGERY PROCEDURE UNLISTED      breast cyst Gilbert Mendez)    CARDIAC SURG PROCEDURE UNLIST  1999    + stress thalassemia; 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  Holter    HX APPENDECTOMY      HX BREAST BIOPSY Left     neg    HX CYST INCISION AND DRAINAGE Right years ago    neg    HX DILATION AND CURETTAGE      x5    HX GYN      D&C (x5), ALLEY/BSO (-/dle), Provera intolerance (bleeding), endometrial Bx annually    HX PTCA  2018    stent distal RCA into PLwith KENNETH x 2    HX ALLEY AND BSO      Juliann/Zedler      Social History     Tobacco Use    Smoking status: Former Smoker     Packs/day: 4.00     Types: Cigarettes     Last attempt to quit: 1955     Years since quittin.6    Smokeless tobacco: Never Used   Substance Use Topics    Alcohol use: No     Alcohol/week: 0.0 - 0.8 standard drinks    Drug use: No      Family History   Problem Relation Age of Onset    Diabetes Mother     Hypertension Mother     Heart Failure Mother         CHF ( @ 80)   [facility] Alzheimer Mother     Cancer Father         lung ( @ 77)   [facility] Alzheimer Father     No Known Problems Son     No Known Problems Son     Ovarian Cancer Sister         hysterectomy    Breast Cancer Sister 28        mastectomy    Cancer Sister         breast and ovarian    Alzheimer Sister     Breast Problems Sister         breast cyst    Cataract Sister     Hypertension Sister     Diabetes Brother      Advance Care Planning 2018   Primary Decision Maker Name -   Primary Decision Maker Phone Number -   Primary Decision Maker Relationship to Patient -   Confirm Advance Directive Yes, on file   Does the patient have other document types -     Latest Laboratory Results:     Lab Results   Component Value Date/Time    WBC 5.8 06/03/2019 12:02 PM    HGB 10.9 (L) 06/03/2019 12:02 PM    HCT 33.7 (L) 06/03/2019 12:02 PM    PLATELET 313 07/27/2275 12:02 PM    MCV 87 06/03/2019 12:02 PM     Lab Results   Component Value Date/Time    Sodium 141 06/03/2019 12:02 PM    Potassium 4.4 06/03/2019 12:02 PM    Chloride 100 06/03/2019 12:02 PM    CO2 27 06/03/2019 12:02 PM    Anion gap 6 03/28/2019 03:16 AM    Glucose 70 06/03/2019 12:02 PM    BUN 28 06/03/2019 12:02 PM    Creatinine 1.16 (H) 06/03/2019 12:02 PM    BUN/Creatinine ratio 24 06/03/2019 12:02 PM    GFR est AA 48 (L) 06/03/2019 12:02 PM    GFR est non-AA 41 (L) 06/03/2019 12:02 PM    Calcium 9.6 06/03/2019 12:02 PM    Bilirubin, total 0.4 06/03/2019 12:02 PM    AST (SGOT) 26 06/03/2019 12:02 PM    Alk. phosphatase 130 (H) 06/03/2019 12:02 PM    Protein, total 7.1 06/03/2019 12:02 PM    Albumin 4.4 06/03/2019 12:02 PM    Globulin 4.2 (H) 03/28/2019 03:16 AM    A-G Ratio 1.6 06/03/2019 12:02 PM    ALT (SGPT) 19 06/03/2019 12:02 PM     Objective:     Vitals:    08/05/19 1116   BP: 120/58   Pulse: (!) 54   Resp: 16   SpO2: 94%   Weight: 124 lb (56.2 kg)   Height: 5' 1\" (1.549 m)     Wt Readings from Last 3 Encounters:   08/05/19 124 lb (56.2 kg)   07/31/19 122 lb 1.6 oz (55.4 kg)   07/29/19 125 lb 6.4 oz (56.9 kg)     BP Readings from Last 3 Encounters:   08/05/19 120/58   07/31/19 100/50   07/29/19 110/60     Review of Systems   Constitutional: Negative for activity change, appetite change, chills, fatigue and fever. HENT: Negative for congestion, dental problem, hearing loss, postnasal drip, sinus pressure, sneezing, sore throat and trouble swallowing. Eyes: Negative for discharge, redness and visual disturbance. Respiratory: Negative for apnea, cough, chest tightness, shortness of breath and wheezing. Cardiovascular: Negative for chest pain, palpitations and leg swelling. Gastrointestinal: Negative for abdominal distention, abdominal pain, blood in stool, constipation, diarrhea, nausea and vomiting. Endocrine: Positive for polyuria. Negative for polydipsia and polyphagia. Genitourinary: Positive for urgency. Negative for flank pain and frequency. Musculoskeletal: Negative for arthralgias, gait problem, joint swelling and neck pain. Skin: Positive for color change, pallor and wound. Negative for rash. Allergic/Immunologic: Negative for environmental allergies and food allergies. Neurological: Negative for dizziness, tremors, weakness, light-headedness, numbness and headaches. Hematological: Negative for adenopathy. Psychiatric/Behavioral: Negative for agitation, confusion and sleep disturbance. The patient is not nervous/anxious. Physical Exam   Constitutional: She is oriented to person, place, and time. Vital signs are normal. She appears not lethargic, to not be writhing in pain, not malnourished and not dehydrated. She appears healthy. She appears not cachectic. Non-toxic appearance. She does not have a sickly appearance. No distress. Frail, fragile, alert but not oriented, elderly  female. 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 Now resides in [facility]. She is on continuous nasal cannula due to advancing heart failure. She is not a candidature for routine treatment or surgery she is in a study at [facility]. HENT:   Head: Normocephalic and atraumatic. Right Ear: External ear and ear canal normal. No middle ear effusion. Decreased hearing is noted. Left Ear: External ear and ear canal normal.  No middle ear effusion. Decreased hearing is noted. Nose: Nose normal. No mucosal edema or rhinorrhea. Mouth/Throat: Uvula is midline and oropharynx is clear and moist. Mucous membranes are pale, dry and not cyanotic. No oral lesions. No uvula swelling.  No posterior oropharyngeal edema or posterior oropharyngeal erythema. Eyes: Pupils are equal, round, and reactive to light. Conjunctivae, EOM and lids are normal. Lids are everted and swept, no foreign bodies found. Right pupil is round and reactive. Left pupil is reactive. Left pupil required 20 to 30 secs extra to respond. But it finally did respond. Neck: Trachea normal, normal range of motion and full passive range of motion without pain. Neck supple. No hepatojugular reflux and no JVD present. Carotid bruit is not present. No thyromegaly present. Cardiovascular: Regular rhythm, S1 normal, S2 normal, normal heart sounds, intact distal pulses and normal pulses. Occasional extrasystoles are present. Bradycardia present. Exam reveals no gallop, no distant heart sounds and no friction rub. No murmur heard. No extremity edema noted, hr is still very low. I will reach out to Cards for further direction. Pulmonary/Chest: Effort normal and breath sounds normal. No accessory muscle usage. No tachypnea. No respiratory distress. She has no decreased breath sounds. She has no wheezes. Abdominal: Soft. Normal appearance and bowel sounds are normal. She exhibits no fluid wave and no mass. There is no hepatosplenomegaly. There is no tenderness. There is no rebound and no CVA tenderness. Continues to struggle with constipation. The [employer] is working but she can not take it every day as the stools get better but the urgency is too much. Musculoskeletal: Normal range of motion. Lymphadenopathy:        Head (right side): No submandibular adenopathy present. Head (left side): No submandibular and no tonsillar adenopathy present. She has no cervical adenopathy. She has no axillary adenopathy. Neurological: She is alert and oriented to person, place, and time. She has normal motor skills, normal sensation, normal strength, normal reflexes and intact cranial nerves. She appears not lethargic. She is not agitated and not disoriented.  She displays no weakness, no atrophy, facial symmetry, normal stance and normal speech. No cranial nerve deficit or sensory deficit. Gait normal. Coordination and gait normal. GCS score is 15. Skin: Skin is warm and dry. Abrasion and lesion noted. No bruising, no ecchymosis and no rash noted. She is not diaphoretic. There is erythema. No cyanosis. No pallor. Nails show no clubbing. See picture. Psychiatric: Mood, memory, affect and judgment normal. Her mood appears not anxious. She does not express impulsivity. She does not exhibit a depressed mood. She exhibits ordered thought content, normal new learning ability and normal remote memory. Nursing note and vitals reviewed. Procedure   SENIOR [facility] SERVICES [facility]  Chart reviewed for the following:   Jenni RAMON NP, have reviewed the History, Physical and updated the Allergic reactions for [address] [place] [address] performed immediately prior to start of procedure:   Jenni RAMON NP, have performed the following reviews on [facility] prior to the start of the procedure:            * Patient was identified by name and date of birth   * Agreement on procedure being performed was verified  * Risks and Benefits explained to the patient  * Procedure site verified and marked as necessary  * Patient was positioned for comfort  * Consent was signed and verified    Face to Face Start: 1115 am    Time Out: 1130 am   Procedure Start: 5859 am   Procedure Stop: 1210 pm   Face to Face End: 1230 pm    Total Time Face to Face: 75 minutes    Performed By:  Jenni Galan NP     Assistant:  Anh Hinojosa LPN    Anesthesia: Local, intradermal     Pre-Procedure Picture-     Procedure Details: The risks,benefits and alternatives  were explained and consent was obtained for the procedure. RBAs also explained. After informed consent was obtained, time out was performed.  Using chlorhexidine for cleansing and 1.5 % Lidocaine with Epi, buffered with 1 ml of Bi Carb solution  for anesthetic, with sterile technique, elliptical excision in total and suturing was performed. Wound edges were well approximated using absorbable suture, taking meticulous 2 cm bites of healthy fascia throughout the length of the wound. Total number of sutures placed 8. Antibiotic dressing was applied, and wound care instructions provided at discharge. Patient educated to be alert for any signs of cutaneous infection. The procedure was well tolerated without complications. Follow up: the specimen is labeled and sent to pathology for evaluation, return for suture removal in 7 days. A sterile dressing was then applied. Supplies Used:   Laceration Tray  Lidocaine with Epi or Lidocaine single use bottle 10 mls  3- 10 cc syringes   1 pair of size 7.5 sterile gloves   6 oz of Normal Sterile Saline   Chlorehexiderfm 4 oz bottle     Estimated Blood Loss:  Minimal           Complications:  None; patient tolerated the procedure well. Condition: stable           Disclaimer:   Ms. Kassy Tavares has been advised to call or return to our office if symptoms worsen/change/persist. We as a care team including the patient; discussed expected course/resolution/complications of diagnosis in detail today. Medication risks/benefits/costs/interactions/alternatives discussed Donna Reid was given an after visit summary which includes diagnoses, current medications, & vitals. Kassy Tavares expressed understanding with the diagnosis and plan.        Abi Correa, RN, MSN, [facility]   Acute Care/Adult Gerontology Nurse Practitioner   [facility] at [address] [place] [address], [address] [place] [address]  [phone] (office)   [phone] (cell)   [phone] (office fax)

## 2019-08-05 NOTE — PROGRESS NOTES
ADVISED PATIENT OF THE FOLLOWING HEALTH MAINTAINCE DUE  There are no preventive care reminders to display for this patient. Chief Complaint   Patient presents with    Lesion     Pt here for lesion removal on left upper arm. 1. Have you been to the ER, urgent care clinic since your last visit? Hospitalized since your last visit? No    2. Have you seen or consulted any other health care providers outside of the [address] [place] [address] since your last visit? Include any DEXA scan, mammography  or colon screening. No    3. Do you have an Advance Directive on file? yes    4. Do you have a DNR on file? DNR    Patient is accompanied by self I have received verbal consent from Marj Dumont to discuss any/all medical information while they are present in the room. Advance Care Planning 12/6/2018   Primary Decision Maker Name -   Primary Decision Maker Phone Number -   Primary Decision Maker Relationship to Patient -   Confirm Advance Directive Yes, on file   Does the patient have other document types -         [facility], [address] [facility] [address]  Phone: [phone] Fax: [phone]        Patient reminded during visit to bring all medication bottles, OTC medications to all appointments.

## 2019-08-05 NOTE — PATIENT INSTRUCTIONS
Hives: Care Instructions Your Care Instructions Hives are raised, red, itchy patches of skin. They are also called wheals or welts. They usually have red borders and pale centers. Hives range in size from ¼ inch to 3 inches or more across. They may seem to move from place to place on the skin. Several hives may form a large area of raised, red skin. You can get hives after an insect sting, after taking medicine or eating certain foods, or because of infection or stress. Other causes include plants, things you breathe in, makeup, heat, cold, sunlight, and latex. You cannot spread hives to other people. Hives may last a few minutes or a few days, but a single spot may last less than 36 hours. Follow-up care is a key part of your treatment and safety. Be sure to make and go to all appointments, and call your doctor if you are having problems. It's also a good idea to know your test results and keep a list of the medicines you take. How can you care for yourself at home? · Avoid whatever you think may have caused your hives, such as a certain food or medicine. However, you may not know the cause. · Put a cool, wet towel on the area to relieve itching. · Take an over-the-counter antihistamine, such as diphenhydramine (Benadryl), cetirizine (Zyrtec), or loratadine (Claritin), to help stop the hives and calm the itching. Read and follow directions on the label. These medicines can make you feel sleepy. Do not drive while using them. · Stay away from strong soaps, detergents, and chemicals. These can make itching worse. When should you call for help? Call 911 anytime you think you may need emergency care. For example, call if: 
  · You have symptoms of a severe allergic reaction. These may include: 
? Sudden raised, red areas (hives) all over your body. ? Swelling of the throat, mouth, lips, or tongue. ? Trouble breathing. ? Passing out (losing consciousness).  Or you may feel very lightheaded or suddenly feel weak, confused, or restless.  
 Call your doctor now or seek immediate medical care if: 
  · You have symptoms of an allergic reaction, such as: ? A rash or hives (raised, red areas on the skin). ? Itching. ? Swelling. ? Belly pain, nausea, or vomiting.  
  · You get hives after you start a new medicine.  
  · Hives have not gone away after 24 hours.  
 Watch closely for changes in your health, and be sure to contact your doctor if: 
  · You do not get better as expected. Where can you learn more? Go to http://pia-colby.info/. Enter Z130 in the search box to learn more about \"Hives: Care Instructions. \" Current as of: September 23, 2018 Content Version: 12.1 © 2961-6226 [employer]. Care instructions adapted under license by [employer] (which disclaims liability or warranty for this information). If you have questions about a medical condition or this instruction, always ask your healthcare professional. [address] any warranty or liability for your use of this information.

## 2019-08-05 NOTE — PROGRESS NOTES
Reason for Visit   Analilia Fraser is a 80 y.o. female patient who presents today for :  Chief Complaint   Patient presents with    Skin Problem     Pt has a lesion on her upper posterior left arm that is bleeding and itching.  Slow Heart Rate     Pts heart rate is running in the low 50's today. [facility] staff concerned. We have just changed her to bumex 0.5 mg daily per cardiology visit, I would like to wait a few days to see if the hr changes. Follow Up: Follow-up and Dispositions    · Return in about 1 week (around 8/7/2019) for with the NP for follow up to your treatment plan. Diagnosis/Treatment Plan:        ICD-10-CM ICD-9-CM    1. Skin lesion of left upper extremity L98.9 709.9     see picture attached. 2. Bleeding pigmented skin lesion L81.9 709.00    3. Acute urticaria L50.8 708.8    4. Skin macule L98.8 709.8    5. Acquired hypothyroidism E03.9 244.9    6. Nocturnal polyuria R35.1 788.43    7. Chronic diastolic congestive heart failure (HCC) I50.32 428.32      428.0    8. Paroxysmal atrial fibrillation (HCC) I48.0 427.31    9. Hypotension due to drugs I95.2 458.8      E947.9    10. Bradycardia with 41-50 beats per minute R00.1 427.89    11. Lives in assisted living facility Z59.3 V60.6    12. Medication management Z79.899 V58.69             Plan is to remove the lesion next week in office after holding just the plavix for 48 hours to help with hemostasis. Mrs. Emma Babcock has agreed to the plan of care and is on board with the removal.     Objective:     Vitals:    07/31/19 1216   BP: 100/50   Pulse: (!) 42   Resp: 16   Temp: 98.4 °F (36.9 °C)   TempSrc: Oral   SpO2: 98%   Weight: 122 lb 1.6 oz (55.4 kg)   Height: 5' 1\" (1.549 m)     Wt Readings from Last 3 Encounters:   07/31/19 122 lb 1.6 oz (55.4 kg)   07/29/19 125 lb 6.4 oz (56.9 kg)   06/03/19 121 lb (54.9 kg)     BP Readings from Last 3 Encounters:   08/05/19 120/58   07/31/19 100/50   07/29/19 110/60     Review of Systems Constitutional: Negative for activity change, appetite change, chills, fatigue and fever. HENT: Negative for congestion, dental problem, hearing loss, postnasal drip, sinus pressure, sneezing, sore throat and trouble swallowing. Eyes: Negative for discharge, redness and visual disturbance. Respiratory: Negative for apnea, cough, chest tightness, shortness of breath and wheezing. Cardiovascular: Negative for chest pain, palpitations and leg swelling. Gastrointestinal: Negative for abdominal distention, abdominal pain, blood in stool, constipation, diarrhea, nausea and vomiting. Endocrine: Positive for polyuria. Negative for polydipsia and polyphagia. Genitourinary: Positive for urgency. Negative for flank pain and frequency. Musculoskeletal: Negative for arthralgias, gait problem, joint swelling and neck pain. Skin: Positive for color change, pallor and wound. Negative for rash. Allergic/Immunologic: Negative for environmental allergies and food allergies. Neurological: Negative for dizziness, tremors, weakness, light-headedness, numbness and headaches. Hematological: Negative for adenopathy. Psychiatric/Behavioral: Negative for agitation, confusion and sleep disturbance. The patient is not nervous/anxious. Physical Exam   Constitutional: She is oriented to person, place, and time. Vital signs are normal. She appears not lethargic, to not be writhing in pain, not malnourished and not dehydrated. She appears healthy. She appears not cachectic. Non-toxic appearance. She does not have a sickly appearance. No distress. Frail, fragile, alert but not oriented, elderly  female. 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 Now resides in [facility]. She is on continuous nasal cannula due to advancing heart failure. She is not a candidature for routine treatment or surgery she is in a study at [facility]. HENT:   Head: Normocephalic and atraumatic. Right Ear: External ear and ear canal normal. No middle ear effusion. Decreased hearing is noted. Left Ear: External ear and ear canal normal.  No middle ear effusion. Decreased hearing is noted. Nose: Nose normal. No mucosal edema or rhinorrhea. Mouth/Throat: Uvula is midline and oropharynx is clear and moist. Mucous membranes are pale, dry and not cyanotic. No oral lesions. No uvula swelling. No posterior oropharyngeal edema or posterior oropharyngeal erythema. Eyes: Pupils are equal, round, and reactive to light. Conjunctivae, EOM and lids are normal. Lids are everted and swept, no foreign bodies found. Right pupil is round and reactive. Left pupil is reactive. Left pupil required 20 to 30 secs extra to respond. But it finally did respond. Neck: Trachea normal, normal range of motion and full passive range of motion without pain. Neck supple. No hepatojugular reflux and no JVD present. Carotid bruit is not present. No thyromegaly present. Cardiovascular: Normal rate, regular rhythm, S1 normal, S2 normal, normal heart sounds, intact distal pulses and normal pulses. Extrasystoles are present. Exam reveals no gallop, no distant heart sounds and no friction rub. No murmur heard. Pulmonary/Chest: Effort normal and breath sounds normal. No accessory muscle usage. No tachypnea. No respiratory distress. She has no decreased breath sounds. She has no wheezes. Abdominal: Soft. Normal appearance and bowel sounds are normal. She exhibits no fluid wave and no mass. There is no hepatosplenomegaly. There is no tenderness. There is no rebound and no CVA tenderness. Continues to struggle with constipation. The SRCH2 Conception is working but she can not take it every day as the stools get better but the urgency is too much. Musculoskeletal: Normal range of motion. Lymphadenopathy:        Head (right side): No submandibular adenopathy present.         Head (left side): No submandibular and no tonsillar adenopathy present. She has no cervical adenopathy. She has no axillary adenopathy. Neurological: She is alert and oriented to person, place, and time. She has normal motor skills, normal sensation, normal strength, normal reflexes and intact cranial nerves. She appears not lethargic. She is not agitated and not disoriented. She displays no weakness, no atrophy, facial symmetry, normal stance and normal speech. No cranial nerve deficit or sensory deficit. Gait normal. Coordination and gait normal. GCS score is 15. Skin: Skin is warm and dry. Abrasion and lesion noted. No bruising, no ecchymosis and no rash noted. She is not diaphoretic. There is erythema. No cyanosis. No pallor. Nails show no clubbing. See picture. Psychiatric: Mood, memory, affect and judgment normal. Her mood appears not anxious. She does not express impulsivity. She does not exhibit a depressed mood. She exhibits ordered thought content, normal new learning ability and normal remote memory. Nursing note and vitals reviewed. Discontinued Care: The following treatment modalities have been discontinued by the provider today:   There are no discontinued medications. Subjective: Allergies   Allergen Reactions    Brilinta [Ticagrelor] Shortness of Breath    Celecoxib Other (comments)    Codeine Hives    Sulfa (Sulfonamide Antibiotics) Unknown (comments) and Hives    Iodine Unknown (comments)     Other reaction(s): Other (See Comments)  This was on outside records, pt denies that she has iodine allergy. Able to tolerate seafood, shellfish w/o reaction.  Aricept [Donepezil] Other (comments)     Headaches - noted as intolerance     Darvocet A500 [Propoxyphene N-Acetaminophen] Nausea Only     Prior to Admission medications    Medication Sig Start Date End Date Taking? Authorizing Provider   acetaminophen (TYLENOL) 325 mg tablet Take 325 mg by mouth every six (6) hours as needed for Pain.    Yes Provider, Historical bumetanide (BUMEX) 0.5 mg tablet Take 1 Tab by mouth daily. 7/29/19  Yes Danial Sanchez MD   omeprazole (PRILOSEC) 20 mg capsule Take 20 mg by mouth daily. Yes Provider, Historical   Calcium-Cholecalciferol, D3, 500 mg(1,250mg) -400 unit tab Take  by mouth. Yes Provider, Historical   carvedilol (COREG) 6.25 mg tablet Take 1 Tab by mouth two (2) times a day. 5/13/19  Yes Flip Ashraf NP   melatonin 3 mg tablet Take 3 mg by mouth nightly. Yes Provider, Historical   levothyroxine (SYNTHROID) 25 mcg tablet Take 1.5 Tabs by mouth Daily (before breakfast). Indications: hypothyroidism 3/27/19  Yes Rosina Yen NP   escitalopram oxalate (LEXAPRO) 10 mg tablet Take 10 mg by mouth daily. Indications: Repeated Episodes of Anxiety, major depressive disorder   Yes Provider, Historical   busPIRone (BUSPAR) 5 mg tablet Take 5 mg by mouth two (2) times a day. Indications: Repeated Episodes of Anxiety   Yes Provider, Historical   clopidogrel (PLAVIX) 75 mg tab Take 75 mg by mouth daily. Indications: treatment to prevent a heart attack   Yes Provider, Historical   polyethylene glycol (MIRALAX) 17 gram packet Take 17 g by mouth daily. Yes Provider, Historical   apixaban (ELIQUIS) 2.5 mg tablet Take 1 Tab by mouth two (2) times a day. 11/30/18  Yes Rosina Yen NP   simvastatin (ZOCOR) 40 mg tablet Take 40 mg by mouth nightly. 11/26/18  Yes Provider, Historical   nitroglycerin (NITROSTAT) 0.4 mg SL tablet Take 0.4 mg by mouth as needed. 11/26/18  Yes Provider, Historical   PROAIR HFA 90 mcg/actuation inhaler Take 2 Inhalation by inhalation four (4) times daily as needed. 11/26/18  Yes Provider, Historical   diltiazem CD (CARTIA XT) 180 mg ER capsule Take 180 mg by mouth nightly. Yes Provider, Historical   potassium chloride SR (KLOR-CON 10) 10 mEq tablet Take 10 mEq by mouth daily.     Provider, Historical     Past Medical History:   Diagnosis Date    (HFpEF) heart failure with preserved ejection fraction ([facility] [address] 10/17/2018    acute    Allergic rhinitis     Atherosclerotic cardiovascular disease     CHF (congestive heart failure) ([facility] [address] 2018    Cognitive communication deficit     Edema     Environmental allergies     dizziness-(chronic)    Fatty liver disease, nonalcoholic     GERD (gastroesophageal reflux disease)     H/O heart artery stent 2018    HCVD (hypertensive cardiovascular disease) 10/17/2018    Heart attack ([address] 2018    Heart palpitations 2018    infrequent     History of PTCA     Hypercholesterolemia     Hypertension     Liver disease     fatty liver    MI (myocardial infarction) ([facility] [address] 2018    Overactive bladder 2018    Peripheral neuropathy     Permanent atrial fibrillation ([facility] [address] 2018    Senile dementia, without behavioral disturbance 2018    Stress incontinence, female 2018    Vertigo     Vitamin D deficiency      Past Surgical History:   Procedure Laterality Date    BREAST SURGERY PROCEDURE UNLISTED      breast cyst Maggie Yepez)    CARDIAC SURG PROCEDURE UNLIST  1999    + stress thalassemia; 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  Holter    HX APPENDECTOMY      HX BREAST BIOPSY Left     neg    HX CYST INCISION AND DRAINAGE Right years ago    neg    HX DILATION AND CURETTAGE      x5    HX GYN      D&C (x5), ALLEY/BSO (-Juliann/Zedler), Provera intolerance (bleeding), endometrial Bx annually    HX PTCA  2018    stent distal RCA into PLwith KENNETH x 2    HX ALLEY AND BSO      Juliann/Zedler      Social History     Tobacco Use    Smoking status: Former Smoker     Packs/day: 4.00     Types: Cigarettes     Last attempt to quit: 1955     Years since quittin.6    Smokeless tobacco: Never Used   Substance Use Topics    Alcohol use: No     Alcohol/week: 0.0 - 0.8 standard drinks    Drug use: No      Family History   Problem Relation Age of Onset    Diabetes Mother     Hypertension Mother     Heart Failure Mother         CHF ( @ 80)   Fabi Martinez Alzheimer Mother     Cancer Father         lung ( @ 77)   Fabi Martinez Alzheimer Father     No Known Problems Son     No Known Problems Son     Ovarian Cancer Sister         hysterectomy    Breast Cancer Sister 28        mastectomy    Cancer Sister         breast and ovarian    Alzheimer Sister     Breast Problems Sister         breast cyst    Cataract Sister     Hypertension Sister     Diabetes Brother      Advance Care Planning 2018   Primary Decision Maker Name -   Primary Decision Maker Phone Number -   Primary Decision Maker Relationship to Patient -   Confirm Advance Directive Yes, on file   Does the patient have other document types -     Test Results:     Office Visit on 2019   Component Date Value Ref Range Status    WBC 2019 5.8  3.4 - 10.8 x10E3/uL Final    RBC 2019 3.87  3.77 - 5.28 x10E6/uL Final    HGB 2019 10.9* 11.1 - 15.9 g/dL Final    HCT 2019 33.7* 34.0 - 46.6 % Final    MCV 2019 87  79 - 97 fL Final    MCH 2019 28.2  26.6 - 33.0 pg Final    MCHC 2019 32.3  31.5 - 35.7 g/dL Final    RDW 2019 18.2* 12.3 - 15.4 % Final    PLATELET  271  150 - 450 x10E3/uL Final    NEUTROPHILS 2019 63  Not Estab. % Final    Lymphocytes 2019 21  Not Estab. % Final    MONOCYTES 2019 13  Not Estab. % Final    EOSINOPHILS 2019 3  Not Estab. % Final    BASOPHILS 2019 0  Not Estab. % Final    ABS. NEUTROPHILS 2019 3.6  1.4 - 7.0 x10E3/uL Final    Abs Lymphocytes 2019 1.2  0.7 - 3.1 x10E3/uL Final    ABS. MONOCYTES 2019 0.8  0.1 - 0.9 x10E3/uL Final    ABS. EOSINOPHILS 2019 0.2  0.0 - 0.4 x10E3/uL Final    ABS. BASOPHILS 2019 0.0  0.0 - 0.2 x10E3/uL Final    IMMATURE GRANULOCYTES 2019 0  Not Estab. % Final    ABS. IMM.  GRANS. 2019 0.0  0.0 - 0.1 x10E3/uL Final    Hemoglobin A1c 2019 6.0* 4.8 - 5.6 % Final    Comment: Prediabetes: 5.7 - 6.4           Diabetes: >6.4           Glycemic control for adults with diabetes: <7.0      Estimated average glucose 06/03/2019 126  mg/dL Final    Cholesterol, total 06/03/2019 153  100 - 199 mg/dL Final    Triglyceride 06/03/2019 90  0 - 149 mg/dL Final    HDL Cholesterol 06/03/2019 59  >39 mg/dL Final    VLDL, calculated 06/03/2019 18  5 - 40 mg/dL Final    LDL, calculated 06/03/2019 76  0 - 99 mg/dL Final    Magnesium 06/03/2019 2.1  1.6 - 2.3 mg/dL Final    Glucose 06/03/2019 70  65 - 99 mg/dL Final    BUN 06/03/2019 28  10 - 36 mg/dL Final    Creatinine 06/03/2019 1.16* 0.57 - 1.00 mg/dL Final    GFR est non-AA 06/03/2019 41* >59 mL/min/1.73 Final    GFR est AA 06/03/2019 48* >59 mL/min/1.73 Final    BUN/Creatinine ratio 06/03/2019 24  12 - 28 Final    Sodium 06/03/2019 141  134 - 144 mmol/L Final    Potassium 06/03/2019 4.4  3.5 - 5.2 mmol/L Final    Chloride 06/03/2019 100  96 - 106 mmol/L Final    CO2 06/03/2019 27  20 - 29 mmol/L Final    Calcium 06/03/2019 9.6  8.7 - 10.3 mg/dL Final    Protein, total 06/03/2019 7.1  6.0 - 8.5 g/dL Final    Albumin 06/03/2019 4.4  3.2 - 4.6 g/dL Final    GLOBULIN, TOTAL 06/03/2019 2.7  1.5 - 4.5 g/dL Final    A-G Ratio 06/03/2019 1.6  1.2 - 2.2 Final    Bilirubin, total 06/03/2019 0.4  0.0 - 1.2 mg/dL Final    Alk. phosphatase 06/03/2019 130* 39 - 117 IU/L Final    AST (SGOT) 06/03/2019 26  0 - 40 IU/L Final    ALT (SGPT) 06/03/2019 19  0 - 32 IU/L Final    TSH 06/03/2019 2.470  0.450 - 4.500 uIU/mL Final    T4, Total 06/03/2019 6.6  4.5 - 12.0 ug/dL Final    T3 Uptake 06/03/2019 25  24 - 39 % Final    Free Thyroxine Index (FTI) 06/03/2019 1.7  1.2 - 4.9 Final    Vitamin B12 06/03/2019 522  232 - 1,245 pg/mL Final    Folate 06/03/2019 >20.0  >3.0 ng/mL Final    Comment: A serum folate concentration of less than 3.1 ng/mL is  considered to represent clinical deficiency.       VITAMIN D, 25-HYDROXY 06/03/2019 46.4 30.0 - 100.0 ng/mL Final    Comment: Vitamin D deficiency has been defined by the [address] practice guideline as a  level of serum 25-OH vitamin D less than 20 ng/mL (1,2). The Endocrine Society went on to further define vitamin D  insufficiency as a level between 21 and 29 ng/mL (2). 1. IOM ([place] [employer]). 2010. Dietary reference     intakes for calcium and D. [facility]: The     [employer]. 2. Shantelle MF, Lora ROE, Gary CARSON, et al.     Evaluation, treatment, and prevention of vitamin D     deficiency: an Endocrine Society clinical practice     guideline. JCEM. 2011 Jul; 96(3):1911-30.      Hospital Outpatient Visit on 05/13/2019   Component Date Value Ref Range Status    Aortic Valve Systolic Peak Velocity 88/30/2207 120.67  cm/s Final    AoV PG 05/13/2019 5.8  mmHg Final    LVIDd 05/13/2019 4.33  3.9 - 5.3 cm Final    LVPWd 05/13/2019 0.70  0.6 - 0.9 cm Final    LVIDs 05/13/2019 2.95  cm Final    IVSd 05/13/2019 0.75  0.6 - 0.9 cm Final    MV Mean Gradient 05/13/2019 2.4  mmHg Final    Mitral Valve Annulus Velocity Time* 05/13/2019 28.96  cm Final    Left Atrium to Aortic Root Ratio 05/13/2019 1.65   Final    RVIDd 05/13/2019 3.76  cm Final    LA Vol 4C 05/13/2019 71.16* 22 - 52 mL Final    LA Vol 2C 05/13/2019 89.54* 22 - 52 mL Final    LA Area 4C 05/13/2019 22.6  cm2 Final    LV Mass AL 05/13/2019 102.5  67 - 162 g Final    LV Mass AL Index 05/13/2019 67.9  43 - 95 g/m2 Final    MV Peak Gradient 05/13/2019 8.3  mmHg Final    Left Atrium Major Axis 05/13/2019 5.03  cm Final    Triscuspid Valve Regurgitation Pea* 05/13/2019 33.4  mmHg Final    Aortic Regurgitant Pressure Half-t* 05/13/2019 507.8  cm Final    Pulmonic Valve Max Velocity 05/13/2019 63.30  cm/s Final    Mitral Valve Max Velocity 05/13/2019 143.81  cm/s Final    TR Max Velocity 05/13/2019 288.77  cm/s Final    LA Vol Index 05/13/2019 59.31  16 - 28 ml/m2 Final    LA Vol Index 05/13/2019 47.14  16 - 28 ml/m2 Final    AR Max Jozef 05/13/2019 410.60  cm/s Final    PV peak gradient 05/13/2019 1.6  mmHg Final    LA Volume 05/13/2019 85.93* 22 - 52 mL Final    LV E' Lateral Velocity 05/13/2019 5.89  cm/s Final    LV E' Septal Velocity 05/13/2019 2.94  cm/s Final    Tapse 05/13/2019 0.84* 1.5 - 2.0 cm Final    Ao Root D 05/13/2019 3.05  cm Final    LA Vol Index 05/13/2019 56.92  16 - 28 ml/m2 Final     Disclaimer:   Ms. Marlee Jackson has been advised to call or return to our office if symptoms worsen/change/persist. We as a care team including the patient; discussed expected course/resolution/complications of diagnosis in detail today. Medication risks/benefits/costs/interactions/alternatives discussed Donna Beverly was given an after visit summary which includes diagnoses, current medications, & vitals. Marlee Jackson expressed understanding with the diagnosis and plan.

## 2019-08-05 NOTE — PATIENT INSTRUCTIONS
Skin Lesion Removal: What to Expect at Home  Your Recovery  After your procedure, you should not have much pain. But some soreness, swelling, or bruising is normal. Your doctor may recommend over-the-counter medicines to help with any discomfort. Most people can return to their normal routine the same day of their procedure. How quickly your wound heals depends on the size of your wound and the type of procedure you had. Most wounds take 1 to 3 weeks to heal. If you had laser surgery, your skin may change color and then slowly return to its normal color. You may need only a bandage, or you may need stitches. If you had stitches, your doctor will probably remove them 5 to 14 days later. If you have the type of stitches that dissolve, they do not have to be removed. They will disappear on their own. This care sheet gives you a general idea about how long it will take for you to recover. But each person recovers at a different pace. Follow the steps below to get better as quickly as possible. How can you care for yourself at home? Activity    · For the first few days, try not to bump or knock your wound.     · Depending on where your wound is, you may need to avoid strenuous exercise for 2 weeks after the procedure or until your doctor says it is okay.     · If you have had a lesion removed from your face, do not use makeup near your wound until you have your stitches taken out.     · Ask your doctor when it is okay to shower, bathe, or swim. Medicines    · Your doctor will tell you if and when you can restart your medicines. He or she will also give you instructions about taking any new medicines.     · If you take blood thinners, such as warfarin (Coumadin), clopidogrel (Plavix), or aspirin, be sure to talk to your doctor. He or she will tell you if and when to start taking those medicines again. Make sure that you understand exactly what your doctor wants you to do.     · Be safe with medicines.  Take pain medicines exactly as directed. ? If the doctor gave you a prescription medicine for pain, take it as prescribed. ? If you are not taking a prescription pain medicine, ask your doctor if you can take an over-the-counter medicine.    Wound care    · If your doctor told you how to care for your incision, follow your doctor's instructions. If you did not get instructions, follow this general advice:  ? Keep the wound bandaged and dry for the first day. ? After the first 24 to 48 hours, wash around the wound with clean water 2 times a day. Don't use hydrogen peroxide or alcohol, which can slow healing. ? You may cover the wound with a thin layer of petroleum jelly, such as Vaseline, and a nonstick bandage. ? Apply more petroleum jelly and replace the bandage as needed.     · If you have stitches, you may get other instructions.     · If a scab forms, do not pull it off. Let it fall off on its own. Wounds heal faster if no scab forms. Washing the area every day and using petroleum jelly will help prevent a scab from forming.     · If the wound bleeds, put direct pressure on it with a clean cloth until the bleeding stops.     · If you had a growth \"frozen\" off, you may get a blister. Do not break it. Let it dry up on its own. It is common for the blister to fill with blood. You do not need to do anything about this, but if it becomes too painful, call your doctor.     · Avoid the sun until your stitches are removed. Follow-up care is a key part of your treatment and safety. Be sure to make and go to all appointments, and call your doctor if you are having problems. It's also a good idea to know your test results and keep a list of the medicines you take. When should you call for help? Call 911 anytime you think you may need emergency care.  For example, call if:    · You passed out (lost consciousness).     · You have severe trouble breathing.     · You have sudden chest pain and shortness of breath, or you cough up blood.    Call your doctor now or seek immediate medical care if:    · You have symptoms of a blood clot in your leg (called a deep vein thrombosis), such as:  ? Pain in the calf, back of the knee, thigh, or groin. ? Redness and swelling in your leg or groin.     · You have signs of infection, such as:  ? Increased pain, swelling, warmth, or redness. ? Red streaks leading from the wound. ? Pus draining from the wound. ? A fever.     · You have pain that does not get better after you take pain medicine.     · You have loose stitches.    Watch closely for changes in your health, and be sure to contact your doctor if you have any problems. Where can you learn more? Go to http://pia-colby.info/. Enter Q228 in the search box to learn more about \"Skin Lesion Removal: What to Expect at Home. \"  Current as of: April 1, 2019  Content Version: 12.1  © 5212-2608 [employer]. Care instructions adapted under license by [employer] (which disclaims liability or warranty for this information). If you have questions about a medical condition or this instruction, always ask your healthcare professional. [place] [address] any warranty or liability for your use of this information.

## 2019-08-07 NOTE — PROGRESS NOTES
* * *FINAL PATHOLOGIC DIAGNOSIS* * *     Left upper arm skin lesion, excision:  Basal cell carcinoma, examined margins free of involvement. No further treatment needed.

## 2019-08-13 ENCOUNTER — CLINICAL SUPPORT (OUTPATIENT)
Dept: GERIATRIC MEDICINE | Age: 84
End: 2019-08-13

## 2019-08-13 VITALS
BODY MASS INDEX: 24.07 KG/M2 | SYSTOLIC BLOOD PRESSURE: 110 MMHG | HEART RATE: 60 BPM | HEIGHT: 61 IN | DIASTOLIC BLOOD PRESSURE: 60 MMHG | RESPIRATION RATE: 18 BRPM | OXYGEN SATURATION: 94 % | WEIGHT: 127.5 LBS

## 2019-08-13 DIAGNOSIS — Z48.02 VISIT FOR SUTURE REMOVAL: ICD-10-CM

## 2019-08-13 DIAGNOSIS — L98.9 SKIN LESION OF LEFT UPPER EXTREMITY: Primary | ICD-10-CM

## 2019-08-13 NOTE — PROGRESS NOTES
ADVISED PATIENT OF THE FOLLOWING HEALTH MAINTAINCE DUE  There are no preventive care reminders to display for this patient. Chief Complaint   Patient presents with    Suture Removal     Pt here for suture removal to left upper arm, from lesion removal on 8/5/19.  8 Sutures removed, steri stripes apply, pt advsied of directions. I will also notify Nurse on [address] was advised of biospy results. 1. Have you been to the ER, urgent care clinic since your last visit? Hospitalized since your last visit? No    2. Have you seen or consulted any other health care providers outside of the [address] [place] [address] since your last visit? Include any DEXA scan, mammography  or colon screening. No    3. Do you have an Advance Directive on file? yes    4. Do you have a DNR on file? DNR    Patient is accompanied by self I have received verbal consent from Cresencio Casanova to discuss any/all medical information while they are present in the room. Advance Care Planning 12/6/2018   Primary Decision Maker Name -   Primary Decision Maker Phone Number -   Primary Decision Maker Relationship to Patient -   Confirm Advance Directive Yes, on file   Does the patient have other document types -         [facility], [address] [facility] [address]  Phone: [phone] Fax: [phone]        Patient reminded during visit to bring all medication bottles, OTC medications to all appointments.

## 2019-08-13 NOTE — PATIENT INSTRUCTIONS
Cuts Closed With Adhesives: Care Instructions  Your Care Instructions  A cut can happen anywhere on your body. The doctor used an adhesive to close the cut. When the adhesive dries, it forms a film that holds the edges of the cut together. Skin adhesives are sometimes called liquid stitches. If the cut went deep and through the skin, the doctor may have put in a layer of stitches below the adhesive. The deeper layer of stitches brings the deep part of the cut together. These stitches will dissolve and don't need to be removed. You don't see the stitches, only the adhesive. You may have a bandage. The doctor has checked you carefully, but problems can develop later. If you notice any problems or new symptoms, get medical treatment right away. Follow-up care is a key part of your treatment and safety. Be sure to make and go to all appointments, and call your doctor if you are having problems. It's also a good idea to know your test results and keep a list of the medicines you take. How can you care for yourself at home? · Keep the cut dry for the first 24 to 48 hours. After this, you can shower if your doctor okays it. Pat the cut dry. · Don't soak the cut, such as in a bathtub. Your doctor will tell you when it's safe to get the cut wet. · If your doctor told you how to care for your cut, follow your doctor's instructions. If you did not get instructions, follow this general advice:  ? Do not put any kind of ointment, cream, or lotion over the area. This can make the adhesive fall off too soon. ? After the first 24 to 48 hours, wash around the cut with clean water 2 times a day. Do not use hydrogen peroxide or alcohol, which can slow healing. ? If the doctor told you to use a bandage, put on a new bandage after cleaning the cut or if the bandage gets wet or dirty. · Prop up the sore area on a pillow anytime you sit or lie down during the next 3 days. Try to keep it above the level of your heart. This will help reduce swelling. · Leave the skin adhesive on your skin until it falls off on its own. This may take 5 to 10 days. · Do not scratch, rub, or pick at the adhesive. · Do not put the sticky part of a bandage directly on the adhesive. · Avoid any activity that could cause your cut to reopen. · Be safe with medicines. Read and follow all instructions on the label. ? If the doctor gave you a prescription medicine for pain, take it as prescribed. ? If you are not taking a prescription pain medicine, ask your doctor if you can take an over-the-counter medicine. When should you call for help? Call your doctor now or seek immediate medical care if:    · You have new pain, or your pain gets worse.     · The skin near the cut is cold or pale or changes color.     · You have tingling, weakness, or numbness near the cut.     · The cut starts to bleed.     · You have trouble moving the area near the cut.     · You have symptoms of infection, such as:  ? Increased pain, swelling, warmth, or redness around the cut.  ? Red streaks leading from the cut.  ? Pus draining from the cut.  ? A fever.    Watch closely for changes in your health, and be sure to contact your doctor if:    · The cut reopens.     · You do not get better as expected. Where can you learn more? Go to http://pia-colby.info/. Enter P174 in the search box to learn more about \"Cuts Closed With Adhesives: Care Instructions. \"  Current as of: September 23, 2018  Content Version: 12.1  © 7788-9713 [employer]. Care instructions adapted under license by [employer] (which disclaims liability or warranty for this information). If you have questions about a medical condition or this instruction, always ask your healthcare professional. [address] any warranty or liability for your use of this information.

## 2019-08-20 ENCOUNTER — OFFICE VISIT (OUTPATIENT)
Dept: GERIATRIC MEDICINE | Age: 84
End: 2019-08-20

## 2019-08-20 VITALS
SYSTOLIC BLOOD PRESSURE: 120 MMHG | DIASTOLIC BLOOD PRESSURE: 54 MMHG | OXYGEN SATURATION: 97 % | WEIGHT: 127.5 LBS | RESPIRATION RATE: 18 BRPM | BODY MASS INDEX: 24.07 KG/M2 | HEIGHT: 61 IN | HEART RATE: 50 BPM

## 2019-08-20 DIAGNOSIS — I27.9 PULMONARY HEART DISEASE (HCC): ICD-10-CM

## 2019-08-20 DIAGNOSIS — H81.13 BPPV (BENIGN PAROXYSMAL POSITIONAL VERTIGO), BILATERAL: ICD-10-CM

## 2019-08-20 DIAGNOSIS — I95.0 IDIOPATHIC HYPOTENSION: ICD-10-CM

## 2019-08-20 DIAGNOSIS — E03.9 ACQUIRED HYPOTHYROIDISM: Chronic | ICD-10-CM

## 2019-08-20 DIAGNOSIS — N18.9 CHRONIC KIDNEY DISEASE, UNSPECIFIED CKD STAGE: ICD-10-CM

## 2019-08-20 DIAGNOSIS — R73.03 PREDIABETES: ICD-10-CM

## 2019-08-20 DIAGNOSIS — H61.91 SKIN LESION OF RIGHT EAR: ICD-10-CM

## 2019-08-20 DIAGNOSIS — H65.113 NON-RECURRENT ACUTE ALLERGIC OTITIS MEDIA OF BOTH EARS: ICD-10-CM

## 2019-08-20 DIAGNOSIS — E78.2 MIXED HYPERLIPIDEMIA: ICD-10-CM

## 2019-08-20 DIAGNOSIS — H65.90 FLUID COLLECTION OF MIDDLE EAR: ICD-10-CM

## 2019-08-20 DIAGNOSIS — R42 POSTURAL DIZZINESS: Primary | ICD-10-CM

## 2019-08-20 DIAGNOSIS — N39.46 MIXED STRESS AND URGE URINARY INCONTINENCE: ICD-10-CM

## 2019-08-20 DIAGNOSIS — I48.0 PAROXYSMAL ATRIAL FIBRILLATION (HCC): ICD-10-CM

## 2019-08-20 DIAGNOSIS — N32.81 OAB (OVERACTIVE BLADDER): ICD-10-CM

## 2019-08-20 DIAGNOSIS — H90.3 SENSORINEURAL HEARING LOSS (SNHL) OF BOTH EARS: ICD-10-CM

## 2019-08-20 DIAGNOSIS — C44.619 BASAL CELL CARCINOMA (BCC) OF SKIN OF LEFT UPPER EXTREMITY INCLUDING SHOULDER: ICD-10-CM

## 2019-08-20 DIAGNOSIS — R00.1 BRADYCARDIA WITH 41-50 BEATS PER MINUTE: ICD-10-CM

## 2019-08-20 DIAGNOSIS — L81.9 PIGMENTED SKIN LESION SUSPICIOUS FOR MALIGNANT NEOPLASM: ICD-10-CM

## 2019-08-20 PROBLEM — T50.2X5A DIURETIC-INDUCED HYPOKALEMIA: Status: RESOLVED | Noted: 2018-11-28 | Resolved: 2019-08-20

## 2019-08-20 PROBLEM — E87.6 DIURETIC-INDUCED HYPOKALEMIA: Status: RESOLVED | Noted: 2018-11-28 | Resolved: 2019-08-20

## 2019-08-20 RX ORDER — AMOXICILLIN 400 MG/5ML
30 POWDER, FOR SUSPENSION ORAL 2 TIMES DAILY
Qty: 151.2 ML | Refills: 0 | Status: SHIPPED | OUTPATIENT
Start: 2019-08-20 | End: 2019-08-27

## 2019-08-20 RX ORDER — CARVEDILOL 3.12 MG/1
3.12 TABLET ORAL 2 TIMES DAILY WITH MEALS
Qty: 60 TAB | Refills: 1
Start: 2019-08-20 | End: 2019-08-21

## 2019-08-20 RX ORDER — MECLIZINE HCL 12.5 MG 12.5 MG/1
12.5 TABLET ORAL 2 TIMES DAILY
Qty: 60 TAB | Refills: 1 | Status: SHIPPED | OUTPATIENT
Start: 2019-08-20 | End: 2019-09-26 | Stop reason: ALTCHOICE

## 2019-08-20 NOTE — PROGRESS NOTES
Reason for Visit   Kassy Tavares is a 80 y.o. female patient who presents today for :  Chief Complaint   Patient presents with    Fatigue     Pt states she feels tired a lot.  Dizziness     Pt states she has diizziness on and off with movement. Follow Up: Follow-up and Dispositions    · Return in about 2 weeks (around 9/3/2019) for with the NP for follow up to your treatment plan. Diagnosis/Treatment Plan: Today was a post change of Coreg visit for Mrs. Cassidy Reid. 1 week ago Dr. Bigg Magallanes advised me to decrease the dose of the coreg due to hypotension and consistent bradycardia. We have also decreased her Bumex from 1 mg daily to 0.5 mg daily. She has done well with decreasing the Bumex but she has continued to have episodes of hypotension and bradycardia. I have attached her vitas tid x 7 days to assess. She is symptomatic with dizziness but currently she does have fluid behind her TM with probable ear infections. Mrs. Cassidy Reid also complains at each visit she is constantly urinating 24 hours per day and is going through 2-3 depended every 2 hours. I have ordered and she has completed bladder training as well as I have checked a urine for infection which was negative. Currently with her heart conditions it is not appropriate to order an antimuscarinic but I will check with Dr. Bigg Magallanes / Chaya Ballard NP if we could try some Myrbetric or possibly going to less frequent dosing of the diuretic. ICD-10-CM ICD-9-CM    1. Postural dizziness R42 780.4 fluticasone (FLONASE SENSIMIST) 27.5 mcg/actuation nasal spray      meclizine (ANTIVERT) 12.5 mg tablet      amoxicillin (AMOXIL) 400 mg/5 mL suspension      REFERRAL TO AUDIOLOGY   2. BPPV (benign paroxysmal positional vertigo), bilateral H81.13 386.11 fluticasone (FLONASE SENSIMIST) 27.5 mcg/actuation nasal spray      meclizine (ANTIVERT) 12.5 mg tablet      amoxicillin (AMOXIL) 400 mg/5 mL suspension      REFERRAL TO AUDIOLOGY   3. Fluid collection of middle ear H65.90 381.4 fluticasone (FLONASE SENSIMIST) 27.5 mcg/actuation nasal spray      meclizine (ANTIVERT) 12.5 mg tablet      amoxicillin (AMOXIL) 400 mg/5 mL suspension      REFERRAL TO AUDIOLOGY   4. Bradycardia with 41-50 beats per minute R00.1 427.89    5. Idiopathic hypotension I95.0 458.9    6. Paroxysmal atrial fibrillation (HCC) I48.0 427.31 carvedilol (COREG) 3.125 mg tablet   7. Pulmonary heart disease (HCC)  I27.9 416.9 CBC WITH AUTOMATED DIFF      METABOLIC PANEL, COMPREHENSIVE      COLLECTION VENOUS BLOOD,VENIPUNCTURE      GGT      THYROID PANEL W/TSH      5' NUCLEOTIDASE      carvedilol (COREG) 3.125 mg tablet   8. Acquired hypothyroidism E03.9 244.9 CBC WITH AUTOMATED DIFF      METABOLIC PANEL, COMPREHENSIVE      COLLECTION VENOUS BLOOD,VENIPUNCTURE      GGT      THYROID PANEL W/TSH      5' NUCLEOTIDASE   9. Prediabetes R73.03 790.29 HEMOGLOBIN A1C WITH EAG      LIPID PANEL   10. Mixed hyperlipidemia E78.2 272.2 CBC WITH AUTOMATED DIFF      METABOLIC PANEL, COMPREHENSIVE      COLLECTION VENOUS BLOOD,VENIPUNCTURE      GGT      THYROID PANEL W/TSH      5' NUCLEOTIDASE      LIPID PANEL   11. Chronic kidney disease, unspecified CKD stage N18.9 585.9 CBC WITH AUTOMATED DIFF      METABOLIC PANEL, COMPREHENSIVE      COLLECTION VENOUS BLOOD,VENIPUNCTURE      GGT      THYROID PANEL W/TSH      5' NUCLEOTIDASE      MAGNESIUM   12. OAB (overactive bladder) N32.81 596.51    13. Mixed stress and urge urinary incontinence N39.46 788.33    14. Non-recurrent acute allergic otitis media of both ears H65.113 381.04 fluticasone (FLONASE SENSIMIST) 27.5 mcg/actuation nasal spray      meclizine (ANTIVERT) 12.5 mg tablet      amoxicillin (AMOXIL) 400 mg/5 mL suspension   15.  Basal cell carcinoma (BCC) of skin of left upper extremity including shoulder C44.619 173.61 REFERRAL TO DERMATOLOGY   16. Skin lesion of right ear L98.9 709.9 REFERRAL TO DERMATOLOGY   17. Pigmented skin lesion suspicious for malignant neoplasm L81.9 709.9 REFERRAL TO DERMATOLOGY   18. Sensorineural hearing loss (SNHL) of both ears H90.3 389.18 REFERRAL TO AUDIOLOGY        Objective:     Vitals:    08/20/19 1118   BP: 120/54   Pulse: (!) 50   Resp: 18   SpO2: 97%   Weight: 127 lb 8 oz (57.8 kg)   Height: 5' 1\" (1.549 m)     Wt Readings from Last 3 Encounters:   08/20/19 127 lb 8 oz (57.8 kg)   08/13/19 127 lb 8 oz (57.8 kg)   08/05/19 124 lb (56.2 kg)     BP Readings from Last 3 Encounters:   08/20/19 120/54   08/13/19 110/60   08/05/19 120/58     Review of Systems   Constitutional: Negative for activity change, appetite change, chills, fatigue and fever. HENT: Positive for hearing loss, postnasal drip and sinus pressure. Negative for congestion, dental problem, sneezing, sore throat and trouble swallowing. Eyes: Negative for discharge, redness and visual disturbance. Respiratory: Negative for apnea, cough, chest tightness, shortness of breath and wheezing. Cardiovascular: Negative for chest pain, palpitations and leg swelling. Gastrointestinal: Negative for abdominal distention, abdominal pain, blood in stool, constipation, diarrhea, nausea and vomiting. Endocrine: Positive for polyuria. Negative for polydipsia and polyphagia. Genitourinary: Positive for frequency and urgency. Negative for flank pain. Musculoskeletal: Negative for arthralgias, gait problem, joint swelling and neck pain. Skin: Positive for color change, pallor and wound. Negative for rash. Allergic/Immunologic: Negative for environmental allergies and food allergies. Neurological: Positive for dizziness, weakness and light-headedness. Negative for tremors, numbness and headaches. Hematological: Negative for adenopathy. Psychiatric/Behavioral: Negative for agitation, confusion and sleep disturbance. The patient is not nervous/anxious. Physical Exam   Constitutional: She is oriented to person, place, and time.  Vital signs are normal. She appears not lethargic, to not be writhing in pain, not malnourished and not dehydrated. She appears healthy. She appears not cachectic. Non-toxic appearance. She does not have a sickly appearance. No distress. Frail, fragile, alert but not oriented, elderly  female. 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 Now resides in [facility]. She is on continuous nasal cannula due to advancing heart failure. She is not a candidature for routine treatment or surgery she is in a study at [facility]. HENT:   Head: Normocephalic and atraumatic. Right Ear: Ear canal normal. There is mastoid tenderness. A middle ear effusion is present. Decreased hearing is noted. Left Ear: Ear canal normal. There is mastoid tenderness. A middle ear effusion is present. Decreased hearing is noted. Nose: Rhinorrhea present. No mucosal edema. Mouth/Throat: Uvula is midline and oropharynx is clear and moist. Mucous membranes are pale, dry and not cyanotic. No oral lesions. No uvula swelling. No posterior oropharyngeal edema or posterior oropharyngeal erythema. Eyes: Pupils are equal, round, and reactive to light. Conjunctivae, EOM and lids are normal. Lids are everted and swept, no foreign bodies found. Right pupil is round and reactive. Left pupil is reactive. Left pupil required 20 to 30 secs extra to respond. But it finally did respond. Neck: Trachea normal, normal range of motion and full passive range of motion without pain. Neck supple. No hepatojugular reflux and no JVD present. Carotid bruit is not present. No thyromegaly present. Cardiovascular: Regular rhythm, S1 normal, S2 normal, normal heart sounds, intact distal pulses and normal pulses. Occasional extrasystoles are present. Bradycardia present. Exam reveals no gallop, no distant heart sounds and no friction rub. No murmur heard. No extremity edema noted, hr is still low.     Pulmonary/Chest: Effort normal and breath sounds normal. No accessory muscle usage. No tachypnea. No respiratory distress. Abdominal: Soft. Normal appearance and bowel sounds are normal. She exhibits no fluid wave and no mass. There is no hepatosplenomegaly. There is no tenderness. There is no rebound and no CVA tenderness. Continues to struggle with constipation. The [place] is working but she can not take it every day as the stools get better but the urgency is too much. Musculoskeletal: Normal range of motion. Lymphadenopathy:        Head (right side): No submandibular adenopathy present. Head (left side): No submandibular and no tonsillar adenopathy present. She has no cervical adenopathy. She has no axillary adenopathy. Neurological: She is alert and oriented to person, place, and time. She has normal reflexes and intact cranial nerves. She appears not lethargic. She is not agitated and not disoriented. She displays weakness and atrophy. She displays facial symmetry, normal stance and normal speech. A sensory deficit is present. No cranial nerve deficit. Gait abnormal. Coordination normal. GCS score is 15. Skin: Skin is warm and dry. Abrasion and lesion noted. No bruising, no ecchymosis and no rash noted. She is not diaphoretic. There is erythema. No cyanosis. No pallor. Nails show no clubbing. 1. Right ear crest lesion, rough, abrasive, raised. Concerns for further skin cancer. 2. Right upper cheek has a larger, brown, raised, rough macule that is worry some. Psychiatric: Memory and judgment normal. Her mood appears anxious. She does not express impulsivity. She does not exhibit a depressed mood. She is apathetic. She exhibits ordered thought content, normal new learning ability and normal remote memory. Nursing note and vitals reviewed. Discontinued Care:     The following treatment modalities have been discontinued by the provider today:   Medications Discontinued During This Encounter   Medication Reason    lidocaine HCl/epinephrine (LIDOCAINE-EPINEPHRINE) 1.5 %-1:200,000 soln Therapy Completed    sodium bicarbonate, 4.2%, 4.2 % injection Therapy Completed    PROAIR HFA 90 mcg/actuation inhaler Therapy Completed    carvedilol (COREG) 6.25 mg tablet      Subjective: Allergies   Allergen Reactions    Brilinta [Ticagrelor] Shortness of Breath    Celecoxib Other (comments)    Codeine Hives    Sulfa (Sulfonamide Antibiotics) Unknown (comments) and Hives    Iodine Unknown (comments)     Other reaction(s): Other (See Comments)  This was on outside records, pt denies that she has iodine allergy. Able to tolerate seafood, shellfish w/o reaction.  Aricept [Donepezil] Other (comments)     Headaches - noted as intolerance     Darvocet A500 [Propoxyphene N-Acetaminophen] Nausea Only     Prior to Admission medications    Medication Sig Start Date End Date Taking? Authorizing Provider   carvedilol (COREG) 3.125 mg tablet Take 1 Tab by mouth two (2) times daily (with meals). Indications: chronic heart failure 8/20/19  Yes Shahnaz Weston NP   fluticasone (FLONASE SENSIMIST) 27.5 mcg/actuation nasal spray 1 Spray two (2) times a day for 10 days, THEN 1 Spray daily for 354 days. Indications: inflammation of the nose due to an allergy 8/20/19 8/18/20 Yes Shahnaz Weston NP   meclizine (ANTIVERT) 12.5 mg tablet Take 1 Tab by mouth two (2) times a day. Indications: sensation of spinning or whirling 8/20/19  Yes Shahnaz Weston NP   amoxicillin (AMOXIL) 400 mg/5 mL suspension Take 10.8 mL by mouth two (2) times a day for 7 days. Indications: bacterial infection of middle ear 8/20/19 8/27/19 Yes Shahnaz Weston NP   acetaminophen (TYLENOL) 325 mg tablet Take 325 mg by mouth every six (6) hours as needed for Pain. Yes Provider, Historical   bumetanide (BUMEX) 0.5 mg tablet Take 1 Tab by mouth daily. 7/29/19  Yes Antwon Giang MD   omeprazole (PRILOSEC) 20 mg capsule Take 20 mg by mouth daily.    Yes Provider, Historical   Calcium-Cholecalciferol, D3, 500 mg(1,250mg) -400 unit tab Take  by mouth. Yes Provider, Historical   melatonin 3 mg tablet Take 3 mg by mouth nightly. Yes Provider, Historical   levothyroxine (SYNTHROID) 25 mcg tablet Take 1.5 Tabs by mouth Daily (before breakfast). Indications: hypothyroidism 3/27/19  Yes Chaka Andrade NP   escitalopram oxalate (LEXAPRO) 10 mg tablet Take 10 mg by mouth daily. Indications: Repeated Episodes of Anxiety, major depressive disorder   Yes Provider, Historical   busPIRone (BUSPAR) 5 mg tablet Take 5 mg by mouth two (2) times a day. Indications: Repeated Episodes of Anxiety   Yes Provider, Historical   clopidogrel (PLAVIX) 75 mg tab Take 75 mg by mouth daily. Indications: treatment to prevent a heart attack   Yes Provider, Historical   polyethylene glycol (MIRALAX) 17 gram packet Take 17 g by mouth daily. Yes Provider, Historical   apixaban (ELIQUIS) 2.5 mg tablet Take 1 Tab by mouth two (2) times a day. 11/30/18  Yes Chaka Andrade NP   nitroglycerin (NITROSTAT) 0.4 mg SL tablet Take 0.4 mg by mouth as needed. 11/26/18  Yes Provider, Historical   diltiazem CD (CARTIA XT) 180 mg ER capsule Take 180 mg by mouth nightly. Yes Provider, Historical   potassium chloride SR (KLOR-CON 10) 10 mEq tablet Take 10 mEq by mouth daily. Provider, Historical   simvastatin (ZOCOR) 40 mg tablet Take 40 mg by mouth nightly.  11/26/18   Provider, Historical     Past Medical History:   Diagnosis Date    (HFpEF) heart failure with preserved ejection fraction ([facility] 10/17/2018    acute    Allergic rhinitis     Atherosclerotic cardiovascular disease 1999    CHF (congestive heart failure) ([facility] 09/11/2018    Cognitive communication deficit     Edema     Environmental allergies     dizziness-(chronic)    Fatty liver disease, nonalcoholic     GERD (gastroesophageal reflux disease)     H/O heart artery stent 09/2018    HCVD (hypertensive cardiovascular disease) 10/17/2018    [facility]    Heart palpitations 2018    infrequent     History of PTCA     Hypercholesterolemia     Hypertension     Liver disease     fatty liver    MI [facility]ocardial infarction) [facility] 2018    Overactive bladder 2018    Peripheral neuropathy     P[facility] ([facility] 2018    Senile dementia, without behavioral disturbance 2018    Stress incontinence, female 2018    Vertigo     Vitamin D deficiency      Past Surgical History:   Procedure Laterality Date    BREAST SURGERY PROCEDURE UNLISTED      breast cyst Luis Miguel Juarez)    CARDIAC SURG PROCEDURE UNLIST  1999    + stress thalassemia; 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  Holter    HX APPENDECTOMY      HX BREAST BIOPSY Left     neg    HX CYST INCISION AND DRAINAGE Right years ago    neg    HX DILATION AND CURETTAGE      x5    HX GYN      D&C (x5), ALLEY/BSO (-Juliann/Zedler), Provera intolerance (bleeding), endometrial Bx annually    HX PTCA  2018    stent distal RCA into PLwith KENNETH x 2    HX ALLEY AND BSO      Juliann/Zedler      Social History     Tobacco Use    Smoking status: Former Smoker     Packs/day: 4.00     Types: Cigarettes     Last attempt to quit: 1955     Years since quittin.6    Smokeless tobacco: Never Used   Substance Use Topics    Alcohol use: No     Alcohol/week: 0.0 - 0.8 standard drinks    Drug use: No      Family History   Problem Relation Age of Onset    Diabetes Mother     Hypertension Mother     Heart Failure Mother         CHF ( @ 80)   Yessi Blanco Alzheimer Mother     Cancer Father         lung ( @ 77)   Yessi Blanco Alzheimer Father     No Known Problems Son     No Known Problems Son     Ovarian Cancer Sister         hysterectomy    Breast Cancer Sister 28        mastectomy    Cancer Sister         breast and ovarian    Alzheimer Sister     Breast Problems Sister         breast cyst    Cataract Sister     Hypertension Sister    Yessi Blanco Diabetes Brother      Advance Care Planning 12/6/2018   Primary Decision Maker Name -   Primary Decision Maker Phone Number -   Primary Decision Maker Relationship to Patient -   Confirm Advance Directive Yes, on file   Does the patient have other document types -     Test Results:     Office Visit on 06/03/2019   Component Date Value Ref Range Status    WBC 06/03/2019 5.8  3.4 - 10.8 x10E3/uL Final    RBC 06/03/2019 3.87  3.77 - 5.28 x10E6/uL Final    HGB 06/03/2019 10.9* 11.1 - 15.9 g/dL Final    HCT 06/03/2019 33.7* 34.0 - 46.6 % Final    MCV 06/03/2019 87  79 - 97 fL Final    MCH 06/03/2019 28.2  26.6 - 33.0 pg Final    MCHC 06/03/2019 32.3  31.5 - 35.7 g/dL Final    RDW 06/03/2019 18.2* 12.3 - 15.4 % Final    PLATELET 49/47/4224 719  150 - 450 x10E3/uL Final    NEUTROPHILS 06/03/2019 63  Not Estab. % Final    Lymphocytes 06/03/2019 21  Not Estab. % Final    MONOCYTES 06/03/2019 13  Not Estab. % Final    EOSINOPHILS 06/03/2019 3  Not Estab. % Final    BASOPHILS 06/03/2019 0  Not Estab. % Final    ABS. NEUTROPHILS 06/03/2019 3.6  1.4 - 7.0 x10E3/uL Final    Abs Lymphocytes 06/03/2019 1.2  0.7 - 3.1 x10E3/uL Final    ABS. MONOCYTES 06/03/2019 0.8  0.1 - 0.9 x10E3/uL Final    ABS. EOSINOPHILS 06/03/2019 0.2  0.0 - 0.4 x10E3/uL Final    ABS. BASOPHILS 06/03/2019 0.0  0.0 - 0.2 x10E3/uL Final    IMMATURE GRANULOCYTES 06/03/2019 0  Not Estab. % Final    ABS. IMM.  GRANS. 06/03/2019 0.0  0.0 - 0.1 x10E3/uL Final    Hemoglobin A1c 06/03/2019 6.0* 4.8 - 5.6 % Final    Comment:          Prediabetes: 5.7 - 6.4           Diabetes: >6.4           Glycemic control for adults with diabetes: <7.0      Estimated average glucose 06/03/2019 126  mg/dL Final    Cholesterol, total 06/03/2019 153  100 - 199 mg/dL Final    Triglyceride 06/03/2019 90  0 - 149 mg/dL Final    HDL Cholesterol 06/03/2019 59  >39 mg/dL Final    VLDL, calculated 06/03/2019 18  5 - 40 mg/dL Final    LDL, calculated 06/03/2019 76  0 - 99 mg/dL Final    Magnesium 06/03/2019 2.1  1.6 - 2.3 mg/dL Final    Glucose 06/03/2019 70  65 - 99 mg/dL Final    BUN 06/03/2019 28  10 - 36 mg/dL Final    Creatinine 06/03/2019 1.16* 0.57 - 1.00 mg/dL Final    GFR est non-AA 06/03/2019 41* >59 mL/min/1.73 Final    GFR est AA 06/03/2019 48* >59 mL/min/1.73 Final    BUN/Creatinine ratio 06/03/2019 24  12 - 28 Final    Sodium 06/03/2019 141  134 - 144 mmol/L Final    Potassium 06/03/2019 4.4  3.5 - 5.2 mmol/L Final    Chloride 06/03/2019 100  96 - 106 mmol/L Final    CO2 06/03/2019 27  20 - 29 mmol/L Final    Calcium 06/03/2019 9.6  8.7 - 10.3 mg/dL Final    Protein, total 06/03/2019 7.1  6.0 - 8.5 g/dL Final    Albumin 06/03/2019 4.4  3.2 - 4.6 g/dL Final    GLOBULIN, TOTAL 06/03/2019 2.7  1.5 - 4.5 g/dL Final    A-G Ratio 06/03/2019 1.6  1.2 - 2.2 Final    Bilirubin, total 06/03/2019 0.4  0.0 - 1.2 mg/dL Final    Alk. phosphatase 06/03/2019 130* 39 - 117 IU/L Final    AST (SGOT) 06/03/2019 26  0 - 40 IU/L Final    ALT (SGPT) 06/03/2019 19  0 - 32 IU/L Final    TSH 06/03/2019 2.470  0.450 - 4.500 uIU/mL Final    T4, Total 06/03/2019 6.6  4.5 - 12.0 ug/dL Final    T3 Uptake 06/03/2019 25  24 - 39 % Final    Free Thyroxine Index (FTI) 06/03/2019 1.7  1.2 - 4.9 Final    Vitamin B12 06/03/2019 522  232 - 1,245 pg/mL Final    Folate 06/03/2019 >20.0  >3.0 ng/mL Final    Comment: A serum folate concentration of less than 3.1 ng/mL is  considered to represent clinical deficiency.  VITAMIN D, 25-HYDROXY 06/03/2019 46.4  30.0 - 100.0 ng/mL Final    Comment: Vitamin D deficiency has been defined by the [facility] practice guideline as a  level of serum 25-OH vitamin D less than 20 ng/mL (1,2). The Endocrine Society went on to further define vitamin D  insufficiency as a level between 21 and 29 ng/mL (2). 1. IOM ([place] of Medicine). 2010. Dietary reference     intakes for calcium and D.  [facility]: The     [employer]. 2. Shantelle MARIO, Lora ROE, Gary CARSON, et al.     Evaluation, treatment, and prevention of vitamin D     deficiency: an Endocrine Society clinical practice     guideline. JCEM. 2011 Jul; 96(7):1911-30. Disclaimer:   Ms. Danish Fuentes has been advised to call or return to our office if symptoms worsen/change/persist. We as a care team including the patient; discussed expected course/resolution/complications of diagnosis in detail today. Medication risks/benefits/costs/interactions/alternatives discussed Dorothy P. Beryle Bee was given an after visit summary which includes diagnoses, current medications, & vitals. Danish Fuentes expressed understanding with the diagnosis and plan.

## 2019-08-20 NOTE — PROGRESS NOTES
ADVISED PATIENT OF THE FOLLOWING HEALTH MAINTAINCE DUE  There are no preventive care reminders to display for this patient. Chief Complaint   Patient presents with    Fatigue     Pt states she feels tired a lot.  Dizziness     Pt states she has diizziness on and off with movement. 1. Have you been to the ER, urgent care clinic since your last visit? Hospitalized since your last visit? No    2. Have you seen or consulted any other health care providers outside of the [address] [place] [address] since your last visit? Include any DEXA scan, mammography  or colon screening. No    3. Do you have an Advance Directive on file? yes    4. Do you have a DNR on file? DNR    Patient is accompanied by self I have received verbal consent from Shakeel Peters to discuss any/all medical information while they are present in the room. Advance Care Planning 12/6/2018   Primary Decision Maker Name -   Primary Decision Maker Phone Number -   Primary Decision Maker Relationship to Patient -   Confirm Advance Directive Yes, on file   Does the patient have other document types -         [facility], [address] [place] [address] [address] [id number]  Phone: [phone] Fax: [phone]        Patient reminded during visit to bring all medication bottles, OTC medications to all appointments.      Shakeel Peters presents for lab draw ordered by Gustavo Ochoa RN MSN [id number]-NP    The following labs were drawn and sent to lab by Corby Allen LPN:    CBC, CMP and Thyroid, GGT, 5' Nucleotidase,  A1C  The following tubes were sent:    Hetal Harper  ( 1) and Brain (2)    Patient tolerated procedure well, blood obtained via venipuncture to right antecubital

## 2019-08-20 NOTE — PATIENT INSTRUCTIONS
Dizziness: Care Instructions  Your Care Instructions  Dizziness is the feeling of unsteadiness or fuzziness in your head. It is different than having vertigo, which is a feeling that the room is spinning or that you are moving or falling. It is also different from lightheadedness, which is the feeling that you are about to faint. It can be hard to know what causes dizziness. Some people feel dizzy when they have migraine headaches. Sometimes bouts of flu can make you feel dizzy. Some medical conditions, such as heart problems or high blood pressure, can make you feel dizzy. Many medicines can cause dizziness, including medicines for high blood pressure, pain, or anxiety. If a medicine causes your symptoms, your doctor may recommend that you stop or change the medicine. If it is a problem with your heart, you may need medicine to help your heart work better. If there is no clear reason for your symptoms, your doctor may suggest watching and waiting for a while to see if the dizziness goes away on its own. Follow-up care is a key part of your treatment and safety. Be sure to make and go to all appointments, and call your doctor if you are having problems. It's also a good idea to know your test results and keep a list of the medicines you take. How can you care for yourself at home? · If your doctor recommends or prescribes medicine, take it exactly as directed. Call your doctor if you think you are having a problem with your medicine. · Do not drive while you feel dizzy. · Try to prevent falls. Steps you can take include:  ? Using nonskid mats, adding grab bars near the tub, and using night-lights. ? Clearing your home so that walkways are free of anything you might trip on.  ? Letting family and friends know that you have been feeling dizzy. This will help them know how to help you. When should you call for help? Call 911 anytime you think you may need emergency care.  For example, call if:    · You passed out (lost consciousness).     · You have dizziness along with symptoms of a heart attack. These may include:  ? Chest pain or pressure, or a strange feeling in the chest.  ? Sweating. ? Shortness of breath. ? Nausea or vomiting. ? Pain, pressure, or a strange feeling in the back, neck, jaw, or upper belly or in one or both shoulders or arms. ? Lightheadedness or sudden weakness. ? A fast or irregular heartbeat.     · You have symptoms of a stroke. These may include:  ? Sudden numbness, tingling, weakness, or loss of movement in your face, arm, or leg, especially on only one side of your body. ? Sudden vision changes. ? Sudden trouble speaking. ? Sudden confusion or trouble understanding simple statements. ? Sudden problems with walking or balance. ? A sudden, severe headache that is different from past headaches.    Call your doctor now or seek immediate medical care if:    · You feel dizzy and have a fever, headache, or ringing in your ears.     · You have new or increased nausea and vomiting.     · Your dizziness does not go away or comes back.    Watch closely for changes in your health, and be sure to contact your doctor if:    · You do not get better as expected. Where can you learn more? Go to http://pia-colby.info/. Enter G944 in the search box to learn more about \"Dizziness: Care Instructions. \"  Current as of: September 23, 2018  Content Version: 12.1  © 0041-7396 [employer]. Care instructions adapted under license by [employer] (which disclaims liability or warranty for this information). If you have questions about a medical condition or this instruction, always ask your healthcare professional. [place] [address] any warranty or liability for your use of this information.          Low Blood Pressure: Care Instructions  Your Care Instructions    Blood pressure is a measurement of the force of the blood against the walls of the blood vessels during and after each beat of the heart. Low blood pressure is also called hypotension. It means that your blood pressure is much lower than normal. Some people, especially young, slim women, may have slightly low blood pressure without symptoms. But in many people, low blood pressure can cause symptoms such as feeling dizzy or lightheaded. When your blood pressure is too low, your heart, brain, and other organs do not get enough blood. Low blood pressure can be caused by many things, including heart problems and some medicines. Diabetes that is not under control can cause your blood pressure to drop. And so can a severe allergic reaction or infection. Another cause is dehydration, which is when your body loses too much fluid. Treatment for low blood pressure depends on the cause. Follow-up care is a key part of your treatment and safety. Be sure to make and go to all appointments, and call your doctor if you are having problems. It's also a good idea to know your test results and keep a list of the medicines you take. How can you care for yourself at home? · Drink plenty of fluids, enough so that your urine is light yellow or clear like water. If you have kidney, heart, or liver disease and have to limit fluids, talk with your doctor before you increase the amount of fluids you drink. · Be safe with medicines. Call your doctor if you think you are having a problem with your medicine. You will get more details on the specific medicines your doctor prescribes. · Stand up or get out of bed very slowly to allow your body to adjust.  · Get plenty of rest.  · Do not smoke. Smoking increases your risk of heart attack. If you need help quitting, talk to your doctor about stop-smoking programs and medicines. These can increase your chances of quitting for good. · Limit alcohol to 2 drinks a day for men and 1 drink a day for women. Alcohol may interfere with your medicine.  In addition, alcohol can make your low blood pressure worse by causing your body to lose water. When should you call for help? Call 911 anytime you think you may need emergency care. For example, call if:    · You passed out (lost consciousness).    Call your doctor now or seek immediate medical care if:    · You are dizzy or lightheaded, or you feel like you may faint.    Watch closely for changes in your health, and be sure to contact your doctor if you have any problems. Where can you learn more? Go to http://pia-colby.info/. Enter C304 in the search box to learn more about \"Low Blood Pressure: Care Instructions. \"  Current as of: July 22, 2018  Content Version: 12.1  © 1226-4131 [employer]. Care instructions adapted under license by [employer] (which disclaims liability or warranty for this information). If you have questions about a medical condition or this instruction, always ask your healthcare professional. [address] any warranty or liability for your use of this information.

## 2019-08-20 NOTE — LETTER
8/20/2019 7:18 PM 
 
RE:    Cresencio Casanova [address] [facility] 
[place] [facility] [address] 
8/20/2019 7:19 PM 
 
Patient: Cresencio Casanova YOB: 1927 Date of Visit: 8/20/2019 Dear MD [address]vitl[address] Xie [address] [address] VIA In Basket DYLAN Nolan [address] [address] VIA In Basket 
 : This letter is to advise you of a recent visit Ms. Martin Matta had with Bob Mendes NP for: Chief Complaint Patient presents with  Fatigue Pt states she feels tired a lot.  Dizziness Pt states she has diizziness on and off with movement. Nandini Christianson,  
1. Can I use Myrbetric on her with the OAB? Or could we try Bumex 0.5 mg every other day for a bit as she did well with the decrease from 1 mg to 0.5 mg a few weeks ago? 2. Should I change anything else as she is still hypotensive for most of the day as well as bradycardic? Dr. Ponce Villavicencio decreased her coreg to 3.125 mg bid last week and wanted an update this week?   
 
Sincerely, 
Bob Mendes NP

## 2019-08-21 DIAGNOSIS — R42 POSTURAL DIZZINESS: ICD-10-CM

## 2019-08-21 DIAGNOSIS — E87.1 ACUTE HYPONATREMIA: ICD-10-CM

## 2019-08-21 DIAGNOSIS — I95.0 IDIOPATHIC HYPOTENSION: ICD-10-CM

## 2019-08-21 DIAGNOSIS — F41.9 ANXIETY: ICD-10-CM

## 2019-08-21 DIAGNOSIS — I48.0 PAROXYSMAL ATRIAL FIBRILLATION (HCC): ICD-10-CM

## 2019-08-21 DIAGNOSIS — H81.13 BPPV (BENIGN PAROXYSMAL POSITIONAL VERTIGO), BILATERAL: ICD-10-CM

## 2019-08-21 DIAGNOSIS — R00.1 BRADYCARDIA WITH 41-50 BEATS PER MINUTE: Primary | ICD-10-CM

## 2019-08-21 LAB
5NT SERPL-CCNC: 4 IU/L (ref 0–10)
ALBUMIN SERPL-MCNC: 4.5 G/DL (ref 3.2–4.6)
ALBUMIN/GLOB SERPL: 1.5 {RATIO} (ref 1.2–2.2)
ALP SERPL-CCNC: 119 IU/L (ref 39–117)
ALT SERPL-CCNC: 21 IU/L (ref 0–32)
AST SERPL-CCNC: 35 IU/L (ref 0–40)
BASOPHILS # BLD AUTO: 0 X10E3/UL (ref 0–0.2)
BASOPHILS NFR BLD AUTO: 1 %
BILIRUB SERPL-MCNC: 0.3 MG/DL (ref 0–1.2)
BUN SERPL-MCNC: 23 MG/DL (ref 10–36)
BUN/CREAT SERPL: 21 (ref 12–28)
CALCIUM SERPL-MCNC: 9.4 MG/DL (ref 8.7–10.3)
CHLORIDE SERPL-SCNC: 91 MMOL/L (ref 96–106)
CHOLEST SERPL-MCNC: 159 MG/DL (ref 100–199)
CO2 SERPL-SCNC: 21 MMOL/L (ref 20–29)
CREAT SERPL-MCNC: 1.08 MG/DL (ref 0.57–1)
EOSINOPHIL # BLD AUTO: 0.2 X10E3/UL (ref 0–0.4)
EOSINOPHIL NFR BLD AUTO: 3 %
ERYTHROCYTE [DISTWIDTH] IN BLOOD BY AUTOMATED COUNT: 12.4 % (ref 12.3–15.4)
EST. AVERAGE GLUCOSE BLD GHB EST-MCNC: 120 MG/DL
FT4I SERPL CALC-MCNC: 2.1 (ref 1.2–4.9)
GGT SERPL-CCNC: 53 IU/L (ref 0–60)
GLOBULIN SER CALC-MCNC: 3 G/DL (ref 1.5–4.5)
GLUCOSE SERPL-MCNC: 87 MG/DL (ref 65–99)
HBA1C MFR BLD: 5.8 % (ref 4.8–5.6)
HCT VFR BLD AUTO: 31.7 % (ref 34–46.6)
HDLC SERPL-MCNC: 67 MG/DL
HGB BLD-MCNC: 10.7 G/DL (ref 11.1–15.9)
IMM GRANULOCYTES # BLD AUTO: 0 X10E3/UL (ref 0–0.1)
IMM GRANULOCYTES NFR BLD AUTO: 0 %
LDLC SERPL CALC-MCNC: 79 MG/DL (ref 0–99)
LYMPHOCYTES # BLD AUTO: 1.2 X10E3/UL (ref 0.7–3.1)
LYMPHOCYTES NFR BLD AUTO: 21 %
MAGNESIUM SERPL-MCNC: 2.1 MG/DL (ref 1.6–2.3)
MCH RBC QN AUTO: 29.3 PG (ref 26.6–33)
MCHC RBC AUTO-ENTMCNC: 33.8 G/DL (ref 31.5–35.7)
MCV RBC AUTO: 87 FL (ref 79–97)
MONOCYTES # BLD AUTO: 0.8 X10E3/UL (ref 0.1–0.9)
MONOCYTES NFR BLD AUTO: 14 %
NEUTROPHILS # BLD AUTO: 3.7 X10E3/UL (ref 1.4–7)
NEUTROPHILS NFR BLD AUTO: 61 %
PLATELET # BLD AUTO: 273 X10E3/UL (ref 150–450)
POTASSIUM SERPL-SCNC: 5.2 MMOL/L (ref 3.5–5.2)
PROT SERPL-MCNC: 7.5 G/DL (ref 6–8.5)
RBC # BLD AUTO: 3.65 X10E6/UL (ref 3.77–5.28)
SODIUM SERPL-SCNC: 130 MMOL/L (ref 134–144)
T3RU NFR SERPL: 28 % (ref 24–39)
T4 SERPL-MCNC: 7.6 UG/DL (ref 4.5–12)
TRIGL SERPL-MCNC: 63 MG/DL (ref 0–149)
TSH SERPL DL<=0.005 MIU/L-ACNC: 3.04 UIU/ML (ref 0.45–4.5)
VLDLC SERPL CALC-MCNC: 13 MG/DL (ref 5–40)
WBC # BLD AUTO: 5.9 X10E3/UL (ref 3.4–10.8)

## 2019-08-21 RX ORDER — ESCITALOPRAM OXALATE 5 MG/1
TABLET ORAL
Qty: 4 TAB | Refills: 0 | Status: SHIPPED | OUTPATIENT
Start: 2019-08-21 | End: 2019-09-09 | Stop reason: ALTCHOICE

## 2019-08-21 RX ORDER — METOPROLOL SUCCINATE 25 MG/1
12.5 TABLET, EXTENDED RELEASE ORAL
Qty: 90 TAB | Refills: 1 | Status: SHIPPED | OUTPATIENT
Start: 2019-08-21 | End: 2019-11-25 | Stop reason: ALTCHOICE

## 2019-08-21 RX ORDER — BUSPIRONE HYDROCHLORIDE 7.5 MG/1
7.5 TABLET ORAL 3 TIMES DAILY
Qty: 90 TAB | Refills: 1 | Status: SHIPPED | OUTPATIENT
Start: 2019-08-21 | End: 2019-09-18 | Stop reason: ALTCHOICE

## 2019-08-21 NOTE — PROGRESS NOTES
Labs reviewed-   CBC- stable, slightly improved with anemia. CMP- ABNORMAL - New hyponatremia & hypochloridemia. I will order IV fluids. Creatine is getting better with medication changes. Liver Specific- GGT; 5' Nucleotidase are normal. Ruled out a chronic neoplastic issue for elevated alk phos. Thyroid panel- stable. No changes in dose. Lipid panel- stable. No changes in med's. HGA1C- better.

## 2019-08-22 ENCOUNTER — OFFICE VISIT (OUTPATIENT)
Dept: GERIATRIC MEDICINE | Age: 84
End: 2019-08-22

## 2019-08-22 VITALS — OXYGEN SATURATION: 97 % | RESPIRATION RATE: 18 BRPM

## 2019-08-22 DIAGNOSIS — I95.0 IDIOPATHIC HYPOTENSION: ICD-10-CM

## 2019-08-22 DIAGNOSIS — E87.8 HYPOCHLOREMIA: ICD-10-CM

## 2019-08-22 DIAGNOSIS — N18.9 CHRONIC KIDNEY DISEASE, UNSPECIFIED CKD STAGE: ICD-10-CM

## 2019-08-22 DIAGNOSIS — E87.1 HYPONATREMIA: ICD-10-CM

## 2019-08-22 DIAGNOSIS — R42 POSTURAL DIZZINESS: ICD-10-CM

## 2019-08-22 DIAGNOSIS — I50.32 CHRONIC HEART FAILURE WITH PRESERVED EJECTION FRACTION (HCC): Primary | ICD-10-CM

## 2019-08-22 NOTE — PROGRESS NOTES
ADVISED PATIENT OF THE FOLLOWING HEALTH MAINTAINCE DUE  There are no preventive care reminders to display for this patient. Chief Complaint   Patient presents with    Abnormal Lab Results     Pt being seen for abnormal lab results. 22 G IV started in left antecubital with positive blood return. Gel top drawn for BMP. IV infusing, patient resting with eyes closed. Pt received 500 cc 0.9% Sodium Chloride. 25 G IV removed from left antecubital, Pressure dressing applied. 1. Have you been to the ER, urgent care clinic since your last visit? Hospitalized since your last visit? No    2. Have you seen or consulted any other health care providers outside of the [address] [place] [address] since your last visit? Include any DEXA scan, mammography  or colon screening. No    3. Do you have an Advance Directive on file? yes    4. Do you have a DNR on file? DNR    Patient is accompanied by self I have received verbal consent from Jeannie Milton to discuss any/all medical information while they are present in the room. Advance Care Planning 12/6/2018   Primary Decision Maker Name -   Primary Decision Maker Phone Number -   Primary Decision Maker Relationship to Patient -   Confirm Advance Directive Yes, on file   Does the patient have other document types -         [facility] [address] [facility] [address]  Phone: [phone] Fax: [phone]        Patient reminded during visit to bring all medication bottles, OTC medications to all appointments.

## 2019-08-23 LAB
BUN SERPL-MCNC: 20 MG/DL (ref 10–36)
BUN/CREAT SERPL: 22 (ref 12–28)
CALCIUM SERPL-MCNC: 9.1 MG/DL (ref 8.7–10.3)
CHLORIDE SERPL-SCNC: 94 MMOL/L (ref 96–106)
CO2 SERPL-SCNC: 21 MMOL/L (ref 20–29)
CREAT SERPL-MCNC: 0.93 MG/DL (ref 0.57–1)
GLUCOSE SERPL-MCNC: 96 MG/DL (ref 65–99)
POTASSIUM SERPL-SCNC: 5.1 MMOL/L (ref 3.5–5.2)
SODIUM SERPL-SCNC: 129 MMOL/L (ref 134–144)

## 2019-08-23 NOTE — PROGRESS NOTES
Improved without fluids. We will get a Monday BMP to make sure things are getting better without more intervention.

## 2019-08-25 RX ORDER — SODIUM CHLORIDE 9 MG/ML
150 INJECTION, SOLUTION INTRAVENOUS ONCE
Qty: 500 ML | Refills: 0
Start: 2019-08-26 | End: 2019-08-26

## 2019-08-26 ENCOUNTER — CLINICAL SUPPORT (OUTPATIENT)
Dept: GERIATRIC MEDICINE | Age: 84
End: 2019-08-26

## 2019-08-26 DIAGNOSIS — E87.1 HYPONATREMIA: Primary | ICD-10-CM

## 2019-08-26 DIAGNOSIS — E87.8 HYPOCHLOREMIA: ICD-10-CM

## 2019-08-26 DIAGNOSIS — I50.32 CHRONIC HEART FAILURE WITH PRESERVED EJECTION FRACTION (HCC): ICD-10-CM

## 2019-08-26 DIAGNOSIS — E87.1 HYPONATREMIA: ICD-10-CM

## 2019-08-26 NOTE — PROGRESS NOTES
Reason for Visit   Savannah Hatfield is a 80 y.o. female patient who presents today for :  Chief Complaint   Patient presents with    Abnormal Lab Results     Pt being seen for abnormal lab results. Follow Up: Follow-up and Dispositions    · Return in about 3 days (around 8/25/2019) for with the Nurse, for requested monitoring care. Diagnosis/Treatment Plan:        ICD-10-CM ICD-9-CM    1. Chronic heart failure with preserved ejection fraction (HCC) I50.32 428.9 COLLECTION VENOUS BLOOD,VENIPUNCTURE      METABOLIC PANEL, BASIC      METABOLIC PANEL, BASIC      0.9% sodium chloride solution      IV HYDRATION 1ST HOUR      NORMAL SALINE SOLUTION INFUS      INSERT PERIPHERAL IV      NM THER/PROPH/DIAG INJECTION, IV PUSH, SINGLE      NM IV INFUSION, HYDRATION, EA ADD HOUR   2. Hyponatremia A79.7 856.3 METABOLIC PANEL, BASIC      0.9% sodium chloride solution      IV HYDRATION 1ST HOUR      NORMAL SALINE SOLUTION INFUS      INSERT PERIPHERAL IV      NM THER/PROPH/DIAG INJECTION, IV PUSH, SINGLE      NM IV INFUSION, HYDRATION, EA ADD HOUR   3. Hypochloremia V56.1 673.1 METABOLIC PANEL, BASIC      0.9% sodium chloride solution      IV HYDRATION 1ST HOUR      NORMAL SALINE SOLUTION INFUS      INSERT PERIPHERAL IV      NM THER/PROPH/DIAG INJECTION, IV PUSH, SINGLE      NM IV INFUSION, HYDRATION, EA ADD HOUR   4. Postural dizziness R42 780.4 0.9% sodium chloride solution      IV HYDRATION 1ST HOUR      NORMAL SALINE SOLUTION INFUS      INSERT PERIPHERAL IV      NM THER/PROPH/DIAG INJECTION, IV PUSH, SINGLE      NM IV INFUSION, HYDRATION, EA ADD HOUR   5. Idiopathic hypotension I95.0 458.9 0.9% sodium chloride solution      IV HYDRATION 1ST HOUR      NORMAL SALINE SOLUTION INFUS      INSERT PERIPHERAL IV      NM THER/PROPH/DIAG INJECTION, IV PUSH, SINGLE      NM IV INFUSION, HYDRATION, EA ADD HOUR   6.  Chronic kidney disease, unspecified CKD stage N18.9 585.9 0.9% sodium chloride solution      IV HYDRATION 1ST HOUR      NORMAL SALINE SOLUTION INFUS      INSERT PERIPHERAL IV      VA THER/PROPH/DIAG INJECTION, IV PUSH, SINGLE      VA IV INFUSION, HYDRATION, EA ADD HOUR        Objective:     Vitals:    08/22/19 1308   Resp: 18   SpO2: 97%   Weight: (P) 129 lb (58.5 kg)   Height: (P) 5' 1\" (1.549 m)     Wt Readings from Last 3 Encounters:   08/22/19 (P) 129 lb (58.5 kg)   08/20/19 127 lb 8 oz (57.8 kg)   08/13/19 127 lb 8 oz (57.8 kg)     BP Readings from Last 3 Encounters:   08/20/19 120/54   08/13/19 110/60   08/05/19 120/58     Review of Systems   Constitutional: Negative for activity change, appetite change, chills, fatigue and fever. HENT: Positive for hearing loss, postnasal drip and sinus pressure. Negative for congestion, dental problem, sneezing, sore throat and trouble swallowing. Eyes: Negative for discharge, redness and visual disturbance. Respiratory: Negative for apnea, cough, chest tightness, shortness of breath and wheezing. Cardiovascular: Negative for chest pain, palpitations and leg swelling. Gastrointestinal: Negative for abdominal distention, abdominal pain, blood in stool, constipation, diarrhea, nausea and vomiting. Endocrine: Positive for polyuria. Negative for polydipsia and polyphagia. Genitourinary: Positive for frequency and urgency. Negative for flank pain. Musculoskeletal: Negative for arthralgias, gait problem, joint swelling and neck pain. Skin: Positive for color change, pallor and wound. Negative for rash. Allergic/Immunologic: Negative for environmental allergies and food allergies. Neurological: Positive for dizziness, weakness and light-headedness. Negative for tremors, numbness and headaches. Hematological: Negative for adenopathy. Psychiatric/Behavioral: Negative for agitation, confusion and sleep disturbance. The patient is not nervous/anxious. Physical Exam  Discontinued Care:     The following treatment modalities have been discontinued by the provider today:   There are no discontinued medications. Subjective: Allergies   Allergen Reactions    Brilinta [Ticagrelor] Shortness of Breath    Celecoxib Other (comments)    Codeine Hives    Sulfa (Sulfonamide Antibiotics) Unknown (comments) and Hives    Iodine Unknown (comments)     Other reaction(s): Other (See Comments)  This was on outside records, pt denies that she has iodine allergy. Able to tolerate seafood, shellfish w/o reaction.  Aricept [Donepezil] Other (comments)     Headaches - noted as intolerance     Darvocet A500 [Propoxyphene N-Acetaminophen] Nausea Only     Prior to Admission medications    Medication Sig Start Date End Date Taking? Authorizing Provider   0.9% sodium chloride solution 150 mL/hr by IntraVENous route once for 1 dose. 8/26/19 8/26/19 Yes Moises Salvador NP   vitamin b12-folic acid 4.3-3 mg tab Take  by mouth. Yes Provider, Historical   busPIRone (BUSPAR) 7.5 mg tablet Take 1 Tab by mouth three (3) times daily. 8/21/19  Yes Moises Salvador NP   metoprolol succinate (TOPROL-XL) 25 mg XL tablet Take 0.5 Tabs by mouth nightly. 8/21/19  Yes Moises Salvador NP   escitalopram oxalate (LEXAPRO) 5 mg tablet Take 1 tablet p.o. q hs x 3 days, then 1 tablet p.o. q hs every other day x 3 doses, then discontinue. 8/21/19  Yes Moises Salvador NP   fluticasone (FLONASE SENSIMIST) 27.5 mcg/actuation nasal spray 1 Spray two (2) times a day for 10 days, THEN 1 Spray daily for 354 days. Indications: inflammation of the nose due to an allergy 8/20/19 8/18/20 Yes Moises Salvador NP   meclizine (ANTIVERT) 12.5 mg tablet Take 1 Tab by mouth two (2) times a day. Indications: sensation of spinning or whirling 8/20/19  Yes Moises Salvador NP   amoxicillin (AMOXIL) 400 mg/5 mL suspension Take 10.8 mL by mouth two (2) times a day for 7 days.  Indications: bacterial infection of middle ear 8/20/19 8/27/19 Yes Moises Salvador NP   acetaminophen (TYLENOL) 325 mg tablet Take 325 mg by mouth every six (6) hours as needed for Pain. Yes Provider, Historical   bumetanide (BUMEX) 0.5 mg tablet Take 1 Tab by mouth daily. 7/29/19  Yes Klever Isaac MD   omeprazole (PRILOSEC) 20 mg capsule Take 20 mg by mouth daily. Yes Provider, Historical   Calcium-Cholecalciferol, D3, 500 mg(1,250mg) -400 unit tab Take  by mouth. Yes Provider, Historical   levothyroxine (SYNTHROID) 25 mcg tablet Take 1.5 Tabs by mouth Daily (before breakfast). Indications: hypothyroidism 3/27/19  Yes Yaniv Kinsey NP   clopidogrel (PLAVIX) 75 mg tab Take 75 mg by mouth daily. Indications: treatment to prevent a heart attack   Yes Provider, Historical   polyethylene glycol (MIRALAX) 17 gram packet Take 17 g by mouth daily. Yes Provider, Historical   apixaban (ELIQUIS) 2.5 mg tablet Take 1 Tab by mouth two (2) times a day. 11/30/18  Yes Yaniv Kinsey NP   simvastatin (ZOCOR) 40 mg tablet Take 40 mg by mouth nightly. 11/26/18  Yes Provider, Historical   nitroglycerin (NITROSTAT) 0.4 mg SL tablet Take 0.4 mg by mouth as needed. 11/26/18  Yes Provider, Historical   diltiazem CD (CARTIA XT) 180 mg ER capsule Take 180 mg by mouth nightly. Yes Provider, Historical   melatonin 3 mg tablet Take 3 mg by mouth nightly.     Provider, Historical     Past Medical History:   Diagnosis Date    (HFpEF) heart failure with preserved [facility] fraction ([facility] 10/17/2018    acute    Allergic rhinitis     Atherosclerotic cardiovascular disease 1999    CHF (congestive heart failure) ([facility] 09/11/2018    Chronic kidney disease     Cognitive communication deficit     Edema     Environmental allergies     dizziness-(chronic)    Fatty liver disease, nonalcoholic     GERD (gastroesophageal reflux disease)     H/O heart artery stent 09/2018    HCVD (hypertensive cardiovascular disease) 10/17/2018    Heart attack ([facility] 09/2018    Heart palpitations 2018    infrequent     History of PTCA     Hypercholesterolemia     Hypertension 2010    Liver disease     fatty liver    [facility] infarction) [facility] 2018    Overactive bladder 2018    Peripheral neuropathy     Permanent atrial fibrillation ([facility] 2018    Senile dementia, without behavioral disturbance 2018    Stress incontinence, female 2018    Thyroid disease     Vertigo     Vitamin D deficiency      Past Surgical History:   Procedure Laterality Date    BREAST SURGERY PROCEDURE UNLISTED      breast cyst Primonaltangela Lion)    CARDIAC SURG PROCEDURE UNLIST  1999    + stress thalassemia; 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  Holter    HX APPENDECTOMY      HX BREAST BIOPSY Left     neg    HX CYST INCISION AND DRAINAGE Right years ago    neg    HX DILATION AND CURETTAGE      x5    HX GYN      D&C (x5), ALLEY/BSO (-Juliann/Alonzo), Provera intolerance (bleeding), endometrial Bx annually    HX PTCA  2018    stent distal RCA into PLwith KENNETH x 2    HX ALLEY AND BSO      Juliann/Zeumerr      Social History     Tobacco Use    Smoking status: Former Smoker     Packs/day: 4.00     Types: Cigarettes     Last attempt to quit: 1955     Years since quittin.6    Smokeless tobacco: Never Used   Substance Use Topics    Alcohol use: No     Alcohol/week: 0.0 - 0.8 standard drinks    Drug use: No      Family History   Problem Relation Age of Onset    Diabetes Mother     Hypertension Mother     Heart Failure Mother         CHF ( @ 80)   Tate Flax Alzheimer Mother     Cancer Father         lung ( @ 77)   Tate Flax Alzheimer Father     No Known Problems Son     No Known Problems Son     Ovarian Cancer Sister         hysterectomy    Breast Cancer Sister 28        mastectomy    Cancer Sister         breast and ovarian    Alzheimer Sister     Breast Problems Sister         breast cyst    Cataract Sister     Hypertension Sister     Diabetes Brother      Advance Care Planning 2018   Primary Decision Maker Name -   Primary Decision Maker Phone Number -   Primary Decision Maker Relationship to Patient -   Confirm Advance Directive Yes, on file   Does the patient have other document types -     Test Results:     Office Visit on 08/22/2019   Component Date Value Ref Range Status    Glucose 08/22/2019 96  65 - 99 mg/dL Final    Specimen received hemolyzed. Clinical correlation indicated.  BUN 08/22/2019 20  10 - 36 mg/dL Final    Creatinine 08/22/2019 0.93  0.57 - 1.00 mg/dL Final    GFR est non-AA 08/22/2019 54* >59 mL/min/1.73 Final    GFR est AA 08/22/2019 62  >59 mL/min/1.73 Final    BUN/Creatinine ratio 08/22/2019 22  12 - 28 Final    Sodium 08/22/2019 129* 134 - 144 mmol/L Final    Potassium 08/22/2019 5.1  3.5 - 5.2 mmol/L Final    Specimen received hemolyzed. Clinical correlation indicated.  Chloride 08/22/2019 94* 96 - 106 mmol/L Final    CO2 08/22/2019 21  20 - 29 mmol/L Final    Calcium 08/22/2019 9.1  8.7 - 10.3 mg/dL Final   Office Visit on 08/20/2019   Component Date Value Ref Range Status    WBC 08/20/2019 5.9  3.4 - 10.8 x10E3/uL Final    RBC 08/20/2019 3.65* 3.77 - 5.28 x10E6/uL Final    HGB 08/20/2019 10.7* 11.1 - 15.9 g/dL Final    HCT 08/20/2019 31.7* 34.0 - 46.6 % Final    MCV 08/20/2019 87  79 - 97 fL Final    MCH 08/20/2019 29.3  26.6 - 33.0 pg Final    MCHC 08/20/2019 33.8  31.5 - 35.7 g/dL Final    RDW 08/20/2019 12.4  12.3 - 15.4 % Final    PLATELET 91/74/9085 937  150 - 450 x10E3/uL Final    NEUTROPHILS 08/20/2019 61  Not Estab. % Final    Lymphocytes 08/20/2019 21  Not Estab. % Final    MONOCYTES 08/20/2019 14  Not Estab. % Final    EOSINOPHILS 08/20/2019 3  Not Estab. % Final    BASOPHILS 08/20/2019 1  Not Estab. % Final    ABS. NEUTROPHILS 08/20/2019 3.7  1.4 - 7.0 x10E3/uL Final    Abs Lymphocytes 08/20/2019 1.2  0.7 - 3.1 x10E3/uL Final    ABS. MONOCYTES 08/20/2019 0.8  0.1 - 0.9 x10E3/uL Final    ABS. EOSINOPHILS 08/20/2019 0.2  0.0 - 0.4 x10E3/uL Final    ABS.  BASOPHILS 08/20/2019 0.0 0.0 - 0.2 x10E3/uL Final    IMMATURE GRANULOCYTES 08/20/2019 0  Not Estab. % Final    ABS. IMM. GRANS. 08/20/2019 0.0  0.0 - 0.1 x10E3/uL Final    Glucose 08/20/2019 87  65 - 99 mg/dL Final    BUN 08/20/2019 23  10 - 36 mg/dL Final    Creatinine 08/20/2019 1.08* 0.57 - 1.00 mg/dL Final    GFR est non-AA 08/20/2019 45* >59 mL/min/1.73 Final    GFR est AA 08/20/2019 52* >59 mL/min/1.73 Final    BUN/Creatinine ratio 08/20/2019 21  12 - 28 Final    Sodium 08/20/2019 130* 134 - 144 mmol/L Final    Potassium 08/20/2019 5.2  3.5 - 5.2 mmol/L Final    Chloride 08/20/2019 91* 96 - 106 mmol/L Final    CO2 08/20/2019 21  20 - 29 mmol/L Final    Calcium 08/20/2019 9.4  8.7 - 10.3 mg/dL Final    Protein, total 08/20/2019 7.5  6.0 - 8.5 g/dL Final    Albumin 08/20/2019 4.5  3.2 - 4.6 g/dL Final    GLOBULIN, TOTAL 08/20/2019 3.0  1.5 - 4.5 g/dL Final    A-G Ratio 08/20/2019 1.5  1.2 - 2.2 Final    Bilirubin, total 08/20/2019 0.3  0.0 - 1.2 mg/dL Final    Alk.  phosphatase 08/20/2019 119* 39 - 117 IU/L Final    AST (SGOT) 08/20/2019 35  0 - 40 IU/L Final    ALT (SGPT) 08/20/2019 21  0 - 32 IU/L Final    GGT 08/20/2019 53  0 - 60 IU/L Final    TSH 08/20/2019 3.040  0.450 - 4.500 uIU/mL Final    T4, Total 08/20/2019 7.6  4.5 - 12.0 ug/dL Final    T3 Uptake 08/20/2019 28  24 - 39 % Final    Free Thyroxine Index (FTI) 08/20/2019 2.1  1.2 - 4.9 Final    5' Nucleotidase 08/20/2019 4  0 - 10 IU/L Final    Hemoglobin A1c 08/20/2019 5.8* 4.8 - 5.6 % Final    Comment:          Prediabetes: 5.7 - 6.4           Diabetes: >6.4           Glycemic control for adults with diabetes: <7.0      Estimated average glucose 08/20/2019 120  mg/dL Final    Cholesterol, total 08/20/2019 159  100 - 199 mg/dL Final    Triglyceride 08/20/2019 63  0 - 149 mg/dL Final    HDL Cholesterol 08/20/2019 67  >39 mg/dL Final    VLDL, calculated 08/20/2019 13  5 - 40 mg/dL Final    LDL, calculated 08/20/2019 79  0 - 99 mg/dL Final    Magnesium 08/20/2019 2.1  1.6 - 2.3 mg/dL Final   Office Visit on 06/03/2019   Component Date Value Ref Range Status    WBC 06/03/2019 5.8  3.4 - 10.8 x10E3/uL Final    RBC 06/03/2019 3.87  3.77 - 5.28 x10E6/uL Final    HGB 06/03/2019 10.9* 11.1 - 15.9 g/dL Final    HCT 06/03/2019 33.7* 34.0 - 46.6 % Final    MCV 06/03/2019 87  79 - 97 fL Final    MCH 06/03/2019 28.2  26.6 - 33.0 pg Final    MCHC 06/03/2019 32.3  31.5 - 35.7 g/dL Final    RDW 06/03/2019 18.2* 12.3 - 15.4 % Final    PLATELET 24/59/8380 557  150 - 450 x10E3/uL Final    NEUTROPHILS 06/03/2019 63  Not Estab. % Final    Lymphocytes 06/03/2019 21  Not Estab. % Final    MONOCYTES 06/03/2019 13  Not Estab. % Final    EOSINOPHILS 06/03/2019 3  Not Estab. % Final    BASOPHILS 06/03/2019 0  Not Estab. % Final    ABS. NEUTROPHILS 06/03/2019 3.6  1.4 - 7.0 x10E3/uL Final    Abs Lymphocytes 06/03/2019 1.2  0.7 - 3.1 x10E3/uL Final    ABS. MONOCYTES 06/03/2019 0.8  0.1 - 0.9 x10E3/uL Final    ABS. EOSINOPHILS 06/03/2019 0.2  0.0 - 0.4 x10E3/uL Final    ABS. BASOPHILS 06/03/2019 0.0  0.0 - 0.2 x10E3/uL Final    IMMATURE GRANULOCYTES 06/03/2019 0  Not Estab. % Final    ABS. IMM.  GRANS. 06/03/2019 0.0  0.0 - 0.1 x10E3/uL Final    Hemoglobin A1c 06/03/2019 6.0* 4.8 - 5.6 % Final    Comment:          Prediabetes: 5.7 - 6.4           Diabetes: >6.4           Glycemic control for adults with diabetes: <7.0      Estimated average glucose 06/03/2019 126  mg/dL Final    Cholesterol, total 06/03/2019 153  100 - 199 mg/dL Final    Triglyceride 06/03/2019 90  0 - 149 mg/dL Final    HDL Cholesterol 06/03/2019 59  >39 mg/dL Final    VLDL, calculated 06/03/2019 18  5 - 40 mg/dL Final    LDL, calculated 06/03/2019 76  0 - 99 mg/dL Final    Magnesium 06/03/2019 2.1  1.6 - 2.3 mg/dL Final    Glucose 06/03/2019 70  65 - 99 mg/dL Final    BUN 06/03/2019 28  10 - 36 mg/dL Final    Creatinine 06/03/2019 1.16* 0.57 - 1.00 mg/dL Final    GFR est non-AA 06/03/2019 41* >59 mL/min/1.73 Final    GFR est AA 06/03/2019 48* >59 mL/min/1.73 Final    BUN/Creatinine ratio 06/03/2019 24  12 - 28 Final    Sodium 06/03/2019 141  134 - 144 mmol/L Final    Potassium 06/03/2019 4.4  3.5 - 5.2 mmol/L Final    Chloride 06/03/2019 100  96 - 106 mmol/L Final    CO2 06/03/2019 27  20 - 29 mmol/L Final    Calcium 06/03/2019 9.6  8.7 - 10.3 mg/dL Final    Protein, total 06/03/2019 7.1  6.0 - 8.5 g/dL Final    Albumin 06/03/2019 4.4  3.2 - 4.6 g/dL Final    GLOBULIN, TOTAL 06/03/2019 2.7  1.5 - 4.5 g/dL Final    A-G Ratio 06/03/2019 1.6  1.2 - 2.2 Final    Bilirubin, total 06/03/2019 0.4  0.0 - 1.2 mg/dL Final    Alk. phosphatase 06/03/2019 130* 39 - 117 IU/L Final    AST (SGOT) 06/03/2019 26  0 - 40 IU/L Final    ALT (SGPT) 06/03/2019 19  0 - 32 IU/L Final    TSH 06/03/2019 2.470  0.450 - 4.500 uIU/mL Final    T4, Total 06/03/2019 6.6  4.5 - 12.0 ug/dL Final    T3 Uptake 06/03/2019 25  24 - 39 % Final    Free Thyroxine Index (FTI) 06/03/2019 1.7  1.2 - 4.9 Final    Vitamin B12 06/03/2019 522  232 - 1,245 pg/mL Final    Folate 06/03/2019 >20.0  >3.0 ng/mL Final    Comment: A serum folate concentration of less than 3.1 ng/mL is  considered to represent clinical deficiency.  VITAMIN D, 25-HYDROXY 06/03/2019 46.4  30.0 - 100.0 ng/mL Final    Comment: Vitamin D deficiency has been defined by the [address] practice guideline as a  level of serum 25-OH vitamin D less than 20 ng/mL (1,2). The Endocrine Society went on to further define vitamin D  insufficiency as a level between 21 and 29 ng/mL (2). 1. IOM ([place] of [employer]). 2010. Dietary reference     intakes for calcium and D. [facility]: The     [employer]. 2. Shantelle MARIO, Lora ROE, Gary CARSON, et al.     Evaluation, treatment, and prevention of vitamin D     deficiency: an Endocrine Society clinical practice     guideline. JCEM.  2011 Jul; 96:5034-81. Disclaimer:   Ms. January Whaley has been advised to call or return to our office if symptoms worsen/change/persist. We as a care team including the patient; discussed expected course/resolution/complications of diagnosis in detail today. Medication risks/benefits/costs/interactions/alternatives discussed Donna Sweet was given an after visit summary which includes diagnoses, current medications, & vitals. January Whaley expressed understanding with the diagnosis and plan.

## 2019-08-26 NOTE — PROGRESS NOTES
Chief Complaint   Patient presents with   [facility]     Patient here for lab draw.          January Whaley presents for lab draw ordered by Elma Chi RN MSN ACNPC-AG-NP    The following labs were drawn and sent to lab by Payton Silver LPN:    BMP    The following tubes were sent:    Tiger (1)    Patient tolerated procedure well, blood obtained via venipuncture to right antecubital

## 2019-08-26 NOTE — PATIENT INSTRUCTIONS
Learning About Chronic Kidney Disease  What is chronic kidney disease? Chronic kidney disease means your kidneys have not worked right for a while. Your kidneys have an important job. They remove waste and extra fluid from your blood. This waste and fluid goes out of your body in your urine. When your kidneys don't work as they should, wastes build up in your blood. This makes you sick. High blood pressure and diabetes can cause kidney damage. Other causes include kidney infections and some medicines. Chronic kidney disease is also called chronic renal failure. Or it may be called chronic renal insufficiency. How is chronic kidney disease diagnosed? · Your doctor will do blood and urine tests to check your kidney function. This will help your doctor see how well your kidneys filter your blood. · Your doctor will ask you about past kidney problems. He or she will ask if you have a family history of kidney disease. Your doctor will also want to know what medicines you take. This includes prescription and over-the-counter medicines. · You may have a test, such as an ultrasound or CT scan. These tests let your doctor look at a picture of your kidneys. This can help your doctor measure the size of your kidneys and see if anything is blocking your urine flow. · In some cases, your doctor may take a tiny sample of kidney tissue. This is called a biopsy. It helps the doctor find out what caused the kidney disease. What are the symptoms? You may not have symptoms if your disease is mild. If you lose more kidney function, you may:  · Have swelling and weight gain. This is from the extra fluid in your tissues. It is called edema (say \"ih-LUIS-muh\"). · Often feel sick to your stomach (nauseated) or vomit. · Have trouble sleeping. · Urinate less than normal.  · Have trouble thinking clearly. · Feel very tired. What can you expect when you have chronic kidney disease?   · In the early stages of the disease, your kidneys are still able to regulate the fluids, salts, and waste products in your body. But if you keep losing kidney function, you may start to have problems, or complications. · How long it takes for the kidney disease to get worse depends on your condition. Sometimes it gets worse very slowly over many years. Or it may get worse more quickly. · When kidney function falls below a certain point, it is called kidney failure. Kidney failure affects your whole body. It can cause serious heart, bone, and brain problems and make you feel very ill. Untreated kidney failure can cause death. How is it treated? Manage your health problems  · If you have diabetes, it's important to control your blood sugar level with diet, exercise, and medicines. If your blood sugar level is too high for too long, it can damage your kidneys. · If you have high blood pressure, it's important to control it with diet, exercise, and any medicines your doctor prescribes. · Avoid long-term use of medicines that can damage the kidneys. These medicines include nonsteroidal anti-inflammatory drugs. Examples of these are ibuprofen (Advil) and celecoxib (Celebrex). Treat kidney complications  · If your doctor put you on a special diet, stay on the diet. · If you have kidney failure, you'll probably have two treatment choices. Your doctor may recommend that you start kidney dialysis to filter wastes and extra fluid from your blood. Or it may be better to get a new kidney (transplant). Both treatments have risks and benefits. Talk with your doctor to decide which is best for you. Practice healthy habits  · If your doctor recommends it, get more exercise. Walking is a good choice. Bit by bit, increase the amount you walk every day. Try for at least 30 minutes on most days of the week. · Don't smoke. Smoking can make chronic kidney disease worse. If you need help quitting, talk to your doctor about stop-smoking programs and medicines.  These can increase your chances of quitting for good. · Don't drink alcohol. Follow-up care is a key part of your treatment and safety. Be sure to make and go to all appointments, and call your doctor if you are having problems. It's also a good idea to know your test results and keep a list of the medicines you take. Where can you learn more? Go to http://pia-colby.info/. Enter [phone] in the search box to learn more about \"Learning About Chronic Kidney Disease. \"  Current as of: October 31, 2018  Content Version: 12.1  © 2516-9147 [employer]. Care instructions adapted under license by [employer] (which disclaims liability or warranty for this information). If you have questions about a medical condition or this instruction, always ask your healthcare professional. [address] any warranty or liability for your use of this information.

## 2019-08-27 LAB
BUN SERPL-MCNC: 23 MG/DL (ref 10–36)
BUN/CREAT SERPL: 20 (ref 12–28)
CALCIUM SERPL-MCNC: 9.3 MG/DL (ref 8.7–10.3)
CHLORIDE SERPL-SCNC: 96 MMOL/L (ref 96–106)
CO2 SERPL-SCNC: 22 MMOL/L (ref 20–29)
CREAT SERPL-MCNC: 1.16 MG/DL (ref 0.57–1)
GLUCOSE SERPL-MCNC: 66 MG/DL (ref 65–99)
POTASSIUM SERPL-SCNC: 4.6 MMOL/L (ref 3.5–5.2)
SODIUM SERPL-SCNC: 134 MMOL/L (ref 134–144)

## 2019-08-28 NOTE — PROGRESS NOTES
Excellent- hyponatremia and hypochloridemia have resolved. Mild kidney insufficiency is stable. Recheck a BMP in 1 week.

## 2019-09-03 DIAGNOSIS — I50.32 CHRONIC HEART FAILURE WITH PRESERVED EJECTION FRACTION (HCC): ICD-10-CM

## 2019-09-03 DIAGNOSIS — E87.1 HYPONATREMIA: ICD-10-CM

## 2019-09-03 DIAGNOSIS — E87.8 HYPOCHLOREMIA: ICD-10-CM

## 2019-09-04 ENCOUNTER — CLINICAL SUPPORT (OUTPATIENT)
Dept: GERIATRIC MEDICINE | Age: 84
End: 2019-09-04

## 2019-09-04 VITALS
OXYGEN SATURATION: 96 % | BODY MASS INDEX: 24.35 KG/M2 | DIASTOLIC BLOOD PRESSURE: 60 MMHG | HEART RATE: 78 BPM | SYSTOLIC BLOOD PRESSURE: 110 MMHG | WEIGHT: 129 LBS | HEIGHT: 61 IN | RESPIRATION RATE: 18 BRPM

## 2019-09-04 DIAGNOSIS — E87.1 HYPONATREMIA: Primary | ICD-10-CM

## 2019-09-04 DIAGNOSIS — E87.8 HYPOCHLOREMIA: ICD-10-CM

## 2019-09-04 NOTE — PROGRESS NOTES
Chief Complaint   Patient presents with   [facility]     Patient here for lab draw.        Luis Rdz presents for lab draw ordered by Rubia Pillai RN MSN ACNPC-AG-NP    The following labs were drawn and sent to lab by Julian Simeon LPN:    BMP and Magnesium    The following tubes were sent:    Tiger    Patient tolerated procedure well, blood obtained via venipuncture to right antecubital

## 2019-09-05 LAB
BUN SERPL-MCNC: 18 MG/DL (ref 10–36)
BUN/CREAT SERPL: 17 (ref 12–28)
CALCIUM SERPL-MCNC: 9.5 MG/DL (ref 8.7–10.3)
CHLORIDE SERPL-SCNC: 96 MMOL/L (ref 96–106)
CO2 SERPL-SCNC: 22 MMOL/L (ref 20–29)
CREAT SERPL-MCNC: 1.07 MG/DL (ref 0.57–1)
GLUCOSE SERPL-MCNC: 75 MG/DL (ref 65–99)
MAGNESIUM SERPL-MCNC: 1.9 MG/DL (ref 1.6–2.3)
POTASSIUM SERPL-SCNC: 4.8 MMOL/L (ref 3.5–5.2)
SODIUM SERPL-SCNC: 136 MMOL/L (ref 134–144)

## 2019-09-08 NOTE — PROGRESS NOTES
Results reviewed-   BMP- Kidney function is stable and slightly improved. Sodium & chloride have normalized. Mag- stable. Next labs in 1 week to monitor changes in chf since decreasing the Bumex to every other day.

## 2019-09-09 ENCOUNTER — OFFICE VISIT (OUTPATIENT)
Dept: GERIATRIC MEDICINE | Age: 84
End: 2019-09-09

## 2019-09-09 VITALS
SYSTOLIC BLOOD PRESSURE: 140 MMHG | HEART RATE: 58 BPM | BODY MASS INDEX: 24.55 KG/M2 | DIASTOLIC BLOOD PRESSURE: 58 MMHG | OXYGEN SATURATION: 98 % | RESPIRATION RATE: 18 BRPM | WEIGHT: 130 LBS | HEIGHT: 61 IN

## 2019-09-09 DIAGNOSIS — I95.0 IDIOPATHIC HYPOTENSION: ICD-10-CM

## 2019-09-09 DIAGNOSIS — L03.031 INFECTION OF NAIL BED OF TOE OF RIGHT FOOT: ICD-10-CM

## 2019-09-09 DIAGNOSIS — L60.0 INGROWN NAIL OF GREAT TOE OF RIGHT FOOT: Primary | ICD-10-CM

## 2019-09-09 DIAGNOSIS — L03.031 CELLULITIS OF TOE OF RIGHT FOOT: ICD-10-CM

## 2019-09-09 DIAGNOSIS — E87.1 HYPONATREMIA: ICD-10-CM

## 2019-09-09 DIAGNOSIS — E87.8 HYPOCHLOREMIA: ICD-10-CM

## 2019-09-09 NOTE — PROGRESS NOTES
ADVISED PATIENT OF THE FOLLOWING HEALTH MAINTAINCE DUE  There are no preventive care reminders to display for this patient. Chief Complaint   Patient presents with    Toe Pain     Patient has redness and infection noted to left foot great toe. 1. Have you been to the ER, urgent care clinic since your last visit? Hospitalized since your last visit? No    2. Have you seen or consulted any other health care providers outside of the [address] [place] [address] since your last visit? Include any DEXA scan, mammography  or colon screening. No    3. Do you have an Advance Directive on file? yes    4. Do you have a DNR on file? DNR    Patient is accompanied by self I have received verbal consent from Shakeel Peters to discuss any/all medical information while they are present in the room. Advance Care Planning 12/6/2018   Primary Decision Maker Name -   Primary Decision Maker Phone Number -   Primary Decision Maker Relationship to Patient -   Confirm Advance Directive Yes, on file   Does the patient have other document types -         [facility], [address] [place] [id number]  Phone: [phone] Fax: [phone]        Patient reminded during visit to bring all medication bottles, OTC medications to all appointments.

## 2019-09-12 RX ORDER — DOXYCYCLINE 100 MG/1
100 CAPSULE ORAL 2 TIMES DAILY
Qty: 14 CAP | Refills: 0
Start: 2019-09-12 | End: 2019-09-19 | Stop reason: ALTCHOICE

## 2019-09-12 NOTE — PROGRESS NOTES
Reason for Visit   Luis Rdz is a 80 y.o. female patient who presents today for :  Chief Complaint   Patient presents with    Toe Pain     Patient has redness and infection noted to left foot great toe. Treatment Plan / Follow Up: Follow-up and Dispositions    · Return in about 1 week (around 9/16/2019) for with the NP for follow up to your treatment plan. Diagnosis:        ICD-10-CM ICD-9-CM    1. Ingrown nail of great toe of right foot L60.0 703.0 doxycycline (VIBRAMYCIN) 100 mg capsule    see picture. 2. Cellulitis of toe of right foot L03.031 681.10 doxycycline (VIBRAMYCIN) 100 mg capsule   3. Infection of nail bed of toe of right foot L03.031 681.11 doxycycline (VIBRAMYCIN) 100 mg capsule   4. Hyponatremia E87.1 276.1    5. Hypochloremia E87.8 276.9    6. Idiopathic hypotension I95.0 458.9                   Orders Placed This Encounter    doxycycline (VIBRAMYCIN) 100 mg capsule     Sig: Take 1 Cap by mouth two (2) times a day for 7 days. Indications: skin infection     Dispense:  14 Cap     Refill:  0      Objective:     Vitals:    09/09/19 1319   BP: 140/58   Pulse: (!) 58   Resp: 18   SpO2: 98%   Weight: 130 lb (59 kg)   Height: 5' 1\" (1.549 m)     Wt Readings from Last 3 Encounters:   09/09/19 130 lb (59 kg)   09/04/19 129 lb (58.5 kg)   08/22/19 (P) 129 lb (58.5 kg)     BP Readings from Last 3 Encounters:   09/09/19 140/58   09/04/19 110/60   08/20/19 120/54     Review of Systems   Constitutional: Negative for activity change, appetite change, chills, fatigue and fever. HENT: Positive for hearing loss, postnasal drip and sinus pressure. Negative for congestion, dental problem, sneezing, sore throat and trouble swallowing. Eyes: Negative for discharge, redness and visual disturbance. Respiratory: Negative for apnea, cough, chest tightness, shortness of breath and wheezing. Cardiovascular: Negative for chest pain, palpitations and leg swelling.    Gastrointestinal: Negative for abdominal distention, abdominal pain, blood in stool, constipation, diarrhea, nausea and vomiting. Endocrine: Negative for polydipsia, polyphagia and polyuria. Genitourinary: Negative for flank pain, frequency and urgency. Musculoskeletal: Negative for arthralgias, gait problem, joint swelling and neck pain. Skin: Positive for color change, pallor and wound. Negative for rash. Allergic/Immunologic: Negative for environmental allergies and food allergies. Neurological: Positive for weakness. Negative for dizziness, tremors, light-headedness, numbness and headaches. Hematological: Negative for adenopathy. Psychiatric/Behavioral: Negative for agitation, confusion and sleep disturbance. The patient is not nervous/anxious. Physical Exam   Constitutional: She is oriented to person, place, and time. Vital signs are normal. She appears not lethargic, to not be writhing in pain, not malnourished and not dehydrated. She appears healthy. She appears not cachectic. Non-toxic appearance. She does not have a sickly appearance. No distress. Frail, fragile, alert but not oriented, elderly  female. 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 Now resides in [facility]. She is on continuous nasal cannula due to advancing heart failure. She is not a candidature for routine treatment or surgery she is in a study at [facility]. HENT:   Head: Normocephalic and atraumatic. Right Ear: Ear canal normal. There is mastoid tenderness. A middle ear effusion is present. Decreased hearing is noted. Left Ear: Ear canal normal. There is mastoid tenderness. A middle ear effusion is present. Decreased hearing is noted. Nose: Rhinorrhea present. No mucosal edema. Mouth/Throat: Uvula is midline and oropharynx is clear and moist. Mucous membranes are pale, dry and not cyanotic. No oral lesions. No uvula swelling.  No posterior oropharyngeal edema or posterior oropharyngeal erythema. Eyes: Pupils are equal, round, and reactive to light. Conjunctivae, EOM and lids are normal. Lids are everted and swept, no foreign bodies found. Right pupil is round and reactive. Left pupil is reactive. Left pupil required 20 to 30 secs extra to respond. But it finally did respond. Neck: Trachea normal, normal range of motion and full passive range of motion without pain. Neck supple. No hepatojugular reflux and no JVD present. Carotid bruit is not present. No thyromegaly present. Cardiovascular: Regular rhythm, S1 normal, S2 normal, normal heart sounds, intact distal pulses and normal pulses. Occasional extrasystoles are present. Bradycardia present. Exam reveals no gallop, no distant heart sounds and no friction rub. No murmur heard. No extremity edema noted, hr is still low. Pulmonary/Chest: Effort normal and breath sounds normal. No accessory muscle usage. No tachypnea. No respiratory distress. Abdominal: Soft. Normal appearance and bowel sounds are normal. She exhibits no fluid wave and no mass. There is no hepatosplenomegaly. There is no tenderness. There is no rebound and no CVA tenderness. Continues to struggle with constipation. The [employer] is working but she can not take it every day as the stools get better but the urgency is too much. Musculoskeletal: Normal range of motion. Feet:    See picture. Lymphadenopathy:        Head (right side): No submandibular adenopathy present. Head (left side): No submandibular and no tonsillar adenopathy present. She has no cervical adenopathy. She has no axillary adenopathy. Neurological: She is alert and oriented to person, place, and time. She has normal reflexes and intact cranial nerves. She appears not lethargic. She is not agitated and not disoriented. She displays weakness and atrophy. She displays facial symmetry, normal stance and normal speech. A sensory deficit is present. No cranial nerve deficit. Gait abnormal. Coordination normal. GCS score is 15. Skin: Skin is warm and dry. No bruising, no ecchymosis and no rash noted. She is not diaphoretic. There is erythema. No cyanosis. No pallor. Nails show no clubbing. See picture. Psychiatric: Memory and judgment normal. Her mood appears anxious. She does not express impulsivity. She does not exhibit a depressed mood. She is apathetic. She exhibits ordered thought content, normal new learning ability and normal remote memory. Nursing note and vitals reviewed. Subjective: Allergies   Allergen Reactions    Brilinta [Ticagrelor] Shortness of Breath    Celecoxib Other (comments)    Codeine Hives    Sulfa (Sulfonamide Antibiotics) Unknown (comments) and Hives    Iodine Unknown (comments)     Other reaction(s): Other (See Comments)  This was on outside records, pt denies that she has iodine allergy. Able to tolerate seafood, shellfish w/o reaction.  Aricept [Donepezil] Other (comments)     Headaches - noted as intolerance     Darvocet A500 [Propoxyphene N-Acetaminophen] Nausea Only     Prior to Admission medications    Medication Sig Start Date End Date Taking? Authorizing Provider   doxycycline (VIBRAMYCIN) 100 mg capsule Take 1 Cap by mouth two (2) times a day for 7 days. Indications: skin infection 9/12/19 9/19/19 Yes Dre Fuller NP   vitamin b12-folic acid 0.7-9 mg tab Take  by mouth. Yes Provider, Historical   busPIRone (BUSPAR) 7.5 mg tablet Take 1 Tab by mouth three (3) times daily. 8/21/19  Yes Dre Fuller NP   metoprolol succinate (TOPROL-XL) 25 mg XL tablet Take 0.5 Tabs by mouth nightly. 8/21/19  Yes Dre Fuller NP   fluticasone (FLONASE SENSIMIST) 27.5 mcg/actuation nasal spray 1 Spray two (2) times a day for 10 days, THEN 1 Spray daily for 354 days.  Indications: inflammation of the nose due to an allergy 8/20/19 8/18/20 Yes Dre Fuller NP   meclizine (ANTIVERT) 12.5 mg tablet Take 1 Tab by mouth two (2) times a day. Indications: sensation of spinning or whirling 8/20/19  Yes Missy Joseph NP   acetaminophen (TYLENOL) 325 mg tablet Take 325 mg by mouth every six (6) hours as needed for Pain. Yes Provider, Historical   bumetanide (BUMEX) 0.5 mg tablet Take 1 Tab by mouth daily. 7/29/19  Yes Alivia Gallagher MD   omeprazole (PRILOSEC) 20 mg capsule Take 20 mg by mouth daily. Yes Provider, Historical   Calcium-Cholecalciferol, D3, 500 mg(1,250mg) -400 unit tab Take  by mouth. Yes Provider, Historical   melatonin 3 mg tablet Take 3 mg by mouth nightly. Yes Provider, Historical   levothyroxine (SYNTHROID) 25 mcg tablet Take 1.5 Tabs by mouth Daily (before breakfast). Indications: hypothyroidism 3/27/19  Yes Missy Joseph NP   clopidogrel (PLAVIX) 75 mg tab Take 75 mg by mouth daily. Indications: treatment to prevent a heart attack   Yes Provider, Historical   polyethylene glycol (MIRALAX) 17 gram packet Take 17 g by mouth daily. Yes Provider, Historical   apixaban (ELIQUIS) 2.5 mg tablet Take 1 Tab by mouth two (2) times a day. 11/30/18  Yes Missy Joseph NP   simvastatin (ZOCOR) 40 mg tablet Take 40 mg by mouth nightly. 11/26/18  Yes Provider, Historical   nitroglycerin (NITROSTAT) 0.4 mg SL tablet Take 0.4 mg by mouth as needed. 11/26/18  Yes Provider, Historical   diltiazem CD (CARTIA XT) 180 mg ER capsule Take 180 mg by mouth nightly.    Yes Provider, Historical     Past Medical History:   Diagnosis Date    (HFpEF) heart failure with preserved ejection fraction ([facility] 10/17/2018    acute    Allergic rhinitis     Atherosclerotic cardiovascular disease 1999    [facility] 09/11/2018    Chronic kidney disease     Cognitive communication deficit     Contact dermatitis and eczema due to cause     Edema     Environmental allergies     dizziness-(chronic)    Fatty liver disease, nonalcoholic     GERD (gastroesophageal reflux disease)     H/O heart artery stent 2018    HCVD (hypertensive cardiovascular disease) 10/17/2018    Heart attack ([facility] 2018    Heart palpitations 2018    infrequent     History of PTCA     Hypercholesterolemia 1999    Hypertension 2010    Liver disease     fatty liver    MI (myocardial infarction) ([facility] 2018    Overactive bladder 2018    Peripheral neuropathy     Permanent atrial fibrillation ([facility] 2018    Senile dementia, without behavioral disturbance 2018    Stress incontinence, female 2018    Thyroid disease     Vertigo     Vitamin D deficiency      Past Surgical History:   Procedure Laterality Date    BREAST SURGERY PROCEDURE UNLISTED      breast cyst Morris Mckeon)    CARDIAC SURG PROCEDURE UNLIST  1999    + stress thalassemia; 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  Holter    HX APPENDECTOMY      HX BREAST BIOPSY Left     neg    HX CYST INCISION AND DRAINAGE Right years ago    neg    HX DILATION AND CURETTAGE      x5    HX GYN      D&C (x5), ALLEY/BSO (-Juliann/Zedler), Provera intolerance (bleeding), endometrial Bx annually    HX PTCA  2018    stent distal RCA into PLwith KENNETH x 2    HX ALLEY AND BSO      Juliann/Zedler      Social History     Tobacco Use    Smoking status: Former Smoker     Packs/day: 4.00     Types: Cigarettes     Last attempt to quit: 1955     Years since quittin.7    Smokeless tobacco: Never Used   Substance Use Topics    Alcohol use: No     Alcohol/week: 0.0 - 0.8 standard drinks    Drug use: No      Family History   Problem Relation Age of Onset    Diabetes Mother     Hypertension Mother     Heart Failure Mother         CHF ( @ 80)   [facility] Alzheimer Mother     Cancer Father         lung ( @ 77)   [facility] Alzheimer Father     No Known Problems Son     No Known Problems Son     Ovarian Cancer Sister         hysterectomy    Breast Cancer Sister 28        mastectomy    Cancer Sister         breast and ovarian    Alzheimer Sister     Breast Problems Sister         breast cyst    Cataract Sister     Hypertension Sister     Diabetes Brother      Functional Assessment:   ADL FUNCTIONALITY:  ADL Assessment 2/11/2019   Feeding yourself No Help Needed   Getting from bed to chair No Help Needed   Getting dressed No Help Needed   Bathing or showering Help Needed   Walk across the room (includes cane/walker) Help Needed   Using the telphone Help Needed   Taking your medications Help Needed   Preparing meals Help Needed   Managing money (expenses/bills) Help Needed   Moderately strenuous housework (laundry) Help Needed   Shopping for personal items (toiletries/medicines) Help Needed   Shopping for groceries Help Needed   Driving Help Needed   Climbing a flight of stairs Help Needed   Getting to places beyond walking distances Help Needed     DEPRESSION SCREENING:   3 most recent PHQ Screens 8/26/2019   Little interest or pleasure in doing things Not at all   Feeling down, depressed, irritable, or hopeless Not at all   Total Score PHQ 2 0   Trouble falling or staying asleep, or sleeping too much -   Feeling tired or having little energy -   Poor appetite, weight loss, or overeating -   Feeling bad about yourself - or that you are a failure or have let yourself or your family down -   Trouble concentrating on things such as school, work, reading, or watching TV -   Moving or speaking so slowly that other people could have noticed; or the opposite being so fidgety that others notice -   Thoughts of being better off dead, or hurting yourself in some way -   PHQ 9 Score -   How difficult have these problems made it for you to do your work, take care of your home and get along with others -      [address] Exam 2/11/2019 1/18/2018   What is the Year 0 1   What is the Season 0 1   What is the Date 0 1   What is the Day 1 1   What is the Month 0 1   Where are we State 1 1   Where are we Country 1 1   Where are we [place] 1 1   Where are we Floor 0 1 Name three objects, then ask the patient to say them 3 3   Serial sevens Subtract 7 from 100 in increments 0 3   Ask for the three objects repeated above 0 3   Name a pencil 1 1   Name a watch 1 1   Have the patient repeat this phrase \"No ifs, ands, or buts\" 1 1   Three stage command: Take the paper in your right hand 1 1   Fold the paper in half 1 1   Put the paper on the floor 1 1   Read and obey the following: CLOSE YOUR EYES 0 1   Have the patient write a sentence 0 1   Have the patient copy a figure 0 1   Mini Mental Score 13 28     FALL RISK:   Fall Risk Assessment, last 12 mths 8/26/2019   Able to walk? Yes   Fall in past 12 months? Yes   Fall with injury? No   Number of falls in past 12 months 1   Fall Risk Score 1      ABUSE SCREEN:   Abuse Screening Questionnaire 2/11/2019   Do you ever feel afraid of your partner? N   Are you in a relationship with someone who physically or mentally threatens you? N   Is it safe for you to go home? Y      Advance Care Planning 12/6/2018   Primary Decision Maker Name -   Primary Decision Maker Phone Number -   Primary Decision Maker Relationship to Patient -   Confirm Advance Directive Yes, on file   Does the patient have other document types -     CHANGES IN TREATMENT:    The following treatment modalities have been discontinued by the provider today:   Medications Discontinued During This Encounter   Medication Reason    escitalopram oxalate (LEXAPRO) 5 mg tablet Therapy Completed     MOST RECENT LABORATORY RESULTS:      Orders Only on 09/03/2019   Component Date Value Ref Range Status    Glucose 09/04/2019 75  65 - 99 mg/dL Final    Comment: Specimen received in contact with cells. No visible hemolysis  present. However GLUC may be decreased and K increased. Clinical  correlation indicated.       BUN 09/04/2019 18  10 - 36 mg/dL Final    Creatinine 09/04/2019 1.07* 0.57 - 1.00 mg/dL Final    GFR est non-AA 09/04/2019 45* >59 mL/min/1.73 Final    GFR est AA 09/04/2019 52* >59 mL/min/1.73 Final    BUN/Creatinine ratio 09/04/2019 17  12 - 28 Final    Sodium 09/04/2019 136  134 - 144 mmol/L Final    Potassium 09/04/2019 4.8  3.5 - 5.2 mmol/L Final    Comment: Specimen received in contact with cells. No visible hemolysis  present. However GLUC may be decreased and K increased. Clinical  correlation indicated.  Chloride 09/04/2019 96  96 - 106 mmol/L Final    CO2 09/04/2019 22  20 - 29 mmol/L Final    Calcium 09/04/2019 9.5  8.7 - 10.3 mg/dL Final    Magnesium 09/04/2019 1.9  1.6 - 2.3 mg/dL Final   Orders Only on 08/26/2019   Component Date Value Ref Range Status    Glucose 08/26/2019 66  65 - 99 mg/dL Final    BUN 08/26/2019 23  10 - 36 mg/dL Final    Creatinine 08/26/2019 1.16* 0.57 - 1.00 mg/dL Final    GFR est non-AA 08/26/2019 41* >59 mL/min/1.73 Final    GFR est AA 08/26/2019 48* >59 mL/min/1.73 Final    BUN/Creatinine ratio 08/26/2019 20  12 - 28 Final    Sodium 08/26/2019 134  134 - 144 mmol/L Final    Potassium 08/26/2019 4.6  3.5 - 5.2 mmol/L Final    Chloride 08/26/2019 96  96 - 106 mmol/L Final    CO2 08/26/2019 22  20 - 29 mmol/L Final    Calcium 08/26/2019 9.3  8.7 - 10.3 mg/dL Final   Office Visit on 08/22/2019   Component Date Value Ref Range Status    Glucose 08/22/2019 96  65 - 99 mg/dL Final    Specimen received hemolyzed. Clinical correlation indicated.  BUN 08/22/2019 20  10 - 36 mg/dL Final    Creatinine 08/22/2019 0.93  0.57 - 1.00 mg/dL Final    GFR est non-AA 08/22/2019 54* >59 mL/min/1.73 Final    GFR est AA 08/22/2019 62  >59 mL/min/1.73 Final    BUN/Creatinine ratio 08/22/2019 22  12 - 28 Final    Sodium 08/22/2019 129* 134 - 144 mmol/L Final    Potassium 08/22/2019 5.1  3.5 - 5.2 mmol/L Final    Specimen received hemolyzed. Clinical correlation indicated.     Chloride 08/22/2019 94* 96 - 106 mmol/L Final    CO2 08/22/2019 21  20 - 29 mmol/L Final    Calcium 08/22/2019 9.1  8.7 - 10.3 mg/dL Final   Office Visit on 08/20/2019   Component Date Value Ref Range Status    WBC 08/20/2019 5.9  3.4 - 10.8 x10E3/uL Final    RBC 08/20/2019 3.65* 3.77 - 5.28 x10E6/uL Final    HGB 08/20/2019 10.7* 11.1 - 15.9 g/dL Final    HCT 08/20/2019 31.7* 34.0 - 46.6 % Final    MCV 08/20/2019 87  79 - 97 fL Final    MCH 08/20/2019 29.3  26.6 - 33.0 pg Final    MCHC 08/20/2019 33.8  31.5 - 35.7 g/dL Final    RDW 08/20/2019 12.4  12.3 - 15.4 % Final    PLATELET 87/27/6482 938  150 - 450 x10E3/uL Final    NEUTROPHILS 08/20/2019 61  Not Estab. % Final    Lymphocytes 08/20/2019 21  Not Estab. % Final    MONOCYTES 08/20/2019 14  Not Estab. % Final    EOSINOPHILS 08/20/2019 3  Not Estab. % Final    BASOPHILS 08/20/2019 1  Not Estab. % Final    ABS. NEUTROPHILS 08/20/2019 3.7  1.4 - 7.0 x10E3/uL Final    Abs Lymphocytes 08/20/2019 1.2  0.7 - 3.1 x10E3/uL Final    ABS. MONOCYTES 08/20/2019 0.8  0.1 - 0.9 x10E3/uL Final    ABS. EOSINOPHILS 08/20/2019 0.2  0.0 - 0.4 x10E3/uL Final    ABS. BASOPHILS 08/20/2019 0.0  0.0 - 0.2 x10E3/uL Final    IMMATURE GRANULOCYTES 08/20/2019 0  Not Estab. % Final    ABS. IMM.  GRANS. 08/20/2019 0.0  0.0 - 0.1 x10E3/uL Final    Glucose 08/20/2019 87  65 - 99 mg/dL Final    BUN 08/20/2019 23  10 - 36 mg/dL Final    Creatinine 08/20/2019 1.08* 0.57 - 1.00 mg/dL Final    GFR est non-AA 08/20/2019 45* >59 mL/min/1.73 Final    GFR est AA 08/20/2019 52* >59 mL/min/1.73 Final    BUN/Creatinine ratio 08/20/2019 21  12 - 28 Final    Sodium 08/20/2019 130* 134 - 144 mmol/L Final    Potassium 08/20/2019 5.2  3.5 - 5.2 mmol/L Final    Chloride 08/20/2019 91* 96 - 106 mmol/L Final    CO2 08/20/2019 21  20 - 29 mmol/L Final    Calcium 08/20/2019 9.4  8.7 - 10.3 mg/dL Final    Protein, total 08/20/2019 7.5  6.0 - 8.5 g/dL Final    Albumin 08/20/2019 4.5  3.2 - 4.6 g/dL Final    GLOBULIN, TOTAL 08/20/2019 3.0  1.5 - 4.5 g/dL Final    A-G Ratio 08/20/2019 1.5  1.2 - 2.2 Final    Bilirubin, total 08/20/2019 0.3  0.0 - 1.2 mg/dL Final    Alk. phosphatase 08/20/2019 119* 39 - 117 IU/L Final    AST (SGOT) 08/20/2019 35  0 - 40 IU/L Final    ALT (SGPT) 08/20/2019 21  0 - 32 IU/L Final    GGT 08/20/2019 53  0 - 60 IU/L Final    TSH 08/20/2019 3.040  0.450 - 4.500 uIU/mL Final    T4, Total 08/20/2019 7.6  4.5 - 12.0 ug/dL Final    T3 Uptake 08/20/2019 28  24 - 39 % Final    Free Thyroxine Index (FTI) 08/20/2019 2.1  1.2 - 4.9 Final    5' Nucleotidase 08/20/2019 4  0 - 10 IU/L Final    Hemoglobin A1c 08/20/2019 5.8* 4.8 - 5.6 % Final    Comment:          Prediabetes: 5.7 - 6.4           Diabetes: >6.4           Glycemic control for adults with diabetes: <7.0      Estimated average glucose 08/20/2019 120  mg/dL Final    Cholesterol, total 08/20/2019 159  100 - 199 mg/dL Final    Triglyceride 08/20/2019 63  0 - 149 mg/dL Final    HDL Cholesterol 08/20/2019 67  >39 mg/dL Final    VLDL, calculated 08/20/2019 13  5 - 40 mg/dL Final    LDL, calculated 08/20/2019 79  0 - 99 mg/dL Final    Magnesium 08/20/2019 2.1  1.6 - 2.3 mg/dL Final     Results for orders placed or performed in visit on 71/47/62   METABOLIC PANEL, BASIC   Result Value Ref Range    Glucose 75 65 - 99 mg/dL    BUN 18 10 - 36 mg/dL    Creatinine 1.07 (H) 0.57 - 1.00 mg/dL    GFR est non-AA 45 (L) >59 mL/min/1.73    GFR est AA 52 (L) >59 mL/min/1.73    BUN/Creatinine ratio 17 12 - 28    Sodium 136 134 - 144 mmol/L    Potassium 4.8 3.5 - 5.2 mmol/L    Chloride 96 96 - 106 mmol/L    CO2 22 20 - 29 mmol/L    Calcium 9.5 8.7 - 10.3 mg/dL   MAGNESIUM   Result Value Ref Range    Magnesium 1.9 1.6 - 2.3 mg/dL      Disclaimer:   Ms. Analilia Fraser has been advised to call or return to our office if symptoms worsen/change/persist. We as a care team including the patient; discussed expected course/resolution/complications of diagnosis in detail today. Medication risks/benefits/costs/interactions/alternatives discussed Donna Babcock was given an after visit summary which includes diagnoses, current medications, & vitals. Ronit Russo expressed understanding with the diagnosis and plan.

## 2019-09-12 NOTE — PATIENT INSTRUCTIONS
Ingrown Toenail: Care Instructions  Your Care Instructions    An ingrown toenail often occurs because a nail is not trimmed correctly or because shoes are too tight. An ingrown nail can cause an infection. If your toe is infected, your doctor may prescribe antibiotics. Most ingrown toenails can be treated at home. You should trim toenails straight across, so the ends of the nail grow over the skin and not into it. Good nail care can prevent ingrown toenails. Follow-up care is a key part of your treatment and safety. Be sure to make and go to all appointments, and call your doctor if you are having problems. It's also a good idea to know your test results and keep a list of the medicines you take. How can you care for yourself at home? · Trim the nails straight across. Leave the corners a little longer so they do not cut into the skin. To do this when you have an ingrown nail:  ? Soak your foot in warm water for about 15 minutes to soften the nail. ? Wedge a small piece of wet cotton under the corner of the nail to cushion the nail and lift it slightly. This keeps it from cutting the skin. ? Repeat daily until the nail has grown out and can be trimmed. · Do not use manicure scissors to dig under the ingrown nail. You might stab your toe, which could get infected. · Do not trim your toenails too short. · Check with your doctor before trimming your own toenails if you have been diagnosed with diabetes or peripheral arterial disease. These conditions increase the risk of an infection, because you may have decreased sensation in your toes and cut yourself without knowing it. · Wear roomy, comfortable shoes. · If your doctor prescribed antibiotics, take them as directed. Do not stop taking them just because you feel better. You need to take the full course of antibiotics. When should you call for help?   Call your doctor now or seek immediate medical care if:    · You have signs of infection, such as:  ? Increased pain, swelling, warmth, or redness. ? Red streaks leading from the toe. ? Pus draining from the toe. ? A fever.    Watch closely for changes in your health, and be sure to contact your doctor if:    · You do not get better as expected. Where can you learn more? Go to http://pia-colby.info/. Enter R135 in the search box to learn more about \"Ingrown Toenail: Care Instructions. \"  Current as of: April 1, 2019  Content Version: 12.1  © 0814-6851 [employer]. Care instructions adapted under license by [employer] (which disclaims liability or warranty for this information). If you have questions about a medical condition or this instruction, always ask your healthcare professional. [address] any warranty or liability for your use of this information.

## 2019-09-18 ENCOUNTER — OFFICE VISIT (OUTPATIENT)
Dept: GERIATRIC MEDICINE | Age: 84
End: 2019-09-18

## 2019-09-18 DIAGNOSIS — N39.46 MIXED STRESS AND URGE URINARY INCONTINENCE: ICD-10-CM

## 2019-09-18 DIAGNOSIS — I95.0 IDIOPATHIC HYPOTENSION: ICD-10-CM

## 2019-09-18 DIAGNOSIS — E87.8 HYPOCHLOREMIA: ICD-10-CM

## 2019-09-18 DIAGNOSIS — N32.81 OAB (OVERACTIVE BLADDER): Primary | ICD-10-CM

## 2019-09-18 DIAGNOSIS — I50.32 CHRONIC DIASTOLIC CONGESTIVE HEART FAILURE (HCC): ICD-10-CM

## 2019-09-18 DIAGNOSIS — F41.9 ANXIETY: ICD-10-CM

## 2019-09-18 DIAGNOSIS — F51.01 PRIMARY INSOMNIA: ICD-10-CM

## 2019-09-18 DIAGNOSIS — E87.1 HYPONATREMIA: ICD-10-CM

## 2019-09-19 VITALS
BODY MASS INDEX: 24.13 KG/M2 | DIASTOLIC BLOOD PRESSURE: 60 MMHG | RESPIRATION RATE: 16 BRPM | HEART RATE: 60 BPM | SYSTOLIC BLOOD PRESSURE: 119 MMHG | WEIGHT: 127.8 LBS | TEMPERATURE: 97 F | HEIGHT: 61 IN | OXYGEN SATURATION: 98 %

## 2019-09-19 NOTE — PROGRESS NOTES
ADVISED PATIENT OF THE FOLLOWING HEALTH MAINTAINCE DUE  There are no preventive care reminders to display for this patient. Chief Complaint   Patient presents with    Urinary Frequency     patient c/o urinating frequently at night    Nocturia    Mental Health Problem       1. Have you been to the ER, urgent care clinic since your last visit? Hospitalized since your last visit? No    2. Have you seen or consulted any other health care providers outside of the [address] [place] [address] since your last visit? Include any DEXA scan, mammography  or colon screening. No    3. Do you have an Advance Directive on file? yes    4. Do you have a DNR on file? DNR    Patient is accompanied by self I have received verbal consent from Alphonso Conner to discuss any/all medical information while they are present in the room. Advance Care Planning 12/6/2018   Primary Decision Maker Name -   Primary Decision Maker Phone Number -   Primary Decision Maker Relationship to Patient -   Confirm Advance Directive Yes, on file   Does the patient have other document types -         [facility], [address] [facility] [address]  Phone: [phone] Fax: [phone]        Patient reminded during visit to bring all medication bottles, OTC medications to all appointments.

## 2019-09-26 RX ORDER — BUSPIRONE HYDROCHLORIDE 10 MG/1
10 TABLET ORAL 2 TIMES DAILY
Qty: 60 TAB | Refills: 10
Start: 2019-09-26

## 2019-09-26 RX ORDER — HYDROXYZINE 25 MG/1
25 TABLET, FILM COATED ORAL
Qty: 30 TAB | Refills: 1
Start: 2019-09-26

## 2019-09-26 NOTE — PATIENT INSTRUCTIONS
Learning About Generalized Anxiety Disorder What is generalized anxiety disorder? We all worry. It's a normal part of life. But when you have generalized anxiety disorder, you worry about lots of things and have a hard time stopping your worry. This worry or anxiety interferes with your relationships, work, and life. What causes it? The cause is not known. But it may be passed down through families. What are the symptoms? You may feel anxious or worry most days about things like work, relationships, health, or money. You may worry about things that are unlikely to happen. You find it hard to stop or control the worry. Because you worry a lot and try hard to stop worrying, you may feel restless, tired, tense, or cranky. You may also find it hard to think or sleep. And you may have headaches or an upset stomach. How is it diagnosed? Your doctor will ask about your health and how often you worry or feel anxious. He or she may ask about other symptoms, like whether you: · Feel restless. · Feel tired. · Have a hard time thinking or feel that your mind goes blank. · Feel cranky. · Have tense muscles. · Have sleep problems. A physical exam and tests can help make sure that your symptoms aren't caused by a different condition, such as a thyroid problem. How is it treated? Counseling and medicine can both work to treat anxiety. The two are often used along with lifestyle changes. Cognitive-behavioral therapy (CBT) is a type of counseling that's used to help treat anxiety. In CBT, you learn how to notice and replace thoughts that make you feel worried. It also can help you learn how to relax when you worry. Medicines can help. These medicines are often also used for depression. Selective serotonin reuptake inhibitors (SSRIs) are often tried first. But there are other medicines that your doctor may use. You may need to try a few medicines to find one that works well. Many people feel better by getting regular exercise, eating healthy meals, and getting good sleep. Mindfulnessfocusing on things that happen in the present momentalso can help reduce your anxiety. What can you expect when you have it? Having anxiety can be upsetting. Some people might feel less worried and stressed after a couple of months of treatment. But for other people, it might take longer to feel better. Reaching out to people for help is important. Try not to isolate yourself. Let your family and friends help you. Find someone you can trust and confide in. Talk to that person. When you know what anxiety isand how you can get help for ityou can start to learn new ways of thinking. This can help you cope and work through your anxiety. Follow-up care is a key part of your treatment and safety. Be sure to make and go to all appointments, and call your doctor if you are having problems. It's also a good idea to know your test results and keep a list of the medicines you take. Where can you learn more? Go to http://pia-colby.info/. Enter G110 in the search box to learn more about \"Learning About Generalized Anxiety Disorder. \" Current as of: May 28, 2019 Content Version: 12.2 © 9094-4546 [employer]. Care instructions adapted under license by [employer] (which disclaims liability or warranty for this information). If you have questions about a medical condition or this instruction, always ask your healthcare professional. [address] any warranty or liability for your use of this information.

## 2019-09-26 NOTE — PROGRESS NOTES
Reason for Visit   Luis Rdz is a 80 y.o. female patient who presents today for :  Chief Complaint   Patient presents with    Urinary Frequency     patient c/o urinating frequently at night    Nocturia   65 [address]keon / Follow Up: Follow-up and Dispositions    · Return in about 1 month (around 10/18/2019) for in office with Collette Sanfilippo, NP after treatment. Mrs. Pau Burnett was seen on the Assisted Living unit in her apartment today. She is complaining of constant urinary incontinence as well as being down and depressed. She was previously on Zoloft for this and was doing well until her sodium started dropping. I have removed her from this medication and started her on Buspar to help with titration of her behaviors. She has been tolerating this well except for the evening dose that seems to be causing her to stay awake at night. I am going to discontinue the evening/bedtime dose and start her on some Hydroxyzine for insomnia and anxiety. I have been watching her weights closely as well as her blood pressures and she has handled the decrease of the bumex to every other day well. I will now try to discontinue the Bumex all together and see if this helps her to not have to urinate constantly. I have written an order for her to be watched carefully in the AL environment for any SOB or Weight gain. Diagnosis:        ICD-10-CM ICD-9-CM    1. OAB (overactive bladder) N32.81 596.51    2. Mixed stress and urge urinary incontinence N39.46 788.33    3. Chronic diastolic congestive heart failure (HCC) I50.32 428.32      428.0    4. Idiopathic hypotension I95.0 458.9    5. Hyponatremia E87.1 276.1    6. Hypochloremia E87.8 276.9    7. Anxiety F41.9 300.00 busPIRone (BUSPAR) 10 mg tablet   8.  Primary insomnia F51.01 307.42 hydrOXYzine HCl (ATARAX) 25 mg tablet        Orders Placed This Encounter    busPIRone (BUSPAR) 10 mg tablet     Sig: Take 1 Tab by mouth two (2) times a day.     Dispense:  60 Tab     Refill:  10    hydrOXYzine HCl (ATARAX) 25 mg tablet     Sig: Take 1 Tab by mouth nightly. Dispense:  30 Tab     Refill:  1      Objective:     Vitals:    09/19/19 1117   BP: 119/60   Pulse: 60   Resp: 16   Temp: 97 °F (36.1 °C)   TempSrc: Oral   SpO2: 98%   Weight: 127 lb 12.8 oz (58 kg)   Height: 5' 1\" (1.549 m)     Wt Readings from Last 3 Encounters:   09/19/19 127 lb 12.8 oz (58 kg)   09/09/19 130 lb (59 kg)   09/04/19 129 lb (58.5 kg)     BP Readings from Last 3 Encounters:   09/19/19 119/60   09/09/19 140/58   09/04/19 110/60     Review of Systems   Constitutional: Positive for fatigue. Negative for activity change, appetite change, chills and fever. HENT: Negative for congestion, dental problem, hearing loss, postnasal drip, sinus pressure, sneezing, sore throat and trouble swallowing. Eyes: Negative for discharge, redness and visual disturbance. Respiratory: Negative for apnea, cough, chest tightness, shortness of breath and wheezing. Cardiovascular: Negative for chest pain, palpitations and leg swelling. Gastrointestinal: Negative for abdominal distention, abdominal pain, blood in stool, constipation, diarrhea, nausea and vomiting. Endocrine: Negative for polydipsia, polyphagia and polyuria. Genitourinary: Negative for flank pain, frequency and urgency. Musculoskeletal: Negative for arthralgias, gait problem, joint swelling and neck pain. Skin: Negative for color change, pallor, rash and wound. Allergic/Immunologic: Negative for environmental allergies and food allergies. Neurological: Negative for dizziness, tremors, weakness, light-headedness, numbness and headaches. Hematological: Negative for adenopathy. Psychiatric/Behavioral: Negative for agitation, confusion and sleep disturbance. The patient is not nervous/anxious. Physical Exam   Constitutional: She is oriented to person, place, and time.  Vital signs are normal. She appears not lethargic, to not be writhing in pain, not malnourished and not dehydrated. She appears healthy. She appears not cachectic. Non-toxic appearance. She does not have a sickly appearance. No distress. Frail, fragile, alert but not oriented, elderly  female. 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 Now resides in [facility]. HENT:   Head: Normocephalic and atraumatic. Right Ear: External ear and ear canal normal. No middle ear effusion. Decreased hearing is noted. Left Ear: External ear and ear canal normal.  No middle ear effusion. Decreased hearing is noted. Nose: Nose normal. No mucosal edema or rhinorrhea. Mouth/Throat: Uvula is midline and oropharynx is clear and moist. Mucous membranes are pale, dry and not cyanotic. No oral lesions. No uvula swelling. No posterior oropharyngeal edema or posterior oropharyngeal erythema. Eyes: Pupils are equal, round, and reactive to light. Conjunctivae, EOM and lids are normal. Lids are everted and swept, no foreign bodies found. Right pupil is round and reactive. Left pupil is reactive. Left pupil required 20 to 30 secs extra to respond. But it finally did respond. Neck: Trachea normal, normal range of motion and full passive range of motion without pain. Neck supple. No hepatojugular reflux and no JVD present. Carotid bruit is not present. No thyromegaly present. Cardiovascular: Normal rate, regular rhythm, S1 normal, S2 normal, normal heart sounds, intact distal pulses and normal pulses. Extrasystoles are present. Exam reveals no gallop, no distant heart sounds and no friction rub. No murmur heard. Pulmonary/Chest: Effort normal and breath sounds normal. No accessory muscle usage. No tachypnea. No respiratory distress. She has no decreased breath sounds. She has no wheezes. Abdominal: Soft. Normal appearance and bowel sounds are normal. She exhibits no fluid wave and no mass.  There is no hepatosplenomegaly. There is no tenderness. There is no rebound and no CVA tenderness. Musculoskeletal: Normal range of motion. Lymphadenopathy:        Head (right side): No submandibular adenopathy present. Head (left side): No submandibular and no tonsillar adenopathy present. She has no cervical adenopathy. She has no axillary adenopathy. Neurological: She is alert and oriented to person, place, and time. She has normal motor skills, normal sensation, normal strength, normal reflexes and intact cranial nerves. She appears not lethargic. She is not agitated and not disoriented. She displays no weakness, no atrophy, facial symmetry, normal stance and normal speech. No cranial nerve deficit or sensory deficit. Gait normal. Coordination and gait normal. GCS score is 15. Skin: Skin is warm, dry and intact. No bruising, no ecchymosis and no rash noted. She is not diaphoretic. No cyanosis. No pallor. Nails show no clubbing. Psychiatric: Mood, memory, affect and judgment normal. Her mood appears not anxious. She does not express impulsivity. She does not exhibit a depressed mood. She exhibits ordered thought content, normal new learning ability and normal remote memory. Nursing note and vitals reviewed. Subjective: Allergies   Allergen Reactions    Brilinta [Ticagrelor] Shortness of Breath    Celecoxib Other (comments)    Codeine Hives    Sulfa (Sulfonamide Antibiotics) Unknown (comments) and Hives    Iodine Unknown (comments)     Other reaction(s): Other (See Comments)  This was on outside records, pt denies that she has iodine allergy. Able to tolerate seafood, shellfish w/o reaction.  Aricept [Donepezil] Other (comments)     Headaches - noted as intolerance     Darvocet A500 [Propoxyphene N-Acetaminophen] Nausea Only     Prior to Admission medications    Medication Sig Start Date End Date Taking?  Authorizing Provider   busPIRone (BUSPAR) 10 mg tablet Take 1 Tab by mouth two (2) times a day. 9/26/19  Yes Radha Pat NP   hydrOXYzine HCl (ATARAX) 25 mg tablet Take 1 Tab by mouth nightly. 9/26/19  Yes Radha Pat NP   vitamin l35-kmbge acid 5.5-8 mg tab Take  by mouth. Yes Provider, Historical   metoprolol succinate (TOPROL-XL) 25 mg XL tablet Take 0.5 Tabs by mouth nightly. 8/21/19  Yes Radha Pat NP   fluticasone (FLONASE SENSIMIST) 27.5 mcg/actuation nasal spray 1 Spray two (2) times a day for 10 days, THEN 1 Spray daily for 354 days. Indications: inflammation of the nose due to an allergy 8/20/19 8/18/20 Yes Radha Pat NP   omeprazole (PRILOSEC) 20 mg capsule Take 20 mg by mouth daily. Yes Provider, Historical   Calcium-Cholecalciferol, D3, 500 mg(1,250mg) -400 unit tab Take  by mouth. Yes Provider, Historical   levothyroxine (SYNTHROID) 25 mcg tablet Take 1.5 Tabs by mouth Daily (before breakfast). Indications: hypothyroidism 3/27/19  Yes Radha Pat NP   clopidogrel (PLAVIX) 75 mg tab Take 75 mg by mouth daily. Indications: treatment to prevent a heart attack   Yes Provider, Historical   polyethylene glycol (MIRALAX) 17 gram packet Take 17 g by mouth daily. Yes Provider, Historical   apixaban (ELIQUIS) 2.5 mg tablet Take 1 Tab by mouth two (2) times a day. 11/30/18  Yes Radha Pat NP   simvastatin (ZOCOR) 40 mg tablet Take 40 mg by mouth nightly. 11/26/18  Yes Provider, Historical   nitroglycerin (NITROSTAT) 0.4 mg SL tablet Take 0.4 mg by mouth as needed. 11/26/18  Yes Provider, Historical   diltiazem CD (CARTIA XT) 180 mg ER capsule Take 180 mg by mouth nightly. Yes Provider, Historical   acetaminophen (TYLENOL) 325 mg tablet Take 325 mg by mouth every six (6) hours as needed for Pain.     Provider, Historical     Past Medical History:   Diagnosis Date    (HFpEF) heart failure with preserved ejection fraction ([facility] [address] [facility] 10/17/2018    acute    Allergic rhinitis     Atherosclerotic cardiovascular disease 1999    CHF [facility]congestive heart failure) ([facility] 2018    Chronic kidney disease     Cognitive communication deficit     Contact dermatitis and eczema due to cause     Edema     Environmental allergies     dizziness-(chronic)    Fatty liver disease, nonalcoholic     GERD (gastroesophageal reflux disease)     H/O heart artery stent 2018    HCVD (hypertensive cardiovascular disease) 10/17/2018    [facility]    Heart palpitations 2018    infrequent     History of PTCA     Hypercholesterolemia 1999    Hypertension 2010    Liver disease     fatty liver    MI [facility]ocardial infarction) ([facility] 2018    Overactive bladder 2018    Peripheral neuropathy     Permanent atrial fibrillation ([facility] 2018    Senile dementia, without behavioral disturbance 2018    Stress incontinence, female 2018    Thyroid disease     Vertigo     Vitamin D deficiency      Past Surgical History:   Procedure Laterality Date    BREAST SURGERY PROCEDURE UNLISTED      breast cyst Apoorva Samuel)    CARDIAC SURG PROCEDURE UNLIST  1999    + stress thalassemia; 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  Holter    HX APPENDECTOMY      HX BREAST BIOPSY Left     neg    HX CYST INCISION AND DRAINAGE Right years ago    neg    HX DILATION AND CURETTAGE      x5    HX GYN      D&C (x5), ALLEY/BSO (-Juliann/Zedler), Provera intolerance (bleeding), endometrial Bx annually    HX PTCA  2018    stent distal RCA into PLwith KENNETH x 2    HX ALLEY AND BSO      Juliann/Zedler      Social History     Tobacco Use    Smoking status: Former Smoker     Packs/day: 4.00     Types: Cigarettes     Last attempt to quit: 1955     Years since quittin.7    Smokeless tobacco: Never Used   Substance Use Topics    Alcohol use: No     Alcohol/week: 0.0 - 0.8 standard drinks    Drug use: No      Family History   Problem Relation Age of Onset    Diabetes Mother     Hypertension Mother     Heart Failure Mother         CHF ( @ 80)    Alzheimer Mother     Cancer Father         lung ( @ 77)   [facility] Alzheimer Father     No Known Problems Son     No Known Problems Son     Ovarian Cancer Sister         hysterectomy    Breast Cancer Sister 28        mastectomy    Cancer Sister         breast and ovarian    Alzheimer Sister     Breast Problems Sister         breast cyst    Cataract Sister     Hypertension Sister     Diabetes Brother      Functional Assessment:   ADL FUNCTIONALITY:  ADL Assessment 2019   Feeding yourself No Help Needed   Getting from bed to chair No Help Needed   Getting dressed No Help Needed   Bathing or showering Help Needed   Walk across the room (includes cane/walker) Help Needed   Using the telphone Help Needed   Taking your medications Help Needed   Preparing meals Help Needed   Managing money (expenses/bills) Help Needed   Moderately strenuous housework (laundry) Help Needed   Shopping for personal items (toiletries/medicines) Help Needed   Shopping for groceries Help Needed   Driving Help Needed   Climbing a flight of stairs Help Needed   Getting to places beyond walking distances Help Needed     DEPRESSION SCREENING:   3 most recent PHQ Screens 2019   PHQ Not Done Active Diagnosis of Depression or Bipolar Disorder   Little interest or pleasure in doing things Several days   Feeling down, depressed, irritable, or hopeless Several days   Total Score PHQ 2 2   Trouble falling or staying asleep, or sleeping too much -   Feeling tired or having little energy -   Poor appetite, weight loss, or overeating -   Feeling bad about yourself - or that you are a failure or have let yourself or your family down -   Trouble concentrating on things such as school, work, reading, or watching TV -   Moving or speaking so slowly that other people could have noticed; or the opposite being so fidgety that others notice -   Thoughts of being better off dead, or hurting yourself in some way -   PHQ 9 Score -   How difficult have these problems made it for you to do your work, take care of your home and get along with others -      [address] Exam 2/11/2019 1/18/2018   What is the Year 0 1   What is the Season 0 1   What is the Date 0 1   What is the Day 1 1   What is the Month 0 1   Where are we State 1 1   Where are we Country 1 1   Where are we [address] [place] [address] or [facility] 1 1   Where are we Floor 0 1   Name three objects, then ask the patient to say them 3 3   Serial sevens Subtract 7 from 100 in increments 0 3   Ask for the three objects repeated above 0 3   Name a pencil 1 1   Name a watch 1 1   Have the patient repeat this phrase \"No ifs, ands, or buts\" 1 1   Three stage command: Take the paper in your right hand 1 1   Fold the paper in half 1 1   Put the paper on the floor 1 1   Read and obey the following: CLOSE YOUR EYES 0 1   Have the patient write a sentence 0 1   Have the patient copy a figure 0 1   Mini Mental Score 13 28     FALL RISK:   Fall Risk Assessment, last 12 mths 9/18/2019   Able to walk? Yes   Fall in past 12 months? Yes   Fall with injury? Yes   Number of falls in past 12 months 3   Fall Risk Score 4      ABUSE SCREEN:   Abuse Screening Questionnaire 2/11/2019   Do you ever feel afraid of your partner? N   Are you in a relationship with someone who physically or mentally threatens you? N   Is it safe for you to go home?  Y      Advance Care Planning 12/6/2018   Primary Decision Maker Name -   Primary Decision Maker Phone Number -   Primary Decision Maker Relationship to Patient -   Confirm Advance Directive Yes, on file   Does the patient have other document types -     CHANGES IN TREATMENT:    The following treatment modalities have been discontinued by the provider today:   Medications Discontinued During This Encounter   Medication Reason    busPIRone (BUSPAR) 7.5 mg tablet Alternate Therapy    bumetanide (BUMEX) 0.5 mg tablet Therapy Completed    melatonin 3 mg tablet Clinically Ineffective    doxycycline (VIBRAMYCIN) 100 mg capsule Therapy Completed    meclizine (ANTIVERT) 12.5 mg tablet Alternate Therapy     MOST RECENT LABORATORY RESULTS:      Orders Only on 09/03/2019   Component Date Value Ref Range Status    Glucose 09/04/2019 75  65 - 99 mg/dL Final    Comment: Specimen received in contact with cells. No visible hemolysis  present. However GLUC may be decreased and K increased. Clinical  correlation indicated.  BUN 09/04/2019 18  10 - 36 mg/dL Final    Creatinine 09/04/2019 1.07* 0.57 - 1.00 mg/dL Final    GFR est non-AA 09/04/2019 45* >59 mL/min/1.73 Final    GFR est AA 09/04/2019 52* >59 mL/min/1.73 Final    BUN/Creatinine ratio 09/04/2019 17  12 - 28 Final    Sodium 09/04/2019 136  134 - 144 mmol/L Final    Potassium 09/04/2019 4.8  3.5 - 5.2 mmol/L Final    Comment: Specimen received in contact with cells. No visible hemolysis  present. However GLUC may be decreased and K increased. Clinical  correlation indicated.  Chloride 09/04/2019 96  96 - 106 mmol/L Final    CO2 09/04/2019 22  20 - 29 mmol/L Final    Calcium 09/04/2019 9.5  8.7 - 10.3 mg/dL Final    Magnesium 09/04/2019 1.9  1.6 - 2.3 mg/dL Final   Orders Only on 08/26/2019   Component Date Value Ref Range Status    Glucose 08/26/2019 66  65 - 99 mg/dL Final    BUN 08/26/2019 23  10 - 36 mg/dL Final    Creatinine 08/26/2019 1.16* 0.57 - 1.00 mg/dL Final    GFR est non-AA 08/26/2019 41* >59 mL/min/1.73 Final    GFR est AA 08/26/2019 48* >59 mL/min/1.73 Final    BUN/Creatinine ratio 08/26/2019 20  12 - 28 Final    Sodium 08/26/2019 134  134 - 144 mmol/L Final    Potassium 08/26/2019 4.6  3.5 - 5.2 mmol/L Final    Chloride 08/26/2019 96  96 - 106 mmol/L Final    CO2 08/26/2019 22  20 - 29 mmol/L Final    Calcium 08/26/2019 9.3  8.7 - 10.3 mg/dL Final   Office Visit on 08/22/2019   Component Date Value Ref Range Status    Glucose 08/22/2019 96  65 - 99 mg/dL Final    Specimen received hemolyzed.  Clinical correlation indicated.  BUN 08/22/2019 20  10 - 36 mg/dL Final    Creatinine 08/22/2019 0.93  0.57 - 1.00 mg/dL Final    GFR est non-AA 08/22/2019 54* >59 mL/min/1.73 Final    GFR est AA 08/22/2019 62  >59 mL/min/1.73 Final    BUN/Creatinine ratio 08/22/2019 22  12 - 28 Final    Sodium 08/22/2019 129* 134 - 144 mmol/L Final    Potassium 08/22/2019 5.1  3.5 - 5.2 mmol/L Final    Specimen received hemolyzed. Clinical correlation indicated.  Chloride 08/22/2019 94* 96 - 106 mmol/L Final    CO2 08/22/2019 21  20 - 29 mmol/L Final    Calcium 08/22/2019 9.1  8.7 - 10.3 mg/dL Final   Office Visit on 08/20/2019   Component Date Value Ref Range Status    WBC 08/20/2019 5.9  3.4 - 10.8 x10E3/uL Final    RBC 08/20/2019 3.65* 3.77 - 5.28 x10E6/uL Final    HGB 08/20/2019 10.7* 11.1 - 15.9 g/dL Final    HCT 08/20/2019 31.7* 34.0 - 46.6 % Final    MCV 08/20/2019 87  79 - 97 fL Final    MCH 08/20/2019 29.3  26.6 - 33.0 pg Final    MCHC 08/20/2019 33.8  31.5 - 35.7 g/dL Final    RDW 08/20/2019 12.4  12.3 - 15.4 % Final    PLATELET 69/02/4653 353  150 - 450 x10E3/uL Final    NEUTROPHILS 08/20/2019 61  Not Estab. % Final    Lymphocytes 08/20/2019 21  Not Estab. % Final    MONOCYTES 08/20/2019 14  Not Estab. % Final    EOSINOPHILS 08/20/2019 3  Not Estab. % Final    BASOPHILS 08/20/2019 1  Not Estab. % Final    ABS. NEUTROPHILS 08/20/2019 3.7  1.4 - 7.0 x10E3/uL Final    Abs Lymphocytes 08/20/2019 1.2  0.7 - 3.1 x10E3/uL Final    ABS. MONOCYTES 08/20/2019 0.8  0.1 - 0.9 x10E3/uL Final    ABS. EOSINOPHILS 08/20/2019 0.2  0.0 - 0.4 x10E3/uL Final    ABS. BASOPHILS 08/20/2019 0.0  0.0 - 0.2 x10E3/uL Final    IMMATURE GRANULOCYTES 08/20/2019 0  Not Estab. % Final    ABS. IMM.  GRANS. 08/20/2019 0.0  0.0 - 0.1 x10E3/uL Final    Glucose 08/20/2019 87  65 - 99 mg/dL Final    BUN 08/20/2019 23  10 - 36 mg/dL Final    Creatinine 08/20/2019 1.08* 0.57 - 1.00 mg/dL Final    GFR est non-AA 08/20/2019 45* >59 mL/min/1.73 Final    GFR est AA 08/20/2019 52* >59 mL/min/1.73 Final    BUN/Creatinine ratio 08/20/2019 21  12 - 28 Final    Sodium 08/20/2019 130* 134 - 144 mmol/L Final    Potassium 08/20/2019 5.2  3.5 - 5.2 mmol/L Final    Chloride 08/20/2019 91* 96 - 106 mmol/L Final    CO2 08/20/2019 21  20 - 29 mmol/L Final    Calcium 08/20/2019 9.4  8.7 - 10.3 mg/dL Final    Protein, total 08/20/2019 7.5  6.0 - 8.5 g/dL Final    Albumin 08/20/2019 4.5  3.2 - 4.6 g/dL Final    GLOBULIN, TOTAL 08/20/2019 3.0  1.5 - 4.5 g/dL Final    A-G Ratio 08/20/2019 1.5  1.2 - 2.2 Final    Bilirubin, total 08/20/2019 0.3  0.0 - 1.2 mg/dL Final    Alk.  phosphatase 08/20/2019 119* 39 - 117 IU/L Final    AST (SGOT) 08/20/2019 35  0 - 40 IU/L Final    ALT (SGPT) 08/20/2019 21  0 - 32 IU/L Final    GGT 08/20/2019 53  0 - 60 IU/L Final    TSH 08/20/2019 3.040  0.450 - 4.500 uIU/mL Final    T4, Total 08/20/2019 7.6  4.5 - 12.0 ug/dL Final    T3 Uptake 08/20/2019 28  24 - 39 % Final    Free Thyroxine Index (FTI) 08/20/2019 2.1  1.2 - 4.9 Final    5' Nucleotidase 08/20/2019 4  0 - 10 IU/L Final    Hemoglobin A1c 08/20/2019 5.8* 4.8 - 5.6 % Final    Comment:          Prediabetes: 5.7 - 6.4           Diabetes: >6.4           Glycemic control for adults with diabetes: <7.0      Estimated average glucose 08/20/2019 120  mg/dL Final    Cholesterol, total 08/20/2019 159  100 - 199 mg/dL Final    Triglyceride 08/20/2019 63  0 - 149 mg/dL Final    HDL Cholesterol 08/20/2019 67  >39 mg/dL Final    VLDL, calculated 08/20/2019 13  5 - 40 mg/dL Final    LDL, calculated 08/20/2019 79  0 - 99 mg/dL Final    Magnesium 08/20/2019 2.1  1.6 - 2.3 mg/dL Final     Results for orders placed or performed in visit on 33/17/04   METABOLIC PANEL, BASIC   Result Value Ref Range    Glucose 75 65 - 99 mg/dL    BUN 18 10 - 36 mg/dL    Creatinine 1.07 (H) 0.57 - 1.00 mg/dL    GFR est non-AA 45 (L) >59 mL/min/1.73    GFR est AA 52 (L) >59 mL/min/1.73 BUN/Creatinine ratio 17 12 - 28    Sodium 136 134 - 144 mmol/L    Potassium 4.8 3.5 - 5.2 mmol/L    Chloride 96 96 - 106 mmol/L    CO2 22 20 - 29 mmol/L    Calcium 9.5 8.7 - 10.3 mg/dL   MAGNESIUM   Result Value Ref Range    Magnesium 1.9 1.6 - 2.3 mg/dL      Disclaimer:   Ms. Demarcus Silva has been advised to call or return to our office if symptoms worsen/change/persist. We as a care team including the patient; discussed expected course/resolution/complications of diagnosis in detail today. Medication risks/benefits/costs/interactions/alternatives discussed Donna Mcknight was given an after visit summary which includes diagnoses, current medications, & vitals. Demarcus Silva expressed understanding with the diagnosis and plan.

## 2019-10-02 ENCOUNTER — CLINICAL SUPPORT (OUTPATIENT)
Dept: GERIATRIC MEDICINE | Age: 84
End: 2019-10-02

## 2019-10-02 DIAGNOSIS — I50.32 CHRONIC DIASTOLIC CONGESTIVE HEART FAILURE (HCC): Primary | ICD-10-CM

## 2019-10-02 DIAGNOSIS — I25.10 CORONARY ARTERY DISEASE INVOLVING NATIVE CORONARY ARTERY OF NATIVE HEART WITHOUT ANGINA PECTORIS: ICD-10-CM

## 2019-10-02 DIAGNOSIS — E87.1 HYPONATREMIA: ICD-10-CM

## 2019-10-02 DIAGNOSIS — E87.8 HYPOCHLOREMIA: ICD-10-CM

## 2019-10-02 DIAGNOSIS — Z48.02 VISIT FOR SUTURE REMOVAL: ICD-10-CM

## 2019-10-02 DIAGNOSIS — N18.9 CHRONIC KIDNEY DISEASE, UNSPECIFIED CKD STAGE: ICD-10-CM

## 2019-10-02 NOTE — PROGRESS NOTES
ADVISED PATIENT OF THE FOLLOWING HEALTH MAINTAINCE DUE  There are no preventive care reminders to display for this patient. Chief Complaint   Patient presents with    Suture Removal     Patient here for suture removal. She had two internal sutures to left upper arm coming thru the skin. I was able to cut them. No bleeding noted to area. She will follow up if changes occur.  Labs     BMP, magnesium drawn today       1. Have you been to the ER, urgent care clinic since your last visit? Hospitalized since your last visit? No    2. Have you seen or consulted any other health care providers outside of the [address] [place] [address] since your last visit? Include any DEXA scan, mammography  or colon screening. No    3. Do you have an Advance Directive on file? yes    4. Do you have a DNR on file? DNR    Patient is accompanied by self I have received verbal consent from Celi Gifford to discuss any/all medical information while they are present in the room. Advance Care Planning 12/6/2018   Primary Decision Maker Name -   Primary Decision Maker Phone Number -   Primary Decision Maker Relationship to Patient -   Confirm Advance Directive Yes, on file   Does the patient have other document types -         [facility], [address] [facility] [address]  Phone: [phone] Fax: [phone]        Patient reminded during visit to bring all medication bottles, OTC medications to all appointments.      Celi Gifford presents for lab draw ordered by Kris Mccollum RN MSN [id number]    The following labs were drawn and sent to lab by Esau Burr LPN:    BMP and Magnesium    The following tubes were sent:    Tiger (1)    Patient tolerated procedure well, blood obtained via venipuncture to left antecubital

## 2019-10-03 LAB
BUN SERPL-MCNC: 25 MG/DL (ref 10–36)
BUN/CREAT SERPL: 24 (ref 12–28)
CALCIUM SERPL-MCNC: 9.4 MG/DL (ref 8.7–10.3)
CHLORIDE SERPL-SCNC: 93 MMOL/L (ref 96–106)
CO2 SERPL-SCNC: 22 MMOL/L (ref 20–29)
CREAT SERPL-MCNC: 1.04 MG/DL (ref 0.57–1)
GLUCOSE SERPL-MCNC: 112 MG/DL (ref 65–99)
MAGNESIUM SERPL-MCNC: 2.1 MG/DL (ref 1.6–2.3)
POTASSIUM SERPL-SCNC: 4.5 MMOL/L (ref 3.5–5.2)
SODIUM SERPL-SCNC: 131 MMOL/L (ref 134–144)

## 2019-10-07 NOTE — PROGRESS NOTES
CMP- kidney function is stable. Sodium and Chloride are lower again. Advise pt to add a small amount of sodium on her foods to help with her values. Mag- stable.

## 2019-10-11 ENCOUNTER — OFFICE VISIT (OUTPATIENT)
Dept: GERIATRIC MEDICINE | Age: 84
End: 2019-10-11

## 2019-10-11 DIAGNOSIS — N32.81 OAB (OVERACTIVE BLADDER): ICD-10-CM

## 2019-10-11 DIAGNOSIS — R63.5 WEIGHT GAIN: ICD-10-CM

## 2019-10-11 DIAGNOSIS — R42 POSTURAL DIZZINESS: Primary | ICD-10-CM

## 2019-10-11 DIAGNOSIS — I50.32 CHRONIC DIASTOLIC CONGESTIVE HEART FAILURE (HCC): ICD-10-CM

## 2019-10-11 DIAGNOSIS — E87.8 HYPOCHLOREMIA: ICD-10-CM

## 2019-10-11 DIAGNOSIS — E87.1 HYPONATREMIA: ICD-10-CM

## 2019-10-11 DIAGNOSIS — N39.46 MIXED STRESS AND URGE URINARY INCONTINENCE: ICD-10-CM

## 2019-10-29 VITALS
HEART RATE: 78 BPM | HEIGHT: 61 IN | DIASTOLIC BLOOD PRESSURE: 66 MMHG | BODY MASS INDEX: 25.07 KG/M2 | OXYGEN SATURATION: 98 % | RESPIRATION RATE: 18 BRPM | WEIGHT: 132.8 LBS | SYSTOLIC BLOOD PRESSURE: 130 MMHG

## 2019-10-29 RX ORDER — MECLIZINE HCL 12.5 MG 12.5 MG/1
12.5 TABLET ORAL
COMMUNITY

## 2019-10-29 NOTE — PROGRESS NOTES
ADVISED PATIENT OF THE FOLLOWING HEALTH MAINTAINCE DUE  Health Maintenance Due   Topic Date Due    GLAUCOMA SCREENING Q2Y  12/04/2019      Chief Complaint   Patient presents with    Results     Patient being seen tore shantelle lab results and weights. 1. Have you been to the ER, urgent care clinic since your last visit? Hospitalized since your last visit? No    2. Have you seen or consulted any other health care providers outside of the [address] [place] [address] since your last visit? Include any DEXA scan, mammography  or colon screening. No    3. Do you have an Advance Directive on file? yes    4. Do you have a DNR on file? DNR    Patient is accompanied by self I have received verbal consent from Jerry Dumont to discuss any/all medical information while they are present in the room. Advance Care Planning 12/6/2018   Primary Decision Maker Name -   Primary Decision Maker Phone Number -   Primary Decision Maker Relationship to Patient -   Confirm Advance Directive Yes, on file   Does the patient have other document types -         [facility], [address] [facility] [address]  Phone: [phone] Fax: [phone]        Patient reminded during visit to bring all medication bottles, OTC medications to all appointments.

## 2019-11-12 NOTE — ADVANCED PRACTICE NURSE
Cancelled Chest CTA order per Alisa Arizmendi NP Cancelled prednisone and benadryl orders Tomer WILLS notified and she will call radiology to cancel study - unable to cancel it in [facility] care Dashawn Skelton NP  
 [Follow-Up - Clinic] : a clinic follow-up of [Hypertension] : hypertension [FreeTextEntry1] : Mitral and tspid valve repair,  aortic insufficiency

## 2019-11-25 ENCOUNTER — OFFICE VISIT (OUTPATIENT)
Dept: CARDIOLOGY CLINIC | Age: 84
End: 2019-11-25

## 2019-11-25 VITALS
BODY MASS INDEX: 26.06 KG/M2 | HEIGHT: 61 IN | OXYGEN SATURATION: 97 % | DIASTOLIC BLOOD PRESSURE: 74 MMHG | WEIGHT: 138 LBS | HEART RATE: 63 BPM | RESPIRATION RATE: 16 BRPM | SYSTOLIC BLOOD PRESSURE: 138 MMHG

## 2019-11-25 DIAGNOSIS — I25.10 CORONARY ARTERY DISEASE INVOLVING NATIVE CORONARY ARTERY OF NATIVE HEART WITHOUT ANGINA PECTORIS: Primary | ICD-10-CM

## 2019-11-25 DIAGNOSIS — I35.1 AORTIC VALVE INSUFFICIENCY, ETIOLOGY OF CARDIAC VALVE DISEASE UNSPECIFIED: ICD-10-CM

## 2019-11-25 DIAGNOSIS — I48.0 PAROXYSMAL ATRIAL FIBRILLATION (HCC): ICD-10-CM

## 2019-11-25 DIAGNOSIS — I50.32 CHRONIC DIASTOLIC CONGESTIVE HEART FAILURE (HCC): ICD-10-CM

## 2019-11-25 DIAGNOSIS — I50.32 CHRONIC HEART FAILURE WITH PRESERVED EJECTION FRACTION (HCC): ICD-10-CM

## 2019-11-25 NOTE — PROGRESS NOTES
[facility]  [phone]    HPI: Jh Sheffield, a 80y.o. year-old who presents for follow up regarding her severe MR and diastolic heart failure. Bp is doing well, it is running 130s now. Occasional low BP and that makes her weak. But having more good days than days. Walking every day, more mobile and using her walker. Not sleeping well at night. They set an alarm to get her up in the night to urinate. Not taking her meclizine but has been having more dizziness than usual.   Toy Bon her compression stockings and now off bumex. Still dizzy. Will stop her metoprolol today and see if it helps her dizziness. Will also try to to get her to have 6 glasse of water a day. MV clip was done 4/9/19 and she looks much better. Really getting back to her old self, and color better, breathing better, stamina better, etc.   Son thinks she is doing better, less short of breath, more mobile, more active, she looks better, moving and walking faster, looks more energetic   She denies any PND or orthopnea  Off her O2. Back in [place] and doing well. She has chronic dizziness and feeling unsteady for years  No syncope   No headaches, no chest pain no dyspnea now. Pt's son is with her today. Assessment/Plan:  1. CAD s/p Inferior STEMI 9/11/18 with PTCA/stents to distal RCA (KENNETH x 2)  -symptoms preceding STEMI were palpitations, nausea, dizziness, diaphoresis, maybe some chest heaviness, troponin > 50  -continue plavix, coreg, statin  2. Severe MR - s/p MV clip 4/9/19, doing great now  3. Large left pleural effusion- resolved, now on bumex, cut to 0.5mg daily   4. AI- better on most recent echo  5. Hypotension - hx of HTN, well controlled on coreg and diltiazem   6. HFpEF - BP well controlled, continue bumex 0.5mg daily, may be able to cut it down to . 25mg daily  -compression stockings daily  7.   AF - rate controlled on coreg and diltiazem, continue Eliquis 2.5mg BID, no bleeding, etc.   8.  Dyslipidemia - at goal on simvastatin 40mg daily   9. DNR status in place  10. Dyslipidemia- cont statin    Echo 12/6/18 - LVEF 50 %, no WMA, grade 3 dd, LA severely dilated, MAC with eccentric severe regurgitation that was posteriorly directed, AV sclerosis without stenosis and mod AI, mild TR, PASP mildly increased, dilated IVC, large right pleural effusion, large left pleural effusion.   Head CT 9/18 normal  Inferior STEMI 9/11/18, Cardiac Cath 9/11/18  Thrombectomy of RCA, PTCA/stent of the distal RCA into the PLB with a 2.75 x 12 and 3.0 x 15 mm KENNETH (resolute stent), complicated case Ao 602/45, LVEDP 27, no gradient across AV valve, RCA dominant, totally occluded in distal segment, LM normal, LAD transapical vessel and small caliber normal.  LCX normal.  LVEF 50%, 2+ MR  Echo 4/18 - LVEF 55 %, no WMA,mod dilated LA, MAC with mild MR, AV sclerosis with mod AI, mild TR, PASP 30mmHg, mild PI    Soc Hx: no tobacco use x 10 years, no etoh use   Fam Hx: mother passed at age 80 from heart problems    She  has a past medical history of (HFpEF) heart failure with preserved ejection fraction ([address] (10/17/2018), Allergic rhinitis, Atherosclerotic cardiovascular disease (1999), CHF (congestive heart failure) ([address] (09/11/2018), Chronic kidney disease, Cognitive communication deficit, Contact dermatitis and eczema due to cause, Edema, Environmental allergies, Fatty liver disease, nonalcoholic, GERD (gastroesophageal reflux disease), H/O heart artery stent (09/2018), HCVD (hypertensive cardiovascular disease) (10/17/2018), Heart attack ([address] (09/2018), Heart palpitations (2018), History of PTCA, Hypercholesterolemia (1999), Hypertension (2010), Liver disease, MI (myocardial infarction) ([address] (09/2018), Overactive bladder (08/09/2018), Peripheral neuropathy, Permanent atrial fibrillation (11/05/2018), Senile dementia, without behavioral disturbance ([address] (11/05/2018), Stress incontinence, female (08/09/2018), Thyroid disease, Vertigo, and Vitamin D deficiency. She also has no past medical history of Anemia, Arthritis, Asthma, Autoimmune disease ([facility] Calculus of kidney, Cancer ([facility] Chronic obstructive pulmonary disease ([facility] Chronic pain, Depression, Diabetes ([facility] Headache, History of abuse in adulthood, History of abuse in childhood, Psychotic disorder ([facility] PUD (peptic ulcer disease), Seizures ([facility] Stroke ([facility] Thromboembolus ([facility] or Trauma. Cardiovascular ROS: no chest pain, positive for dyspnea   Respiratory ROS: positive for shortness of breath  Neurological ROS: no TIA or stroke symptoms  All other systems negative except as above. PE  Vitals:    11/25/19 1449   BP: 138/74   Pulse: 63   Resp: 16   SpO2: 97%   Weight: 138 lb (62.6 kg)   Height: 5' 1\" (1.549 m)    Body mass index is 26.07 kg/m².    General appearance - alert, well appearing, and in no distress  Mental status - affect appropriate to mood  Eyes - sclera anicteric, moist mucous membranes  Neck - supple  Lymphatics - not assessed  Chest - CTA bilaterally     Heart - regular rate and irregular rhythm, normal S1, S2, 2/6 VANGIE   Abdomen - soft, nontender, nondistended  Back exam - full range of motion  Neurological - no focal deficit  Musculoskeletal - normal strength  Extremities - peripheral pulses normal, 1+ LE edema bilaterally   Skin- normal coloration  no rashes    Recent Labs:  Lab Results   Component Value Date/Time    Cholesterol, total 159 08/20/2019 12:05 PM    HDL Cholesterol 67 08/20/2019 12:05 PM    LDL, calculated 79 08/20/2019 12:05 PM    Triglyceride 63 08/20/2019 12:05 PM    CHOL/HDL Ratio 3.8 12/17/2009 08:35 AM     Lab Results   Component Value Date/Time    Creatinine 1.04 (H) 10/02/2019 01:35 PM     Lab Results   Component Value Date/Time    BUN 25 10/02/2019 01:35 PM     Lab Results   Component Value Date/Time    Potassium 4.5 10/02/2019 01:35 PM     Lab Results   Component Value Date/Time    Hemoglobin A1c 5.8 (H) 2019 12:05 PM     Lab Results   Component Value Date/Time    HGB 10.7 (L) 2019 12:05 PM     Lab Results   Component Value Date/Time    PLATELET 963  12:05 PM       Reviewed:  Past Medical History:   Diagnosis Date    (HFpEF) heart failure with preserved ejection fraction ([facility] 10/17/2018    acute    Allergic rhinitis     Atherosclerotic cardiovascular disease     CHF (congestive heart failure) ([facility] 2018    Chronic kidney disease     Cognitive communication deficit     Contact dermatitis and eczema due to cause     Edema     Environmental allergies     dizziness-(chronic)    Fatty liver disease, nonalcoholic     GERD (gastroesophageal reflux disease)     H/O heart artery stent 2018    HCVD (hypertensive cardiovascular disease) 10/17/2018    Heart attack ([facility] 2018    Heart palpitations 2018    infrequent     History of PTCA     Hypercholesterolemia     Hypertension     Liver disease     fatty liver    MI (myocardial infarction) ([facility] 2018    Overactive bladder 2018    Peripheral neuropathy     Permanent atrial fibrillation 2018    Senile dementia, without behavioral disturbance ([facility] 2018    Stress incontinence, female 2018    Thyroid disease     Vertigo     Vitamin D deficiency      Social History     Tobacco Use   Smoking Status Former Smoker    Packs/day: 4.00    Types: Cigarettes    Last attempt to quit: 1955    Years since quittin.9   Smokeless Tobacco Never Used     Social History     Substance and Sexual Activity   Alcohol Use No    Alcohol/week: 0.0 - 0.8 standard drinks     Allergies   Allergen Reactions    Brilinta [Ticagrelor] Shortness of Breath    Celecoxib Other (comments)    Codeine Hives    Sulfa (Sulfonamide Antibiotics) Unknown (comments) and Hives    Iodine Unknown (comments)     Other reaction(s):  Other (See Comments)  This was on outside records, pt denies that she has iodine allergy. Able to tolerate seafood, shellfish w/o reaction.  Aricept [Donepezil] Other (comments)     Headaches - noted as intolerance     Darvocet A500 [Propoxyphene N-Acetaminophen] Nausea Only       Current Outpatient Medications   Medication Sig    peg 400-propylene glycol (SYSTANE, PROPYLENE GLYCOL,) 0.4-0.3 % drop Administer 1 Drop to both eyes two (2) times a day.  meclizine (ANTIVERT) 12.5 mg tablet Take 12.5 mg by mouth three (3) times daily as needed (for BPPV).  busPIRone (BUSPAR) 10 mg tablet Take 1 Tab by mouth two (2) times a day.  hydrOXYzine HCl (ATARAX) 25 mg tablet Take 1 Tab by mouth nightly.  vitamin b12-folic acid 5.6-4 mg tab Take 1 Tab by mouth daily.  metoprolol succinate (TOPROL-XL) 25 mg XL tablet Take 0.5 Tabs by mouth nightly.  fluticasone (FLONASE SENSIMIST) 27.5 mcg/actuation nasal spray 1 Spray two (2) times a day for 10 days, THEN 1 Spray daily for 354 days. Indications: inflammation of the nose due to an allergy    acetaminophen (TYLENOL) 325 mg tablet Take 325 mg by mouth every six (6) hours as needed for Pain.  omeprazole (PRILOSEC) 20 mg capsule Take 20 mg by mouth daily.  Calcium-Cholecalciferol, D3, 500 mg(1,250mg) -400 unit tab Take  by mouth.  levothyroxine (SYNTHROID) 25 mcg tablet Take 1.5 Tabs by mouth Daily (before breakfast). Indications: hypothyroidism    clopidogrel (PLAVIX) 75 mg tab Take 75 mg by mouth daily. Indications: treatment to prevent a heart attack    polyethylene glycol (MIRALAX) 17 gram packet Take 17 g by mouth daily.  apixaban (ELIQUIS) 2.5 mg tablet Take 1 Tab by mouth two (2) times a day.  simvastatin (ZOCOR) 40 mg tablet Take 40 mg by mouth nightly.  nitroglycerin (NITROSTAT) 0.4 mg SL tablet Take 0.4 mg by mouth as needed.  diltiazem CD (CARTIA XT) 180 mg ER capsule Take 180 mg by mouth nightly. No current facility-administered medications for this visit.         Kendell Agudelo MD  [facility] [address] [facility] [address], [address] [facility] [address]  [place]  [phone]  [address] [facility] [address] [facility] [address] 62 83    HPI: Fady Boone, a 80y.o. year-old who presents for follow up regarding her severe MR and diastolic heart failure. She is in healthcare now at [facility] says they plan to move her to assisted living but I suggested they hold off on transfer out of healthcare until we make sure she is stable  She reports that her stamina is better  Says she gets dyspnea with walking depending on how fast she is walking  Denies PND and sleeps on 2 pillows  Weight down 2 lbs from the hospital, also reports poor appetite, we talked about eating 3 meals/day  Says she is sleeping well   Denies any chest pain, pressure, tightness  Reports rare palpitations which only last a few seconds and without any high risk features  Says she has very little dizziness now, no syncope  BP low today - 100/60  O2 Sats 90% with ambulation   Says she coughs all the time but it is not productive, no fevers or chills  Son reports she is coughing much less now than she was prior to admission  Patient's son is with her today, she is a poor historian    **After last office visit labs received dated 12/24/18  BUN 15, Cr 0.97, K 3.3, Mg 1.7    Assessment/Plan:  1. CAD s/p Inferior STEMI 9/11/18 with PTCA/stents to distal RCA (KENNETH x 2)  -symptoms preceding STEMI were palpitations, nausea, dizziness, diaphoresis, maybe some chest heaviness, troponin > 50  -continue plavix, coreg, statin  2. Severe MR - seen by valve team during admission, no plans for valve repair, etc at this time   -will repeat TTE in 1 month to reassess severity of MR and follow up with Dr. Shy Tsai at that time   3. Large left pleural effusion on TTE, moderate on CXR on admission - will repeat CXR now to reassess, continue bumex  4. AI - moderate by TTE   5.   Hypotension - hx of HTN, advised her to reduce her coreg to 3.125mg BID and continue other medications for now, will need to watch BP closely  6. HFpEF - BP controlled, continue bumex, will check BMP and magnesium level prior to her return appointment in 1 month  7. AF - rate controlled on coreg 6.25mg BID and diltiazem 180mg daily, reducing coreg dose as above for hypotension, continue Eliquis 2.5mg BID for anticoagulation  8. Dyslipidemia - LDL 56 on simvastatin 40mg daily   -Lipids 9/18 - , TG 81, , HDL 50  9. CHEMA post cardiac cath - now creatinine normal  10. DNR status in place    Echo 12/6/18 - LVEF 50 %, no WMA, grade 3 dd, LA severely dilated, MAC with eccentric severe regurgitation that was posteriorly directed, AV sclerosis without stenosis and mod AI, mild TR, PASP mildly increased, dilated IVC, large right pleural effusion, large left pleural effusion.   Head CT 9/18 normal  Inferior STEMI 9/11/18, Cardiac Cath 9/11/18  Thrombectomy of RCA, PTCA/stent of the distal RCA into the PLB with a 2.75 x 12 and 3.0 x 15 mm KENNETH (resolute stent), complicated case Ao 905/99, LVEDP 27, no gradient across AV valve, RCA dominant, totally occluded in distal segment, LM normal, LAD transapical vessel and small caliber normal.  LCX normal.  LVEF 50%, 2+ MR  Echo 4/18 - LVEF 55 %, no WMA,mod dilated LA, MAC with mild MR, AV sclerosis with mod AI, mild TR, PASP 30mmHg, mild PI    Soc Hx: no tobacco use x 10 years, no etoh use   Fam Hx: mother passed at age 80 from heart problems    She  has a past medical history of (HFpEF) heart failure with preserved ejection fraction ([address] (10/17/2018), Allergic rhinitis, Atherosclerotic cardiovascular disease (1999), CHF (congestive heart failure) ([address] (09/11/2018), Chronic kidney disease, Cognitive communication deficit, Contact dermatitis and eczema due to cause, Edema, Environmental allergies, Fatty liver disease, nonalcoholic, GERD (gastroesophageal reflux disease), H/O heart artery stent (09/2018), HCVD (hypertensive cardiovascular disease) (10/17/2018), Heart attack ([address] (09/2018), Heart palpitations (2018), History of PTCA, Hypercholesterolemia (1999), Hypertension (2010), Liver disease, MI (myocardial infarction) ([address] (09/2018), Overactive bladder (08/09/2018), Peripheral neuropathy, Permanent atrial fibrillation (11/05/2018), Senile dementia, without behavioral disturbance ([address] (11/05/2018), Stress incontinence, female (08/09/2018), Thyroid disease, Vertigo, and Vitamin D deficiency. She also has no past medical history of Anemia, Arthritis, Asthma, Autoimmune disease ([address] Calculus of kidney, Cancer ([address] Chronic obstructive pulmonary disease ([address] Chronic pain, Depression, Diabetes ([address] Headache, History of abuse in adulthood, History of abuse in childhood, Psychotic disorder ([address] PUD (peptic ulcer disease), Seizures ([address] Stroke ([address] Thromboembolus ([address] or Trauma. Cardiovascular ROS: no chest pain, positive for dyspnea   Respiratory ROS: positive for cough, shortness of breath  Neurological ROS: no TIA or stroke symptoms  All other systems negative except as above. PE  Vitals:    11/25/19 1449   BP: 138/74   Pulse: 63   Resp: 16   SpO2: 97%   Weight: 138 lb (62.6 kg)   Height: 5' 1\" (1.549 m)    Body mass index is 26.07 kg/m².    General appearance - alert, well appearing, and in no distress  Mental status - affect appropriate to mood  Eyes - sclera anicteric, moist mucous membranes  Neck - supple  Lymphatics - not assessed  Chest - diminished base on left side, right lung clear    Heart - normal rate, irregular rhythm, normal S1, S2, no murmurs, rubs, clicks or gallops  Abdomen - soft, nontender, nondistended  Back exam - full range of motion  Neurological - no focal deficit  Musculoskeletal - normal strength  Extremities - peripheral pulses normal, trace LE edema  Skin - normal coloration  no rashes    Recent Labs:  Lab Results   Component Value Date/Time    Cholesterol, total 159 08/20/2019 12:05 PM    HDL Cholesterol 67 08/20/2019 12:05 PM    LDL, calculated 79 08/20/2019 12:05 PM    Triglyceride 63 08/20/2019 12:05 PM    CHOL/HDL Ratio 3.8 12/17/2009 08:35 AM     Lab Results   Component Value Date/Time    Creatinine 1.04 (H) 10/02/2019 01:35 PM     Lab Results   Component Value Date/Time    BUN 25 10/02/2019 01:35 PM     Lab Results   Component Value Date/Time    Potassium 4.5 10/02/2019 01:35 PM     Lab Results   Component Value Date/Time    Hemoglobin A1c 5.8 (H) 08/20/2019 12:05 PM     Lab Results   Component Value Date/Time    HGB 10.7 (L) 08/20/2019 12:05 PM     Lab Results   Component Value Date/Time    PLATELET 423 09/13/0410 12:05 PM       Reviewed:  Past Medical History:   Diagnosis Date    (HFpEF) heart failure with preserved ejection fraction ([facility] 10/17/2018    acute    Allergic rhinitis     Atherosclerotic cardiovascular disease 1999    CHF (congestive heart failure) ([facility] 09/11/2018    Chronic kidney disease     Cognitive communication deficit     Contact dermatitis and eczema due to cause     Edema     Environmental allergies     dizziness-(chronic)    Fatty liver disease, nonalcoholic     GERD (gastroesophageal reflux disease)     H/O heart artery stent 09/2018    HCVD (hypertensive cardiovascular disease) 10/17/2018    Heart attack ([address] 09/2018    Heart palpitations 2018    infrequent     History of PTCA     Hypercholesterolemia 1999    Hypertension 2010    Liver disease     fatty liver    MI (myocardial infarction) ([address] 09/2018    Overactive bladder 08/09/2018    Peripheral neuropathy     Permanent atrial fibrillation 11/05/2018    Senile dementia, without behavioral disturbance ([address] 11/05/2018    Stress incontinence, female 08/09/2018    Thyroid disease     Vertigo     Vitamin D deficiency      Social History     Tobacco Use   Smoking Status Former Smoker    Packs/day: 4.00    Types: Cigarettes    Last attempt to quit: 1/1/1955    Years since quittin.9   Smokeless Tobacco Never Used     Social History     Substance and Sexual Activity   Alcohol Use No    Alcohol/week: 0.0 - 0.8 standard drinks     Allergies   Allergen Reactions    Brilinta [Ticagrelor] Shortness of Breath    Celecoxib Other (comments)    Codeine Hives    Sulfa (Sulfonamide Antibiotics) Unknown (comments) and Hives    Iodine Unknown (comments)     Other reaction(s): Other (See Comments)  This was on outside records, pt denies that she has iodine allergy. Able to tolerate seafood, shellfish w/o reaction.  Aricept [Donepezil] Other (comments)     Headaches - noted as intolerance     Darvocet A500 [Propoxyphene N-Acetaminophen] Nausea Only       Current Outpatient Medications   Medication Sig    peg 400-propylene glycol (SYSTANE, PROPYLENE GLYCOL,) 0.4-0.3 % drop Administer 1 Drop to both eyes two (2) times a day.  meclizine (ANTIVERT) 12.5 mg tablet Take 12.5 mg by mouth three (3) times daily as needed (for BPPV).  busPIRone (BUSPAR) 10 mg tablet Take 1 Tab by mouth two (2) times a day.  hydrOXYzine HCl (ATARAX) 25 mg tablet Take 1 Tab by mouth nightly.  vitamin b12-folic acid 7.7-0 mg tab Take 1 Tab by mouth daily.  metoprolol succinate (TOPROL-XL) 25 mg XL tablet Take 0.5 Tabs by mouth nightly.  fluticasone (FLONASE SENSIMIST) 27.5 mcg/actuation nasal spray 1 Spray two (2) times a day for 10 days, THEN 1 Spray daily for 354 days. Indications: inflammation of the nose due to an allergy    acetaminophen (TYLENOL) 325 mg tablet Take 325 mg by mouth every six (6) hours as needed for Pain.  omeprazole (PRILOSEC) 20 mg capsule Take 20 mg by mouth daily.  Calcium-Cholecalciferol, D3, 500 mg(1,250mg) -400 unit tab Take  by mouth.  levothyroxine (SYNTHROID) 25 mcg tablet Take 1.5 Tabs by mouth Daily (before breakfast). Indications: hypothyroidism    clopidogrel (PLAVIX) 75 mg tab Take 75 mg by mouth daily.  Indications: treatment to prevent a heart attack    polyethylene glycol (MIRALAX) 17 gram packet Take 17 g by mouth daily.  apixaban (ELIQUIS) 2.5 mg tablet Take 1 Tab by mouth two (2) times a day.  simvastatin (ZOCOR) 40 mg tablet Take 40 mg by mouth nightly.  nitroglycerin (NITROSTAT) 0.4 mg SL tablet Take 0.4 mg by mouth as needed.  diltiazem CD (CARTIA XT) 180 mg ER capsule Take 180 mg by mouth nightly. No current facility-administered medications for this visit.         Faviola Arellano MD  [facility] [address], [address] [facility] [place] [address]  [address] [facility]  [phone]

## 2019-11-25 NOTE — Clinical Note
Happy Thanksgiving! She looks so great! I'm stopping her metoprolol today. Please check on her Bp in a few weeks. Ill see her in 6 mos if all goes well!

## 2019-11-25 NOTE — PROGRESS NOTES
Reason for Visit   Malika Viveros is a 80 y.o. female patient who presents today for :  Chief Complaint   Patient presents with    Results     Patient being seen tore view lab results and weights. Treatment Plan / Follow Up: Follow-up and Dispositions    · Return in about 3 months (around 1/11/2020) for for routine lab draw to monitor chronic disease. Diagnosis:        ICD-10-CM ICD-9-CM    1. Postural dizziness R42 780.4 meclizine (ANTIVERT) 12.5 mg tablet   2. Weight gain R63.5 783.1 meclizine (ANTIVERT) 12.5 mg tablet   3. Hyponatremia E87.1 276.1 meclizine (ANTIVERT) 12.5 mg tablet   4. Hypochloremia E87.8 276.9 meclizine (ANTIVERT) 12.5 mg tablet   5. OAB (overactive bladder) N32.81 596.51    6. Mixed stress and urge urinary incontinence N39.46 788.33    7. Chronic diastolic congestive heart failure (HCC) I50.32 428.32      428.0         Orders Placed This Encounter    peg 400-propylene glycol (SYSTANE, PROPYLENE GLYCOL,) 0.4-0.3 % drop     Sig: Administer 1 Drop to both eyes two (2) times a day.  meclizine (ANTIVERT) 12.5 mg tablet     Sig: Take 12.5 mg by mouth three (3) times daily as needed (for BPPV). Objective:     Vitals:    10/29/19 1236   BP: 130/66   Pulse: 78   Resp: 18   SpO2: 98%   Weight: 132 lb 12.8 oz (60.2 kg)   Height: 5' 1\" (1.549 m)     Wt Readings from Last 3 Encounters:   11/25/19 138 lb (62.6 kg)   10/29/19 132 lb 12.8 oz (60.2 kg)   09/19/19 127 lb 12.8 oz (58 kg)     BP Readings from Last 3 Encounters:   11/25/19 138/74   10/29/19 130/66   09/19/19 119/60     Review of Systems   Constitutional: Positive for fatigue. Negative for activity change, appetite change, chills and fever. HENT: Negative for congestion, dental problem, hearing loss, postnasal drip, sinus pressure, sneezing, sore throat and trouble swallowing. Eyes: Negative for discharge, redness and visual disturbance.    Respiratory: Negative for apnea, cough, chest tightness, shortness of breath and wheezing. Cardiovascular: Negative for chest pain, palpitations and leg swelling. Gastrointestinal: Negative for abdominal distention, abdominal pain, blood in stool, constipation, diarrhea, nausea and vomiting. Endocrine: Negative for polydipsia, polyphagia and polyuria. Genitourinary: Negative for flank pain, frequency and urgency. Musculoskeletal: Negative for arthralgias, gait problem, joint swelling and neck pain. Skin: Negative for color change, pallor, rash and wound. Allergic/Immunologic: Negative for environmental allergies and food allergies. Neurological: Negative for dizziness, tremors, weakness, light-headedness, numbness and headaches. Hematological: Negative for adenopathy. Psychiatric/Behavioral: Negative for agitation, confusion and sleep disturbance. The patient is not nervous/anxious. Physical Exam  Vitals signs and nursing note reviewed. Constitutional:       General: She is not in acute distress. Appearance: Normal appearance. She is well-developed and well-groomed. She is not diaphoretic. HENT:      Head: Normocephalic and atraumatic. Right Ear: Hearing, tympanic membrane, ear canal and external ear normal.      Left Ear: Hearing, tympanic membrane, ear canal and external ear normal.      Nose: Nose normal.      Mouth/Throat:      Mouth: Mucous membranes are not pale, not dry and not cyanotic. No oral lesions. Pharynx: Uvula midline. No posterior oropharyngeal erythema or uvula swelling. Eyes:      General: Lids are normal. Lids are everted, no foreign bodies appreciated. Conjunctiva/sclera: Conjunctivae normal.      Pupils: Pupils are equal, round, and reactive to light. Neck:      Musculoskeletal: Full passive range of motion without pain, normal range of motion and neck supple. Thyroid: No thyromegaly. Vascular: No carotid bruit, hepatojugular reflux or JVD.       Trachea: Trachea normal.   Cardiovascular:      Rate and Rhythm: Normal rate and regular rhythm. Occasional extrasystoles are present. Pulses: Normal pulses. Heart sounds: Normal heart sounds, S1 normal and S2 normal. No murmur. No friction rub. No gallop. Comments: No extremity edema is present during today's exam.   Pulmonary:      Effort: Pulmonary effort is normal.      Breath sounds: Normal breath sounds. Abdominal:      General: Bowel sounds are normal.      Palpations: Abdomen is soft. Tenderness: There is no tenderness. Skin:     General: Skin is warm and dry. Coloration: Skin is not pale. Findings: No bruising or rash. Nails: There is no clubbing. Neurological:      Mental Status: She is alert and oriented to person, place, and time. Cranial Nerves: Cranial nerves are intact. Sensory: Sensation is intact. Motor: No weakness or abnormal muscle tone. Coordination: Coordination normal.      Gait: Gait is intact. Gait normal.      Deep Tendon Reflexes: Reflexes are normal and symmetric. Psychiatric:         Mood and Affect: Mood and affect normal.         Behavior: Behavior is cooperative. Cognition and Memory: Memory normal.         Judgment: Judgment normal.      Comments: Baseline mood and affect unchanged from normal.        Subjective: Allergies   Allergen Reactions    Brilinta [Ticagrelor] Shortness of Breath    Celecoxib Other (comments)    Codeine Hives    Sulfa (Sulfonamide Antibiotics) Unknown (comments) and Hives    Iodine Unknown (comments)     Other reaction(s): Other (See Comments)  This was on outside records, pt denies that she has iodine allergy. Able to tolerate seafood, shellfish w/o reaction.  Aricept [Donepezil] Other (comments)     Headaches - noted as intolerance     Darvocet A500 [Propoxyphene N-Acetaminophen] Nausea Only     Prior to Admission medications    Medication Sig Start Date End Date Taking?  Authorizing Provider   peg 400-propylene glycol (SYSTANE, PROPYLENE GLYCOL,) 0.4-0.3 % drop Administer 1 Drop to both eyes two (2) times a day. Yes Provider, Historical   meclizine (ANTIVERT) 12.5 mg tablet Take 12.5 mg by mouth three (3) times daily as needed (for BPPV). Yes Provider, Historical   busPIRone (BUSPAR) 10 mg tablet Take 1 Tab by mouth two (2) times a day. 9/26/19  Yes Janneth Levy NP   hydrOXYzine HCl (ATARAX) 25 mg tablet Take 1 Tab by mouth nightly. 9/26/19  Yes Janneth Levy NP   vitamin r43-[id number] acid 2.6-5 mg tab Take 1 Tab by mouth daily. Yes Provider, Historical   metoprolol succinate (TOPROL-XL) 25 mg XL tablet Take 0.5 Tabs by mouth nightly. 8/21/19 11/25/19 Yes Janneth Levy NP   fluticasone (FLONASE SENSIMIST) 27.5 mcg/actuation nasal spray 1 Spray two (2) times a day for 10 days, THEN 1 Spray daily for 354 days. Indications: inflammation of the nose due to an allergy 8/20/19 8/18/20 Yes Janneth Levy NP   acetaminophen (TYLENOL) 325 mg tablet Take 325 mg by mouth every six (6) hours as needed for Pain. Yes Provider, Historical   omeprazole (PRILOSEC) 20 mg capsule Take 20 mg by mouth daily. Yes Provider, Historical   Calcium-Cholecalciferol, D3, 500 mg(1,250mg) -400 unit tab Take  by mouth. Yes Provider, Historical   levothyroxine (SYNTHROID) 25 mcg tablet Take 1.5 Tabs by mouth Daily (before breakfast). Indications: hypothyroidism 3/27/19  Yes Janneth Levy NP   clopidogrel (PLAVIX) 75 mg tab Take 75 mg by mouth daily. Indications: treatment to prevent a heart attack   Yes Provider, Historical   polyethylene glycol (MIRALAX) 17 gram packet Take 17 g by mouth daily. Yes Provider, Historical   apixaban (ELIQUIS) 2.5 mg tablet Take 1 Tab by mouth two (2) times a day. 11/30/18  Yes Janneth Levy NP   simvastatin (ZOCOR) 40 mg tablet Take 40 mg by mouth nightly. 11/26/18  Yes Provider, Historical   nitroglycerin (NITROSTAT) 0.4 mg SL tablet Take 0.4 mg by mouth as needed.  11/26/18  Yes Provider, Historical   diltiazem CD (CARTIA XT) 180 mg ER capsule Take 180 mg by mouth nightly. Yes Provider, Historical     Past Medical History:   Diagnosis Date    (HFpEF) heart failure with preserved ejection fraction ([facility] [address] 10/17/2018    acute    Allergic rhinitis     Atherosclerotic cardiovascular disease 1999    CHF (congestive heart failure) ([facility] [address] 09/11/2018    Chronic kidney disease     Cognitive communication deficit     Contact dermatitis and eczema due to cause     Edema     Environmental allergies     dizziness-(chronic)    Fatty liver disease, nonalcoholic     GERD (gastroesophageal reflux disease)     H/O heart artery stent 09/2018    HCVD (hypertensive cardiovascular disease) 10/17/2018    Heart attack ([address] 09/2018    Heart palpitations 2018    infrequent     History of PTCA     Hypercholesterolemia 1999    Hypertension 2010    Liver disease     fatty liver    MI (myocardial infarction) ([facility] [address] 09/2018    Overactive bladder 08/09/2018    Peripheral neuropathy     Permanent atrial fibrillation 11/05/2018    Senile dementia, without behavioral disturbance ([facility] [address] 11/05/2018    Stress incontinence, female 08/09/2018    Thyroid disease     Vertigo     Vitamin D deficiency      Past Surgical History:   Procedure Laterality Date    BREAST SURGERY PROCEDURE UNLISTED      breast cyst Gael Rosa)    CARDIAC SURG PROCEDURE UNLIST  8/1999    + stress thalassemia; neg. cath., (4/02) neg.  Holter    HX APPENDECTOMY      HX BREAST BIOPSY Left 1998    neg    HX CYST INCISION AND DRAINAGE Right years ago    neg    HX DILATION AND CURETTAGE      x5    HX GYN      D&C (x5), ALLEY/BSO (12/01-Juliann/Jerome), Provera intolerance (bleeding), endometrial Bx annually    HX PTCA  09/11/2018    stent distal RCA into PLwith KENNETH x 2    HX ALLEY AND BSO  12/01    uJliann/Jerome      Social History     Tobacco Use    Smoking status: Former Smoker     Packs/day: 4.00     Types: Cigarettes     Last attempt to quit: 1955     Years since quittin.9    Smokeless tobacco: Never Used   Substance Use Topics    Alcohol use: No     Alcohol/week: 0.0 - 0.8 standard drinks    Drug use: No      Family History   Problem Relation Age of Onset    Diabetes Mother     Hypertension Mother     Heart Failure Mother         CHF ( @ 80)   Zeina Alzheimer Mother     Cancer Father         lung ( @ 77)   Zeina Alzheimer Father     No Known Problems Son     No Known Problems Son     Ovarian Cancer Sister         hysterectomy    Breast Cancer Sister 28        mastectomy    Cancer Sister         breast and ovarian    Alzheimer Sister     Breast Problems Sister         breast cyst    Cataract Sister     Hypertension Sister     Diabetes Brother      Functional Assessment:   ADL FUNCTIONALITY:  ADL Assessment 2019   Feeding yourself No Help Needed   Getting from bed to chair No Help Needed   Getting dressed No Help Needed   Bathing or showering Help Needed   Walk across the room (includes cane/walker) Help Needed   Using the telphone Help Needed   Taking your medications Help Needed   Preparing meals Help Needed   Managing money (expenses/bills) Help Needed   Moderately strenuous housework (laundry) Help Needed   Shopping for personal items (toiletries/medicines) Help Needed   Shopping for groceries Help Needed   Driving Help Needed   Climbing a flight of stairs Help Needed   Getting to places beyond walking distances Help Needed     DEPRESSION SCREENING:   3 most recent PHQ Screens 10/29/2019   PHQ Not Done -   Little interest or pleasure in doing things Not at all   Feeling down, depressed, irritable, or hopeless Not at all   Total Score PHQ 2 0   Trouble falling or staying asleep, or sleeping too much -   Feeling tired or having little energy -   Poor appetite, weight loss, or overeating -   Feeling bad about yourself - or that you are a failure or have let yourself or your family down -   Trouble concentrating on things such as school, work, reading, or watching TV -   Moving or speaking so slowly that other people could have noticed; or the opposite being so fidgety that others notice -   Thoughts of being better off dead, or hurting yourself in some way -   PHQ 9 Score -   How difficult have these problems made it for you to do your work, take care of your home and get along with others -      [address] Exam 2/11/2019 1/18/2018   What is the Year 0 1   What is the Season 0 1   What is the Date 0 1   What is the Day 1 1   What is the Month 0 1   Where are we State 1 1   Where are we Country 1 1   Where are we [place] 1 1   Where are we Floor 0 1   Name three objects, then ask the patient to say them 3 3   Serial sevens Subtract 7 from 100 in increments 0 3   Ask for the three objects repeated above 0 3   Name a pencil 1 1   Name a watch 1 1   Have the patient repeat this phrase \"No ifs, ands, or buts\" 1 1   Three stage command: Take the paper in your right hand 1 1   Fold the paper in half 1 1   Put the paper on the floor 1 1   Read and obey the following: CLOSE YOUR EYES 0 1   Have the patient write a sentence 0 1   Have the patient copy a figure 0 1   Mini Mental Score 13 28     FALL RISK:   Fall Risk Assessment, last 12 mths 9/18/2019   Able to walk? Yes   Fall in past 12 months? Yes   Fall with injury? Yes   Number of falls in past 12 months 3   Fall Risk Score 4      ABUSE SCREEN:   Abuse Screening Questionnaire 2/11/2019   Do you ever feel afraid of your partner? N   Are you in a relationship with someone who physically or mentally threatens you? N   Is it safe for you to go home?  Y      Advance Care Planning 12/6/2018   Primary Decision Maker Name -   Primary Decision Maker Phone Number -   Primary Decision Maker Relationship to Patient -   Confirm Advance Directive Yes, on file   Does the patient have other document types -     CHANGES IN TREATMENT:    The following treatment modalities have been discontinued by the provider today:   There are no discontinued medications. MOST RECENT LABORATORY RESULTS:      Clinical Support on 10/02/2019   Component Date Value Ref Range Status    Magnesium 10/02/2019 2.1  1.6 - 2.3 mg/dL Final    Glucose 10/02/2019 112* 65 - 99 mg/dL Final    BUN 10/02/2019 25  10 - 36 mg/dL Final    Creatinine 10/02/2019 1.04* 0.57 - 1.00 mg/dL Final    GFR est non-AA 10/02/2019 47* >59 mL/min/1.73 Final    GFR est AA 10/02/2019 54* >59 mL/min/1.73 Final    BUN/Creatinine ratio 10/02/2019 24  12 - 28 Final    Sodium 10/02/2019 131* 134 - 144 mmol/L Final    Potassium 10/02/2019 4.5  3.5 - 5.2 mmol/L Final    Chloride 10/02/2019 93* 96 - 106 mmol/L Final    CO2 10/02/2019 22  20 - 29 mmol/L Final    Calcium 10/02/2019 9.4  8.7 - 10.3 mg/dL Final   Orders Only on 09/03/2019   Component Date Value Ref Range Status    Glucose 09/04/2019 75  65 - 99 mg/dL Final    Comment: Specimen received in contact with cells. No visible hemolysis  present. However GLUC may be decreased and K increased. Clinical  correlation indicated.  BUN 09/04/2019 18  10 - 36 mg/dL Final    Creatinine 09/04/2019 1.07* 0.57 - 1.00 mg/dL Final    GFR est non-AA 09/04/2019 45* >59 mL/min/1.73 Final    GFR est AA 09/04/2019 52* >59 mL/min/1.73 Final    BUN/Creatinine ratio 09/04/2019 17  12 - 28 Final    Sodium 09/04/2019 136  134 - 144 mmol/L Final    Potassium 09/04/2019 4.8  3.5 - 5.2 mmol/L Final    Comment: Specimen received in contact with cells. No visible hemolysis  present. However GLUC may be decreased and K increased. Clinical  correlation indicated.       Chloride 09/04/2019 96  96 - 106 mmol/L Final    CO2 09/04/2019 22  20 - 29 mmol/L Final    Calcium 09/04/2019 9.5  8.7 - 10.3 mg/dL Final    Magnesium 09/04/2019 1.9  1.6 - 2.3 mg/dL Final   Orders Only on 08/26/2019   Component Date Value Ref Range Status    Glucose 08/26/2019 66  65 - 99 mg/dL Final    BUN 08/26/2019 23  10 - 36 mg/dL Final    Creatinine 08/26/2019 1.16* 0.57 - 1.00 mg/dL Final    GFR est non-AA 08/26/2019 41* >59 mL/min/1.73 Final    GFR est AA 08/26/2019 48* >59 mL/min/1.73 Final    BUN/Creatinine ratio 08/26/2019 20  12 - 28 Final    Sodium 08/26/2019 134  134 - 144 mmol/L Final    Potassium 08/26/2019 4.6  3.5 - 5.2 mmol/L Final    Chloride 08/26/2019 96  96 - 106 mmol/L Final    CO2 08/26/2019 22  20 - 29 mmol/L Final    Calcium 08/26/2019 9.3  8.7 - 10.3 mg/dL Final   Office Visit on 08/22/2019   Component Date Value Ref Range Status    Glucose 08/22/2019 96  65 - 99 mg/dL Final    Specimen received hemolyzed. Clinical correlation indicated.  BUN 08/22/2019 20  10 - 36 mg/dL Final    Creatinine 08/22/2019 0.93  0.57 - 1.00 mg/dL Final    GFR est non-AA 08/22/2019 54* >59 mL/min/1.73 Final    GFR est AA 08/22/2019 62  >59 mL/min/1.73 Final    BUN/Creatinine ratio 08/22/2019 22  12 - 28 Final    Sodium 08/22/2019 129* 134 - 144 mmol/L Final    Potassium 08/22/2019 5.1  3.5 - 5.2 mmol/L Final    Specimen received hemolyzed. Clinical correlation indicated.     Chloride 08/22/2019 94* 96 - 106 mmol/L Final    CO2 08/22/2019 21  20 - 29 mmol/L Final    Calcium 08/22/2019 9.1  8.7 - 10.3 mg/dL Final   Office Visit on 08/20/2019   Component Date Value Ref Range Status    WBC 08/20/2019 5.9  3.4 - 10.8 x10E3/uL Final    RBC 08/20/2019 3.65* 3.77 - 5.28 x10E6/uL Final    HGB 08/20/2019 10.7* 11.1 - 15.9 g/dL Final    HCT 08/20/2019 31.7* 34.0 - 46.6 % Final    MCV 08/20/2019 87  79 - 97 fL Final    MCH 08/20/2019 29.3  26.6 - 33.0 pg Final    MCHC 08/20/2019 33.8  31.5 - 35.7 g/dL Final    RDW 08/20/2019 12.4  12.3 - 15.4 % Final    PLATELET 36/52/8896 892  150 - 450 x10E3/uL Final    NEUTROPHILS 08/20/2019 61  Not Estab. % Final    Lymphocytes 08/20/2019 21  Not Estab. % Final    MONOCYTES 08/20/2019 14  Not Estab. % Final    EOSINOPHILS 08/20/2019 3  Not Estab. % Final    BASOPHILS 08/20/2019 1  Not Estab. % Final    ABS. NEUTROPHILS 08/20/2019 3.7  1.4 - 7.0 x10E3/uL Final    Abs Lymphocytes 08/20/2019 1.2  0.7 - 3.1 x10E3/uL Final    ABS. MONOCYTES 08/20/2019 0.8  0.1 - 0.9 x10E3/uL Final    ABS. EOSINOPHILS 08/20/2019 0.2  0.0 - 0.4 x10E3/uL Final    ABS. BASOPHILS 08/20/2019 0.0  0.0 - 0.2 x10E3/uL Final    IMMATURE GRANULOCYTES 08/20/2019 0  Not Estab. % Final    ABS. IMM. GRANS. 08/20/2019 0.0  0.0 - 0.1 x10E3/uL Final    Glucose 08/20/2019 87  65 - 99 mg/dL Final    BUN 08/20/2019 23  10 - 36 mg/dL Final    Creatinine 08/20/2019 1.08* 0.57 - 1.00 mg/dL Final    GFR est non-AA 08/20/2019 45* >59 mL/min/1.73 Final    GFR est AA 08/20/2019 52* >59 mL/min/1.73 Final    BUN/Creatinine ratio 08/20/2019 21  12 - 28 Final    Sodium 08/20/2019 130* 134 - 144 mmol/L Final    Potassium 08/20/2019 5.2  3.5 - 5.2 mmol/L Final    Chloride 08/20/2019 91* 96 - 106 mmol/L Final    CO2 08/20/2019 21  20 - 29 mmol/L Final    Calcium 08/20/2019 9.4  8.7 - 10.3 mg/dL Final    Protein, total 08/20/2019 7.5  6.0 - 8.5 g/dL Final    Albumin 08/20/2019 4.5  3.2 - 4.6 g/dL Final    GLOBULIN, TOTAL 08/20/2019 3.0  1.5 - 4.5 g/dL Final    A-G Ratio 08/20/2019 1.5  1.2 - 2.2 Final    Bilirubin, total 08/20/2019 0.3  0.0 - 1.2 mg/dL Final    Alk.  phosphatase 08/20/2019 119* 39 - 117 IU/L Final    AST (SGOT) 08/20/2019 35  0 - 40 IU/L Final    ALT (SGPT) 08/20/2019 21  0 - 32 IU/L Final    GGT 08/20/2019 53  0 - 60 IU/L Final    TSH 08/20/2019 3.040  0.450 - 4.500 uIU/mL Final    T4, Total 08/20/2019 7.6  4.5 - 12.0 ug/dL Final    T3 Uptake 08/20/2019 28  24 - 39 % Final    Free Thyroxine Index (FTI) 08/20/2019 2.1  1.2 - 4.9 Final    5' Nucleotidase 08/20/2019 4  0 - 10 IU/L Final    Hemoglobin A1c 08/20/2019 5.8* 4.8 - 5.6 % Final    Comment:          Prediabetes: 5.7 - 6.4           Diabetes: >6.4           Glycemic control for adults with diabetes: <7.0      Estimated average glucose 08/20/2019 120  mg/dL Final    Cholesterol, total 08/20/2019 159  100 - 199 mg/dL Final    Triglyceride 08/20/2019 63  0 - 149 mg/dL Final    HDL Cholesterol 08/20/2019 67  >39 mg/dL Final    VLDL, calculated 08/20/2019 13  5 - 40 mg/dL Final    LDL, calculated 08/20/2019 79  0 - 99 mg/dL Final    Magnesium 08/20/2019 2.1  1.6 - 2.3 mg/dL Final     Results for orders placed or performed in visit on 10/02/19   MAGNESIUM   Result Value Ref Range    Magnesium 2.1 1.6 - 2.3 mg/dL   METABOLIC PANEL, BASIC   Result Value Ref Range    Glucose 112 (H) 65 - 99 mg/dL    BUN 25 10 - 36 mg/dL    Creatinine 1.04 (H) 0.57 - 1.00 mg/dL    GFR est non-AA 47 (L) >59 mL/min/1.73    GFR est AA 54 (L) >59 mL/min/1.73    BUN/Creatinine ratio 24 12 - 28    Sodium 131 (L) 134 - 144 mmol/L    Potassium 4.5 3.5 - 5.2 mmol/L    Chloride 93 (L) 96 - 106 mmol/L    CO2 22 20 - 29 mmol/L    Calcium 9.4 8.7 - 10.3 mg/dL      Disclaimer:   Ms. Abi Jaquez has been advised to call or return to our office if symptoms worsen/change/persist. We as a care team including the patient; discussed expected course/resolution/complications of diagnosis in detail today. Medication risks/benefits/costs/interactions/alternatives discussed Donna Rothman was given an after visit summary which includes diagnoses, current medications, & vitals. Abi Jaquez expressed understanding with the diagnosis and plan.

## 2020-01-06 ENCOUNTER — OFFICE VISIT (OUTPATIENT)
Dept: GERIATRIC MEDICINE | Age: 85
End: 2020-01-06

## 2020-01-06 VITALS
HEIGHT: 61 IN | BODY MASS INDEX: 27.11 KG/M2 | HEART RATE: 63 BPM | OXYGEN SATURATION: 97 % | TEMPERATURE: 97.7 F | DIASTOLIC BLOOD PRESSURE: 64 MMHG | RESPIRATION RATE: 18 BRPM | SYSTOLIC BLOOD PRESSURE: 130 MMHG | WEIGHT: 143.6 LBS

## 2020-01-06 DIAGNOSIS — R05.9 COUGH: Primary | ICD-10-CM

## 2020-01-06 RX ORDER — GUAIFENESIN 600 MG/1
600 TABLET, EXTENDED RELEASE ORAL 2 TIMES DAILY
Qty: 14 TAB | Refills: 0 | Status: SHIPPED | OUTPATIENT
Start: 2020-01-06 | End: 2020-01-13

## 2020-01-06 NOTE — PROGRESS NOTES
Reason for Visit   Dianne Omer is a 80 y.o. female patient who presents today for : non productive cough and sneezing x 2 weeks. Denies fevers. Denies sore thoat. Occasional HA after coughing spell. Denies any other sx's. Unable to bring up sputum, feels it hung up in her throat. Chief Complaint   Patient presents with    Cough     Patient here for non productive cough, sore throat, headache     Past Medical History     Past Medical History:   Diagnosis Date    (HFpEF) heart failure with preserved ejection fraction ([address] 10/17/2018    acute    Allergic rhinitis     Atherosclerotic cardiovascular disease 1999    CHF (congestive heart failure) ([address] 09/11/2018    Chronic kidney disease     Cognitive communication deficit     Contact dermatitis and eczema due to cause     Edema     Environmental allergies     dizziness-(chronic)    Fatty liver disease, nonalcoholic     GERD (gastroesophageal reflux disease)     H/O heart artery stent 09/2018    HCVD (hypertensive cardiovascular disease) 10/17/2018    Heart attack ([address] 09/2018    Heart palpitations 2018    infrequent     History of PTCA     Hypercholesterolemia 1999    Hypertension 2010    Liver disease     fatty liver    MI (myocardial infarction) ([address] 09/2018    Overactive bladder 08/09/2018    Peripheral neuropathy     Permanent atrial fibrillation 11/05/2018    Senile dementia, without behavioral disturbance ([address] 11/05/2018    Stress incontinence, female 08/09/2018    Thyroid disease     Vertigo     Vitamin D deficiency      Past Surgical History:   Procedure Laterality Date    BREAST SURGERY PROCEDURE UNLISTED      breast cyst Luis Antonio Franco)    CARDIAC SURG PROCEDURE UNLIST  8/1999    + stress thalassemia; neg. cath., (4/02) neg.  Holter    HX APPENDECTOMY      HX BREAST BIOPSY Left 1998    neg    HX CYST INCISION AND DRAINAGE Right years ago    neg    HX DILATION AND CURETTAGE      x5    HX GYN      D&C (x5), ALLEY/BSO (-Juliann/Jerome), Provera intolerance (bleeding), endometrial Bx annually    HX PTCA  2018    stent distal RCA into PLwith KENNETH x 2    HX ALLEY AND BSO      Juliann/Jerome      Social History     Tobacco Use    Smoking status: Former Smoker     Packs/day: 4.00     Types: Cigarettes     Last attempt to quit: 1955     Years since quittin.0    Smokeless tobacco: Never Used   Substance Use Topics    Alcohol use: No     Alcohol/week: 0.0 - 0.8 standard drinks    Drug use: No      Family History   Problem Relation Age of Onset    Diabetes Mother     Hypertension Mother     Heart Failure Mother         CHF ( @ 80)   Ryan Miners Alzheimer Mother     Cancer Father         lung ( @ 77)   [place] Miners Alzheimer Father     No Known Problems Son     No Known Problems Son     Ovarian Cancer Sister         hysterectomy    Breast Cancer Sister 28        mastectomy    Cancer Sister         breast and ovarian    Alzheimer Sister     Breast Problems Sister         breast cyst    Cataract Sister     Hypertension Sister     Diabetes Brother       Prior to Admission medications    Medication Sig Start Date End Date Taking? Authorizing Provider   peg 400-propylene glycol (SYSTANE, PROPYLENE GLYCOL,) 0.4-0.3 % drop Administer 1 Drop to both eyes two (2) times a day. Yes Provider, Historical   meclizine (ANTIVERT) 12.5 mg tablet Take 12.5 mg by mouth three (3) times daily as needed (for BPPV). Yes Provider, Historical   busPIRone (BUSPAR) 10 mg tablet Take 1 Tab by mouth two (2) times a day. 19  Yes Scot Sol NP   hydrOXYzine HCl (ATARAX) 25 mg tablet Take 1 Tab by mouth nightly. 19  Yes Scot Sol NP   vitamin b89-cqios acid 5.3-7 mg tab Take 1 Tab by mouth daily. Yes Provider, Historical   fluticasone (FLONASE SENSIMIST) 27.5 mcg/actuation nasal spray 1 Spray two (2) times a day for 10 days, THEN 1 Spray daily for 354 days.  Indications: inflammation of the nose due to an allergy 8/20/19 8/18/20 Yes Salina Ames NP   acetaminophen (TYLENOL) 325 mg tablet Take 325 mg by mouth every six (6) hours as needed for Pain. Yes Provider, Historical   omeprazole (PRILOSEC) 20 mg capsule Take 20 mg by mouth daily. Yes Provider, Historical   Calcium-Cholecalciferol, D3, 500 mg(1,250mg) -400 unit tab Take  by mouth. Yes Provider, Historical   levothyroxine (SYNTHROID) 25 mcg tablet Take 1.5 Tabs by mouth Daily (before breakfast). Indications: hypothyroidism 3/27/19  Yes Salina Ames NP   clopidogrel (PLAVIX) 75 mg tab Take 75 mg by mouth daily. Indications: treatment to prevent a heart attack   Yes Provider, Historical   polyethylene glycol (MIRALAX) 17 gram packet Take 17 g by mouth daily. Yes Provider, Historical   apixaban (ELIQUIS) 2.5 mg tablet Take 1 Tab by mouth two (2) times a day. 11/30/18  Yes Salina Ames NP   simvastatin (ZOCOR) 40 mg tablet Take 40 mg by mouth nightly. 11/26/18  Yes Provider, Historical   nitroglycerin (NITROSTAT) 0.4 mg SL tablet Take 0.4 mg by mouth as needed. 11/26/18  Yes Provider, Historical   diltiazem CD (CARTIA XT) 180 mg ER capsule Take 180 mg by mouth nightly. Yes Provider, Historical     Allergies   Allergen Reactions    Brilinta [Ticagrelor] Shortness of Breath    Celecoxib Other (comments)    Codeine Hives    Sulfa (Sulfonamide Antibiotics) Unknown (comments) and Hives    Iodine Unknown (comments)     Other reaction(s): Other (See Comments)  This was on outside records, pt denies that she has iodine allergy. Able to tolerate seafood, shellfish w/o reaction.      Aricept [Donepezil] Other (comments)     Headaches - noted as intolerance     Darvocet A500 [Propoxyphene N-Acetaminophen] Nausea Only       Functional Assessment:   ADL FUNCTIONALITY:  ADL Assessment 2/11/2019   Feeding yourself No Help Needed   Getting from bed to chair No Help Needed   Getting dressed No Help Needed   Bathing or showering Help Needed   Walk across the room (includes cane/walker) Help Needed   Using the telphone Help Needed   Taking your medications Help Needed   Preparing meals Help Needed   Managing money (expenses/bills) Help Needed   Moderately strenuous housework (laundry) Help Needed   Shopping for personal items (toiletries/medicines) Help Needed   Shopping for groceries Help Needed   Driving Help Needed   Climbing a flight of stairs Help Needed   Getting to places beyond walking distances Help Needed     DEPRESSION SCREENING:   3 most recent PHQ Screens 1/6/2020   PHQ Not Done -   Little interest or pleasure in doing things Not at all   Feeling down, depressed, irritable, or hopeless Not at all   Total Score PHQ 2 0   Trouble falling or staying asleep, or sleeping too much -   Feeling tired or having little energy -   Poor appetite, weight loss, or overeating -   Feeling bad about yourself - or that you are a failure or have let yourself or your family down -   Trouble concentrating on things such as school, work, reading, or watching TV -   Moving or speaking so slowly that other people could have noticed; or the opposite being so fidgety that others notice -   Thoughts of being better off dead, or hurting yourself in some way -   PHQ 9 Score -   How difficult have these problems made it for you to do your work, take care of your home and get along with others -      [address] Exam 2/11/2019 1/18/2018   What is the Year 0 1   What is the Season 0 1   What is the Date 0 1   What is the Day 1 1   What is the Month 0 1   Where are we State 1 1   Where are we Country 1 1   Where are we [place] or [facility] 1 1   Where are we Floor 0 1   Name three objects, then ask the patient to say them 3 3   Serial sevens Subtract 7 from 100 in increments 0 3   Ask for the three objects repeated above 0 3   Name a pencil 1 1   Name a watch 1 1   Have the patient repeat this phrase \"No ifs, ands, or buts\" 1 1   Three stage command: Take the paper in your right hand 1 1 Fold the paper in half 1 1   Put the paper on the floor 1 1   Read and obey the following: CLOSE YOUR EYES 0 1   Have the patient write a sentence 0 1   Have the patient copy a figure 0 1   Mini Mental Score 13 28     FALL RISK:   Fall Risk Assessment, last 12 mths 1/6/2020   Able to walk? Yes   Fall in past 12 months? No   Fall with injury? -   Number of falls in past 12 months -   Fall Risk Score -      ABUSE SCREEN:   Abuse Screening Questionnaire 2/11/2019   Do you ever feel afraid of your partner? N   Are you in a relationship with someone who physically or mentally threatens you? N   Is it safe for you to go home? Y      Advance Care Planning 12/6/2018   Primary Decision Maker Name -   Primary Decision Maker Phone Number -   Primary Decision Maker Relationship to Patient -   Confirm Advance Directive Yes, on file   Does the patient have other document types -     CHANGES IN TREATMENT:    The following treatment modalities have been discontinued by the provider today:   There are no discontinued medications. MOST RECENT LABORATORY RESULTS:      No visits with results within 3 Month(s) from this visit.    Latest known visit with results is:   Clinical Support on 10/02/2019   Component Date Value Ref Range Status    Magnesium 10/02/2019 2.1  1.6 - 2.3 mg/dL Final    Glucose 10/02/2019 112* 65 - 99 mg/dL Final    BUN 10/02/2019 25  10 - 36 mg/dL Final    Creatinine 10/02/2019 1.04* 0.57 - 1.00 mg/dL Final    GFR est non-AA 10/02/2019 47* >59 mL/min/1.73 Final    GFR est AA 10/02/2019 54* >59 mL/min/1.73 Final    BUN/Creatinine ratio 10/02/2019 24  12 - 28 Final    Sodium 10/02/2019 131* 134 - 144 mmol/L Final    Potassium 10/02/2019 4.5  3.5 - 5.2 mmol/L Final    Chloride 10/02/2019 93* 96 - 106 mmol/L Final    CO2 10/02/2019 22  20 - 29 mmol/L Final    Calcium 10/02/2019 9.4  8.7 - 10.3 mg/dL Final     Results for orders placed or performed in visit on 10/02/19   MAGNESIUM   Result Value Ref Range Magnesium 2.1 1.6 - 2.3 mg/dL   METABOLIC PANEL, BASIC   Result Value Ref Range    Glucose 112 (H) 65 - 99 mg/dL    BUN 25 10 - 36 mg/dL    Creatinine 1.04 (H) 0.57 - 1.00 mg/dL    GFR est non-AA 47 (L) >59 mL/min/1.73    GFR est AA 54 (L) >59 mL/min/1.73    BUN/Creatinine ratio 24 12 - 28    Sodium 131 (L) 134 - 144 mmol/L    Potassium 4.5 3.5 - 5.2 mmol/L    Chloride 93 (L) 96 - 106 mmol/L    CO2 22 20 - 29 mmol/L    Calcium 9.4 8.7 - 10.3 mg/dL      XR Results (most recent):  Results from Hospital Encounter encounter on 03/27/19   XR CHEST PA LAT    Narrative EXAM:  XR CHEST PA LAT    INDICATION: Shortness of breath    COMPARISON: 1/7/2019    TECHNIQUE: PA and lateral chest views    FINDINGS: Cardiac monitoring wires overlie the thorax. The cardia mediastinal  contours are not well seen due to small to moderate pleural effusions and  bibasilar opacities. There is no pneumothorax. The bones and upper abdomen are  stable. Impression IMPRESSION:    Small to moderate pleural effusions and bibasilar opacities that may represent  atelectasis, edema, or infection. Objective:     Vitals:    01/06/20 1407   BP: 130/64   Pulse: 63   Resp: 18   Temp: 97.7 °F (36.5 °C)   TempSrc: Oral   SpO2: 97%   Weight: 143 lb 9.6 oz (65.1 kg)   Height: 5' 1\" (1.549 m)     Wt Readings from Last 3 Encounters:   01/06/20 143 lb 9.6 oz (65.1 kg)   11/25/19 138 lb (62.6 kg)   10/29/19 132 lb 12.8 oz (60.2 kg)     BP Readings from Last 3 Encounters:   01/06/20 130/64   11/25/19 138/74   10/29/19 130/66     Review of Systems   Constitutional: Positive for activity change and chills. Negative for appetite change, diaphoresis, fever and unexpected weight change. HENT: Positive for congestion, postnasal drip and sneezing. Negative for ear pain and sore throat. Eyes: Negative. Respiratory: Negative. Cardiovascular: Negative. Gastrointestinal: Negative. Endocrine: Negative. Genitourinary: Negative. Musculoskeletal: Negative. Skin: Negative. Neurological: Negative. Psychiatric/Behavioral: Negative. Physical Exam  Constitutional:       Appearance: Normal appearance. HENT:      Head: Normocephalic and atraumatic. Nose: Congestion present. Mouth/Throat:      Mouth: Mucous membranes are dry. Pharynx: Oropharynx is clear. No oropharyngeal exudate or posterior oropharyngeal erythema. Neck:      Musculoskeletal: Normal range of motion. Cardiovascular:      Rate and Rhythm: Normal rate. Rhythm irregular. Pulses: Normal pulses. Heart sounds: Murmur present. Pulmonary:      Effort: Pulmonary effort is normal.      Breath sounds: Normal breath sounds. Abdominal:      General: Bowel sounds are normal.      Palpations: Abdomen is soft. Musculoskeletal: Normal range of motion. Skin:     General: Skin is warm and dry. Neurological:      General: No focal deficit present. Mental Status: She is alert and oriented to person, place, and time. Psychiatric:         Mood and Affect: Mood normal.         Behavior: Behavior normal.        Diagnosis:    1. Cough, viral: Mucinex 600 mg q 12 hours x 1 week    Disclaimer:   Ms. Alexander Cardona has been advised to call or return to our office if symptoms worsen/change/persist. We as a care team including the patient; discussed expected course/resolution/complications of diagnosis in detail today. Medication risks/benefits/costs/interactions/alternatives discussed Donna Liang was given an after visit summary which includes diagnoses, current medications, & vitals. Alexander Cardona expressed understanding with the diagnosis and plan.

## 2020-01-06 NOTE — PROGRESS NOTES
ADVISED PATIENT OF THE FOLLOWING HEALTH MAINTAINCE DUE  Health Maintenance Due   Topic Date Due    Shingrix Vaccine Age 49> (1 of 2) [birth year]/1977    GLAUCOMA SCREENING Q2Y  12/04/2019      Chief Complaint   Patient presents with    Cough     Patient here for non productive cough, sore throat, headache       1. Have you been to the ER, urgent care clinic since your last visit? Hospitalized since your last visit? No    2. Have you seen or consulted any other health care providers outside of the [address] [place] [address] since your last visit? Include any DEXA scan, mammography  or colon screening. No    3. Do you have an Advance Directive on file? yes    4. Do you have a DNR on file? DNR    Patient is accompanied by self I have received verbal consent from Shahrzad Roberson to discuss any/all medical information while they are present in the room. Advance Care Planning 12/6/2018   Primary Decision Maker Name -   Primary Decision Maker Phone Number -   Primary Decision Maker Relationship to Patient -   Confirm Advance Directive Yes, on file   Does the patient have other document types -         [facility], [address] [id number]  Phone: [phone] Fax: [phone]        Patient reminded during visit to bring all medication bottles, OTC medications to all appointments.

## 2020-03-02 NOTE — PROGRESS NOTES
Pt called, wants to boost dosage of Tadalafil (CIALIS) 2.5 MG Oral Tab. Needs new script sent to [facility], but tell pharmacy to use [facility]-90 day subscription.    Please call pt at [phone] Subjective:     Chief Complaint   Patient presents with    Follow-up     yearly exam     she is a 80y.o. year old female who presents for evaluation following an episode of hot flashes and waking up in the night sweating. She is concerned she is going through another menopause. She states these events have take place twice this last week. She is experiencing some stress and anxiety from a relationship she is having. She also states she is worried about her mental capabilities and cognition decline. She states she has a family history of Alzheimer's late onset. Her youngest sister passed away from her dementia diagnosis. She states she can feel a difference in her memory and is interested in discussing treatment. Current Outpatient Prescriptions on File Prior to Visit   Medication Sig Dispense Refill    fexofenadine (ALLEGRA) 180 mg tablet Take  by mouth.  cholecalciferol, vitamin D3, 2,000 unit tab Take  by mouth.  diltiazem CD (CARTIA XT) 180 mg ER capsule Take  by mouth daily.  simvastatin (ZOCOR) 10 mg tablet Take 10 mg by mouth daily.  aspirin 81 mg tablet Take 81 mg by mouth.  MULTIVITAMIN PO Take  by mouth.  CALCIUM CARBONATE/VITAMIN D3 (CALTRATE 600 + D PO) Take  by mouth. No current facility-administered medications on file prior to visit.         Allergies   Allergen Reactions    Sulfa (Sulfonamide Antibiotics) Unknown (comments) and Hives    Darvocet A500 [Propoxyphene N-Acetaminophen] Unknown (comments)    Iodine Unknown (comments)    Statins-Hmg-Coa Reductase Inhibitors Other (comments)     (intolerance)-myalgias    Sulfa (Sulfonamide Antibiotics) Unknown (comments)      Past Medical History:   Diagnosis Date    Allergic rhinitis     Edema     Environmental allergies     dizziness-(chronic)    Fatty liver disease, nonalcoholic     GERD (gastroesophageal reflux disease)     Hypercholesterolemia     Hypertension     Liver disease     fatty liver    Peripheral neuropathy ([facility]     Vertigo     Vitamin D deficiency       Past Surgical History:   Procedure Laterality Date    BREAST SURGERY PROCEDURE UNLISTED      breast cyst Esther Tommie)    CARDIAC SURG PROCEDURE UNLIST  1999    + stress thalassemia; 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 Holter    HX APPENDECTOMY      HX DILATION AND CURETTAGE      x5    HX GYN      D&C (x5), ALLEY/BSO (-Juliann/dle), Provera intolerance (bleeding), endometrial Bx annually    HX ALLEY AND BSO      Juliann/Zedler      Social History   Substance Use Topics    Smoking status: Former Smoker     Packs/day: 4.00     Types: Cigarettes     Quit date: 1955    Smokeless tobacco: Never Used    Alcohol use 0.0 - 0.5 oz/week     0 - 1 Standard drinks or equivalent per week      Comment: rare      Family History   Problem Relation Age of Onset    Diabetes Mother     Hypertension Mother     Heart Failure Mother      CHF ( @ 80)   Wolfgang Bennett Alzheimer Mother     Cancer Father      lung ( @ 77)   Wolfgang Bennett Alzheimer Father     Ovarian Cancer Sister      hysterectomy    Breast Cancer Sister      mastectomy    Cancer Sister      breast and ovarian    Alzheimer Sister     Breast Problems Sister      breast cyst    Cataract Sister     Hypertension Sister     Diabetes Brother         Objective:     Vitals:    17 1210   BP: 159/72   Pulse: 69   Resp: 18   Temp: 97.2 °F (36.2 °C)   TempSrc: Oral   SpO2: 96%   Weight: 151 lb 8 oz (68.7 kg)   Height: 5' 1\" (1.549 m)       Review of Systems   Constitutional: Negative for chills, diaphoresis, fever, malaise/fatigue and weight loss. HENT: Negative for congestion, ear discharge, ear pain, sore throat and tinnitus. Eyes: Negative for blurred vision, photophobia, pain, discharge and redness. Respiratory: Negative for cough, sputum production, shortness of breath, wheezing and stridor. Cardiovascular: Negative for chest pain, palpitations, orthopnea, leg swelling and PND. Gastrointestinal: Negative for abdominal pain, blood in stool, constipation, diarrhea, heartburn, nausea and vomiting. Genitourinary: Negative for dysuria, flank pain, frequency, hematuria and urgency. Musculoskeletal: Negative for back pain, falls, joint pain and neck pain. Skin: Negative for itching and rash. Neurological: Positive for dizziness. Negative for tingling, sensory change, focal weakness, seizures, loss of consciousness, weakness and headaches. Psychiatric/Behavioral: Positive for memory loss. Negative for depression and substance abuse. The patient is nervous/anxious. The patient does not have insomnia. Physical Exam   Constitutional: She is oriented to person, place, and time. She appears well-developed and well-nourished. She does not appear ill. No distress. HENT:   Head: Normocephalic and atraumatic. Right Ear: External ear normal.   Left Ear: External ear normal.   Nose: Nose normal.   Mouth/Throat: Oropharynx is clear and moist. No oropharyngeal exudate. Eyes: Conjunctivae and EOM are normal. Pupils are equal, round, and reactive to light. No scleral icterus. Neck: Normal range of motion. Neck supple. No JVD present. No tracheal deviation present. Cardiovascular: Normal rate, regular rhythm, normal heart sounds and intact distal pulses. Exam reveals no gallop and no friction rub. No murmur heard. Pulmonary/Chest: Effort normal and breath sounds normal. No respiratory distress. She has no wheezes. She has no rales. Abdominal: Soft. Bowel sounds are normal. She exhibits no distension and no mass. There is no tenderness. There is no guarding. Musculoskeletal: Normal range of motion. She exhibits no tenderness. Lymphadenopathy:     She has no cervical adenopathy. Neurological: She is alert and oriented to person, place, and time. She has normal reflexes. No cranial nerve deficit. Skin: Skin is warm and dry. No rash noted. She is not diaphoretic. No erythema. No pallor. Psychiatric: Judgment and thought content normal. Her mood appears anxious. Her speech is rapid and/or pressured. She is agitated. Cognition and memory are impaired. She exhibits abnormal recent memory and abnormal remote memory. Nursing note and vitals reviewed. Assessment/ Plan:   Ellie Tim was seen today for anxiety and cognition changes. Diagnoses and all orders for this visit:    Anxiety        -     LORazepam (ATIVAN) 0.5 mg tablet; Take 1 Tab by mouth every eight (8) hours as needed for Anxiety. Max Daily Amount: 1.5 mg. 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 Abnormal finding of blood chemistry   -     LIPID PANEL; Future  -     HEMOGLOBIN A1C WITH EAG; Future    I have ordered labs to evaluate patient's current medical conditions. She will return on Thursday 6/8 for the labs to drawn. I have discussed the diagnosis with the patient and the intended plan as seen in the above orders. The patient has received an after-visit summary and questions were answered concerning future plans. I have discussed medication side effects and warnings with the patient as well. Follow-up Disposition:  Return in about 1 week (around 6/12/2017).

## 2020-04-21 NOTE — PATIENT INSTRUCTIONS
Atrial Fibrillation: Care Instructions Your Care Instructions Atrial fibrillation is an irregular and often fast heartbeat. Treating this condition is important for several reasons. It can cause blood clots, which can travel from your heart to your brain and cause a stroke. If you have a fast heartbeat, you may feel lightheaded, dizzy, and weak. An irregular heartbeat can also increase your risk for heart failure. Atrial fibrillation is often the result of another heart condition, such as high blood pressure or coronary artery disease. Making changes to improve your heart condition will help you stay healthy and active. Follow-up care is a key part of your treatment and safety. Be sure to make and go to all appointments, and call your doctor if you are having problems. It's also a good idea to know your test results and keep a list of the medicines you take. How can you care for yourself at home? Medicines 
  · Take your medicines exactly as prescribed. Call your doctor if you think you are having a problem with your medicine. You will get more details on the specific medicines your doctor prescribes.  
  · If your doctor has given you a blood thinner to prevent a stroke, be sure you get instructions about how to take your medicine safely. Blood thinners can cause serious bleeding problems.  
  · Do not take any vitamins, over-the-counter drugs, or herbal products without talking to your doctor first.  
 Lifestyle changes 
  · Do not smoke. Smoking can increase your chance of a stroke and heart attack. If you need help quitting, talk to your doctor about stop-smoking programs and medicines. These can increase your chances of quitting for good.  
  · Eat a heart-healthy diet.  
  · Stay at a healthy weight. Lose weight if you need to.  
  · Limit alcohol to 2 drinks a day for men and 1 drink a day for women. Too much alcohol can cause health problems.  
  · Avoid colds and flu.  Get a pneumococcal vaccine Negative for agitation. All other systems reviewed and are negative. Positivesand Pertinent negatives as per HPI. Except as noted above in the ROS, all other systems were reviewed and negative. PAST MEDICAL HISTORY     Past Medical History:   Diagnosis Date    Pneumonia 2008    Seasonal allergies          SURGICAL HISTORY       Past Surgical History:   Procedure Laterality Date    TONSILLECTOMY AND ADENOIDECTOMY           CURRENT MEDICATIONS       Discharge Medication List as of 4/20/2020 10:25 PM            ALLERGIES     Adhesive tape    FAMILY HISTORY     History reviewed. No pertinent family history.       SOCIAL HISTORY       Social History     Socioeconomic History    Marital status: Single     Spouse name: None    Number of children: None    Years of education: None    Highest education level: None   Occupational History    None   Social Needs    Financial resource strain: None    Food insecurity     Worry: None     Inability: None    Transportation needs     Medical: None     Non-medical: None   Tobacco Use    Smoking status: Never Smoker    Smokeless tobacco: Never Used   Substance and Sexual Activity    Alcohol use: Not Currently    Drug use: No    Sexual activity: Never   Lifestyle    Physical activity     Days per week: None     Minutes per session: None    Stress: None   Relationships    Social connections     Talks on phone: None     Gets together: None     Attends Mosque service: None     Active member of club or organization: None     Attends meetings of clubs or organizations: None     Relationship status: None    Intimate partner violence     Fear of current or ex partner: None     Emotionally abused: None     Physically abused: None     Forced sexual activity: None   Other Topics Concern    None   Social History Narrative    None       SCREENINGS             PHYSICAL EXAM    (up to 7 for level 4, 8 ormore for level 5)     ED Triage Vitals [04/20/20 1804]   BP shot. If you have had one before, ask your doctor whether you need another dose. Get a flu shot every year. If you must be around people with colds or flu, wash your hands often. Activity 
  · If your doctor recommends it, get more exercise. Walking is a good choice. Bit by bit, increase the amount you walk every day. Try for at least 30 minutes on most days of the week. You also may want to swim, bike, or do other activities. Your doctor may suggest that you join a cardiac rehabilitation program so that you can have help increasing your physical activity safely.  
  · Start light exercise if your doctor says it is okay. Even a small amount will help you get stronger, have more energy, and manage stress. Walking is an easy way to get exercise. Start out by walking a little more than you did in the hospital. Gradually increase the amount you walk.  
  · When you exercise, watch for signs that your heart is working too hard. You are pushing too hard if you cannot talk while you are exercising. If you become short of breath or dizzy or have chest pain, sit down and rest immediately.  
  · Check your pulse regularly. Place two fingers on the artery at the palm side of your wrist, in line with your thumb. If your heartbeat seems uneven or fast, talk to your doctor. When should you call for help? Call 911 anytime you think you may need emergency care. For example, call if: 
  · You have symptoms of a heart attack. These may include: ¨ Chest pain or pressure, or a strange feeling in the chest. 
¨ Sweating. ¨ Shortness of breath. ¨ Nausea or vomiting. ¨ Pain, pressure, or a strange feeling in the back, neck, jaw, or upper belly or in one or both shoulders or arms. ¨ Lightheadedness or sudden weakness. ¨ A fast or irregular heartbeat. After you call 911, the  may tell you to chew 1 adult-strength or 2 to 4 low-dose aspirin. Wait for an ambulance.  Do not try to drive yourself.  
  · You have symptoms of a Temp Temp Source Heart Rate Resp SpO2 Height Weight - Scale   123/79 97.6 °F (36.4 °C) Oral 122 16 96 % 5' 3\" (1.6 m) 145 lb (65.8 kg)       Physical Exam  Constitutional:       Appearance: She is well-developed. HENT:      Head: Normocephalic and atraumatic. Neck:      Musculoskeletal: Normal range of motion. Cardiovascular:      Rate and Rhythm: Tachycardia present. Pulmonary:      Effort: Pulmonary effort is normal. No respiratory distress. Abdominal:      General: There is no distension. Palpations: Abdomen is soft. Tenderness: There is abdominal tenderness in the suprapubic area. Musculoskeletal: Normal range of motion. Skin:     General: Skin is warm and dry. Neurological:      Mental Status: She is alert and oriented to person, place, and time.          DIAGNOSTIC RESULTS   LABS:    Labs Reviewed   URINE RT REFLEX TO CULTURE - Abnormal; Notable for the following components:       Result Value    Clarity, UA SL CLOUDY (*)     Ketones, Urine >=80 (*)     Blood, Urine TRACE-INTACT (*)     Protein, UA TRACE (*)     Leukocyte Esterase, Urine MODERATE (*)     All other components within normal limits    Narrative:     Performed at:  [facility],  [place], [facility]   Phone [phone]   MICROSCOPIC URINALYSIS - Abnormal; Notable for the following components:    WBC, UA 11-20 (*)     Epithelial Cells, UA 6-10 (*)     Bacteria, UA 4+ (*)     Crystals, UA 1+ Triple Phos (*)     All other components within normal limits    Narrative:     Performed at:  [facility] [address],  [place], [facility]   Phone [phone]   CBC WITH AUTO DIFFERENTIAL - Abnormal; Notable for the following components:    Neutrophils Absolute 8.0 (*)     Lymphocytes Absolute 0.7 (*)     All other components within normal limits    Narrative:     Performed at:  [facility]) - [facility] stroke. These may include: 
¨ Sudden numbness, tingling, weakness, or loss of movement in your face, arm, or leg, especially on only one side of your body. ¨ Sudden vision changes. ¨ Sudden trouble speaking. ¨ Sudden confusion or trouble understanding simple statements. ¨ Sudden problems with walking or balance. ¨ A sudden, severe headache that is different from past headaches.  
  · You passed out (lost consciousness).  
 Call your doctor now or seek immediate medical care if: 
  · You have new or increased shortness of breath.  
  · You feel dizzy or lightheaded, or you feel like you may faint.  
  · Your heart rate becomes irregular.  
  · You can feel your heart flutter in your chest or skip heartbeats. Tell your doctor if these symptoms are new or worse.  
 Watch closely for changes in your health, and be sure to contact your doctor if you have any problems. Where can you learn more? Go to http://pia-colby.info/. Enter U020 in the search box to learn more about \"Atrial Fibrillation: Care Instructions. \" Current as of: December 6, 2017 Content Version: 11.7 © 1817-4792 [employer]. Care instructions adapted under license by [facility] (which disclaims liability or warranty for this information). If you have questions about a medical condition or this instruction, always ask your healthcare professional. [address] any warranty or liability for your use of this information. DASH Diet: Care Instructions Your Care Instructions The DASH diet is an eating plan that can help lower your blood pressure. DASH stands for Dietary Approaches to Stop Hypertension. Hypertension is high blood pressure. The DASH diet focuses on eating foods that are high in calcium, potassium, and magnesium. These nutrients can lower blood pressure.  The foods that are highest in these nutrients are fruits, vegetables, low-fat dairy products, nuts, seeds, and legumes. But taking calcium, potassium, and magnesium supplements instead of eating foods that are high in those nutrients does not have the same effect. The DASH diet also includes whole grains, fish, and poultry. The DASH diet is one of several lifestyle changes your doctor may recommend to lower your high blood pressure. Your doctor may also want you to decrease the amount of sodium in your diet. Lowering sodium while following the DASH diet can lower blood pressure even further than just the DASH diet alone. Follow-up care is a key part of your treatment and safety. Be sure to make and go to all appointments, and call your doctor if you are having problems. It's also a good idea to know your test results and keep a list of the medicines you take. How can you care for yourself at home? Following the DASH diet · Eat 4 to 5 servings of fruit each day. A serving is 1 medium-sized piece of fruit, ½ cup chopped or canned fruit, 1/4 cup dried fruit, or 4 ounces (½ cup) of fruit juice. Choose fruit more often than fruit juice. · Eat 4 to 5 servings of vegetables each day. A serving is 1 cup of lettuce or raw leafy vegetables, ½ cup of chopped or cooked vegetables, or 4 ounces (½ cup) of vegetable juice. Choose vegetables more often than vegetable juice. · Get 2 to 3 servings of low-fat and fat-free dairy each day. A serving is 8 ounces of milk, 1 cup of yogurt, or 1 ½ ounces of cheese. · Eat 6 to 8 servings of grains each day. A serving is 1 slice of bread, 1 ounce of dry cereal, or ½ cup of cooked rice, pasta, or cooked cereal. Try to choose whole-grain products as much as possible. · Limit lean meat, poultry, and fish to 2 servings each day. A serving is 3 ounces, about the size of a deck of cards. · Eat 4 to 5 servings of nuts, seeds, and legumes (cooked dried beans, lentils, and split peas) each week. A serving is 1/3 cup of nuts, 2 tablespoons of seeds, or ½ cup of cooked beans or peas.  
· Limit fats and oils to 2 to 3 servings each day. A serving is 1 teaspoon of vegetable oil or 2 tablespoons of salad dressing. · Limit sweets and added sugars to 5 servings or less a week. A serving is 1 tablespoon jelly or jam, ½ cup sorbet, or 1 cup of lemonade. · Eat less than 2,300 milligrams (mg) of sodium a day. If you limit your sodium to 1,500 mg a day, you can lower your blood pressure even more. Tips for success · Start small. Do not try to make dramatic changes to your diet all at once. You might feel that you are missing out on your favorite foods and then be more likely to not follow the plan. Make small changes, and stick with them. Once those changes become habit, add a few more changes. · Try some of the following: ¨ Make it a goal to eat a fruit or vegetable at every meal and at snacks. This will make it easy to get the recommended amount of fruits and vegetables each day. ¨ Try yogurt topped with fruit and nuts for a snack or healthy dessert. ¨ Add lettuce, tomato, cucumber, and onion to sandwiches. ¨ Combine a ready-made pizza crust with low-fat mozzarella cheese and lots of vegetable toppings. Try using tomatoes, squash, spinach, broccoli, carrots, cauliflower, and onions. ¨ Have a variety of cut-up vegetables with a low-fat dip as an appetizer instead of chips and dip. ¨ Sprinkle sunflower seeds or chopped almonds over salads. Or try adding chopped walnuts or almonds to cooked vegetables. ¨ Try some vegetarian meals using beans and peas. Add garbanzo or kidney beans to salads. Make burritos and tacos with mashed whittaker beans or black beans. Where can you learn more? Go to http://pia-colby.info/. Enter G400 in the search box to learn more about \"DASH Diet: Care Instructions. \" Current as of: December 6, 2017 Content Version: 11.7 © 3306-1265 [employer], [employer].  Care instructions adapted under license by [employer] (which disclaims liability or warranty for this information). If you have questions about a medical condition or this instruction, always ask your healthcare professional. [address] any warranty or liability for your use of this information. Insomnia: Care Instructions Your Care Instructions Insomnia is the inability to sleep well. It is a common problem for most people at some time. Insomnia may make it hard for you to get to sleep, stay asleep, or sleep as long as you need to. This can make you tired and grouchy during the day. It can also make you forgetful, less effective at work, and unhappy. Insomnia can be caused by conditions such as depression or anxiety. Pain can also affect your ability to sleep. When these problems are solved, the insomnia usually clears up. But sometimes bad sleep habits can cause insomnia. If insomnia is affecting your work or your enjoyment of life, you can take steps to improve your sleep. Follow-up care is a key part of your treatment and safety. Be sure to make and go to all appointments, and call your doctor if you are having problems. It's also a good idea to know your test results and keep a list of the medicines you take. How can you care for yourself at home? What to avoid · Do not have drinks with caffeine, such as coffee or black tea, for 8 hours before bed. · Do not smoke or use other types of tobacco near bedtime. Nicotine is a stimulant and can keep you awake. · Avoid drinking alcohol late in the evening, because it can cause you to wake in the middle of the night. · Do not eat a big meal close to bedtime. If you are hungry, eat a light snack. · Do not drink a lot of water close to bedtime, because the need to urinate may wake you up during the night. · Do not read or watch TV in bed. Use the bed only for sleeping and sexual activity. What to try · Go to bed at the same time every night, and wake up at the same time every morning.  Do not take naps during the day. 
· Keep your bedroom quiet, dark, and cool. · Sleep on a comfortable pillow and mattress. · If watching the clock makes you anxious, turn it facing away from you so you cannot see the time. · If you worry when you lie down, start a worry book. Well before bedtime, write down your worries, and then set the book and your concerns aside. · Try meditation or other relaxation techniques before you go to bed. · If you cannot fall asleep, get up and go to another room until you feel sleepy. Do something relaxing. Repeat your bedtime routine before you go to bed again. · Make your house quiet and calm about an hour before bedtime. Turn down the lights, turn off the TV, log off the computer, and turn down the volume on music. This can help you relax after a busy day. When should you call for help? Watch closely for changes in your health, and be sure to contact your doctor if: 
  · Your efforts to improve your sleep do not work.  
  · Your insomnia gets worse.  
  · You have been feeling down, depressed, or hopeless or have lost interest in things that you usually enjoy. Where can you learn more? Go to http://pia-colby.info/. Enter P513 in the search box to learn more about \"Insomnia: Care Instructions. \" Current as of: October 10, 2017 Content Version: 11.7 © 2317-4223 [employer]. Care instructions adapted under license by [employer] (which disclaims liability or warranty for this information). If you have questions about a medical condition or this instruction, always ask your healthcare professional. [place] [address] any warranty or liability for your use of this information. Frequent Urination: Care Instructions Your Care Instructions An urge to urinate frequently but usually passing only small amounts of urine is a common symptom of urinary problems, such as urinary tract infections. The bladder may become inflamed.  This can cause the urge to urinate. You may try to urinate more often than usual to try to soothe that urge. Frequent urination also may be caused by sexually transmitted infections (STIs) or kidney stones. Or it may happen when something irritates the tube that carries urine from the bladder to the outside of the body (urethra). It may also be a sign of diabetes. The cause may be hard to find. You may need tests. Follow-up care is a key part of your treatment and safety. Be sure to make and go to all appointments, and call your doctor if you are having problems. It's also a good idea to know your test results and keep a list of the medicines you take. How can you care for yourself at home? · Drink extra water for the next day or two. This will help make the urine less concentrated. (If you have kidney, heart, or liver disease and have to limit fluids, talk with your doctor before you increase the amount of fluids you drink.) · Avoid drinks that are carbonated or have caffeine. They can irritate the bladder. For women: · Urinate right after you have sex. · After you go to the bathroom, wipe from front to back. · Avoid douches, bubble baths, and feminine hygiene sprays. And avoid other feminine hygiene products that have deodorants. When should you call for help? Call your doctor now or seek immediate medical care if: 
  · You have new symptoms, such as fever, nausea, or vomiting.  
  · You have new or worse symptoms of a urinary problem. For example: ¨ You have blood or pus in your urine. ¨ You have chills or body aches. ¨ It hurts to urinate. ¨ You have groin or belly pain. ¨ You have pain in your back just below your rib cage (the flank area).  
 Watch closely for changes in your health, and be sure to contact your doctor if you feel thirstier than usual. 
Where can you learn more? Go to http://pia-colby.info/.  
Enter 572 [id number] in the search box to learn more about \"Frequent Urination: Care Instructions. \" Current as of: May 12, 2017 Content Version: 11.7 © 5210-1315 [employer], [employer]. Care instructions adapted under license by [employer] (which disclaims liability or warranty for this information). If you have questions about a medical condition or this instruction, always ask your healthcare professional. [place] [address] any warranty or liability for your use of this information.

## 2020-07-13 ENCOUNTER — TELEPHONE (OUTPATIENT)
Dept: CARDIOLOGY CLINIC | Age: 85
End: 2020-07-13

## 2020-07-13 NOTE — TELEPHONE ENCOUNTER
[facility] requesting to schedule an appt with Dr. Jacki Toro for the pt.  Dx: follow up for August.      Phone:[phone]

## 2020-07-13 NOTE — TELEPHONE ENCOUNTER
7/13/2020 attempted phone number provided, not a working number.  Called main number for [facility] [phone] was transferred to 2nd floor where patient resides and left message with nurse answering to have my call returned

## 2020-07-13 NOTE — TELEPHONE ENCOUNTER
7/13/2020 at 5:50 spoke with Christiano Cox (nurse) with [facility] per request of physician at [facility] he'd like patient to be seen. I advised of scheduled appointment in October. She opted to schedule an appointment for September but will contact family to confirm which appointment they'd prefer and contact me back.      Future Appointments   Date Time Provider Maty Schrader   9/8/2020  1:20 PM Harshad Caal  [address]   10/5/2020  3:20 PM Harshad Caal  [address]

## 2020-07-16 NOTE — TELEPHONE ENCOUNTER
7/16/2020 spoke with patients son, Ciara Arias, opted to keep 9/8/2020 appointment and cancel 10/5/2020 appointment    Future Appointments   Date Time Provider Maty Schrader   9/8/2020  1:20 PM Daniel Stevenson  [address]

## 2020-09-08 ENCOUNTER — OFFICE VISIT (OUTPATIENT)
Dept: CARDIOLOGY CLINIC | Age: 85
End: 2020-09-08
Payer: MEDICARE

## 2020-09-08 VITALS
DIASTOLIC BLOOD PRESSURE: 80 MMHG | HEART RATE: 69 BPM | BODY MASS INDEX: 26.73 KG/M2 | SYSTOLIC BLOOD PRESSURE: 140 MMHG | OXYGEN SATURATION: 97 % | RESPIRATION RATE: 14 BRPM | HEIGHT: 61 IN | WEIGHT: 141.6 LBS

## 2020-09-08 DIAGNOSIS — Z98.890 S/P MITRAL VALVE REPAIR: ICD-10-CM

## 2020-09-08 DIAGNOSIS — I48.0 PAROXYSMAL ATRIAL FIBRILLATION (HCC): Primary | ICD-10-CM

## 2020-09-08 DIAGNOSIS — I27.9 PULMONARY HEART DISEASE (HCC): ICD-10-CM

## 2020-09-08 DIAGNOSIS — I50.32 CHRONIC DIASTOLIC CONGESTIVE HEART FAILURE (HCC): ICD-10-CM

## 2020-09-08 DIAGNOSIS — E78.2 MIXED HYPERLIPIDEMIA: ICD-10-CM

## 2020-09-08 DIAGNOSIS — I25.10 CORONARY ARTERY DISEASE INVOLVING NATIVE CORONARY ARTERY OF NATIVE HEART WITHOUT ANGINA PECTORIS: ICD-10-CM

## 2020-09-08 DIAGNOSIS — K76.0 FATTY LIVER DISEASE, NONALCOHOLIC: ICD-10-CM

## 2020-09-08 DIAGNOSIS — I34.0 MITRAL VALVE INSUFFICIENCY, UNSPECIFIED ETIOLOGY: ICD-10-CM

## 2020-09-08 DIAGNOSIS — I50.32 CHRONIC HEART FAILURE WITH PRESERVED EJECTION FRACTION (HCC): ICD-10-CM

## 2020-09-08 DIAGNOSIS — I35.1 AORTIC VALVE INSUFFICIENCY, ETIOLOGY OF CARDIAC VALVE DISEASE UNSPECIFIED: ICD-10-CM

## 2020-09-08 PROCEDURE — G8536 NO DOC ELDER MAL SCRN: HCPCS | Performed by: INTERNAL MEDICINE

## 2020-09-08 PROCEDURE — 99214 OFFICE O/P EST MOD 30 MIN: CPT | Performed by: INTERNAL MEDICINE

## 2020-09-08 PROCEDURE — G0463 HOSPITAL OUTPT CLINIC VISIT: HCPCS | Performed by: INTERNAL MEDICINE

## 2020-09-08 PROCEDURE — 1090F PRES/ABSN URINE INCON ASSESS: CPT | Performed by: INTERNAL MEDICINE

## 2020-09-08 PROCEDURE — G8427 DOCREV CUR MEDS BY ELIG CLIN: HCPCS | Performed by: INTERNAL MEDICINE

## 2020-09-08 PROCEDURE — 93005 ELECTROCARDIOGRAM TRACING: CPT | Performed by: INTERNAL MEDICINE

## 2020-09-08 PROCEDURE — 93010 ELECTROCARDIOGRAM REPORT: CPT | Performed by: INTERNAL MEDICINE

## 2020-09-08 PROCEDURE — 1101F PT FALLS ASSESS-DOCD LE1/YR: CPT | Performed by: INTERNAL MEDICINE

## 2020-09-08 PROCEDURE — G8419 CALC BMI OUT NRM PARAM NOF/U: HCPCS | Performed by: INTERNAL MEDICINE

## 2020-09-08 PROCEDURE — G8432 DEP SCR NOT DOC, RNG: HCPCS | Performed by: INTERNAL MEDICINE

## 2020-09-08 RX ORDER — SENNOSIDES 8.6 MG/1
1 TABLET ORAL DAILY
COMMUNITY

## 2020-09-08 RX ORDER — LANOLIN ALCOHOL/MO/W.PET/CERES
500 CREAM (GRAM) TOPICAL DAILY
COMMUNITY

## 2020-09-08 RX ORDER — FUROSEMIDE 40 MG/1
TABLET ORAL DAILY
COMMUNITY

## 2020-09-08 RX ORDER — LEVOTHYROXINE SODIUM 75 UG/1
37.5 TABLET ORAL
COMMUNITY

## 2020-09-08 RX ORDER — FLUTICASONE FUROATE 27.5 UG/1
1 SPRAY, METERED NASAL DAILY
COMMUNITY

## 2020-09-08 RX ORDER — CHOLECALCIFEROL TAB 125 MCG (5000 UNIT) 125 MCG
5000 TAB ORAL DAILY
COMMUNITY

## 2020-09-08 NOTE — PATIENT INSTRUCTIONS
Pyridium is an over the counter urinary pain reliever that can help with bladder pain if okay with urology.

## 2020-09-08 NOTE — PROGRESS NOTES
[facility]  [phone]    HPI: Evy Miranda, a 80y.o. year-old who presents for follow up regarding her severe MR and diastolic heart failure. Bp is doing well, it is running 130s now. Occasional low BP and that makes her weak. But having more good days than days. She has her compression stockings on today. Will stop her metoprolol today and see if it helps her dizziness. Will also try to to get her to have 6 glasse of water a day. MV clip was done 4/9/19 and she looks much better. Really getting back to her old self, and color better, breathing better, stamina better, etc. Echo next visit. She denies any PND or orthopnea  Off her O2. Back in [place] and doing well. She has chronic dizziness and feeling unsteady for years  No syncope   No headaches, no chest pain no dyspnea now. Pt's son is with her today. She is having a lot of dysuria and burning in the urine. No infection. Uro though she was having spasms and prescribed myrbetriq    She is doing great. Needs an echo to look at the valve repair. Had an episode of overheating with walking but has been trying to get back into it. Her son is with her today and he is happy with how she is doing. She has been quarantined and unable to have visitors for a long time and he was concerned about how she was doing with that but she seems to be okay today. She is in good spirits her blood pressure is at the upper limit of normal.  She is using her walker for safety but has not been exercising as much as she should be. Assessment/Plan:  1. CAD s/p Inferior STEMI 9/11/18 with PTCA/stents to distal RCA (KENNETH x 2)  -symptoms preceding STEMI were palpitations, nausea, dizziness, diaphoresis, maybe some chest heaviness, troponin > 50  -continue plavix, coreg, statin  2. Severe MR - s/p MV clip 4/9/19, Dr. Jeet Layne, doing great now  3. Large left pleural effusion- resolved, now on low dose Lasix  4.   AI- better on most recent echo, due to repeat  5. Hypotension - hx of HTN, well controlled on coreg and diltiazem   6. HFpEF - BP well controlled, continue bumex 0.5mg daily, may be able to cut it down to . 25mg daily  -compression stockings daily  7. AF - rate controlled on coreg and diltiazem, continue Eliquis 2.5mg BID, no bleeding, etc.   8.  Dyslipidemia - at goal on simvastatin 40mg daily   9. DNR status in place  10. Dyslipidemia- cont statin    Echo 12/6/18 - LVEF 50 %, no WMA, grade 3 dd, LA severely dilated, MAC with eccentric severe regurgitation that was posteriorly directed, AV sclerosis without stenosis and mod AI, mild TR, PASP mildly increased, dilated IVC, large right pleural effusion, large left pleural effusion.   Head CT 9/18 normal  Inferior STEMI 9/11/18, Cardiac Cath 9/11/18  Thrombectomy of RCA, PTCA/stent of the distal RCA into the PLB with a 2.75 x 12 and 3.0 x 15 mm KENNETH (resolute stent), complicated case Ao 275/70, LVEDP 27, no gradient across AV valve, RCA dominant, totally occluded in distal segment, LM normal, LAD transapical vessel and small caliber normal.  LCX normal.  LVEF 50%, 2+ MR  Echo 4/18 - LVEF 55 %, no WMA,mod dilated LA, MAC with mild MR, AV sclerosis with mod AI, mild TR, PASP 30mmHg, mild PI    Soc Hx: no tobacco use x 10 years, no etoh use   Fam Hx: mother passed at age 80 from heart problems    She  has a past medical history of (HFpEF) heart failure with preserved ejection fraction ([address] (10/17/2018), Allergic rhinitis, Atherosclerotic cardiovascular disease (1999), CHF (congestive heart failure) ([address] (09/11/2018), Chronic kidney disease, Cognitive communication deficit, Contact dermatitis and eczema due to cause, Edema, Environmental allergies, Fatty liver disease, nonalcoholic, GERD (gastroesophageal reflux disease), H/O heart artery stent (09/2018), HCVD (hypertensive cardiovascular disease) (10/17/2018), Heart attack ([address] (09/2018), Heart palpitations (2018), History of PTCA, Hypercholesterolemia (1999), Hypertension (2010), Liver disease, MI (myocardial infarction) ([facility] (09/2018), Overactive bladder (08/09/2018), Peripheral neuropathy, Permanent atrial fibrillation ([address] (11/05/2018), Senile dementia, without behavioral disturbance ([address] (11/05/2018), Stress incontinence, female (08/09/2018), Thyroid disease, Vertigo, and Vitamin D deficiency. She also has no past medical history of Anemia, Arthritis, Asthma, Autoimmune disease ([address] Calculus of kidney, Cancer ([address] Chronic obstructive pulmonary disease ([address] Chronic pain, Depression, Diabetes ([address] Headache, History of abuse in adulthood, History of abuse in childhood, Psychotic disorder ([address] PUD (peptic ulcer disease), Seizures ([facility] Stroke ([facility] Thromboembolus ([address] or Trauma. Cardiovascular ROS: no chest pain, positive for dyspnea   Respiratory ROS: positive for shortness of breath  Neurological ROS: no TIA or stroke symptoms  All other systems negative except as above. PE  Vitals:    09/08/20 1312   BP: 140/80   Pulse: 69   Resp: 14   SpO2: 97%   Weight: 141 lb 9.6 oz (64.2 kg)   Height: 5' 1\" (1.549 m)    Body mass index is 26.76 kg/m².    General appearance - alert, well appearing, and in no distress  Mental status - affect appropriate to mood  Eyes - sclera anicteric, moist mucous membranes  Neck - supple  Lymphatics - not assessed  Chest - CTA bilaterally     Heart - regular rate and irregular rhythm, normal S1, S2, 2/6 VANGIE   Abdomen - soft, nontender, nondistended  Back exam - full range of motion  Neurological - no focal deficit  Musculoskeletal - normal strength  Extremities - peripheral pulses normal, 1+ LE edema bilaterally   Skin- normal coloration  no rashes    Recent Labs:  Lab Results   Component Value Date/Time    Cholesterol, total 159 08/20/2019 12:05 PM    HDL Cholesterol 67 08/20/2019 12:05 PM    LDL, calculated 79 08/20/2019 12:05 PM    Triglyceride 63 08/20/2019 12:05 PM    CHOL/HDL Ratio 3.8 2009 08:35 AM     Lab Results   Component Value Date/Time    Creatinine 1.04 (H) 10/02/2019 01:35 PM     Lab Results   Component Value Date/Time    BUN 25 10/02/2019 01:35 PM     Lab Results   Component Value Date/Time    Potassium 4.5 10/02/2019 01:35 PM     Lab Results   Component Value Date/Time    Hemoglobin A1c 5.8 (H) 2019 12:05 PM     Lab Results   Component Value Date/Time    HGB 10.7 (L) 2019 12:05 PM     Lab Results   Component Value Date/Time    PLATELET 292  12:05 PM       Reviewed:  Past Medical History:   Diagnosis Date    (HFpEF) heart failure with preserved ejection fraction ([facility] 10/17/2018    acute    Allergic rhinitis     Atherosclerotic cardiovascular disease     CHF (congestive heart failure) ([facility] 2018    Chronic kidney disease     Cognitive communication deficit     Contact dermatitis and eczema due to cause     Edema     Environmental allergies     dizziness-(chronic)    Fatty liver disease, nonalcoholic     GERD (gastroesophageal reflux disease)     H/O heart artery stent 2018    HCVD (hypertensive cardiovascular disease) 10/17/2018    Heart attack ([address] 2018    Heart palpitations 2018    infrequent     History of PTCA     Hypercholesterolemia     Hypertension     Liver disease     fatty liver    MI (myocardial infarction) ([address] 2018    Overactive bladder 2018    Peripheral neuropathy     Permanent atrial fibrillation ([facility] 2018    Senile dementia, without behavioral disturbance ([facility] 2018    Stress incontinence, female 2018    Thyroid disease     Vertigo     Vitamin D deficiency      Social History     Tobacco Use   Smoking Status Former Smoker    Packs/day: 4.00    Types: Cigarettes    Last attempt to quit: 1955    Years since quittin.7   Smokeless Tobacco Never Used     Social History     Substance and Sexual Activity   Alcohol Use No    Alcohol/week: 0.0 - 0.8 standard drinks     Allergies   Allergen Reactions    Brilinta [Ticagrelor] Shortness of Breath    Celecoxib Other (comments)    Codeine Hives    Sulfa (Sulfonamide Antibiotics) Unknown (comments) and Hives    Iodine Unknown (comments)     Other reaction(s): Other (See Comments)  This was on outside records, pt denies that she has iodine allergy. Able to tolerate seafood, shellfish w/o reaction.  Aricept [Donepezil] Other (comments)     Headaches - noted as intolerance     Darvocet A500 [Propoxyphene N-Acetaminophen] Nausea Only       Current Outpatient Medications   Medication Sig    cyanocobalamin (VITAMIN B12) 500 mcg tablet Take 500 mcg by mouth daily. With [id number] of folic acid    furosemide (LASIX) 40 mg tablet Take  by mouth daily.  senna (Senna) 8.6 mg tablet Take 1 Tab by mouth daily.  lactulose (CHRONULAC) 10 gram/15 mL solution Take 15 mL by mouth as needed.  cholecalciferol (VITAMIN D3) (5000 Units/125 mcg) tab tablet Take 10,000 Units by mouth daily.  levothyroxine (SYNTHROID) 75 mcg tablet Take 37.5 mcg by mouth Daily (before breakfast).  fluticasone (Flonase Sensimist) 27.5 mcg/actuation nasal spray 1 [place] by Nasal route daily.  mirabegron ER (Myrbetriq) 50 mg ER tablet Take 50 mg by mouth daily.  peg 400-propylene glycol (SYSTANE, PROPYLENE GLYCOL,) 0.4-0.3 % drop Administer 1 Drop to both eyes two (2) times a day.  meclizine (ANTIVERT) 12.5 mg tablet Take 12.5 mg by mouth three (3) times daily as needed (for BPPV).  busPIRone (BUSPAR) 10 mg tablet Take 1 Tab by mouth two (2) times a day.  hydrOXYzine HCl (ATARAX) 25 mg tablet Take 1 Tab by mouth nightly.  acetaminophen (TYLENOL) 325 mg tablet Take 325 mg by mouth every six (6) hours as needed for Pain.  omeprazole (PRILOSEC) 20 mg capsule Take 20 mg by mouth daily.  Calcium-Cholecalciferol, D3, 500 mg(1,250mg) -400 unit tab Take  by mouth.     clopidogrel (PLAVIX) 75 mg tab Take 75 mg by mouth daily. Indications: treatment to prevent a heart attack    polyethylene glycol (MIRALAX) 17 gram packet Take 17 g by mouth daily.  apixaban (ELIQUIS) 2.5 mg tablet Take 1 Tab by mouth two (2) times a day.  simvastatin (ZOCOR) 40 mg tablet Take 40 mg by mouth nightly.  nitroglycerin (NITROSTAT) 0.4 mg SL tablet Take 0.4 mg by mouth as needed.  diltiazem CD (CARTIA XT) 180 mg ER capsule Take 180 mg by mouth nightly.  vitamin b12-folic acid 3.1-0 mg tab Take 1 Tab by mouth daily.  levothyroxine (SYNTHROID) 25 mcg tablet Take 1.5 Tabs by mouth Daily (before breakfast). Indications: hypothyroidism     No current facility-administered medications for this visit. Darline Arriaza MD  [facility] [address] [facility] [address]  [place] [address] [address]   [phone]  [address] [place] [address] [phone]    HPI: Johann Herring, a 80y.o. year-old who presents for follow up regarding her severe MR and diastolic heart failure.     She is in healthcare now at [facility] says they plan to move her to assisted living but I suggested they hold off on transfer out of healthcare until we make sure she is stable  She reports that her stamina is better  Says she gets dyspnea with walking depending on how fast she is walking  Denies PND and sleeps on 2 pillows  Weight down 2 lbs from the hospital, also reports poor appetite, we talked about eating 3 meals/day  Says she is sleeping well   Denies any chest pain, pressure, tightness  Reports rare palpitations which only last a few seconds and without any high risk features  Says she has very little dizziness now, no syncope  BP low today - 100/60  O2 Sats 90% with ambulation   Says she coughs all the time but it is not productive, no fevers or chills  Son reports she is coughing much less now than she was prior to admission  Patient's son is with her today, she is a poor historian    **After last office visit labs received dated 12/24/18  BUN 15, Cr 0.97, K 3.3, Mg 1.7    Assessment/Plan:  1. CAD s/p Inferior STEMI 9/11/18 with PTCA/stents to distal RCA (KENNETH x 2)  -symptoms preceding STEMI were palpitations, nausea, dizziness, diaphoresis, maybe some chest heaviness, troponin > 50  -continue plavix, coreg, statin  2. Severe MR - seen by valve team during admission, no plans for valve repair, etc at this time   -will repeat TTE in 1 month to reassess severity of MR and follow up with Dr. Erinn Brown at that time   3. Large left pleural effusion on TTE, moderate on CXR on admission - will repeat CXR now to reassess, continue bumex  4. AI - moderate by TTE   5. Hypotension - hx of HTN, advised her to reduce her coreg to 3.125mg BID and continue other medications for now, will need to watch BP closely  6. HFpEF - BP controlled, continue bumex, will check BMP and magnesium level prior to her return appointment in 1 month  7. AF - rate controlled on coreg 6.25mg BID and diltiazem 180mg daily, reducing coreg dose as above for hypotension, continue Eliquis 2.5mg BID for anticoagulation  8. Dyslipidemia - LDL 56 on simvastatin 40mg daily   -Lipids 9/18 - , TG 81, , HDL 50  9. CHEMA post cardiac cath - now creatinine normal  10. DNR status in place    Echo 12/6/18 - LVEF 50 %, no WMA, grade 3 dd, LA severely dilated, MAC with eccentric severe regurgitation that was posteriorly directed, AV sclerosis without stenosis and mod AI, mild TR, PASP mildly increased, dilated IVC, large right pleural effusion, large left pleural effusion.   Head CT 9/18 normal  Inferior STEMI 9/11/18, Cardiac Cath 9/11/18  Thrombectomy of RCA, PTCA/stent of the distal RCA into the PLB with a 2.75 x 12 and 3.0 x 15 mm KENNETH (resolute stent), complicated case Ao 340/40, LVEDP 27, no gradient across AV valve, RCA dominant, totally occluded in distal segment, LM normal, LAD transapical vessel and small caliber normal.  LCX normal.  LVEF 50%, 2+ MR  Echo 4/18 - LVEF 55 %, no WMA,mod dilated LA, MAC with mild MR, AV sclerosis with mod AI, mild TR, PASP 30mmHg, mild PI    Soc Hx: no tobacco use x 10 years, no etoh use   Fam Hx: mother passed at age 80 from heart problems    She  has a past medical history of (HFpEF) heart failure with preserved ejection fraction ([address] (10/17/2018), Allergic rhinitis, Atherosclerotic cardiovascular disease (1999), CHF (congestive heart failure) ([address] (09/11/2018), Chronic kidney disease, Cognitive communication deficit, Contact dermatitis and eczema due to cause, Edema, Environmental allergies, Fatty liver disease, nonalcoholic, GERD (gastroesophageal reflux disease), H/O heart artery stent (09/2018), HCVD (hypertensive cardiovascular disease) (10/17/2018), Heart attack ([address] (09/2018), Heart palpitations (2018), History of PTCA, Hypercholesterolemia (1999), Hypertension (2010), Liver disease, MI (myocardial infarction) ([address] (09/2018), Overactive bladder (08/09/2018), Peripheral neuropathy, Permanent atrial fibrillation ([address] (11/05/2018), Senile dementia, without behavioral disturbance ([address] (11/05/2018), Stress incontinence, female (08/09/2018), Thyroid disease, Vertigo, and Vitamin D deficiency. She also has no past medical history of Anemia, Arthritis, Asthma, Autoimmune disease ([address] Calculus of kidney, Cancer ([address] Chronic obstructive pulmonary disease ([address] Chronic pain, Depression, Diabetes ([address] Headache, History of abuse in adulthood, History of abuse in childhood, Psychotic disorder ([address] PUD (peptic ulcer disease), Seizures ([address] Stroke ([address] Thromboembolus ([address] or Trauma. Cardiovascular ROS: no chest pain, positive for dyspnea   Respiratory ROS: positive for cough, shortness of breath  Neurological ROS: no TIA or stroke symptoms  All other systems negative except as above.      PE  Vitals:    09/08/20 1312   BP: 140/80   Pulse: 69   Resp: 14   SpO2: 97%   Weight: 141 lb 9.6 oz (64.2 kg) Height: 5' 1\" (1.549 m)    Body mass index is 26.76 kg/m².    General appearance - alert, well appearing, and in no distress  Mental status - affect appropriate to mood  Eyes - sclera anicteric, moist mucous membranes  Neck - supple  Lymphatics - not assessed  Chest - diminished base on left side, right lung clear    Heart - normal rate, irregular rhythm, normal S1, S2, no murmurs, rubs, clicks or gallops  Abdomen - soft, nontender, nondistended  Back exam - full range of motion  Neurological - no focal deficit  Musculoskeletal - normal strength  Extremities - peripheral pulses normal, trace LE edema  Skin - normal coloration  no rashes    Recent Labs:  Lab Results   Component Value Date/Time    Cholesterol, total 159 08/20/2019 12:05 PM    HDL Cholesterol 67 08/20/2019 12:05 PM    LDL, calculated 79 08/20/2019 12:05 PM    Triglyceride 63 08/20/2019 12:05 PM    CHOL/HDL Ratio 3.8 12/17/2009 08:35 AM     Lab Results   Component Value Date/Time    Creatinine 1.04 (H) 10/02/2019 01:35 PM     Lab Results   Component Value Date/Time    BUN 25 10/02/2019 01:35 PM     Lab Results   Component Value Date/Time    Potassium 4.5 10/02/2019 01:35 PM     Lab Results   Component Value Date/Time    Hemoglobin A1c 5.8 (H) 08/20/2019 12:05 PM     Lab Results   Component Value Date/Time    HGB 10.7 (L) 08/20/2019 12:05 PM     Lab Results   Component Value Date/Time    PLATELET 994 01/43/6954 12:05 PM       Reviewed:  Past Medical History:   Diagnosis Date    (HFpEF) heart failure with preserved ejection fraction ([address] 10/17/2018    acute    Allergic rhinitis     Atherosclerotic cardiovascular disease 1999    CHF (congestive heart failure) ([facility] 09/11/2018    Chronic kidney disease     Cognitive communication deficit     Contact dermatitis and eczema due to cause     Edema     Environmental allergies     dizziness-(chronic)    Fatty liver disease, nonalcoholic     GERD (gastroesophageal reflux disease)     H/O heart artery stent 2018    HCVD (hypertensive cardiovascular disease) 10/17/2018    Heart attack ([facility] 2018    Heart palpitations 2018    infrequent     History of PTCA     Hypercholesterolemia 1999    Hypertension     Liver disease     fatty liver    MI (myocardial infarction) ([facility] 2018    Overactive bladder 2018    Peripheral neuropathy     Permanent atrial fibrillation ([address] 2018    Senile dementia, without behavioral disturbance ([facility] 2018    Stress incontinence, female 2018    Thyroid disease     Vertigo     Vitamin D deficiency      Social History     Tobacco Use   Smoking Status Former Smoker    Packs/day: 4.00    Types: Cigarettes    Last attempt to quit: 1955    Years since quittin.7   Smokeless Tobacco Never Used     Social History     Substance and Sexual Activity   Alcohol Use No    Alcohol/week: 0.0 - 0.8 standard drinks     Allergies   Allergen Reactions    Brilinta [Ticagrelor] Shortness of Breath    Celecoxib Other (comments)    Codeine Hives    Sulfa (Sulfonamide Antibiotics) Unknown (comments) and Hives    Iodine Unknown (comments)     Other reaction(s): Other (See Comments)  This was on outside records, pt denies that she has iodine allergy. Able to tolerate seafood, shellfish w/o reaction.  Aricept [Donepezil] Other (comments)     Headaches - noted as intolerance     Darvocet A500 [Propoxyphene N-Acetaminophen] Nausea Only       Current Outpatient Medications   Medication Sig    cyanocobalamin (VITAMIN B12) 500 mcg tablet Take 500 mcg by mouth daily. With 135EHK of folic acid    furosemide (LASIX) 40 mg tablet Take  by mouth daily.  senna (Senna) 8.6 mg tablet Take 1 Tab by mouth daily.  lactulose (CHRONULAC) 10 gram/15 mL solution Take 15 mL by mouth as needed.  cholecalciferol (VITAMIN D3) (5000 Units/125 mcg) tab tablet Take 10,000 Units by mouth daily.     levothyroxine (SYNTHROID) 75 mcg tablet Take 37.5 mcg by mouth Daily (before breakfast).  fluticasone (Flonase Sensimist) 27.5 mcg/actuation nasal spray 1 [place] by Nasal route daily.  mirabegron ER (Myrbetriq) 50 mg ER tablet Take 50 mg by mouth daily.  peg 400-propylene glycol (SYSTANE, PROPYLENE GLYCOL,) 0.4-0.3 % drop Administer 1 Drop to both eyes two (2) times a day.  meclizine (ANTIVERT) 12.5 mg tablet Take 12.5 mg by mouth three (3) times daily as needed (for BPPV).  busPIRone (BUSPAR) 10 mg tablet Take 1 Tab by mouth two (2) times a day.  hydrOXYzine HCl (ATARAX) 25 mg tablet Take 1 Tab by mouth nightly.  acetaminophen (TYLENOL) 325 mg tablet Take 325 mg by mouth every six (6) hours as needed for Pain.  omeprazole (PRILOSEC) 20 mg capsule Take 20 mg by mouth daily.  Calcium-Cholecalciferol, D3, 500 mg(1,250mg) -400 unit tab Take  by mouth.  clopidogrel (PLAVIX) 75 mg tab Take 75 mg by mouth daily. Indications: treatment to prevent a heart attack    polyethylene glycol (MIRALAX) 17 gram packet Take 17 g by mouth daily.  apixaban (ELIQUIS) 2.5 mg tablet Take 1 Tab by mouth two (2) times a day.  simvastatin (ZOCOR) 40 mg tablet Take 40 mg by mouth nightly.  nitroglycerin (NITROSTAT) 0.4 mg SL tablet Take 0.4 mg by mouth as needed.  diltiazem CD (CARTIA XT) 180 mg ER capsule Take 180 mg by mouth nightly.  vitamin b12-folic acid 7.7-4 mg tab Take 1 Tab by mouth daily.  levothyroxine (SYNTHROID) 25 mcg tablet Take 1.5 Tabs by mouth Daily (before breakfast). Indications: hypothyroidism     No current facility-administered medications for this visit.         Darrell Daniel MD  [facility] [address] [place] [address] [address], [address] [facility] [address]  [facility], [facility]  [phone]

## 2020-11-06 ENCOUNTER — VIRTUAL VISIT (OUTPATIENT)
Dept: CARDIOLOGY CLINIC | Age: 85
End: 2020-11-06
Payer: MEDICARE

## 2020-11-06 ENCOUNTER — TELEPHONE (OUTPATIENT)
Dept: CARDIOLOGY CLINIC | Age: 85
End: 2020-11-06

## 2020-11-06 DIAGNOSIS — I50.32 CHRONIC HEART FAILURE WITH PRESERVED EJECTION FRACTION (HCC): ICD-10-CM

## 2020-11-06 DIAGNOSIS — I25.10 CORONARY ARTERY DISEASE INVOLVING NATIVE CORONARY ARTERY OF NATIVE HEART WITHOUT ANGINA PECTORIS: ICD-10-CM

## 2020-11-06 DIAGNOSIS — E78.2 MIXED HYPERLIPIDEMIA: Primary | ICD-10-CM

## 2020-11-06 DIAGNOSIS — I35.1 AORTIC VALVE INSUFFICIENCY, ETIOLOGY OF CARDIAC VALVE DISEASE UNSPECIFIED: ICD-10-CM

## 2020-11-06 DIAGNOSIS — I48.0 PAROXYSMAL ATRIAL FIBRILLATION (HCC): ICD-10-CM

## 2020-11-06 DIAGNOSIS — I50.32 CHRONIC DIASTOLIC CONGESTIVE HEART FAILURE (HCC): ICD-10-CM

## 2020-11-06 DIAGNOSIS — I25.10 CORONARY ARTERY DISEASE INVOLVING NATIVE HEART WITHOUT ANGINA PECTORIS, UNSPECIFIED VESSEL OR LESION TYPE: ICD-10-CM

## 2020-11-06 DIAGNOSIS — I27.9 PULMONARY HEART DISEASE (HCC): ICD-10-CM

## 2020-11-06 DIAGNOSIS — I34.0 MITRAL VALVE INSUFFICIENCY, UNSPECIFIED ETIOLOGY: ICD-10-CM

## 2020-11-06 DIAGNOSIS — K76.0 FATTY LIVER DISEASE, NONALCOHOLIC: ICD-10-CM

## 2020-11-06 DIAGNOSIS — Z98.890 S/P MITRAL VALVE REPAIR: ICD-10-CM

## 2020-11-06 PROCEDURE — G8428 CUR MEDS NOT DOCUMENT: HCPCS | Performed by: INTERNAL MEDICINE

## 2020-11-06 PROCEDURE — 1090F PRES/ABSN URINE INCON ASSESS: CPT | Performed by: INTERNAL MEDICINE

## 2020-11-06 PROCEDURE — G8432 DEP SCR NOT DOC, RNG: HCPCS | Performed by: INTERNAL MEDICINE

## 2020-11-06 PROCEDURE — 1101F PT FALLS ASSESS-DOCD LE1/YR: CPT | Performed by: INTERNAL MEDICINE

## 2020-11-06 PROCEDURE — G0463 HOSPITAL OUTPT CLINIC VISIT: HCPCS | Performed by: INTERNAL MEDICINE

## 2020-11-06 PROCEDURE — 99214 OFFICE O/P EST MOD 30 MIN: CPT | Performed by: INTERNAL MEDICINE

## 2020-11-06 RX ORDER — METOLAZONE 2.5 MG/1
2.5 TABLET ORAL EVERY OTHER DAY
COMMUNITY

## 2020-11-06 RX ORDER — DICLOFENAC SODIUM 10 MG/G
1 GEL TOPICAL DAILY
COMMUNITY
Start: 2020-11-04

## 2020-11-06 NOTE — PATIENT INSTRUCTIONS
MD Ara Long   
  
    
  
3 mo vv thanks Then 6 mos in person with echo for MVR Rajani looking for labs 11/6/2020 at 2:44 spoke with Adeola (patients nurse at [facility]) advised that we'll keep patient already scheduled appointments and schedule additional follow up at that appointment Future Appointments Date Time Provider Maty Schrader 3/9/2021  3:00 PM VASILE CASTREJON  
3/9/2021  3:40 PM MD HELIO Madera BS [facility]B

## 2020-11-06 NOTE — PROGRESS NOTES
Chava Charter reported patient's info (administator at [facility]). Patient's weight 147 lb on Nov 3rd, 149lb  Yesterday. Vitals reported from yesterday. Highest weight they have is 151lb. Anna reported patient taking metolazone 2.5 mg every other day for HF. Added to med rec.

## 2020-11-06 NOTE — PROGRESS NOTES
[facility]  [phone]  [id number] Prieto Parson who was evaluated through a synchronous (real-time) audio-video encounter, and/or her healthcare decision maker, is aware that it is a billable service, with coverage as determined by her insurance carrier. She provided verbal consent to proceed: Yes, and patient identification was verified. It was conducted pursuant to the emergency declaration under the [facility], [address] [place] [address] and the [employer] and [employer] Act. A caregiver was present when appropriate. Ability to conduct physical exam was limited. I was at the office. The patient was at home. HPI: [id number] Prieto Parson, a 80y.o. year-old who presents for follow up regarding her severe MR and diastolic heart failure. Bp is doing well, it is running 130s now. Occasional low BP and that makes her weak. But having more good days than days. Looks good today at 110/66. Normally running in the 110s, one at 133/65, has really been doing great. She has her compression stockings on today. Off her metoprolol and she is feeling fine  Occasional dyspnea with exertion. Walking down the rader is fine  No real dizziness  NO palpitations  NO swelling, ankles are fine  Good appetite    MV clip was done 4/9/19 and she looks much better. Really getting back to her old self, and color better, breathing better, stamina better, etc. Echo next visit. She denies any PND or orthopnea  Off her O2. Back in [place] and doing well. She has chronic dizziness and feeling unsteady for years  No syncope   No headaches, no chest pain no dyspnea now. Pt's nurse is with her today. She is having a lot of dysuria and burning in the urine. No infection. Uro though she was having spasms and prescribed myrbetriq    Tylenol for hip pain once a day, since she fell a while back. Her weight is stable. 149.  Gradually trending up. Asked her to maintain here as her weight is up 20 pounds year over year. Needs labs for CBC, CMP mag Lipids A1c    Assessment/Plan:  1. CAD s/p Inferior STEMI 9/11/18 with PTCA/stents to distal RCA (KENNETH x 2)  -symptoms preceding STEMI were palpitations, nausea, dizziness, diaphoresis, maybe some chest heaviness, troponin > 50  -continue plavix, coreg, statin  2. Severe MR - s/p MV clip 4/9/19, Dr. Julienne Baird, doing great now  3. Large left pleural effusion- resolved, now on low dose Lasix  4. AI- better on most recent echo, due to repeat  5. Hypotension - hx of HTN, well controlled now  6. HFpEF - BP well controlled, bumex 1mg daily  -compression stockings daily  7. AF - rate controlled  , continue Eliquis 2.5mg BID, no bleeding, etc.   8.  Dyslipidemia - at goal on simvastatin 40mg daily   9. DNR status in place  10. Dyslipidemia- cont statin    Echo 12/6/18 - LVEF 50 %, no WMA, grade 3 dd, LA severely dilated, MAC with eccentric severe regurgitation that was posteriorly directed, AV sclerosis without stenosis and mod AI, mild TR, PASP mildly increased, dilated IVC, large right pleural effusion, large left pleural effusion.   Head CT 9/18 normal  Inferior STEMI 9/11/18, Cardiac Cath 9/11/18  Thrombectomy of RCA, PTCA/stent of the distal RCA into the PLB with a 2.75 x 12 and 3.0 x 15 mm KENNETH (resolute stent), complicated case Ao 049/77, LVEDP 27, no gradient across AV valve, RCA dominant, totally occluded in distal segment, LM normal, LAD transapical vessel and small caliber normal.  LCX normal.  LVEF 50%, 2+ MR  Echo 4/18 - LVEF 55 %, no WMA,mod dilated LA, MAC with mild MR, AV sclerosis with mod AI, mild TR, PASP 30mmHg, mild PI    Soc Hx: no tobacco use x 10 years, no etoh use   Fam Hx: mother passed at age 80 from heart problems    She  has a past medical history of (HFpEF) heart failure with preserved ejection fraction ([address] (10/17/2018), Allergic rhinitis, Atherosclerotic cardiovascular disease (1999), CHF (congestive heart failure) ([facility] (09/11/2018), Chronic kidney disease, Cognitive communication deficit, Contact dermatitis and eczema due to cause, Edema, Environmental allergies, Fatty liver disease, nonalcoholic, GERD (gastroesophageal reflux disease), H/O heart artery stent (09/2018), HCVD (hypertensive cardiovascular disease) (10/17/2018), Heart attack ([address] (09/2018), Heart palpitations (2018), History of PTCA, Hypercholesterolemia (1999), Hypertension (2010), Liver disease, MI (myocardial infarction) ([address] (09/2018), Overactive bladder (08/09/2018), Peripheral neuropathy, Permanent atrial fibrillation ([address] (11/05/2018), Senile dementia, without behavioral disturbance ([address] (11/05/2018), Stress incontinence, female (08/09/2018), Thyroid disease, Vertigo, and Vitamin D deficiency. She also has no past medical history of Anemia, Arthritis, Asthma, Autoimmune disease ([address] Calculus of kidney, Cancer ([address] Chronic obstructive pulmonary disease ([address] Chronic pain, Depression, Diabetes ([address] Headache, History of abuse in adulthood, History of abuse in childhood, Psychotic disorder ([address] PUD (peptic ulcer disease), Seizures ([address] Stroke ([address] Thromboembolus ([address] or Trauma. Cardiovascular ROS: no chest pain, positive for dyspnea   Respiratory ROS: positive for shortness of breath  Neurological ROS: no TIA or stroke symptoms  All other systems negative except as above. PE  There were no vitals filed for this visit. There is no height or weight on file to calculate BMI.    General appearance - alert, well appearing, and in no distress  Mental status - affect appropriate to mood  y   Skin- normal coloration  no rashes    Recent Labs:  Lab Results   Component Value Date/Time    Cholesterol, total 159 08/20/2019 12:05 PM    HDL Cholesterol 67 08/20/2019 12:05 PM    LDL, calculated 79 08/20/2019 12:05 PM    Triglyceride 63 08/20/2019 12:05 PM    CHOL/HDL Ratio 3.8 12/17/2009 08:35 AM     Lab Results Component Value Date/Time    Creatinine 1.04 (H) 10/02/2019 01:35 PM     Lab Results   Component Value Date/Time    BUN 25 10/02/2019 01:35 PM     Lab Results   Component Value Date/Time    Potassium 4.5 10/02/2019 01:35 PM     Lab Results   Component Value Date/Time    Hemoglobin A1c 5.8 (H) 2019 12:05 PM     Lab Results   Component Value Date/Time    HGB 10.7 (L) 2019 12:05 PM     Lab Results   Component Value Date/Time    PLATELET 971  12:05 PM       Reviewed:  Past Medical History:   Diagnosis Date    (HFpEF) heart failure with preserved ejection fraction ([facility] 10/17/2018    acute    Allergic rhinitis     Atherosclerotic cardiovascular disease     CHF (congestive heart failure) ([facility] 2018    Chronic kidney disease     Cognitive communication deficit     Contact dermatitis and eczema due to cause     Edema     Environmental allergies     dizziness-(chronic)    Fatty liver disease, nonalcoholic     GERD (gastroesophageal reflux disease)     H/O heart artery stent 2018    HCVD (hypertensive cardiovascular disease) 10/17/2018    Heart attack ([address] 2018    Heart palpitations 2018    infrequent     History of PTCA     Hypercholesterolemia     Hypertension     Liver disease     fatty liver    MI (myocardial infarction) ([facility] 2018    Overactive bladder 2018    Peripheral neuropathy     Permanent atrial fibrillation ([facility] 2018    Senile dementia, without behavioral disturbance ([facility] 2018    Stress incontinence, female 2018    Thyroid disease     Vertigo     Vitamin D deficiency      Social History     Tobacco Use   Smoking Status Former Smoker    Packs/day: 4.00    Types: Cigarettes    Last attempt to quit: 1955    Years since quittin.8   Smokeless Tobacco Never Used     Social History     Substance and Sexual Activity   Alcohol Use No    Alcohol/week: 0.0 - 0.8 standard drinks     Allergies   Allergen Reactions    Brilinta [Ticagrelor] Shortness of Breath    Celecoxib Other (comments)    Codeine Hives    Sulfa (Sulfonamide Antibiotics) Unknown (comments) and Hives    Iodine Unknown (comments)     Other reaction(s): Other (See Comments)  This was on outside records, pt denies that she has iodine allergy. Able to tolerate seafood, shellfish w/o reaction.  Aricept [Donepezil] Other (comments)     Headaches - noted as intolerance     Darvocet A500 [Propoxyphene N-Acetaminophen] Nausea Only       Current Outpatient Medications   Medication Sig    metOLazone (ZAROXOLYN) 2.5 mg tablet Take 2.5 mg by mouth every other day.  cyanocobalamin (VITAMIN B12) 500 mcg tablet Take 500 mcg by mouth daily. With [id number] of folic acid    furosemide (LASIX) 40 mg tablet Take  by mouth daily.  lactulose (CHRONULAC) 10 gram/15 mL solution Take 15 mL by mouth as needed.  cholecalciferol (VITAMIN D3) (5000 Units/125 mcg) tab tablet Take 5,000 Units by mouth daily.  levothyroxine (SYNTHROID) 75 mcg tablet Take 37.5 mcg by mouth Daily (before breakfast).  fluticasone (Flonase Sensimist) 27.5 mcg/actuation nasal spray 1 [place] by Nasal route daily.  mirabegron ER (Myrbetriq) 50 mg ER tablet Take 50 mg by mouth daily.  peg 400-propylene glycol (SYSTANE, PROPYLENE GLYCOL,) 0.4-0.3 % drop Administer 1 Drop to both eyes two (2) times a day.  meclizine (ANTIVERT) 12.5 mg tablet Take 12.5 mg by mouth three (3) times daily as needed (for BPPV).  busPIRone (BUSPAR) 10 mg tablet Take 1 Tab by mouth two (2) times a day.  hydrOXYzine HCl (ATARAX) 25 mg tablet Take 1 Tab by mouth nightly.  vitamin b12-folic acid 0.1-6 mg tab Take 1 Tab by mouth daily.  acetaminophen (TYLENOL) 325 mg tablet Take 325 mg by mouth every six (6) hours as needed for Pain.  omeprazole (PRILOSEC) 20 mg capsule Take 20 mg by mouth daily.  Calcium-Cholecalciferol, D3, 500 mg(1,250mg) -400 unit tab Take  by mouth.     clopidogrel (PLAVIX) 75 mg tab Take 75 mg by mouth daily. Indications: treatment to prevent a heart attack    polyethylene glycol (MIRALAX) 17 gram packet Take 17 g by mouth daily.  apixaban (ELIQUIS) 2.5 mg tablet Take 1 Tab by mouth two (2) times a day.  simvastatin (ZOCOR) 40 mg tablet Take 40 mg by mouth nightly.  nitroglycerin (NITROSTAT) 0.4 mg SL tablet Take 0.4 mg by mouth as needed.  diltiazem CD (CARTIA XT) 180 mg ER capsule Take 180 mg by mouth nightly.  diclofenac (VOLTAREN) 1 % gel Apply 1 Each to affected area daily.  senna (Senna) 8.6 mg tablet Take 1 Tab by mouth daily. No current facility-administered medications for this visit. Gwendolyn Mckay MD  [facility] [address], [address] [place] [address]  [place], [facility] [address]   [phone]  [address] [place] [address] [phone]    HPI: Darwin Ibanez, a 80y.o. year-old who presents for follow up regarding her severe MR and diastolic heart failure.     She is in healthcare now at [facility] says they plan to move her to assisted living but I suggested they hold off on transfer out of healthcare until we make sure she is stable  She reports that her stamina is better  Says she gets dyspnea with walking depending on how fast she is walking  Denies PND and sleeps on 2 pillows  Weight down 2 lbs from the hospital, also reports poor appetite, we talked about eating 3 meals/day  Says she is sleeping well   Denies any chest pain, pressure, tightness  Reports rare palpitations which only last a few seconds and without any high risk features  Says she has very little dizziness now, no syncope  BP low today - 100/60  O2 Sats 90% with ambulation   Says she coughs all the time but it is not productive, no fevers or chills  Son reports she is coughing much less now than she was prior to admission  Patient's son is with her today, she is a poor historian    **After last office visit labs received dated 12/24/18  BUN 15, Cr 0.97, K 3.3, Mg 1.7    Assessment/Plan:  1. CAD s/p Inferior STEMI 9/11/18 with PTCA/stents to distal RCA (KENNETH x 2)  -symptoms preceding STEMI were palpitations, nausea, dizziness, diaphoresis, maybe some chest heaviness, troponin > 50  -continue plavix, coreg, statin  2. Severe MR - seen by valve team during admission, no plans for valve repair, etc at this time   -will repeat TTE in 1 month to reassess severity of MR and follow up with Dr. Charles Sierra at that time   3. Large left pleural effusion on TTE, moderate on CXR on admission - will repeat CXR now to reassess, continue bumex  4. AI - moderate by TTE   5. Hypotension - hx of HTN, advised her to reduce her coreg to 3.125mg BID and continue other medications for now, will need to watch BP closely  6. HFpEF - BP controlled, continue bumex, will check BMP and magnesium level prior to her return appointment in 1 month  7. AF - rate controlled on coreg 6.25mg BID and diltiazem 180mg daily, reducing coreg dose as above for hypotension, continue Eliquis 2.5mg BID for anticoagulation  8. Dyslipidemia - LDL 56 on simvastatin 40mg daily   -Lipids 9/18 - , TG 81, , HDL 50  9. CHEMA post cardiac cath - now creatinine normal  10. DNR status in place    Echo 12/6/18 - LVEF 50 %, no WMA, grade 3 dd, LA severely dilated, MAC with eccentric severe regurgitation that was posteriorly directed, AV sclerosis without stenosis and mod AI, mild TR, PASP mildly increased, dilated IVC, large right pleural effusion, large left pleural effusion.   Head CT 9/18 normal  Inferior STEMI 9/11/18, Cardiac Cath 9/11/18  Thrombectomy of RCA, PTCA/stent of the distal RCA into the PLB with a 2.75 x 12 and 3.0 x 15 mm KENNETH (resolute stent), complicated case Ao 312/52, LVEDP 27, no gradient across AV valve, RCA dominant, totally occluded in distal segment, LM normal, LAD transapical vessel and small caliber normal.  LCX normal.  LVEF 50%, 2+ MR  Echo 4/18 - LVEF 55 %, no WMA,mod dilated LA, MAC with mild MR, AV sclerosis with mod AI, mild TR, PASP 30mmHg, mild PI    Soc Hx: no tobacco use x 10 years, no etoh use   Fam Hx: mother passed at age 80 from heart problems    She  has a past medical history of (HFpEF) heart failure with preserved ejection fraction ([address] (10/17/2018), Allergic rhinitis, Atherosclerotic cardiovascular disease (1999), CHF (congestive heart failure) ([address] (09/11/2018), Chronic kidney disease, Cognitive communication deficit, Contact dermatitis and eczema due to cause, Edema, Environmental allergies, Fatty liver disease, nonalcoholic, GERD (gastroesophageal reflux disease), H/O heart artery stent (09/2018), HCVD (hypertensive cardiovascular disease) (10/17/2018), Heart attack ([address] (09/2018), Heart palpitations (2018), History of PTCA, Hypercholesterolemia (1999), Hypertension (2010), Liver disease, MI (myocardial infarction) ([address] (09/2018), Overactive bladder (08/09/2018), Peripheral neuropathy, Permanent atrial fibrillation ([address] (11/05/2018), Senile dementia, without behavioral disturbance ([address] (11/05/2018), Stress incontinence, female (08/09/2018), Thyroid disease, Vertigo, and Vitamin D deficiency. She also has no past medical history of Anemia, Arthritis, Asthma, Autoimmune disease ([address] Calculus of kidney, Cancer ([address] Chronic obstructive pulmonary disease ([address] Chronic pain, Depression, Diabetes ([address] Headache, History of abuse in adulthood, History of abuse in childhood, Psychotic disorder ([address] PUD (peptic ulcer disease), Seizures ([address] Stroke ([address] Thromboembolus ([address] or Trauma. Cardiovascular ROS: no chest pain, positive for dyspnea   Respiratory ROS: positive for cough, shortness of breath  Neurological ROS: no TIA or stroke symptoms  All other systems negative except as above. PE  There were no vitals filed for this visit. There is no height or weight on file to calculate BMI.    General appearance - alert, well appearing, and in no distress  Mental status - affect appropriate to mood  Eyes - sclera anicteric, moist mucous membranes  Neck - supple  Lymphatics - not assessed  Chest - diminished base on left side, right lung clear    Heart - normal rate, irregular rhythm, normal S1, S2, no murmurs, rubs, clicks or gallops  Abdomen - soft, nontender, nondistended  Back exam - full range of motion  Neurological - no focal deficit  Musculoskeletal - normal strength  Extremities - peripheral pulses normal, trace LE edema  Skin - normal coloration  no rashes    Recent Labs:  Lab Results   Component Value Date/Time    Cholesterol, total 159 08/20/2019 12:05 PM    HDL Cholesterol 67 08/20/2019 12:05 PM    LDL, calculated 79 08/20/2019 12:05 PM    Triglyceride 63 08/20/2019 12:05 PM    CHOL/HDL Ratio 3.8 12/17/2009 08:35 AM     Lab Results   Component Value Date/Time    Creatinine 1.04 (H) 10/02/2019 01:35 PM     Lab Results   Component Value Date/Time    BUN 25 10/02/2019 01:35 PM     Lab Results   Component Value Date/Time    Potassium 4.5 10/02/2019 01:35 PM     Lab Results   Component Value Date/Time    Hemoglobin A1c 5.8 (H) 08/20/2019 12:05 PM     Lab Results   Component Value Date/Time    HGB 10.7 (L) 08/20/2019 12:05 PM     Lab Results   Component Value Date/Time    PLATELET 839 16/96/9565 12:05 PM       Reviewed:  Past Medical History:   Diagnosis Date    (HFpEF) heart failure with preserved ejection fraction ([facility] 10/17/2018    acute    Allergic rhinitis     Atherosclerotic cardiovascular disease 1999    [facility] 09/11/2018    Chronic kidney disease     Cognitive communication deficit     Contact dermatitis and eczema due to cause     Edema     Environmental allergies     dizziness-(chronic)    Fatty liver disease, nonalcoholic     GERD (gastroesophageal reflux disease)     H/O heart artery stent 09/2018    HCVD (hypertensive cardiovascular disease) 10/17/2018    Heart attack ([facility] 2018    Heart palpitations 2018    infrequent     History of PTCA     Hypercholesterolemia     Hypertension     Liver disease     fatty liver    MI (myocardial infarction) ([facility] 2018    Overactive bladder 2018    Peripheral neuropathy     Permanent atrial fibrillation ([facility] 2018    Senile dementia, without behavioral disturbance ([facility] 2018    Stress incontinence, female 2018    Thyroid disease     Vertigo     Vitamin D deficiency      Social History     Tobacco Use   Smoking Status Former Smoker    Packs/day: 4.00    Types: Cigarettes    Last attempt to quit: 1955    Years since quittin.8   Smokeless Tobacco Never Used     Social History     Substance and Sexual Activity   Alcohol Use No    Alcohol/week: 0.0 - 0.8 standard drinks     Allergies   Allergen Reactions    Brilinta [Ticagrelor] Shortness of Breath    Celecoxib Other (comments)    Codeine Hives    Sulfa (Sulfonamide Antibiotics) Unknown (comments) and Hives    Iodine Unknown (comments)     Other reaction(s): Other (See Comments)  This was on outside records, pt denies that she has iodine allergy. Able to tolerate seafood, shellfish w/o reaction.  Aricept [Donepezil] Other (comments)     Headaches - noted as intolerance     Darvocet A500 [Propoxyphene N-Acetaminophen] Nausea Only       Current Outpatient Medications   Medication Sig    metOLazone (ZAROXOLYN) 2.5 mg tablet Take 2.5 mg by mouth every other day.  cyanocobalamin (VITAMIN B12) 500 mcg tablet Take 500 mcg by mouth daily. With 408OOV of folic acid    furosemide (LASIX) 40 mg tablet Take  by mouth daily.  lactulose (CHRONULAC) 10 gram/15 mL solution Take 15 mL by mouth as needed.  cholecalciferol (VITAMIN D3) (5000 Units/125 mcg) tab tablet Take 5,000 Units by mouth daily.  levothyroxine (SYNTHROID) 75 mcg tablet Take 37.5 mcg by mouth Daily (before breakfast).     fluticasone (Flonase Sensimist) 27.5 mcg/actuation nasal spray 1 [place] by Nasal route daily.  mirabegron ER (Myrbetriq) 50 mg ER tablet Take 50 mg by mouth daily.  peg 400-propylene glycol (SYSTANE, PROPYLENE GLYCOL,) 0.4-0.3 % drop Administer 1 Drop to both eyes two (2) times a day.  meclizine (ANTIVERT) 12.5 mg tablet Take 12.5 mg by mouth three (3) times daily as needed (for BPPV).  busPIRone (BUSPAR) 10 mg tablet Take 1 Tab by mouth two (2) times a day.  hydrOXYzine HCl (ATARAX) 25 mg tablet Take 1 Tab by mouth nightly.  vitamin b12-folic acid 9.3-4 mg tab Take 1 Tab by mouth daily.  acetaminophen (TYLENOL) 325 mg tablet Take 325 mg by mouth every six (6) hours as needed for Pain.  omeprazole (PRILOSEC) 20 mg capsule Take 20 mg by mouth daily.  Calcium-Cholecalciferol, D3, 500 mg(1,250mg) -400 unit tab Take  by mouth.  clopidogrel (PLAVIX) 75 mg tab Take 75 mg by mouth daily. Indications: treatment to prevent a heart attack    polyethylene glycol (MIRALAX) 17 gram packet Take 17 g by mouth daily.  apixaban (ELIQUIS) 2.5 mg tablet Take 1 Tab by mouth two (2) times a day.  simvastatin (ZOCOR) 40 mg tablet Take 40 mg by mouth nightly.  nitroglycerin (NITROSTAT) 0.4 mg SL tablet Take 0.4 mg by mouth as needed.  diltiazem CD (CARTIA XT) 180 mg ER capsule Take 180 mg by mouth nightly.  diclofenac (VOLTAREN) 1 % gel Apply 1 Each to affected area daily.  senna (Senna) 8.6 mg tablet Take 1 Tab by mouth daily. No current facility-administered medications for this visit.         Abdias Garcia MD  [facility] [address] [facility] [address], [address] [facility] [address]  [address] [facility]  [phone]

## 2020-11-10 ENCOUNTER — APPOINTMENT (OUTPATIENT)
Dept: CT IMAGING | Age: 85
End: 2020-11-10
Attending: EMERGENCY MEDICINE
Payer: MEDICARE

## 2020-11-10 ENCOUNTER — HOSPITAL ENCOUNTER (EMERGENCY)
Age: 85
Discharge: HOME | End: 2020-11-11
Attending: EMERGENCY MEDICINE | Admitting: EMERGENCY MEDICINE
Payer: MEDICARE

## 2020-11-10 ENCOUNTER — APPOINTMENT (OUTPATIENT)
Dept: GENERAL RADIOLOGY | Age: 85
End: 2020-11-10
Attending: EMERGENCY MEDICINE
Payer: MEDICARE

## 2020-11-10 DIAGNOSIS — E87.6 HYPOKALEMIA: Primary | ICD-10-CM

## 2020-11-10 LAB
ALBUMIN SERPL-MCNC: 3 G/DL (ref 3.5–5)
ALBUMIN/GLOB SERPL: 0.7 {RATIO} (ref 1.1–2.2)
ALP SERPL-CCNC: 110 U/L (ref 45–117)
ALT SERPL-CCNC: 19 U/L (ref 12–78)
ANION GAP SERPL CALC-SCNC: 11 MMOL/L (ref 5–15)
APPEARANCE UR: CLEAR
AST SERPL-CCNC: 20 U/L (ref 15–37)
BACTERIA URNS QL MICRO: NEGATIVE /HPF
BASOPHILS # BLD: 0 K/UL (ref 0–0.1)
BASOPHILS NFR BLD: 0 % (ref 0–1)
BILIRUB SERPL-MCNC: 0.4 MG/DL (ref 0.2–1)
BILIRUB UR QL: NEGATIVE
BUN SERPL-MCNC: 32 MG/DL (ref 6–20)
BUN/CREAT SERPL: 22 (ref 12–20)
CALCIUM SERPL-MCNC: 9 MG/DL (ref 8.5–10.1)
CHLORIDE SERPL-SCNC: 87 MMOL/L (ref 97–108)
CO2 SERPL-SCNC: 32 MMOL/L (ref 21–32)
COLOR UR: ABNORMAL
COMMENT, HOLDF: NORMAL
CREAT SERPL-MCNC: 1.47 MG/DL (ref 0.55–1.02)
DIFFERENTIAL METHOD BLD: ABNORMAL
EOSINOPHIL # BLD: 0 K/UL (ref 0–0.4)
EOSINOPHIL NFR BLD: 0 % (ref 0–7)
EPITH CASTS URNS QL MICRO: ABNORMAL /LPF
ERYTHROCYTE [DISTWIDTH] IN BLOOD BY AUTOMATED COUNT: 15.2 % (ref 11.5–14.5)
GLOBULIN SER CALC-MCNC: 4.5 G/DL (ref 2–4)
GLUCOSE SERPL-MCNC: 140 MG/DL (ref 65–100)
GLUCOSE UR STRIP.AUTO-MCNC: NEGATIVE MG/DL
HCT VFR BLD AUTO: 29.8 % (ref 35–47)
HGB BLD-MCNC: 9.7 G/DL (ref 11.5–16)
HGB UR QL STRIP: NEGATIVE
HYALINE CASTS URNS QL MICRO: ABNORMAL /LPF (ref 0–5)
IMM GRANULOCYTES # BLD AUTO: 0.2 K/UL (ref 0–0.04)
IMM GRANULOCYTES NFR BLD AUTO: 1 % (ref 0–0.5)
KETONES UR QL STRIP.AUTO: NEGATIVE MG/DL
LACTATE SERPL-SCNC: 1 MMOL/L (ref 0.4–2)
LEUKOCYTE ESTERASE UR QL STRIP.AUTO: NEGATIVE
LYMPHOCYTES # BLD: 1.2 K/UL (ref 0.8–3.5)
LYMPHOCYTES NFR BLD: 7 % (ref 12–49)
MAGNESIUM SERPL-MCNC: 2.2 MG/DL (ref 1.6–2.4)
MCH RBC QN AUTO: 26.5 PG (ref 26–34)
MCHC RBC AUTO-ENTMCNC: 32.6 G/DL (ref 30–36.5)
MCV RBC AUTO: 81.4 FL (ref 80–99)
MONOCYTES # BLD: 2 K/UL (ref 0–1)
MONOCYTES NFR BLD: 12 % (ref 5–13)
NEUTS SEG # BLD: 13.6 K/UL (ref 1.8–8)
NEUTS SEG NFR BLD: 80 % (ref 32–75)
NITRITE UR QL STRIP.AUTO: NEGATIVE
NRBC # BLD: 0 K/UL (ref 0–0.01)
NRBC BLD-RTO: 0 PER 100 WBC
PH UR STRIP: 6.5 [PH] (ref 5–8)
PLATELET # BLD AUTO: 438 K/UL (ref 150–400)
PLATELET COMMENTS,PCOM: ABNORMAL
PMV BLD AUTO: 8.6 FL (ref 8.9–12.9)
POTASSIUM SERPL-SCNC: 2.3 MMOL/L (ref 3.5–5.1)
PROT SERPL-MCNC: 7.5 G/DL (ref 6.4–8.2)
PROT UR STRIP-MCNC: 30 MG/DL
RBC # BLD AUTO: 3.66 M/UL (ref 3.8–5.2)
RBC #/AREA URNS HPF: ABNORMAL /HPF (ref 0–5)
RBC MORPH BLD: ABNORMAL
SAMPLES BEING HELD,HOLD: NORMAL
SODIUM SERPL-SCNC: 130 MMOL/L (ref 136–145)
SP GR UR REFRACTOMETRY: 1.01 (ref 1–1.03)
UR CULT HOLD, URHOLD: NORMAL
UROBILINOGEN UR QL STRIP.AUTO: 0.2 EU/DL (ref 0.2–1)
WBC # BLD AUTO: 17 K/UL (ref 3.6–11)
WBC URNS QL MICRO: ABNORMAL /HPF (ref 0–4)

## 2020-11-10 PROCEDURE — 36415 COLL VENOUS BLD VENIPUNCTURE: CPT

## 2020-11-10 PROCEDURE — 81001 URINALYSIS AUTO W/SCOPE: CPT

## 2020-11-10 PROCEDURE — 93005 ELECTROCARDIOGRAM TRACING: CPT

## 2020-11-10 PROCEDURE — 83605 ASSAY OF LACTIC ACID: CPT

## 2020-11-10 PROCEDURE — 72125 CT NECK SPINE W/O DYE: CPT

## 2020-11-10 PROCEDURE — 74011250637 HC RX REV CODE- 250/637: Performed by: EMERGENCY MEDICINE

## 2020-11-10 PROCEDURE — 74011250636 HC RX REV CODE- 250/636: Performed by: EMERGENCY MEDICINE

## 2020-11-10 PROCEDURE — 71046 X-RAY EXAM CHEST 2 VIEWS: CPT

## 2020-11-10 PROCEDURE — 99285 EMERGENCY DEPT VISIT HI MDM: CPT

## 2020-11-10 PROCEDURE — 80053 COMPREHEN METABOLIC PANEL: CPT

## 2020-11-10 PROCEDURE — 85025 COMPLETE CBC W/AUTO DIFF WBC: CPT

## 2020-11-10 PROCEDURE — 83735 ASSAY OF MAGNESIUM: CPT

## 2020-11-10 RX ORDER — POTASSIUM CHLORIDE 750 MG/1
40 TABLET, FILM COATED, EXTENDED RELEASE ORAL
Status: COMPLETED | OUTPATIENT
Start: 2020-11-10 | End: 2020-11-10

## 2020-11-10 RX ORDER — SODIUM CHLORIDE AND POTASSIUM CHLORIDE .9; .15 G/100ML; G/100ML
SOLUTION INTRAVENOUS CONTINUOUS
Status: DISCONTINUED | OUTPATIENT
Start: 2020-11-10 | End: 2020-11-11 | Stop reason: HOSPADM

## 2020-11-10 RX ORDER — POTASSIUM CHLORIDE 1500 MG/1
20 TABLET, FILM COATED, EXTENDED RELEASE ORAL 2 TIMES DAILY
Qty: 28 TAB | Refills: 0 | Status: SHIPPED | OUTPATIENT
Start: 2020-11-10 | End: 2020-11-24

## 2020-11-10 RX ADMIN — POTASSIUM CHLORIDE 40 MEQ: 750 TABLET, FILM COATED, EXTENDED RELEASE ORAL at 22:24

## 2020-11-10 RX ADMIN — POTASSIUM CHLORIDE 40 MEQ: 750 TABLET, FILM COATED, EXTENDED RELEASE ORAL at 20:41

## 2020-11-10 RX ADMIN — POTASSIUM CHLORIDE AND SODIUM CHLORIDE: 900; 150 INJECTION, SOLUTION INTRAVENOUS at 20:44

## 2020-11-10 NOTE — ED TRIAGE NOTES
She arrives by EMS from [facility]. She has been complaining of neck pain. She had an \"abnormal xray\". She also has had increasing confusion. She had blood work done there yesterday. She had K+ 2.8 and WBC 14.2. She denies neck pain at present. She says it hurt yesterday.

## 2020-11-10 NOTE — ED PROVIDER NOTES
3year-old woman with a history of dementia was sent in for evaluation of neck pain. Patient currently denies neck pain, though she said that her neck hurt yesterday. Patient denies fevers, chills, chest pain, shortness of breath, nausea, vomiting, dysuria, abdominal pain. Patient reports urinary frequency but is unable to tell me the time of onset; she also seems to believe that the urinary frequency has resolved. [facility] reports hypokalemia and leukocytosis as well as increasing confusion. The history is provided by the patient, the nursing home and medical records. The history is limited by the condition of the patient.         Past Medical History:   Diagnosis Date    (HFpEF) heart failure with preserved ejection fraction ([address] 10/17/2018    acute    Allergic rhinitis     Atherosclerotic cardiovascular disease 1999    CHF (congestive heart failure) (HCC) 09/11/2018    Chronic kidney disease     Cognitive communication deficit     Contact dermatitis and eczema due to cause     Edema     Environmental allergies     dizziness-(chronic)    Fatty liver disease, nonalcoholic     GERD (gastroesophageal reflux disease)     H/O heart artery stent 09/2018    HCVD (hypertensive cardiovascular disease) 10/17/2018    Heart attack ([address] 09/2018    Heart palpitations 2018    infrequent     History of PTCA     Hypercholesterolemia 1999    Hypertension 2010    Liver disease     fatty liver    MI (myocardial infarction) ([address] 09/2018    Overactive bladder 08/09/2018    Peripheral neuropathy     Permanent atrial fibrillation ([address] 11/05/2018    Senile dementia, without behavioral disturbance ([address] 11/05/2018    Stress incontinence, female 08/09/2018    Thyroid disease     Vertigo     Vitamin D deficiency        Past Surgical History:   Procedure Laterality Date    BREAST SURGERY PROCEDURE UNLISTED      breast cyst Ruby Zambrano)    CARDIAC SURG PROCEDURE UNLIST  8/1999    + stress thalassemia; 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  Holter    HX APPENDECTOMY      HX BREAST BIOPSY Left     neg    HX CYST INCISION AND DRAINAGE Right years ago    neg    HX DILATION AND CURETTAGE      x5    HX GYN      D&C (x5), ALLEY/BSO (-Juliann/Jerome), Provera intolerance (bleeding), endometrial Bx annually    HX PTCA  2018    stent distal RCA into PLwith KENNETH x 2    HX ALLEY AND BSO      Juliann/Jerome         Family History:   Problem Relation Age of Onset    Diabetes Mother     Hypertension Mother     Heart Failure Mother         CHF ( @ 80)   [facility] Alzheimer Mother     Cancer Father         lung ( @ 77)   [facility] Alzheimer Father     No Known Problems Son     No Known Problems Son     Ovarian Cancer Sister         hysterectomy    Breast Cancer Sister 28        mastectomy    Cancer Sister         breast and ovarian    Alzheimer Sister     Breast Problems Sister         breast cyst    Cataract Sister     Hypertension Sister     Diabetes Brother        Social History     Socioeconomic History    Marital status:      Spouse name: Not on file    Number of children: Not on file    Years of education: Not on file    Highest education level: Not on file   Occupational History    Not on file   Social Needs    Financial resource strain: Not on file    Food insecurity     Worry: Not on file     Inability: Not on file    Transportation needs     Medical: Not on file     Non-medical: Not on file   Tobacco Use    Smoking status: Former Smoker     Packs/day: 4.00     Types: Cigarettes     Last attempt to quit: 1955     Years since quittin.9    Smokeless tobacco: Never Used   Substance and Sexual Activity    Alcohol use: No     Alcohol/week: 0.0 - 0.8 standard drinks    Drug use: No    Sexual activity: Not Currently     Partners: Male   Lifestyle    Physical activity     Days per week: Not on file     Minutes per session: Not on file    Stress: Not on file   Relationships    Social connections     Talks on phone: Not on file     Gets together: Not on file     Attends [employer] service: Not on file     Active member of club or organization: Not on file     Attends meetings of clubs or organizations: Not on file     Relationship status: Not on file    Intimate partner violence     Fear of current or ex partner: Not on file     Emotionally abused: Not on file     Physically abused: Not on file     Forced sexual activity: Not on file   Other Topics Concern    Not on file   Social History Narrative    ** Merged History Encounter **              ALLERGIES: Brilinta [ticagrelor]; Celecoxib; Codeine; Sulfa (sulfonamide antibiotics); Iodine; Aricept [donepezil]; and Darvocet a500 [propoxyphene n-acetaminophen]    Review of Systems   Constitutional: Negative for chills and fever. Respiratory: Negative for shortness of breath. Cardiovascular: Negative for chest pain. Gastrointestinal: Negative for abdominal pain, constipation, diarrhea and vomiting. Musculoskeletal: Positive for neck pain. Neurological: Negative for dizziness and light-headedness. All other systems reviewed and are negative. Vitals:    11/10/20 1645   BP: (!) 146/73   Pulse: 98   Resp: 16   Temp: 99.7 °F (37.6 °C)   SpO2: 95%            Physical Exam  Vitals signs and nursing note reviewed. Constitutional:       Appearance: She is well-developed. HENT:      Head: Normocephalic and atraumatic. Eyes:      Pupils: Pupils are equal, round, and reactive to light. Neck:      Musculoskeletal: Normal range of motion and neck supple. Cardiovascular:      Rate and Rhythm: Normal rate. Pulmonary:      Effort: Pulmonary effort is normal.   Abdominal:      General: There is no distension. Palpations: Abdomen is soft. Tenderness: There is no abdominal tenderness. Musculoskeletal:      Cervical back: Normal.      Thoracic back: Normal.      Lumbar back: Normal.   Skin:     General: Skin is warm and dry. Capillary Refill: Capillary refill takes less than 2 seconds. Neurological:      General: No focal deficit present. Mental Status: She is alert and oriented to person, place, and time. Psychiatric:         Mood and Affect: Mood normal.         Speech: Speech normal.         Behavior: Behavior normal.         Cognition and Memory: She exhibits impaired recent memory and impaired remote memory. MDM       Procedures      EKG performed at 8:35 PM  Atrial fibrillation, 90 bpm, Q waves in the inferior leads, lateral ST depressions, no T wave changes, no ectopy  EKG interpreted by Justine Small MD        The patient is resting comfortably and feels better, is alert, talkative, interactive and in no distress. The repeat examination is unremarkable and benign. The patient is neurologically intact, has a normal mental status and is ambulatory in the ED. The history, exam, diagnostic testing (if any) and the patient's current condition do not suggest arrhythmia, STEMI, seizure, meningitis, stroke, sepsis, fracture, UTI, PNA, encephalitis or other significant pathology that would warrant further testing, continued ED treatment, admission, neurological consultation, or other specialist evaluation at this point. The vital signs have been stable. The patient's condition is stable and appropriate for discharge. The patient will pursue further outpatient evaluation with the primary care physician or other designated or consulting physician as indicated in the discharge instructions.

## 2020-11-11 VITALS
HEART RATE: 77 BPM | OXYGEN SATURATION: 98 % | TEMPERATURE: 99 F | RESPIRATION RATE: 18 BRPM | DIASTOLIC BLOOD PRESSURE: 72 MMHG | SYSTOLIC BLOOD PRESSURE: 150 MMHG | BODY MASS INDEX: 26.83 KG/M2 | WEIGHT: 141.98 LBS

## 2020-11-11 LAB
ATRIAL RATE: 288 BPM
CALCULATED R AXIS, ECG10: 19 DEGREES
CALCULATED T AXIS, ECG11: 169 DEGREES
DIAGNOSIS, 93000: NORMAL
Q-T INTERVAL, ECG07: 272 MS
QRS DURATION, ECG06: 102 MS
QTC CALCULATION (BEZET), ECG08: 332 MS
VENTRICULAR RATE, ECG03: 90 BPM

## 2020-11-11 NOTE — ED NOTES
Discharge instructions given to patient son at bedside by nurse. Pt had been given counseling on medication use and verbalizes understanding. IV d/c. Pt transported with [facility] off unit in no signs of distress.

## 2021-03-03 ENCOUNTER — TELEPHONE (OUTPATIENT)
Dept: CARDIOLOGY CLINIC | Age: 86
End: 2021-03-03

## 2021-03-03 NOTE — TELEPHONE ENCOUNTER
3/3/2021 rescheduled echo and same day appointment with Dr. Kalyani Rushing from 3/9/2021 to 6/15/2021    Future Appointments   Date Time Provider Maty Schrader   6/15/2021  2:00 PM VASILE CASTREJON [facility]   6/15/2021  3:20 PM MD HELIO Ellis BS [facility]

## 2021-03-03 NOTE — TELEPHONE ENCOUNTER
Patient's son, Aram Lomax, is calling to reschedule the patient's upcoming appointments out a few months.       Phone #: [phone]  Thanks

## 2021-06-15 ENCOUNTER — ANCILLARY PROCEDURE (OUTPATIENT)
Dept: CARDIOLOGY CLINIC | Age: 86
End: 2021-06-15
Payer: MEDICARE

## 2021-06-15 ENCOUNTER — OFFICE VISIT (OUTPATIENT)
Dept: CARDIOLOGY CLINIC | Age: 86
End: 2021-06-15
Payer: MEDICARE

## 2021-06-15 VITALS
HEIGHT: 61 IN | HEART RATE: 74 BPM | SYSTOLIC BLOOD PRESSURE: 130 MMHG | DIASTOLIC BLOOD PRESSURE: 76 MMHG | WEIGHT: 135 LBS | BODY MASS INDEX: 25.49 KG/M2 | OXYGEN SATURATION: 97 % | RESPIRATION RATE: 12 BRPM

## 2021-06-15 VITALS — BODY MASS INDEX: 25.49 KG/M2 | HEIGHT: 61 IN | WEIGHT: 135 LBS

## 2021-06-15 DIAGNOSIS — I35.1 AORTIC VALVE INSUFFICIENCY, ETIOLOGY OF CARDIAC VALVE DISEASE UNSPECIFIED: ICD-10-CM

## 2021-06-15 DIAGNOSIS — Z95.818 S/P MITRAL VALVE CLIP IMPLANTATION: ICD-10-CM

## 2021-06-15 DIAGNOSIS — Z98.890 S/P MITRAL VALVE CLIP IMPLANTATION: ICD-10-CM

## 2021-06-15 DIAGNOSIS — I50.32 CHRONIC HEART FAILURE WITH PRESERVED EJECTION FRACTION (HCC): ICD-10-CM

## 2021-06-15 DIAGNOSIS — I25.10 CORONARY ARTERY DISEASE INVOLVING NATIVE CORONARY ARTERY OF NATIVE HEART WITHOUT ANGINA PECTORIS: ICD-10-CM

## 2021-06-15 DIAGNOSIS — I48.0 PAROXYSMAL ATRIAL FIBRILLATION (HCC): ICD-10-CM

## 2021-06-15 DIAGNOSIS — I50.32 CHRONIC DIASTOLIC CONGESTIVE HEART FAILURE (HCC): Primary | ICD-10-CM

## 2021-06-15 DIAGNOSIS — I34.0 MITRAL VALVE INSUFFICIENCY, UNSPECIFIED ETIOLOGY: ICD-10-CM

## 2021-06-15 LAB
AV R PG: 72.69 MMHG
ECHO AO ASC DIAM: 3.14 CM
ECHO AO ROOT DIAM: 3.11 CM
ECHO AR MAX VEL PISA: 426.3 CM/S
ECHO AV AREA PEAK VELOCITY: 1.46 CM2
ECHO AV AREA VTI: 1.41 CM2
ECHO AV AREA/BSA PEAK VELOCITY: 0.9 CM2/M2
ECHO AV AREA/BSA VTI: 0.9 CM2/M2
ECHO AV MEAN GRADIENT: 6.22 MMHG
ECHO AV PEAK GRADIENT: 10.13 MMHG
ECHO AV PEAK VELOCITY: 159.15 CM/S
ECHO AV REGURGITANT PHT: 423.11 MS
ECHO AV VTI: 27.93 CM
ECHO IVC PROX: 1.66 CM
ECHO LA AREA 4C: 22.25 CM2
ECHO LA MAJOR AXIS: 3.98 CM
ECHO LA MINOR AXIS: 2.49 CM
ECHO LA VOL 2C: 69.17 ML (ref 22–52)
ECHO LA VOL 4C: 69.37 ML (ref 22–52)
ECHO LA VOL BP: 75.91 ML (ref 22–52)
ECHO LA VOL/BSA BIPLANE: 47.44 ML/M2 (ref 16–28)
ECHO LA VOLUME INDEX A2C: 43.23 ML/M2 (ref 16–28)
ECHO LA VOLUME INDEX A4C: 43.36 ML/M2 (ref 16–28)
ECHO LV EDV A2C: 41.33 ML
ECHO LV EDV A4C: 50.24 ML
ECHO LV EDV BP: 45.49 ML (ref 56–104)
ECHO LV EDV INDEX A4C: 31.4 ML/M2
ECHO LV EDV INDEX BP: 28.4 ML/M2
ECHO LV EDV NDEX A2C: 25.8 ML/M2
ECHO LV EJECTION FRACTION A2C: 55 PERCENT
ECHO LV EJECTION FRACTION A4C: 51 PERCENT
ECHO LV EJECTION FRACTION BIPLANE: 52.2 PERCENT (ref 55–100)
ECHO LV ESV A2C: 18.69 ML
ECHO LV ESV A4C: 24.43 ML
ECHO LV ESV BP: 21.76 ML (ref 19–49)
ECHO LV ESV INDEX A2C: 11.7 ML/M2
ECHO LV ESV INDEX A4C: 15.3 ML/M2
ECHO LV ESV INDEX BP: 13.6 ML/M2
ECHO LV INTERNAL DIMENSION DIASTOLIC: 4.33 CM (ref 3.9–5.3)
ECHO LV INTERNAL DIMENSION SYSTOLIC: 3.06 CM
ECHO LV IVSD: 1.25 CM (ref 0.6–0.9)
ECHO LV MASS 2D: 210 G (ref 67–162)
ECHO LV MASS INDEX 2D: 131.2 G/M2 (ref 43–95)
ECHO LV POSTERIOR WALL DIASTOLIC: 1.35 CM (ref 0.6–0.9)
ECHO LVOT DIAM: 1.82 CM
ECHO LVOT PEAK GRADIENT: 3.22 MMHG
ECHO LVOT PEAK VELOCITY: 89.7 CM/S
ECHO LVOT SV: 39.4 ML
ECHO LVOT VTI: 15.23 CM
ECHO MV AREA VTI: 1.13 CM2
ECHO MV MAX VELOCITY: 185.17 CM/S
ECHO MV MEAN GRADIENT: 5.76 MMHG
ECHO MV PEAK GRADIENT: 13.72 MMHG
ECHO MV VTI: 34.83 CM
ECHO PV MAX VELOCITY: 124.99 CM/S
ECHO PV PEAK INSTANTANEOUS GRADIENT SYSTOLIC: 6.25 MMHG
ECHO RA MINOR AXIS: 3.4 CM
ECHO RV INTERNAL DIMENSION: 3.32 CM
ECHO TV REGURGITANT MAX VELOCITY: 254.78 CM/S
ECHO TV REGURGITANT PEAK GRADIENT: 25.96 MMHG
LA VOL DISK BP: 70.94 ML (ref 22–52)

## 2021-06-15 PROCEDURE — G8510 SCR DEP NEG, NO PLAN REQD: HCPCS | Performed by: INTERNAL MEDICINE

## 2021-06-15 PROCEDURE — 1090F PRES/ABSN URINE INCON ASSESS: CPT | Performed by: INTERNAL MEDICINE

## 2021-06-15 PROCEDURE — 1101F PT FALLS ASSESS-DOCD LE1/YR: CPT | Performed by: INTERNAL MEDICINE

## 2021-06-15 PROCEDURE — G8427 DOCREV CUR MEDS BY ELIG CLIN: HCPCS | Performed by: INTERNAL MEDICINE

## 2021-06-15 PROCEDURE — G8536 NO DOC ELDER MAL SCRN: HCPCS | Performed by: INTERNAL MEDICINE

## 2021-06-15 PROCEDURE — G8419 CALC BMI OUT NRM PARAM NOF/U: HCPCS | Performed by: INTERNAL MEDICINE

## 2021-06-15 PROCEDURE — 93306 TTE W/DOPPLER COMPLETE: CPT | Performed by: INTERNAL MEDICINE

## 2021-06-15 PROCEDURE — 99214 OFFICE O/P EST MOD 30 MIN: CPT | Performed by: INTERNAL MEDICINE

## 2021-06-15 PROCEDURE — G0463 HOSPITAL OUTPT CLINIC VISIT: HCPCS | Performed by: INTERNAL MEDICINE

## 2021-06-15 RX ORDER — TRAMADOL HYDROCHLORIDE 50 MG/1
50 TABLET ORAL
COMMUNITY

## 2021-06-15 RX ORDER — SERTRALINE HYDROCHLORIDE 50 MG/1
50 TABLET, FILM COATED ORAL DAILY
COMMUNITY

## 2021-06-15 RX ORDER — CHOLECALCIFEROL (VITAMIN D3) 125 MCG
1 CAPSULE ORAL DAILY
COMMUNITY

## 2021-06-15 RX ORDER — ASCORBIC ACID 500 MG
500 TABLET ORAL DAILY
COMMUNITY

## 2021-06-15 RX ORDER — BUMETANIDE 0.5 MG/1
0.5 TABLET ORAL DAILY
COMMUNITY

## 2021-06-15 NOTE — PROGRESS NOTES
[facility]  [phone]      HPI: Monie Gallo, a 80y.o. year-old who presents for follow up regarding her severe MR and diastolic heart failure. BP is good today and she is feeling well, Feels a bit tired but walking slowly. She did fall last November. Went to the hospital and no broken bones but had some aches and pains and mobility reduced drastically. Son hasnt seen her on a walker in three months. Exercising three days a week, out and about in the chair rather than the walker. Bp is great today. Trace edema. Doing pretty well reviewed her blood pressure level of activity and age  Walking down the rader is fine  No real dizziness  NO palpitations  NO swelling, ankles are fine  Good appetite    MV clip was done 4/9/19 and she looks much better. Really getting back to her old self, and color better, breathing better, stamina better, etc. Echo stable heart function which she is relieved to hear    She denies any PND or orthopnea  Off her O2. Back in [place] and doing well. She has chronic dizziness and feeling unsteady for years  No syncope   No headaches, no chest pain no dyspnea now. Her son is with her today. Weight is stable today at 135 trending down a little bit from before but not alarming at this time little bit of swelling again probably mostly positional if she is sitting a lot of the time    Assessment/Plan:  1. CAD s/p Inferior STEMI 9/11/18 with PTCA/stents to distal RCA (KENNETH x 2)  -symptoms preceding STEMI were palpitations, nausea, dizziness, diaphoresis, maybe some chest heaviness, troponin > 50  -continue plavix, coreg, statin  2. Severe MR - s/p MV clip 4/9/19, Dr. Gomez Monge, doing great now  3. Large left pleural effusion- resolved, now on low dose Lasix  4. AI- better on most recent echo, due to repeat  5. Hypotension - hx of HTN, well controlled now  6. HFpEF - BP well controlled, Lasix 40  -compression stockings daily  7.   AF - rate controlled  , continue Eliquis 2.5mg BID, no bleeding, etc.   8.  Dyslipidemia - at goal on simvastatin 40mg daily   9. DNR status in place  10. Dyslipidemia- cont statin    Echo 12/6/18 - LVEF 50 %, no WMA, grade 3 dd, LA severely dilated, MAC with eccentric severe regurgitation that was posteriorly directed, AV sclerosis without stenosis and mod AI, mild TR, PASP mildly increased, dilated IVC, large right pleural effusion, large left pleural effusion.   Head CT 9/18 normal  Inferior STEMI 9/11/18, Cardiac Cath 9/11/18  Thrombectomy of RCA, PTCA/stent of the distal RCA into the PLB with a 2.75 x 12 and 3.0 x 15 mm KENNETH (resolute stent), complicated case Ao 381/88, LVEDP 27, no gradient across AV valve, RCA dominant, totally occluded in distal segment, LM normal, LAD transapical vessel and small caliber normal.  LCX normal.  LVEF 50%, 2+ MR  Echo 4/18 - LVEF 55 %, no WMA,mod dilated LA, MAC with mild MR, AV sclerosis with mod AI, mild TR, PASP 30mmHg, mild PI    Soc Hx: no tobacco use x 10 years, no etoh use   Fam Hx: mother passed at age 80 from heart problems    She  has a past medical history of (HFpEF) heart failure with preserved ejection fraction ([address] (10/17/2018), Allergic rhinitis, Atherosclerotic cardiovascular disease (1999), CHF (congestive heart failure) ([address] (09/11/2018), Chronic kidney disease, Cognitive communication deficit, Contact dermatitis and eczema due to cause, Edema, Environmental allergies, Fatty liver disease, nonalcoholic, GERD (gastroesophageal reflux disease), H/O heart artery stent (09/2018), HCVD (hypertensive cardiovascular disease) (10/17/2018), Heart attack ([address] (09/2018), Heart palpitations (2018), History of PTCA, Hypercholesterolemia (1999), Hypertension (2010), Liver disease, MI (myocardial infarction) ([address] (09/2018), Overactive bladder (08/09/2018), Peripheral neuropathy, Permanent atrial fibrillation ([address] (11/05/2018), Senile dementia, without behavioral disturbance ([address] (11/05/2018), Stress incontinence, female (08/09/2018), Thyroid disease, Vertigo, and Vitamin D deficiency. She also has no past medical history of Anemia, Arthritis, Asthma, Autoimmune disease ([facility] Calculus of kidney, Cancer ([facility] Chronic obstructive pulmonary disease ([facility] Chronic pain, Depression, Diabetes ([facility] Headache, History of abuse in adulthood, History of abuse in childhood, Psychotic disorder ([facility] PUD (peptic ulcer disease), Seizures ([facility] Stroke ([facility] Thromboembolus ([facility] or Trauma. Cardiovascular ROS: no chest pain, positive for dyspnea   Respiratory ROS: positive for shortness of breath  Neurological ROS: no TIA or stroke symptoms  All other systems negative except as above. PE  Vitals:    06/15/21 1438   BP: 130/76   Pulse: 74   Resp: 12   SpO2: 97%   Weight: 135 lb (61.2 kg)   Height: 5' 1\" (1.549 m)    Body mass index is 25.51 kg/m².    General appearance - alert, well appearing, and in no distress  Mental status - affect appropriate to mood  Respiratory rate unlabored no wheezing  Cardiovascular regular rhythm 2/6 VANGIE  Skin- normal coloration  no rashes    Recent Labs:  Lab Results   Component Value Date/Time    Cholesterol, total 159 08/20/2019 12:05 PM    HDL Cholesterol 67 08/20/2019 12:05 PM    LDL, calculated 79 08/20/2019 12:05 PM    Triglyceride 63 08/20/2019 12:05 PM    CHOL/HDL Ratio 3.8 12/17/2009 08:35 AM     Lab Results   Component Value Date/Time    Creatinine 1.47 (H) 11/10/2020 05:00 PM     Lab Results   Component Value Date/Time    BUN 32 (H) 11/10/2020 05:00 PM     Lab Results   Component Value Date/Time    Potassium 2.3 (LL) 11/10/2020 05:00 PM     Lab Results   Component Value Date/Time    Hemoglobin A1c 5.8 (H) 08/20/2019 12:05 PM     Lab Results   Component Value Date/Time    HGB 9.7 (L) 11/10/2020 05:00 PM     Lab Results   Component Value Date/Time    PLATELET 456 (H) 91/31/8555 05:00 PM       Reviewed:  Past Medical History:   Diagnosis Date    (HFpEF) heart failure with preserved ejection fraction ([facility] 10/17/2018    acute    Allergic rhinitis     Atherosclerotic cardiovascular disease     CHF (congestive heart failure) ([facility] 2018    Chronic kidney disease     Cognitive communication deficit     Contact dermatitis and eczema due to cause     Edema     Environmental allergies     dizziness-(chronic)    Fatty liver disease, nonalcoholic     GERD (gastroesophageal reflux disease)     H/O heart artery stent 2018    HCVD (hypertensive cardiovascular disease) 10/17/2018    Heart attack ([facility] 2018    Heart palpitations 2018    infrequent     History of PTCA     Hypercholesterolemia     Hypertension     Liver disease     fatty liver    MI (myocardial infarction) ([facility] 2018    Overactive bladder 2018    Peripheral neuropathy     Permanent atrial fibrillation ([facility] 2018    Senile dementia, without behavioral disturbance ([facility] 2018    Stress incontinence, female 2018    Thyroid disease     Vertigo     Vitamin D deficiency      Social History     Tobacco Use   Smoking Status Former Smoker    Packs/day: 4.00    Types: Cigarettes    Quit date: 1955    Years since quittin.4   Smokeless Tobacco Never Used     Social History     Substance and Sexual Activity   Alcohol Use No    Alcohol/week: 0.0 - 0.8 standard drinks     Allergies   Allergen Reactions    Brilinta [Ticagrelor] Shortness of Breath    Celecoxib Other (comments)    Codeine Hives    Sulfa (Sulfonamide Antibiotics) Unknown (comments) and Hives    Iodine Unknown (comments)     Other reaction(s): Other (See Comments)  This was on outside records, pt denies that she has iodine allergy. Able to tolerate seafood, shellfish w/o reaction.      Aricept [Donepezil] Other (comments)     Headaches - noted as intolerance     Darvocet A500 [Propoxyphene N-Acetaminophen] Nausea Only       Current Outpatient Medications   Medication Sig  sertraline (Zoloft) 50 mg tablet Take 50 mg by mouth daily.  traMADoL (ULTRAM) 50 mg tablet Take 50 mg by mouth every six (6) hours as needed for Pain.  cholecalciferol, vitamin D3, (Vitamin D3) 50 mcg (2,000 unit) tab Take 1 Tablet by mouth daily.  ascorbic acid, vitamin C, (Vitamin C) 500 mg tablet Take 500 mg by mouth daily.  bumetanide (BUMEX) 0.5 mg tablet Take 0.5 mg by mouth daily.  cyanocobalamin (VITAMIN B12) 500 mcg tablet Take 500 mcg by mouth daily. With [id number] of folic acid    lactulose (CHRONULAC) 10 gram/15 mL solution Take 15 mL by mouth as needed.  levothyroxine (SYNTHROID) 75 mcg tablet Take 37.5 mcg by mouth Daily (before breakfast).  peg 400-propylene glycol (SYSTANE, PROPYLENE GLYCOL,) 0.4-0.3 % drop Administer 1 Drop to both eyes two (2) times a day.  acetaminophen (TYLENOL) 325 mg tablet Take 325 mg by mouth every six (6) hours as needed for Pain.  omeprazole (PRILOSEC) 20 mg capsule Take 20 mg by mouth daily.  clopidogrel (PLAVIX) 75 mg tab Take 75 mg by mouth daily. Indications: treatment to prevent a heart attack    polyethylene glycol (MIRALAX) 17 gram packet Take 17 g by mouth daily.  apixaban (ELIQUIS) 2.5 mg tablet Take 1 Tab by mouth two (2) times a day.  nitroglycerin (NITROSTAT) 0.4 mg SL tablet Take 0.4 mg by mouth as needed.  diltiazem CD (CARTIA XT) 180 mg ER capsule Take 180 mg by mouth nightly.  metOLazone (ZAROXOLYN) 2.5 mg tablet Take 2.5 mg by mouth every other day. (Patient not taking: Reported on 6/15/2021)    diclofenac (VOLTAREN) 1 % gel Apply 1 Each to affected area daily. (Patient not taking: Reported on 6/15/2021)    furosemide (LASIX) 40 mg tablet Take  by mouth daily. (Patient not taking: Reported on 6/15/2021)    senna (Senna) 8.6 mg tablet Take 1 Tab by mouth daily. (Patient not taking: Reported on 6/15/2021)    cholecalciferol (VITAMIN D3) (5000 Units/125 mcg) tab tablet Take 5,000 Units by mouth daily.     fluticasone (Flonase Sensimist) 27.5 mcg/actuation nasal spray 1 [place] by Nasal route daily. (Patient not taking: Reported on 6/15/2021)    mirabegron ER (Myrbetriq) 50 mg ER tablet Take 50 mg by mouth daily. (Patient not taking: Reported on 6/15/2021)    meclizine (ANTIVERT) 12.5 mg tablet Take 12.5 mg by mouth three (3) times daily as needed (for BPPV). (Patient not taking: Reported on 6/15/2021)    busPIRone (BUSPAR) 10 mg tablet Take 1 Tab by mouth two (2) times a day. (Patient not taking: Reported on 6/15/2021)    hydrOXYzine HCl (ATARAX) 25 mg tablet Take 1 Tab by mouth nightly. (Patient not taking: Reported on 6/15/2021)    vitamin b12-folic acid 9.4-0 mg tab Take 1 Tab by mouth daily. (Patient not taking: Reported on 6/15/2021)    Calcium-Cholecalciferol, D3, 500 mg(1,250mg) -400 unit tab Take  by mouth. (Patient not taking: Reported on 6/15/2021)    simvastatin (ZOCOR) 40 mg tablet Take 40 mg by mouth nightly. (Patient not taking: Reported on 6/15/2021)     No current facility-administered medications for this visit. Ralph Mohamud MD  [facility] [address] [place] [address]  [facility]  [phone]  [address] [place] [address] [phone]    HPI: Tiffanie Olguin, a 80y.o. year-old who presents for follow up regarding her severe MR and diastolic heart failure.     She is in healthcare now at [facility] says they plan to move her to assisted living but I suggested they hold off on transfer out of healthcare until we make sure she is stable  She reports that her stamina is better  Says she gets dyspnea with walking depending on how fast she is walking  Denies PND and sleeps on 2 pillows  Weight down 2 lbs from the hospital, also reports poor appetite, we talked about eating 3 meals/day  Says she is sleeping well   Denies any chest pain, pressure, tightness  Reports rare palpitations which only last a few seconds and without any high risk features  Says she has very little dizziness now, no syncope  BP low today - 100/60  O2 Sats 90% with ambulation   Says she coughs all the time but it is not productive, no fevers or chills  Son reports she is coughing much less now than she was prior to admission  Patient's son is with her today, she is a poor historian    **After last office visit labs received dated 12/24/18  BUN 15, Cr 0.97, K 3.3, Mg 1.7    Assessment/Plan:  1. CAD s/p Inferior STEMI 9/11/18 with PTCA/stents to distal RCA (KENNETH x 2)  -symptoms preceding STEMI were palpitations, nausea, dizziness, diaphoresis, maybe some chest heaviness, troponin > 50  -continue plavix, coreg, statin  2. Severe MR - seen by valve team during admission, no plans for valve repair, etc at this time   -will repeat TTE in 1 month to reassess severity of MR and follow up with Dr. Marcos Layne at that time   3. Large left pleural effusion on TTE, moderate on CXR on admission - will repeat CXR now to reassess, continue bumex  4. AI - moderate by TTE   5. Hypotension - hx of HTN, advised her to reduce her coreg to 3.125mg BID and continue other medications for now, will need to watch BP closely  6. HFpEF - BP controlled, continue bumex, will check BMP and magnesium level prior to her return appointment in 1 month  7. AF - rate controlled on coreg 6.25mg BID and diltiazem 180mg daily, reducing coreg dose as above for hypotension, continue Eliquis 2.5mg BID for anticoagulation  8. Dyslipidemia - LDL 56 on simvastatin 40mg daily   -Lipids 9/18 - , TG 81, , HDL 50  9. CHEMA post cardiac cath - now creatinine normal  10. DNR status in place    Echo 12/6/18 - LVEF 50 %, no WMA, grade 3 dd, LA severely dilated, MAC with eccentric severe regurgitation that was posteriorly directed, AV sclerosis without stenosis and mod AI, mild TR, PASP mildly increased, dilated IVC, large right pleural effusion, large left pleural effusion.   Head CT 9/18 normal  Inferior STEMI 9/11/18, Cardiac Cath 9/11/18  Thrombectomy of RCA, PTCA/stent of the distal RCA into the PLB with a 2.75 x 12 and 3.0 x 15 mm KENNETH (resolute stent), complicated case Ao 335/18, LVEDP 27, no gradient across AV valve, RCA dominant, totally occluded in distal segment, LM normal, LAD transapical vessel and small caliber normal.  LCX normal.  LVEF 50%, 2+ MR  Echo 4/18 - LVEF 55 %, no WMA,mod dilated LA, MAC with mild MR, AV sclerosis with mod AI, mild TR, PASP 30mmHg, mild PI    Soc Hx: no tobacco use x 10 years, no etoh use   Fam Hx: mother passed at age 80 from heart problems    She  has a past medical history of (HFpEF) heart failure with preserved ejection fraction ([address] (10/17/2018), Allergic rhinitis, Atherosclerotic cardiovascular disease (1999), CHF (congestive heart failure) ([address] (09/11/2018), Chronic kidney disease, Cognitive communication deficit, Contact dermatitis and eczema due to cause, Edema, Environmental allergies, Fatty liver disease, nonalcoholic, GERD (gastroesophageal reflux disease), H/O heart artery stent (09/2018), HCVD (hypertensive cardiovascular disease) (10/17/2018), Heart attack ([address] (09/2018), Heart palpitations (2018), History of PTCA, Hypercholesterolemia (1999), Hypertension (2010), Liver disease, MI (myocardial infarction) ([address] (09/2018), Overactive bladder (08/09/2018), Peripheral neuropathy, Permanent atrial fibrillation ([address] (11/05/2018), Senile dementia, without behavioral disturbance ([address] (11/05/2018), Stress incontinence, female (08/09/2018), Thyroid disease, Vertigo, and Vitamin D deficiency.  She also has no past medical history of Anemia, Arthritis, Asthma, Autoimmune disease ([address] Calculus of kidney, Cancer ([address] Chronic obstructive pulmonary disease ([address] Chronic pain, Depression, Diabetes ([address] Headache, History of abuse in adulthood, History of abuse in childhood, Psychotic disorder ([facility] PUD (peptic ulcer disease), Seizures ([facility] Stroke ([facility] Th[facility]boembolus ([facility] or Trauma. Cardiovascular ROS: no chest pain, positive for dyspnea   Respiratory ROS: positive for cough, shortness of breath  Neurological ROS: no TIA or stroke symptoms  All other systems negative except as above. PE  Vitals:    06/15/21 1438   BP: 130/76   Pulse: 74   Resp: 12   SpO2: 97%   Weight: 135 lb (61.2 kg)   Height: 5' 1\" (1.549 m)    Body mass index is 25.51 kg/m².    General appearance - alert, well appearing, and in no distress  Mental status - affect appropriate to mood  Eyes - sclera anicteric, moist mucous membranes  Neck - supple  Lymphatics - not assessed  Chest - diminished base on left side, right lung clear    Heart - normal rate, irregular rhythm, normal S1, S2, no murmurs, rubs, clicks or gallops  Abdomen - soft, nontender, nondistended  Back exam - full range of motion  Neurological - no focal deficit  Musculoskeletal - normal strength  Extremities - peripheral pulses normal, trace LE edema  Skin - normal coloration  no rashes    Recent Labs:  Lab Results   Component Value Date/Time    Cholesterol, total 159 08/20/2019 12:05 PM    HDL Cholesterol 67 08/20/2019 12:05 PM    LDL, calculated 79 08/20/2019 12:05 PM    Triglyceride 63 08/20/2019 12:05 PM    CHOL/HDL Ratio 3.8 12/17/2009 08:35 AM     Lab Results   Component Value Date/Time    Creatinine 1.47 (H) 11/10/2020 05:00 PM     Lab Results   Component Value Date/Time    BUN 32 (H) 11/10/2020 05:00 PM     Lab Results   Component Value Date/Time    Potassium 2.3 (LL) 11/10/2020 05:00 PM     Lab Results   Component Value Date/Time    Hemoglobin A1c 5.8 (H) 08/20/2019 12:05 PM     Lab Results   Component Value Date/Time    HGB 9.7 (L) 11/10/2020 05:00 PM     Lab Results   Component Value Date/Time    PLATELET 742 (H) 18/04/4112 05:00 PM       Reviewed:  Past Medical History:   Diagnosis Date    (HFpEF) heart failure with preserved ejection fraction ([facility] 10/17/2018    acute    Allergic rhinitis     Atherosclerotic cardiovascular disease     CHF (congestive heart failure) ([facility] 2018    Chronic kidney disease     Cognitive communication deficit     Contact dermatitis and eczema due to cause     Edema     Environmental allergies     dizziness-(chronic)    Fatty liver disease, nonalcoholic     GERD (gastroesophageal reflux disease)     H/O heart artery stent 2018    HCVD (hypertensive cardiovascular disease) 10/17/2018    Heart attack ([facility] 2018    Heart palpitations 2018    infrequent     History of PTCA     Hypercholesterolemia     Hypertension     Liver disease     fatty liver    MI (myocardial infarction) ([facility] 2018    Overactive bladder 2018    Peripheral neuropathy     Permanent atrial fibrillation ([facility] 2018    Senile dementia, without behavioral disturbance ([facility] 2018    Stress incontinence, female 2018    Thyroid disease     Vertigo     Vitamin D deficiency      Social History     Tobacco Use   Smoking Status Former Smoker    Packs/day: 4.00    Types: Cigarettes    Quit date: 1955    Years since quittin.4   Smokeless Tobacco Never Used     Social History     Substance and Sexual Activity   Alcohol Use No    Alcohol/week: 0.0 - 0.8 standard drinks     Allergies   Allergen Reactions    Brilinta [Ticagrelor] Shortness of Breath    Celecoxib Other (comments)    Codeine Hives    Sulfa (Sulfonamide Antibiotics) Unknown (comments) and Hives    Iodine Unknown (comments)     Other reaction(s): Other (See Comments)  This was on outside records, pt denies that she has iodine allergy. Able to tolerate seafood, shellfish w/o reaction.  Aricept [Donepezil] Other (comments)     Headaches - noted as intolerance     Darvocet A500 [Propoxyphene N-Acetaminophen] Nausea Only       Current Outpatient Medications   Medication Sig    sertraline (Zoloft) 50 mg tablet Take 50 mg by mouth daily.     traMADoL (ULTRAM) 50 mg tablet Take 50 mg by mouth every six (6) hours as needed for Pain.  cholecalciferol, vitamin D3, (Vitamin D3) 50 mcg (2,000 unit) tab Take 1 Tablet by mouth daily.  ascorbic acid, vitamin C, (Vitamin C) 500 mg tablet Take 500 mg by mouth daily.  bumetanide (BUMEX) 0.5 mg tablet Take 0.5 mg by mouth daily.  cyanocobalamin (VITAMIN B12) 500 mcg tablet Take 500 mcg by mouth daily. With 401LBD of folic acid    lactulose (CHRONULAC) 10 gram/15 mL solution Take 15 mL by mouth as needed.  levothyroxine (SYNTHROID) 75 mcg tablet Take 37.5 mcg by mouth Daily (before breakfast).  peg 400-propylene glycol (SYSTANE, PROPYLENE GLYCOL,) 0.4-0.3 % drop Administer 1 Drop to both eyes two (2) times a day.  acetaminophen (TYLENOL) 325 mg tablet Take 325 mg by mouth every six (6) hours as needed for Pain.  omeprazole (PRILOSEC) 20 mg capsule Take 20 mg by mouth daily.  clopidogrel (PLAVIX) 75 mg tab Take 75 mg by mouth daily. Indications: treatment to prevent a heart attack    polyethylene glycol (MIRALAX) 17 gram packet Take 17 g by mouth daily.  apixaban (ELIQUIS) 2.5 mg tablet Take 1 Tab by mouth two (2) times a day.  nitroglycerin (NITROSTAT) 0.4 mg SL tablet Take 0.4 mg by mouth as needed.  diltiazem CD (CARTIA XT) 180 mg ER capsule Take 180 mg by mouth nightly.  metOLazone (ZAROXOLYN) 2.5 mg tablet Take 2.5 mg by mouth every other day. (Patient not taking: Reported on 6/15/2021)    diclofenac (VOLTAREN) 1 % gel Apply 1 Each to affected area daily. (Patient not taking: Reported on 6/15/2021)    furosemide (LASIX) 40 mg tablet Take  by mouth daily. (Patient not taking: Reported on 6/15/2021)    senna (Senna) 8.6 mg tablet Take 1 Tab by mouth daily. (Patient not taking: Reported on 6/15/2021)    cholecalciferol (VITAMIN D3) (5000 Units/125 mcg) tab tablet Take 5,000 Units by mouth daily.  fluticasone (Flonase Sensimist) 27.5 mcg/actuation nasal spray 1 [place] by Nasal route daily.  (Patient not taking: Reported on 6/15/2021)    mirabegron ER (Myrbetriq) 50 mg ER tablet Take 50 mg by mouth daily. (Patient not taking: Reported on 6/15/2021)    meclizine (ANTIVERT) 12.5 mg tablet Take 12.5 mg by mouth three (3) times daily as needed (for BPPV). (Patient not taking: Reported on 6/15/2021)    busPIRone (BUSPAR) 10 mg tablet Take 1 Tab by mouth two (2) times a day. (Patient not taking: Reported on 6/15/2021)    hydrOXYzine HCl (ATARAX) 25 mg tablet Take 1 Tab by mouth nightly. (Patient not taking: Reported on 6/15/2021)    vitamin b12-folic acid 6.9-7 mg tab Take 1 Tab by mouth daily. (Patient not taking: Reported on 6/15/2021)    Calcium-Cholecalciferol, D3, 500 mg(1,250mg) -400 unit tab Take  by mouth. (Patient not taking: Reported on 6/15/2021)    simvastatin (ZOCOR) 40 mg tablet Take 40 mg by mouth nightly. (Patient not taking: Reported on 6/15/2021)     No current facility-administered medications for this visit.        Willy Jacob MD  [facility] [address] [place] [address] [address], [address] [place] [address]  [facility]  [phone]